# Patient Record
Sex: MALE | Race: WHITE | Employment: OTHER | URBAN - METROPOLITAN AREA
[De-identification: names, ages, dates, MRNs, and addresses within clinical notes are randomized per-mention and may not be internally consistent; named-entity substitution may affect disease eponyms.]

---

## 2017-01-05 ENCOUNTER — TELEPHONE (OUTPATIENT)
Dept: CARDIOLOGY CLINIC | Age: 77
End: 2017-01-05

## 2017-01-05 NOTE — TELEPHONE ENCOUNTER
----- Message from Aaron Penn MD sent at 12/22/2016  8:47 AM EST -----  Regarding: Carotid duplex   Please let the patient know that he did not have any high-grade stenosis. His carotid disease were unchanged compared with previous study  ----- Message -----     From: Tito Park MD     Sent: 12/8/2016   4:41 PM       To:  Aaron Penn MD

## 2017-01-05 NOTE — TELEPHONE ENCOUNTER
Patient was informed that he did not have any high grade stenosis and carotid disease were unchanged compared with prior study. Patient verbalized understanding.

## 2017-01-13 DIAGNOSIS — I25.119 ATHEROSCLEROSIS OF NATIVE CORONARY ARTERY WITH ANGINA PECTORIS, UNSPECIFIED WHETHER NATIVE OR TRANSPLANTED HEART (HCC): ICD-10-CM

## 2017-01-13 RX ORDER — NITROGLYCERIN 0.4 MG/1
0.4 TABLET SUBLINGUAL
Qty: 25 TAB | Refills: 3 | Status: SHIPPED | OUTPATIENT
Start: 2017-01-13 | End: 2018-10-23

## 2017-01-25 ENCOUNTER — HOSPITAL ENCOUNTER (OUTPATIENT)
Dept: LAB | Age: 77
Discharge: HOME | End: 2017-01-25
Payer: MEDICARE

## 2017-01-25 DIAGNOSIS — E78.5 HYPERLIPIDEMIA, UNSPECIFIED HYPERLIPIDEMIA TYPE: ICD-10-CM

## 2017-01-25 DIAGNOSIS — I10 ESSENTIAL HYPERTENSION: ICD-10-CM

## 2017-01-25 DIAGNOSIS — R73.01 ELEVATED FASTING GLUCOSE: ICD-10-CM

## 2017-01-25 LAB
ALBUMIN SERPL BCP-MCNC: 4.2 G/DL (ref 3.4–5)
ALBUMIN/GLOB SERPL: 1.6 {RATIO} (ref 0.8–1.7)
ALP SERPL-CCNC: 99 U/L (ref 45–117)
ALT SERPL-CCNC: 53 U/L (ref 16–61)
ANION GAP BLD CALC-SCNC: 12 MMOL/L (ref 3–18)
AST SERPL W P-5'-P-CCNC: 40 U/L (ref 15–37)
BILIRUB SERPL-MCNC: 1.7 MG/DL (ref 0.2–1)
BUN SERPL-MCNC: 12 MG/DL (ref 7–18)
BUN/CREAT SERPL: 12 (ref 12–20)
CALCIUM SERPL-MCNC: 9.5 MG/DL (ref 8.5–10.1)
CHLORIDE SERPL-SCNC: 109 MMOL/L (ref 100–108)
CHOLEST SERPL-MCNC: 118 MG/DL
CO2 SERPL-SCNC: 25 MMOL/L (ref 21–32)
CREAT SERPL-MCNC: 1 MG/DL (ref 0.6–1.3)
GLOBULIN SER CALC-MCNC: 2.7 G/DL (ref 2–4)
GLUCOSE SERPL-MCNC: 111 MG/DL (ref 74–99)
HDLC SERPL-MCNC: 46 MG/DL (ref 40–60)
HDLC SERPL: 2.6 {RATIO} (ref 0–5)
LDLC SERPL CALC-MCNC: 44.2 MG/DL (ref 0–100)
LIPID PROFILE,FLP: NORMAL
POTASSIUM SERPL-SCNC: 4.2 MMOL/L (ref 3.5–5.5)
PROT SERPL-MCNC: 6.9 G/DL (ref 6.4–8.2)
SODIUM SERPL-SCNC: 146 MMOL/L (ref 136–145)
TRIGL SERPL-MCNC: 139 MG/DL (ref ?–150)
VLDLC SERPL CALC-MCNC: 27.8 MG/DL

## 2017-01-25 PROCEDURE — 80061 LIPID PANEL: CPT | Performed by: FAMILY MEDICINE

## 2017-01-25 PROCEDURE — 36415 COLL VENOUS BLD VENIPUNCTURE: CPT | Performed by: FAMILY MEDICINE

## 2017-01-25 PROCEDURE — 80053 COMPREHEN METABOLIC PANEL: CPT | Performed by: FAMILY MEDICINE

## 2017-02-01 ENCOUNTER — OFFICE VISIT (OUTPATIENT)
Dept: FAMILY MEDICINE CLINIC | Age: 77
End: 2017-02-01

## 2017-02-01 VITALS
SYSTOLIC BLOOD PRESSURE: 144 MMHG | RESPIRATION RATE: 18 BRPM | BODY MASS INDEX: 27.77 KG/M2 | HEIGHT: 72 IN | WEIGHT: 205 LBS | HEART RATE: 63 BPM | OXYGEN SATURATION: 98 % | TEMPERATURE: 96.9 F | DIASTOLIC BLOOD PRESSURE: 80 MMHG

## 2017-02-01 DIAGNOSIS — M54.16 RADICULOPATHY, LUMBAR REGION: ICD-10-CM

## 2017-02-01 DIAGNOSIS — I10 ESSENTIAL HYPERTENSION: Primary | ICD-10-CM

## 2017-02-01 DIAGNOSIS — E78.5 HYPERLIPIDEMIA, UNSPECIFIED HYPERLIPIDEMIA TYPE: ICD-10-CM

## 2017-02-01 NOTE — PROGRESS NOTES
HISTORY OF PRESENT ILLNESS  Sherita  is a 77 y.o. male. Chief Complaint   Patient presents with    Follow-up    Hypertension    Cholesterol Problem    Labs     completed on 1-25-17    Stress       HPI  Pt is here for follow up of Hypertension, and Cholesterol. Pt had labs on 1-25-17. Labs reviewed in detail with pt. Pt feels that his diet has not been great recently as they have needed to eat less healthy foods for the sake of convenience. Pt does not need medication refills today. New concerns today: Pt states that he has been stressed lately due to family members' health issues and a death in the family. He will travel to [place] this spring and may be there for a few months as he and his wife help to settle the estate of his late mother-in-law. ROS  Review of Systems   Constitutional: Negative. HENT: Negative. Respiratory: Negative. Cardiovascular: Negative. All other systems reviewed and are negative. Physical Exam  Nursing note and vitals reviewed. Constitutional: He is oriented to person, place, and time. He appears well-developed and well-nourished. HENT:   Head: Normocephalic and atraumatic. Right Ear: External ear normal.   Left Ear: External ear normal.   Nose: Nose normal.   Eyes: Conjunctivae and EOM are normal.   Neck: Normal range of motion. Neck supple. No JVD present. Carotid bruit is not present. No thyromegaly present. Cardiovascular: Normal rate, regular rhythm, normal heart sounds and intact distal pulses. Exam reveals no gallop and no friction rub. No murmur heard. Pulmonary/Chest: Effort normal and breath sounds normal. He has no wheezes. He has no rhonchi. He has no rales. Abdominal: Soft. Musculoskeletal: Normal range of motion. Neurological: He is alert and oriented to person, place, and time. Coordination normal.   Skin: Skin is warm and dry. Psychiatric: He has a normal mood and affect.  His behavior is normal. Judgment and thought content normal.       ASSESSMENT and PLAN  Philly Ross was seen today for follow-up, hypertension, cholesterol problem, labs and stress. Diagnoses and all orders for this visit:    Essential hypertension  Slightly elevated today. Recheck at f/u prior to travel. Hyperlipidemia, unspecified hyperlipidemia type  Stable, cont pres tx plan. Radiculopathy, lumbar region  Stable, cont pres tx plan.        Follow-up Disposition:  6 wk for f/u htn

## 2017-02-01 NOTE — PROGRESS NOTES
Willian Gonzales 77 y.o. male   Chief Complaint   Patient presents with    Follow-up    Hypertension    Cholesterol Problem    Labs     completed on 1-25-17    Stress         1. Have you been to the ER, urgent care clinic since your last visit? Hospitalized since your last visit? No    2. Have you seen or consulted any other health care providers outside of the [facility] since your last visit? Include any pap smears or colon screening.  No

## 2017-02-01 NOTE — MR AVS SNAPSHOT
Visit Information Date & Time Provider Department Dept. Phone Encounter #  
 2/1/2017  9:00 Candice Zaragoza MD [address] [id number] Your Appointments 3/15/2017  9:30 AM  
Follow Up with Mahi Wiggins MD  
[address] (--) Appt Note: 6 week follow up [address] [place] [address] [phone]  
  
   
 [address] [place] [phone]  
  
    
 8/7/2017  8:40 AM  
Follow Up with Ana Maria Le MD  
[facility] ([address]) Appt Note: Follow up  In 6 mo with Dr. Perdue [place] [id number] [address] [address] [phone] [place] [phone]  
  
    
 8/8/2017  8:00 AM  
PROCEDURE with [facility] IMAGING 1  
[facility] ([facility] SCHEDULING) Appt Note: aaa 2 yr [place] [address] [address]  
[phone] [address] [phone]  
  
    
 8/8/2017  9:00 AM  
PROCEDURE with [facility] IMAGING 1  
[facility] ([facility] SCHEDULING) Appt Note: cv 2 yr [place] [address] [address]  
[phone] 8/17/2017  9:15 AM  
Follow Up with RAHAT Ha  
[facility] ([facility] SCHEDULING) [address] [address]  
[phone]  
  
   
 [address] [address] Upcoming Health Maintenance Date Due  
 GLAUCOMA SCREENING Q2Y 11/17/2016 MEDICARE YEARLY EXAM 11/2/2017 COLONOSCOPY 9/28/2020 DTaP/Tdap/Td series (2 - Td) 11/1/2026 Allergies as of 2/1/2017  Review Complete On: 2/1/2017 By: Mahi Wiggins MD  
  
 Severity Noted Reaction Type Reactions Pollen Extracts  02/05/2015    Sneezing Itchy eyes, runny nose Current Immunizations  Reviewed on 11/1/2016 Name Date Influenza High Dose Vaccine PF 11/1/2016 Influenza Vaccine 9/17/2015, 12/1/2014, 9/16/2013 Influenza Vaccine Whole 10/18/2012 Pneumococcal Polysaccharide (PPSV-23) 11/1/2016 Pneumococcal Vaccine (Unspecified Type) 8/21/2006 TD Vaccine 8/21/2006 Zoster 9/18/2012 Not reviewed this visit You Were Diagnosed With   
  
 Codes Comments Essential hypertension    -  Primary ICD-10-CM: I10 
ICD-9-CM: 401.9 Hyperlipidemia, unspecified hyperlipidemia type     ICD-10-CM: E78.5 ICD-9-CM: 272.4 Radiculopathy, lumbar region     ICD-10-CM: M54.16 
ICD-9-CM: 724.4 Vitals BP Pulse Temp Resp Height(growth percentile) Weight(growth percentile) 144/80 (BP 1 Location: Left arm, BP Patient Position: Sitting) 63 96.9 °F (36.1 °C) (Oral) 18 6' (1.829 m) 205 lb (93 kg) SpO2 BMI Smoking Status 98% 27.8 kg/m2 Former Smoker BMI and BSA Data Body Mass Index Body Surface Area  
 27.8 kg/m 2 2.17 m 2 Preferred Pharmacy Pharmacy Name Phone [address] [facility] [phone] Your Updated Medication List  
  
   
This list is accurate as of: 2/1/17  2:48 PM.  Always use your most recent med list. amLODIPine 5 mg tablet Commonly known as:  Arlyss [place] TAKE 1 TABLET BY MOUTH DAILY  
  
 aspirin delayed-release 81 mg tablet Take  by mouth daily. CALTRATE PLUS PO Take 1 Tab by mouth daily. COQ10  100-100 mg-unit Cap Generic drug:  coenzyme q10-vitamin e Take  by mouth daily. ezetimibe 10 mg tablet Commonly known as:  Donice Ka Take 1 Tab by mouth daily. gabapentin 300 mg capsule Commonly known as:  NEURONTIN  
300 mg two (2) times a day. naproxen 500 mg tablet Commonly known as:  NAPROSYN Take 1 Tab by mouth two (2) times daily (with meals). niacin 1,000 mg Tb24 tab Commonly known as:  Niaspan Take 1 Tab by mouth daily. nitroglycerin 0.4 mg SL tablet Commonly known as:  NITROSTAT  
1 Tab by SubLINGual route every five (5) minutes as needed. omega 3-dha-epa-fish oil 100-160-1,000 mg Cap Take 1,000 mg by mouth daily. rosuvastatin 20 mg tablet Commonly known as:  CRESTOR Take 1 Tab by mouth nightly. sildenafil citrate 100 mg tablet Commonly known as:  VIAGRA Take 1 Tab by mouth as needed. VITAMIN D3 2,000 unit Tab Generic drug:  cholecalciferol (vitamin D3) Take  by mouth. Patient Instructions Please contact our office if you have any questions about your visit today. Introducing \[facility]\"" & HEALTH SERVICES! Dear Demar Cain: Thank you for requesting a [employer] account. Our records indicate that you already have an active [employer] account. You can access your account anytime at [email or web address] Did you know that you can access your hospital and ER discharge instructions at any time in [employer]? You can also review all of your test results from your hospital stay or ER visit. Additional Information If you have questions, please visit the Frequently Asked Questions section of the [employer] website at [email or web address] Remember, [employer] is NOT to be used for urgent needs. For medical emergencies, dial 911. Now available from your iPhone and Android! Please provide this summary of care documentation to your next provider. Your primary care clinician is listed as Tong Baldwin. If you have any questions after today's visit, please call [phone].

## 2017-03-15 ENCOUNTER — OFFICE VISIT (OUTPATIENT)
Dept: FAMILY MEDICINE CLINIC | Age: 77
End: 2017-03-15

## 2017-03-15 VITALS
SYSTOLIC BLOOD PRESSURE: 146 MMHG | TEMPERATURE: 96.8 F | DIASTOLIC BLOOD PRESSURE: 71 MMHG | HEART RATE: 67 BPM | OXYGEN SATURATION: 97 % | RESPIRATION RATE: 18 BRPM | BODY MASS INDEX: 27.63 KG/M2 | HEIGHT: 72 IN | WEIGHT: 204 LBS

## 2017-03-15 DIAGNOSIS — I10 ESSENTIAL HYPERTENSION: ICD-10-CM

## 2017-03-15 DIAGNOSIS — E78.5 HYPERLIPIDEMIA, UNSPECIFIED HYPERLIPIDEMIA TYPE: ICD-10-CM

## 2017-03-15 RX ORDER — AMLODIPINE BESYLATE 5 MG/1
TABLET ORAL
Qty: 90 TAB | Refills: 3 | Status: SHIPPED | OUTPATIENT
Start: 2017-03-15 | End: 2017-06-15 | Stop reason: SDUPTHER

## 2017-03-15 RX ORDER — NIACIN 1000 MG/1
1000 TABLET, EXTENDED RELEASE ORAL DAILY
Qty: 90 TAB | Refills: 3 | Status: SHIPPED | OUTPATIENT
Start: 2017-03-15 | End: 2018-04-20 | Stop reason: SDUPTHER

## 2017-03-15 RX ORDER — EZETIMIBE 10 MG/1
10 TABLET ORAL DAILY
Qty: 90 TAB | Refills: 3 | Status: SHIPPED | OUTPATIENT
Start: 2017-03-15 | End: 2017-07-03 | Stop reason: SDUPTHER

## 2017-03-15 RX ORDER — METHYLPREDNISOLONE 4 MG/1
TABLET ORAL
COMMUNITY
Start: 2017-03-13 | End: 2017-08-07 | Stop reason: ALTCHOICE

## 2017-03-15 NOTE — PROGRESS NOTES
Doroteo Gonzales 77 y.o. male   Chief Complaint   Patient presents with    Follow-up     6 week f/u    Hypertension    Medication Refill    Stress     Due to wife in hospital         1. Have you been to the ER, urgent care clinic since your last visit? Hospitalized since your last visit? No    2. Have you seen or consulted any other health care providers outside of the [address] [place] [address] since your last visit? Include any pap smears or colon screening.  No

## 2017-03-15 NOTE — PROGRESS NOTES
HISTORY OF PRESENT ILLNESS  Tashi Cardona is a 77 y.o. male. Chief Complaint   Patient presents with    Follow-up     6 week f/u    Hypertension    Medication Refill    Stress     Due to wife in hospital       HPI  Pt is here for a 6 week follow up of Hypertension. Pt does need medication refills today. Pt requests printed refills. New concerns today: Pt states that he has been under a lot of stress lately due to his wife's recent illness. She has transitioned from the hospital to rehab. She should be able to return home later this month. Her illness has delayed their travels, so he won't be going oversees next month as planned. He feels that he is handling the stress well. ROS  Review of Systems   Constitutional: Negative. HENT: Negative. Respiratory: Negative. Cardiovascular: Negative. All other systems reviewed and are negative. Physical Exam  Nursing note and vitals reviewed. Constitutional: He is oriented to person, place, and time. He appears well-developed and well-nourished. HENT:   Head: Normocephalic and atraumatic. Right Ear: External ear normal.   Left Ear: External ear normal.   Nose: Nose normal.   Eyes: Conjunctivae and EOM are normal.   Neck: Normal range of motion. Neck supple. No JVD present. Carotid bruit is not present. No thyromegaly present. Cardiovascular: Normal rate, regular rhythm, normal heart sounds and intact distal pulses. Exam reveals no gallop and no friction rub. No murmur heard. Pulmonary/Chest: Effort normal and breath sounds normal. He has no wheezes. He has no rhonchi. He has no rales. Abdominal: Soft. Musculoskeletal: Normal range of motion. Neurological: He is alert and oriented to person, place, and time. Coordination normal.   Skin: Skin is warm and dry. Psychiatric: He has a normal mood and affect.  His behavior is normal. Judgment and thought content normal.       ASSESSMENT and PLAN  Wanda Raphael was seen today for follow-up, hypertension, medication refill and stress. Diagnoses and all orders for this visit:    Hyperlipidemia, unspecified hyperlipidemia type  -     ezetimibe (ZETIA) 10 mg tablet; Take 1 Tab by mouth daily. -     niacin (NIASPAN) 1,000 mg Tb24 tab; Take 1 Tab by mouth daily. Essential hypertension  -     amLODIPine (NORVASC) 5 mg tablet; TAKE 1 TABLET BY MOUTH DAILY  Slightly elevated today. Cont to monitor.   Work on getting adequate rest.      Follow-up Disposition: 3 months; sooner prn

## 2017-06-15 ENCOUNTER — OFFICE VISIT (OUTPATIENT)
Dept: FAMILY MEDICINE CLINIC | Age: 77
End: 2017-06-15

## 2017-06-15 VITALS
RESPIRATION RATE: 18 BRPM | DIASTOLIC BLOOD PRESSURE: 84 MMHG | OXYGEN SATURATION: 98 % | HEIGHT: 72 IN | WEIGHT: 201 LBS | TEMPERATURE: 97 F | SYSTOLIC BLOOD PRESSURE: 146 MMHG | HEART RATE: 65 BPM | BODY MASS INDEX: 27.22 KG/M2

## 2017-06-15 DIAGNOSIS — E78.2 MIXED HYPERLIPIDEMIA: Primary | ICD-10-CM

## 2017-06-15 DIAGNOSIS — I10 ESSENTIAL HYPERTENSION: ICD-10-CM

## 2017-06-15 RX ORDER — AMLODIPINE BESYLATE 10 MG/1
TABLET ORAL
Qty: 90 TAB | Refills: 3 | Status: SHIPPED | OUTPATIENT
Start: 2017-06-15 | End: 2017-06-19 | Stop reason: SDUPTHER

## 2017-06-15 NOTE — PROGRESS NOTES
This is a Subsequent Medicare Annual Wellness Visit providing Personalized Prevention Plan Services (PPPS) (Performed 12 months after initial AWV and PPPS )    I have reviewed the patient's medical history in detail and updated the computerized patient record. History     Past Medical History:   Diagnosis Date    AAA (abdominal aortic aneurysm) ([facility] [address] 03/2010    noted on CT (abdomen)    Abnormal LFT's 12/95, 04/16/03    Acute meniscal tear, lateral 01/2007    s/p arthorscopic surg (Dr. Mayte Grier)    Atypical chest pain 02/04/09    CAD (coronary artery disease), native coronary artery     Cardiac cath 04/13/2009    dLM 30%. mLAD 30%. oD2 30%. pCX 55% (FFR 0.95). mRCA 40%. RPDA 65% (FFR 0.86). EF 65%.  Cardiac echocardiogram 04/03/2007    EF 60%. No RWMA. Gr 1 DDfx. LAE. No significant valvular heart disease.  Cardiac nuclear imaging test, low risk 12/04/2015    Low risk. No ischemia or prior infarction. No WMA. EF 67%. Neg EKG on pharm stress test.    Cardiovascular aorto-iliac duplex 06/29/2015    AAA measures 3.2 x 3.1 cm Trv. (Prev 3.2 x 3.4 cm on 6/19/14). Aneurysm is infrarenal & fusiform w/complex intraluminal thrombus within. Mod bilateral common iliac stenosis.  Carotid duplex 03/16/2016    Mild < 50% BETHEL stenosis. Mod 63-96% LICA stenosis. Similar to study of 6/29/15.  Cholecystitis chronic 03/2008    s/p sue    Diverticulosis     Diverticulosis 9-28-15    DR. 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    Plantar fasciitis     Rhinitis     Right knee pain     Right shoulder pain     Tinea versicolor 7/23/2012    2.8 x 2.9cm infra-renal    Tubular adenoma 9-28-15    Vitamin D deficiency 4/13/2011      Past Surgical History:   Procedure Laterality Date    ENDOSCOPY, COLON, DIAGNOSTIC      normal per patient;     HX ARTHROPLASTY  04/05/10    [place]-left knee    HX CATARACT REMOVAL Bilateral     November 2014    HX CHOLECYSTECTOMY  03/2008    chronic cholecystitis    HX COLONOSCOPY  9-28-15    HX HERNIA REPAIR  1995    L inguinal    HX KNEE ARTHROSCOPY  01/24/07    left knee    HX KNEE ARTHROSCOPY Left 4/16/2014    peripatellar debridement    HX KNEE REPLACEMENT  01/29/2013    [place]    HX MOHS PROCEDURES  11/2008    HX RHINOPLASTY  1983    HX TONSILLECTOMY      HX VASECTOMY  1977    HX WISDOM TEETH EXTRACTION      x4     Current Outpatient Prescriptions   Medication Sig Dispense Refill    methylPREDNISolone (MEDROL DOSEPACK) 4 mg tablet       ezetimibe (ZETIA) 10 mg tablet Take 1 Tab by mouth daily. 90 Tab 3    amLODIPine (NORVASC) 5 mg tablet TAKE 1 TABLET BY MOUTH DAILY 90 Tab 3    niacin (NIASPAN) 1,000 mg Tb24 tab Take 1 Tab by mouth daily. 90 Tab 3    CRESTOR 20 mg tablet TAKE 1 TABLET NIGHTLY 90 Tab 3    nitroglycerin (NITROSTAT) 0.4 mg SL tablet 1 Tab by SubLINGual route every five (5) minutes as needed. 25 Tab 3    gabapentin (NEURONTIN) 300 mg capsule 300 mg two (2) times a day.  naproxen (NAPROSYN) 500 mg tablet Take 1 Tab by mouth two (2) times daily (with meals). 30 Tab 0    cholecalciferol, vitamin D3, (VITAMIN D3) 2,000 unit tab Take  by mouth.  sildenafil citrate (VIAGRA) 100 mg tablet Take 1 Tab by mouth as needed. 20 Tab 3    omega 3-dha-epa-fish oil 100-160-1,000 mg cap Take 1,000 mg by mouth daily.  (Patient taking differently: Take 2,000 mg by mouth daily.) 90 Cap 3  aspirin delayed-release 81 mg tablet Take  by mouth daily.  coenzyme q10-vitamin e (COQ10 ) 100-100 mg-unit Cap Take  by mouth daily.  CALCIUM CARB/VIT D3/MINERALS (CALTRATE PLUS PO) Take 1 Tab by mouth daily.        Allergies   Allergen Reactions    Pollen Extracts Sneezing     Itchy eyes, runny nose     Family History   Problem Relation Age of Onset    Heart Disease Mother     Stroke Father     No Known Problems Sister     No Known Problems Maternal Grandmother     No Known Problems Maternal Grandfather     Dementia Paternal Grandmother     No Known Problems Paternal Grandfather     Cancer Brother      lung CA     Social History   Substance Use Topics    Smoking status: Former Smoker     Packs/day: 0.50     Years: 20.00     Types: Cigarettes     Quit date: 12/10/1995    Smokeless tobacco: Never Used    Alcohol use 4.0 oz/week     7 Glasses of wine, 1 Shots of liquor per week      Comment: social     Patient Active Problem List   Diagnosis Code    Hyperlipidemia E78.5    Coronary atherosclerosis of native coronary artery I25.10    AAA [facility]abdominal aortic aneurysm) ([facility] I71.4    Right knee pain M25.561    Abnormal LFTs (liver function tests) R79.89    Vitamin D deficiency E55.9    CLARKE (dyspnea on exertion) R06.09    Hypertension I10    Hyperglycemia R73.9    Tinea versicolor B36.0    S/P total knee replacement Z96.659    Fatty liver K76.0    PVC's (premature ventricular contractions) I49.3    Carotid artery disease ([facility]) I77.9    Right shoulder pain M25.511    Acute back pain M54.9    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.816    Lumbar neuritis M54.16    DDD (degenerative disc disease), lumbar M51.36    Radiculopathy, lumbar region M54.16    Degeneration of intervertebral disc of lumbar region M51.36    Mixed hyperlipidemia E78.2       Depression Risk Factor Screening:     PHQ over the last two weeks 3/15/2017   PHQ Not Done - Little interest or pleasure in doing things Not at all   Feeling down, depressed or hopeless Not at all   Total Score PHQ 2 0     Alcohol Risk Factor Screening: On any occasion during the past 3 months, have you had more than 4 drinks containing alcohol? Yes    Do you average more than 14 drinks per week? No      Functional Ability and Level of Safety:     Hearing Loss   moderate    Activities of Daily Living   Self-care. Requires assistance with: no ADLs    Fall Risk     Fall Risk Assessment, last 12 mths 3/15/2017   Able to walk? Yes   Fall in past 12 months? No     Abuse Screen   Patient is not abused    Review of Systems   {ros - complete:36770}    Physical Examination     Evaluation of Cognitive Function:  Mood/affect:  {mood:73380}  Appearance: {APPEARANCE:52693::\"age appropriate\"}  Family member/caregiver input: ***    {Exam, Complete:40200}    Patient Care Team:  Jaimie Rutledge MD as PCP - General (Family Practice)  Felicia Shepard MD (Vascular Surgery)  Robinson Tang MD (Cardiology)  RAHAT Martinez (Vascular Surgery)  Shoshana Callaway MD (Orthopedic Surgery)    Advice/Referrals/Counseling   Education and counseling provided:  {Education List, choose as appropriate:44741}      Assessment/Plan   {Assessment and Plan:09436}.

## 2017-06-15 NOTE — PROGRESS NOTES
Ingrid Joymorena Gonzales 69 y.o. male   Chief Complaint   Patient presents with    Follow-up    Hypertension    Cholesterol Problem    Arm Pain     one episode of of tingling and numbness of the right arm while sleeping. 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  Annual Wellness Visit         1. Have you been to the ER, urgent care clinic since your last visit? Hospitalized since your last visit? No    2. Have you seen or consulted any other health care providers outside of the [address] [place] [address] since your last visit? Include any pap smears or colon screening.  No

## 2017-06-16 NOTE — PROGRESS NOTES
Chief Complaint   Patient presents with    Follow-up    Hypertension    Cholesterol Problem    Arm Pain     one episode of of tingling and numbness of the right arm while sleeping. 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 HISTORY OF PRESENT ILLNESS  Britton Hurtado is a 69 y.o. male. HPI  Pt is here for follow up of htn and lipids. No recent labs. New concerns today: pt reports that his wife continues to suffer from failing health. He has been her primary caregiver. She has been having multiple appts each week and will start rehab soon. Pt declines MWV today; he needs to take his wife to an appt. ROS  Review of Systems   Constitutional: Negative. HENT: Negative. Respiratory: Negative. Cardiovascular: Negative. All other systems reviewed and are negative. Physical Exam  Nursing note and vitals reviewed. Constitutional: He is oriented to person, place, and time. He appears well-developed and well-nourished. HENT:   Head: Normocephalic and atraumatic. Right Ear: External ear normal.   Left Ear: External ear normal.   Nose: Nose normal.   Eyes: Conjunctivae and EOM are normal.   Neck: Normal range of motion. Neck supple. No JVD present. Carotid bruit is not present. No thyromegaly present. Cardiovascular: Normal rate, regular rhythm, normal heart sounds and intact distal pulses. Exam reveals no gallop and no friction rub. No murmur heard. Pulmonary/Chest: Effort normal and breath sounds normal. He has no wheezes. He has no rhonchi. He has no rales. Abdominal: Soft. Musculoskeletal: Normal range of motion. Neurological: He is alert and oriented to person, place, and time. Coordination normal.   Skin: Skin is warm and dry. Psychiatric: He has a normal mood and affect.  His behavior is normal. Judgment and thought content normal.       ASSESSMENT and PLAN  Jefferson Medina was seen today for follow-up, hypertension, cholesterol problem and arm pain. Diagnoses and all orders for this visit:    Mixed hyperlipidemia  -     METABOLIC PANEL, COMPREHENSIVE; Future  -     LIPID PANEL;  Future    Essential hypertension  -     Increase amLODIPine (NORVASC) to 10 mg tablet; TAKE 1 TABLET BY MOUTH DAILY      Follow-up Disposition: 3 months; sooner prn

## 2017-06-19 DIAGNOSIS — I10 ESSENTIAL HYPERTENSION: ICD-10-CM

## 2017-06-19 RX ORDER — AMLODIPINE BESYLATE 10 MG/1
TABLET ORAL
Qty: 90 TAB | Refills: 3 | Status: SHIPPED | OUTPATIENT
Start: 2017-06-19 | End: 2018-04-20 | Stop reason: SDUPTHER

## 2017-07-03 DIAGNOSIS — E78.5 HYPERLIPIDEMIA, UNSPECIFIED HYPERLIPIDEMIA TYPE: ICD-10-CM

## 2017-07-03 NOTE — TELEPHONE ENCOUNTER
Requested Prescriptions     Pending Prescriptions Disp Refills    ezetimibe (ZETIA) 10 mg tablet 90 Tab 3     Sig: Take 1 Tab by mouth daily.

## 2017-07-05 RX ORDER — EZETIMIBE 10 MG/1
10 TABLET ORAL DAILY
Qty: 90 TAB | Refills: 3 | Status: SHIPPED | OUTPATIENT
Start: 2017-07-05 | End: 2018-07-23 | Stop reason: SDUPTHER

## 2017-08-07 ENCOUNTER — OFFICE VISIT (OUTPATIENT)
Dept: CARDIOLOGY CLINIC | Age: 77
End: 2017-08-07

## 2017-08-07 VITALS
OXYGEN SATURATION: 97 % | DIASTOLIC BLOOD PRESSURE: 66 MMHG | WEIGHT: 205 LBS | HEART RATE: 74 BPM | SYSTOLIC BLOOD PRESSURE: 126 MMHG | HEIGHT: 72 IN | BODY MASS INDEX: 27.77 KG/M2

## 2017-08-07 DIAGNOSIS — I49.3 PVC'S (PREMATURE VENTRICULAR CONTRACTIONS): ICD-10-CM

## 2017-08-07 DIAGNOSIS — I10 ESSENTIAL HYPERTENSION: ICD-10-CM

## 2017-08-07 DIAGNOSIS — I25.10 ATHEROSCLEROSIS OF NATIVE CORONARY ARTERY OF NATIVE HEART WITHOUT ANGINA PECTORIS: Primary | ICD-10-CM

## 2017-08-07 DIAGNOSIS — E78.2 MIXED HYPERLIPIDEMIA: ICD-10-CM

## 2017-08-07 NOTE — PROGRESS NOTES
HISTORY OF PRESENT ILLNESS  Jessy Goins is a 69 y.o. male. Shortness of Breath   Associated symptoms include chest pain. Pertinent negatives include no fever, no headaches, no neck pain, no cough, no wheezing, no PND, no orthopnea, no vomiting, no abdominal pain, no rash, no leg swelling and no claudication. Chest Pain (Angina)    Associated symptoms include shortness of breath. Pertinent negatives include no abdominal pain, no back pain, no claudication, no cough, no dizziness, no fever, no headaches, no nausea, no orthopnea, no palpitations, no PND and no vomiting. Patient presents for a scheduled followup visit. He has a known history of coronary artery disease, status post a non-ST elevation myocardial infarction in 2009. He underwent a cardiac catheterization at that time and was found to have moderate, but nonobstructive two-vessel coronary disease involving his left circumflex and a right-sided posterior descending artery, that were assessed by pressure wire. The patient has been maintained on medical therapy since that time. The patient last underwent a pharmacological nuclear stress test in December 2015, which was a normal study, showing no ischemia, normal left ventricle ejection fraction. EF 67%. He has a history of mild to moderate carotid artery disease, but last underwent a carotid duplex in December 2016 which only showed mild disease bilaterally. The patient was last seen in the office approximately 9 months ago, since last visit, he describes a few episodes of chest pressure which occur when he is under stress, but he has not noted these episodes with any exertional activity. No major change in his activity tolerance. He does complain of chronic exertional dyspnea which has been stable for the past few years. No leg swelling or claudication. No palpitations, dizziness or sam syncope.     Past Medical History:   Diagnosis Date    AAA (abdominal aortic aneurysm) ([facility] [address] 03/2010    noted on CT (abdomen)    Abnormal LFT's 12/95, 04/16/03    Acute meniscal tear, lateral 01/2007    s/p arthorscopic surg (Dr. Anton Burgos)    Atypical chest pain 02/04/09    CAD (coronary artery disease), native coronary artery     Cardiac cath 04/13/2009    dLM 30%. mLAD 30%. oD2 30%. pCX 55% (FFR 0.95). mRCA 40%. RPDA 65% (FFR 0.86). EF 65%.  Cardiac echocardiogram 04/03/2007    EF 60%. No RWMA. Gr 1 DDfx. LAE. No significant valvular heart disease.  Cardiac nuclear imaging test, low risk 12/04/2015    Low risk. No ischemia or prior infarction. No WMA. EF 67%. Neg EKG on pharm stress test.    Cardiovascular aorto-iliac duplex 06/29/2015    AAA measures 3.2 x 3.1 cm Trv. (Prev 3.2 x 3.4 cm on 6/19/14). Aneurysm is infrarenal & fusiform w/complex intraluminal thrombus within. Mod bilateral common iliac stenosis.  Carotid duplex 03/16/2016    Mild < 50% BETHEL stenosis. Mod 98-03% LICA stenosis. Similar to study of 6/29/15.  Cholecystitis chronic 03/2008    s/p sue    Diverticulosis     Diverticulosis 9-28-15    DR. 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 90 Tab 3    amLODIPine (NORVASC) 10 mg tablet TAKE 1 TABLET BY MOUTH DAILY 90 Tab 3    niacin (NIASPAN) 1,000 mg Tb24 tab Take 1 Tab by mouth daily. 90 Tab 3    CRESTOR 20 mg tablet TAKE 1 TABLET NIGHTLY 90 Tab 3    nitroglycerin (NITROSTAT) 0.4 mg SL tablet 1 Tab by SubLINGual route every five (5) minutes as needed. 25 Tab 3    gabapentin (NEURONTIN) 300 mg capsule 300 mg two (2) times a day.  naproxen (NAPROSYN) 500 mg tablet Take 1 Tab by mouth two (2) times daily (with meals). 30 Tab 0    cholecalciferol, vitamin D3, (VITAMIN D3) 2,000 unit tab Take  by mouth.  sildenafil citrate (VIAGRA) 100 mg tablet Take 1 Tab by mouth as needed. 20 Tab 3    omega 3-dha-epa-fish oil 100-160-1,000 mg cap Take 1,000 mg by mouth daily. (Patient taking differently: Take 2,000 mg by mouth daily.) 90 Cap 3    aspirin delayed-release 81 mg tablet Take  by mouth daily.  coenzyme q10-vitamin e (COQ10 ) 100-100 mg-unit Cap Take  by mouth daily.  CALCIUM CARB/VIT D3/MINERALS (CALTRATE PLUS PO) Take 1 Tab by mouth daily. Allergies   Allergen Reactions    Pollen Extracts Sneezing     Itchy eyes, runny nose        Social History   Substance Use Topics    Smoking status: Former Smoker     Packs/day: 0.50     Years: 20.00     Types: Cigarettes     Quit date: 12/10/1995    Smokeless tobacco: Never Used    Alcohol use 4.0 oz/week     7 Glasses of wine, 1 Shots of liquor per week      Comment: social           Review of Systems   Constitutional: Negative for chills, fever and weight loss. HENT: Negative for nosebleeds. Eyes: Negative for blurred vision and double vision. Respiratory: Positive for shortness of breath. Negative for cough and wheezing. Cardiovascular: Positive for chest pain. Negative for palpitations, orthopnea, claudication, leg swelling and PND.    Gastrointestinal: Negative for abdominal pain, heartburn, nausea and vomiting. Genitourinary: Negative for dysuria and hematuria. Musculoskeletal: Negative for back pain, falls, joint pain, myalgias and neck pain. Skin: Negative for rash. Neurological: Negative for dizziness, focal weakness and headaches. Endo/Heme/Allergies: Does not bruise/bleed easily. Psychiatric/Behavioral: Negative for substance abuse. Visit Vitals    /66    Pulse 74    Ht 6' (1.829 m)    Wt 93 kg (205 lb)    SpO2 97%    BMI 27.8 kg/m2      Physical Exam   Constitutional: He is oriented to person, place, and time. He appears well-developed and well-nourished. HENT:   Head: Normocephalic and atraumatic. Eyes: Conjunctivae are normal.   Neck: Neck supple. No JVD present. Carotid bruit is not present. Cardiovascular: Normal rate, regular rhythm, S1 normal, S2 normal and normal pulses. Exam reveals no gallop and no S3. No murmur heard. Pulmonary/Chest: Breath sounds normal. He has no wheezes. He has no rales. Abdominal: Soft. Bowel sounds are normal. There is no tenderness. Musculoskeletal: He exhibits no edema. Neurological: He is alert and oriented to person, place, and time. Skin: Skin is warm and dry. EKG: Normal sinus rhythm, low voltage in the limb leads,  normal axis, normal QTc interval, frequent atrial premature contractions, no ST or T wave abnormalities. Compared to previous EKG, atrial premature contractions are now present. ASSESSMENT and PLAN    Coronary disease. Patient has moderate 2 vessel disease last assessed by cardiac catheterization in 2009, involving nonobstructive lesions in the proximal left circumflex in the right-sided PDA. He does have a history of a non-ST elevation myocardial infarction back in 2009, for which he was briefly treated with Plavix. He is now maintained on aspirin and a statin, and he has been intolerant to beta blockers because of bradycardia.   He last underwent a negative pharmacologic nuclear stress test in December 2015. He describes a few episodes of chest pressure which were more related to stress and not exertion. If he does have more frequent episodes of this, I would recommend a repeat exercise nuclear stress test.    Hypertension. Patient blood pressure remains well controlled on his current regimen, all of which I would continue. Dyslipidemia: Patient is now on Crestor 20 mg daily, Niaspan 1000 mg daily, Fish oil, and Zetia. His goal LDL should be as close to 70 as possible and this is been closely followed by his PCP. Historically it has been well controlled on this regimen. Carotid artery disease. This was only mild on his most recent carotid scan from December 2016. This can likely be reassessed every other year.     Followup in 9 months time, sooner if needed

## 2017-08-07 NOTE — PROGRESS NOTES
1. Have you been to the ER, urgent care clinic since your last visit? Hospitalized since your last visit? No     2. Have you seen or consulted any other health care providers outside of the [address] [place] [address] since your last visit? Include any pap smears or colon screening.  No

## 2017-08-07 NOTE — MR AVS SNAPSHOT
Visit Information Date & Time Provider Department Dept. Phone Encounter #  
 8/7/2017  8:40 AM Solange England MD [facility] [id number] Your Appointments 8/8/2017  8:00 AM  
PROCEDURE with [facility] 1 [facility] ([address] [place] [address]) Appt Note: aaa 1 yr [place]; .  
 [address] [place], [place] [address] [address] [facility] [address]  
[phone] [address] [facility]  
  
    
 8/8/2017  9:00 AM  
PROCEDURE with [facility] 1 [facility] ([address] [place] [address]) Appt Note: cv 1 yr [place]; .  
 [address] [place], [place] [address] [facility] [address]  
[phone] 8/17/2017  9:15 AM  
Follow Up with RAHAT [facility] ([address] [place] [address]) Appt Note: 1 year fu after studies at [facility] on 8/8/17 with prep; rescheduled from [place] schedule [address] [facility] [address] [facility] [address]  
[phone] [address] [facility] [address] [facility] 9/18/2017  9:45 AM  
Follow Up with Rj Chandra MD  
[address] [place] [address] (--) Appt Note: Follow up [address] [address] [phone]  
  
   
 [address] [place] [id number]  
  
    
 5/7/2018  8:20 AM  
Follow Up with Solange England MD  
[facility] ([address] [place] [address]) Appt Note: 9 month follow up [facility] [address] [address] [address] [phone] [address] [place] [address] [address] Upcoming Health Maintenance Date Due INFLUENZA AGE 9 TO ADULT 8/1/2017 GLAUCOMA SCREENING Q2Y 6/30/2018* MEDICARE YEARLY EXAM 11/2/2017 COLONOSCOPY 9/28/2020 DTaP/Tdap/Td series (2 - Td) 11/1/2026 *Topic was postponed. The date shown is not the original due date. Allergies as of 8/7/2017  Review Complete On: 6/15/2017 By: Wilman Self MD  
  
 Severity Noted Reaction Type Reactions Pollen Extracts  02/05/2015    Sneezing Itchy eyes, runny nose Current Immunizations  Reviewed on 11/1/2016 Name Date Influenza High Dose Vaccine PF 11/1/2016 Influenza Vaccine 9/17/2015, 12/1/2014, 9/16/2013 Influenza Vaccine Whole 10/18/2012 Pneumococcal Polysaccharide (PPSV-23) 11/1/2016 TD Vaccine 8/21/2006 ZZZ-RETIRED (DO NOT USE) Pneumococcal Vaccine (Unspecified Type) 8/21/2006 Zoster 9/18/2012 Not reviewed this visit You Were Diagnosed With   
  
 Codes Comments PVC's (premature ventricular contractions)    -  Primary ICD-10-CM: I49.3 ICD-9-CM: 427.69 Mixed hyperlipidemia     ICD-10-CM: E78.2 ICD-9-CM: 272.2 Essential hypertension     ICD-10-CM: I10 
ICD-9-CM: 401.9 Hyperlipidemia, unspecified hyperlipidemia type     ICD-10-CM: E78.5 ICD-9-CM: 272.4 Atherosclerosis of native coronary artery of native heart without angina pectoris     ICD-10-CM: I25.10 ICD-9-CM: 414.01   
 CLARKE (dyspnea on exertion)     ICD-10-CM: R06.09 
ICD-9-CM: 786.09 Vitals BP Pulse Height(growth percentile) Weight(growth percentile) SpO2 BMI  
 126/66 74 6' (1.829 m) 205 lb (93 kg) 97% 27.8 kg/m2 Smoking Status Former Smoker Vitals History BMI and BSA Data Body Mass Index Body Surface Area  
 27.8 kg/m 2 2.17 m 2 Preferred Pharmacy Pharmacy Name Phone [address] [facility] [phone] Your Updated Medication List  
  
   
This list is accurate as of: 8/7/17  9:14 AM.  Always use your most recent med list. amLODIPine 10 mg tablet Commonly known as:  [place] TAKE 1 TABLET BY MOUTH DAILY  
  
 aspirin delayed-release 81 mg tablet Take  by mouth daily. CALTRATE PLUS PO Take 1 Tab by mouth daily. COQ10  100-100 mg-unit Cap Generic drug:  coenzyme q10-vitamin e Take  by mouth daily. CRESTOR 20 mg tablet Generic drug:  rosuvastatin TAKE 1 TABLET NIGHTLY  
  
 ezetimibe 10 mg tablet Commonly known as:  [place] More Take 1 Tab by mouth daily. gabapentin 300 mg capsule Commonly known as:  NEURONTIN  
300 mg two (2) times a day. naproxen 500 mg tablet Commonly known as:  NAPROSYN Take 1 Tab by mouth two (2) times daily (with meals). niacin 1,000 mg Tb24 tab Commonly known as:  Niaspan Take 1 Tab by mouth daily. nitroglycerin 0.4 mg SL tablet Commonly known as:  NITROSTAT  
1 Tab by SubLINGual route every five (5) minutes as needed. omega 3-dha-epa-fish oil 100-160-1,000 mg Cap Take 1,000 mg by mouth daily. sildenafil citrate 100 mg tablet Commonly known as:  VIAGRA Take 1 Tab by mouth as needed. VITAMIN D3 2,000 unit Tab Generic drug:  cholecalciferol (vitamin D3) Take  by mouth. We Performed the Following AMB POC EKG ROUTINE W/ 12 LEADS, INTER & REP [68348 CPT(R)] Introducing \[facility] [place] [facility]\"" & HEALTH SERVICES! Dear Mohini Valdez: Thank you for requesting a [employer] account. Our records indicate that you already have an active [employer] account. You can access your account anytime at [email or web address] Did you know that you can access your hospital and ER discharge instructions at any time in [employer]? You can also review all of your test results from your hospital stay or ER visit. Additional Information If you have questions, please visit the Frequently Asked Questions section of the [employer] website at [email or web address] Remember, [employer] is NOT to be used for urgent needs. For medical emergencies, dial 911. Now available from your iPhone and Android! Please provide this summary of care documentation to your next provider. Your primary care clinician is listed as Leeanna Cole. If you have any questions after today's visit, please call [phone].

## 2017-08-08 ENCOUNTER — OFFICE VISIT (OUTPATIENT)
Dept: VASCULAR SURGERY | Age: 77
End: 2017-08-08

## 2017-08-08 DIAGNOSIS — I71.40 ABDOMINAL AORTIC ANEURYSM WITHOUT RUPTURE: ICD-10-CM

## 2017-08-08 DIAGNOSIS — I77.9 BILATERAL CAROTID ARTERY DISEASE (HCC): ICD-10-CM

## 2017-08-08 NOTE — PROCEDURES
[facility]   *** FINAL REPORT ***    Name: Indu George  MRN: [id number]        Outpatient  : 26 Mar 1940  HIS Order #: [id number]  [address] [facility] Visit #: [id number]  Date: 08 Aug 2017    TYPE OF TEST: Cerebrovascular Duplex    REASON FOR TEST  Carotid disease    Right Carotid:-             Proximal               Mid                 Distal  cm/s  Systolic  Diastolic  Systolic  Diastolic  Systolic  Diastolic  CCA:     69.8      18.0                            50.0      11.0  Bulb:   118.0      29.0  ECA:    102.0      12.0  ICA:    120.0      22.0       85.0      16.0       79.0      19.0  ICA/CCA:  1.3       1.2    ICA Stenosis: <50%    Right Vertebral:-  Finding: Antegrade  Sys:       54.0  Kristina:       10.0    Right Subclavian:    Left Carotid:-            Proximal                Mid                 Distal  cm/s  Systolic  Diastolic  Systolic  Diastolic  Systolic  Diastolic  CCA:     40.5      14.0                            65.0      12.0  Bulb:   178.0      47.0  ECA:    103.0       9.0  ICA:    120.0      23.0      105.0      20.0       67.0      18.0  ICA/CCA:  1.6       1.6    ICA Stenosis: 50-69%    Left Vertebral:-  Finding: Antegrade  Sys:       54.0  Kristina:       13.0    Left Subclavian:    INTERPRETATION/FINDINGS  Duplex images were obtained using 2-D gray scale, color flow and  spectral doppler analysis. 1. Mild plaquing of the right internal carotid artery in the less than   50% range. 2. Moderate 50-69% stenosis in the left internal carotid artery. 3. No significant stenosis in the external carotid arteries  bilaterally. 4. Antegrade flow in both vertebral arteries. Plaque Morphology:  1. Heterogeneous plaque in the bulb and right ICA. 2. Heterogeneous plaque in the bulb and left ICA. No significant changes when compared to previous studies on 16  or 06/29/15. ADDITIONAL COMMENTS    I have personally reviewed the data relevant to the interpretation of  this  study.     TECHNOLOGIST: Mayank Gordon Michele Lamas RVT, BS  Signed: 08/08/2017 12:03 PM    PHYSICIAN: Samantha Rawls D.O.   Signed: 08/08/2017 02:07 PM

## 2017-08-08 NOTE — PROCEDURES
[facility]   *** FINAL REPORT ***    Name: Enoch Moore  MRN: [id number]        Outpatient  : 26 Mar 1940  HIS Order #: [id number]  [address] [facility] Visit #: [id number]  Date: 08 Aug 2017    TYPE OF TEST: Aorto-Iliac Duplex    REASON FOR TEST  AAA    B-Mode:-                 (cm)   1     2     3  Aortic diameter:         AP:     2.1   2.1   3.3                           TV:     2.1   2.1   3.2  Common iliac diameter:   Right: 1.50                           Left:  1.50    Abdominal aortic aneuysm:-  Location:                Infrarenal  Type:                    Fusiform  Distance from SMA (cm):    Duplex:-                           PSV  Stenosis                           ----- --------------------  Aorta: (1)                96.0 Normal         (2)                96.0         (3)               100.0    Right common iliac:      187.0 Normal  Right external iliac:    102.0 Normal    Left common iliac:       155.0 Normal  Left external iliac:     113.0 Normal    INTERPRETATION/FINDINGS  Duplex images were obtained using 2-D gray scale, color flow and  spectral doppler analysis. 1. There has been no significant increase in the size of the abdominal   aortic aneurysm as compared to the previous visit on 16 with a  measurement of 3.2 x 3.3 cm Trv. The aneurysm now measures 3.3 x 3.2  cm Trv.  2. The aneurysm is infrarenal and fusiform. Complex intramural  thrombus within the aneurysm and bilateral common iliac arteries. 3. Continued evdience of elevated velocities noted in bilateral common   iliac arteries. 4. Celiac, SMA and renal origins patent. ADDITIONAL COMMENTS  Celiac: 185 cm/sec   SMA: 193 cm/sec  RRA: 105 cm/sec   LRA: 89cm/sec    I have personally reviewed the data relevant to the interpretation of  this  study. TECHNOLOGIST: Yee Dumas RVT, CECILIA  Signed: 2017 12:39 PM    PHYSICIAN: Daly Price D.O.   Signed: 2017 02:07 PM

## 2017-08-17 ENCOUNTER — OFFICE VISIT (OUTPATIENT)
Dept: VASCULAR SURGERY | Age: 77
End: 2017-08-17

## 2017-08-17 VITALS
BODY MASS INDEX: 27.77 KG/M2 | HEIGHT: 72 IN | RESPIRATION RATE: 16 BRPM | HEART RATE: 68 BPM | DIASTOLIC BLOOD PRESSURE: 72 MMHG | WEIGHT: 205 LBS | SYSTOLIC BLOOD PRESSURE: 134 MMHG

## 2017-08-17 DIAGNOSIS — I77.9 BILATERAL CAROTID ARTERY DISEASE (HCC): ICD-10-CM

## 2017-08-17 DIAGNOSIS — I71.40 ABDOMINAL AORTIC ANEURYSM (AAA) WITHOUT RUPTURE: Primary | ICD-10-CM

## 2017-08-17 NOTE — PROGRESS NOTES
[address]    Chief Complaint   Patient presents with    Coronary Artery Disease       History and Physical    Mr Carolina Schwartz is here to follow up on his AAA and carotid stenosis. He had requested baseline screening a few years ago when it came up that he had relatives getting ready for elective repairs.    He is doing well without complaints   He is leaving town today to go visit his mother who just turned 8 years old! Health is stable, no changes in meds  Denies claudication symptoms    Past Medical History:   Diagnosis Date    AAA (abdominal aortic aneurysm) ([address] 03/2010    noted on CT (abdomen)    Abnormal LFT's 12/95, 04/16/03    Acute meniscal tear, lateral 01/2007    s/p arthorscopic surg (Dr. Mendoza Rodriguez)    Atypical chest pain 02/04/09    CAD (coronary artery disease), native coronary artery     Cardiac cath 04/13/2009    dLM 30%. mLAD 30%. oD2 30%. pCX 55% (FFR 0.95). mRCA 40%. RPDA 65% (FFR 0.86). EF 65%.  Cardiac echocardiogram 04/03/2007    EF 60%. No RWMA. Gr 1 DDfx. LAE. No significant valvular heart disease.  Cardiac nuclear imaging test, low risk 12/04/2015    Low risk. No ischemia or prior infarction. No WMA. EF 67%. Neg EKG on pharm stress test.    Cardiovascular aorto-iliac duplex 06/29/2015    AAA measures 3.2 x 3.1 cm Trv. (Prev 3.2 x 3.4 cm on 6/19/14). Aneurysm is infrarenal & fusiform w/complex intraluminal thrombus within. Mod bilateral common iliac stenosis.  Carotid duplex 03/16/2016    Mild < 50% BETHEL stenosis. Mod 52-79% LICA stenosis. Similar to study of 6/29/15.  Cholecystitis chronic 03/2008    s/p sue    Diverticulosis     Diverticulosis 9-28-15    DR. NAPOLEON ALBERTOD (degenerative joint disease) of lumbar spine 12/18/01    CLARKE (Dyspnea on Exertion) 03/2007    echo and stress WNL    ED (erectile dysfunction) 11/11/04    Fatty liver 4/2012    noted on CT (abdomen)    Fibrosis of knee joint March 2014    peripatellar fibrosis, left knee replacement    H/O: rotator cuff tear 09/25/08    History of colon polyps     Hypercholesterolemia     Hypertension 2009    Non-STEMI (non-ST elevated myocardial infarction) (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.816    Lumbar neuritis M54.16    DDD (degenerative disc disease), lumbar M51.36    Radiculopathy, lumbar region M54.16    Degeneration of intervertebral disc of lumbar region M51.36    Mixed hyperlipidemia E78.2     Past Surgical History:   Procedure Laterality Date    ENDOSCOPY, COLON, DIAGNOSTIC      normal per patient;     HX ARTHROPLASTY  04/05/10    [place]-left knee    HX CATARACT REMOVAL Bilateral     November 2014    HX CHOLECYSTECTOMY  03/2008    chronic cholecystitis    HX COLONOSCOPY  9-28-15    HX HERNIA REPAIR  1995    L inguinal    HX KNEE ARTHROSCOPY  01/24/07    left knee    HX KNEE ARTHROSCOPY Left 4/16/2014    peripatellar debridement    HX KNEE REPLACEMENT  01/29/2013    [facility]    HX MOHS PROCEDURES 11/2008    HX RHINOPLASTY  1983    HX TONSILLECTOMY      HX VASECTOMY  1977    HX WISDOM TEETH EXTRACTION      x4     Current Outpatient Prescriptions   Medication Sig Dispense Refill    ezetimibe (ZETIA) 10 mg tablet Take 1 Tab by mouth daily. 90 Tab 3    amLODIPine (NORVASC) 10 mg tablet TAKE 1 TABLET BY MOUTH DAILY 90 Tab 3    niacin (NIASPAN) 1,000 mg Tb24 tab Take 1 Tab by mouth daily. 90 Tab 3    CRESTOR 20 mg tablet TAKE 1 TABLET NIGHTLY 90 Tab 3    nitroglycerin (NITROSTAT) 0.4 mg SL tablet 1 Tab by SubLINGual route every five (5) minutes as needed. 25 Tab 3    gabapentin (NEURONTIN) 300 mg capsule 300 mg two (2) times a day.  naproxen (NAPROSYN) 500 mg tablet Take 1 Tab by mouth two (2) times daily (with meals). 30 Tab 0    cholecalciferol, vitamin D3, (VITAMIN D3) 2,000 unit tab Take  by mouth.  sildenafil citrate (VIAGRA) 100 mg tablet Take 1 Tab by mouth as needed. 20 Tab 3    omega 3-dha-epa-fish oil 100-160-1,000 mg cap Take 1,000 mg by mouth daily. (Patient taking differently: Take 2,000 mg by mouth daily.) 90 Cap 3    aspirin delayed-release 81 mg tablet Take  by mouth daily.  coenzyme q10-vitamin e (COQ10 ) 100-100 mg-unit Cap Take  by mouth daily.  CALCIUM CARB/VIT D3/MINERALS (CALTRATE PLUS PO) Take 1 Tab by mouth daily.        Allergies   Allergen Reactions    Pollen Extracts Sneezing     Itchy eyes, runny nose       Review of Systems    Review of Systems - History obtained from the patient  General ROS: negative  Ophthalmic ROS: negative  Psychological ROS: negative  Respiratory ROS: no cough, shortness of breath, or wheezing  Cardiovascular ROS: no chest pain or dyspnea on exertion  Gastrointestinal ROS: negative  Musculoskeletal ROS: knee pain  Neurological ROS: no TIA or stroke symptoms  Dermatological ROS: negative  Vascular ROS: no claudication    Physical   Visit Vitals    /72 (BP 1 Location: Left arm, BP Patient Position: Sitting)    Pulse 68  Resp 16    Ht 6' (1.829 m)    Wt 205 lb (93 kg)    BMI 27.8 kg/m2     General:   Alert, cooperative, no distress.     Head:   Normocephalic, without obvious abnormality, atraumatic.     Eyes:  Conjunctivae/corneas clear. Pupils equal, round, reactive to light. Extraocular movements intact.     Neck:   No bruits     Lungs:   Clear to auscultation bilaterally.     Heart:   Regular rate and rhythm, S1, S2 normal     Extremities:   Extremities normal, atraumatic, no cyanosis or edema.     Pulses:   2+ and symmetric all extremities.     Skin:   Skin color, texture, turgor normal. No rashes or lesions.           Vascular studies:  1. Mild plaquing of the right internal carotid artery in the less than   50% range. 2. Moderate 50-69% stenosis in the left internal carotid artery. 3. No significant stenosis in the external carotid arteries  bilaterally. 4. Antegrade flow in both vertebral arteries. Plaque Morphology:  1. Heterogeneous plaque in the bulb and right ICA. 2. Heterogeneous plaque in the bulb and left ICA. No significant changes when compared to previous studies on 03/16/16  or 06/29/15. 1. There has been no significant increase in the size of the abdominal   aortic aneurysm as compared to the previous visit on 07/06/16 with a  measurement of 3.2 x 3.3 cm Trv. The aneurysm now measures 3.3 x 3.2  cm Trv.  2. The aneurysm is infrarenal and fusiform. Complex intramural  thrombus within the aneurysm and bilateral common iliac arteries. 3. Continued evdience of elevated velocities noted in bilateral common   iliac arteries. 4. Celiac, SMA and renal origins patent    Impression/Plan:     ICD-10-CM ICD-9-CM    1.  Abdominal aortic aneurysm (AAA) without rupture ([facility] [address] I71.4 441.4 DUPLEX AORTA ILIAC GRAFT COMPLETE AMB   2. Bilateral carotid artery disease (HCC) I77.9 447.9 DUPLEX CAROTID BILATERAL AMB     Orders Placed This Encounter    DUPLEX CAROTID BILATERAL AMB    DUPLEX AORTA ILIAC GRAFT COMPLETE AMB     Reviewed stability of results and continued follow up can be done yearly  Continue with healthy lifestyle and risk factor control, which were reviewed    Follow-up Disposition:  Return in about 1 year (around 8/17/2018). RAHAT Zazueta    Portions of this note have been entered using voice recognition software.

## 2017-09-07 ENCOUNTER — HOSPITAL ENCOUNTER (OUTPATIENT)
Dept: LAB | Age: 77
Discharge: HOME | End: 2017-09-07
Payer: MEDICARE

## 2017-09-07 DIAGNOSIS — E78.2 MIXED HYPERLIPIDEMIA: ICD-10-CM

## 2017-09-07 LAB
ALBUMIN SERPL-MCNC: 4.2 G/DL (ref 3.4–5)
ALBUMIN/GLOB SERPL: 1.6 {RATIO} (ref 0.8–1.7)
ALP SERPL-CCNC: 105 U/L (ref 45–117)
ALT SERPL-CCNC: 39 U/L (ref 16–61)
ANION GAP SERPL CALC-SCNC: 9 MMOL/L (ref 3–18)
AST SERPL-CCNC: 29 U/L (ref 15–37)
BILIRUB SERPL-MCNC: 2.3 MG/DL (ref 0.2–1)
BUN SERPL-MCNC: 16 MG/DL (ref 7–18)
BUN/CREAT SERPL: 15 (ref 12–20)
CALCIUM SERPL-MCNC: 9.4 MG/DL (ref 8.5–10.1)
CHLORIDE SERPL-SCNC: 109 MMOL/L (ref 100–108)
CHOLEST SERPL-MCNC: 106 MG/DL
CO2 SERPL-SCNC: 28 MMOL/L (ref 21–32)
CREAT SERPL-MCNC: 1.07 MG/DL (ref 0.6–1.3)
GLOBULIN SER CALC-MCNC: 2.7 G/DL (ref 2–4)
GLUCOSE SERPL-MCNC: 134 MG/DL (ref 74–99)
HDLC SERPL-MCNC: 49 MG/DL (ref 40–60)
HDLC SERPL: 2.2 {RATIO} (ref 0–5)
LDLC SERPL CALC-MCNC: 34.6 MG/DL (ref 0–100)
LIPID PROFILE,FLP: NORMAL
POTASSIUM SERPL-SCNC: 4.3 MMOL/L (ref 3.5–5.5)
PROT SERPL-MCNC: 6.9 G/DL (ref 6.4–8.2)
SODIUM SERPL-SCNC: 146 MMOL/L (ref 136–145)
TRIGL SERPL-MCNC: 112 MG/DL (ref ?–150)
VLDLC SERPL CALC-MCNC: 22.4 MG/DL

## 2017-09-07 PROCEDURE — 80061 LIPID PANEL: CPT | Performed by: FAMILY MEDICINE

## 2017-09-07 PROCEDURE — 80053 COMPREHEN METABOLIC PANEL: CPT | Performed by: FAMILY MEDICINE

## 2017-09-07 PROCEDURE — 36415 COLL VENOUS BLD VENIPUNCTURE: CPT | Performed by: FAMILY MEDICINE

## 2017-09-18 ENCOUNTER — OFFICE VISIT (OUTPATIENT)
Dept: FAMILY MEDICINE CLINIC | Age: 77
End: 2017-09-18

## 2017-09-18 VITALS
DIASTOLIC BLOOD PRESSURE: 75 MMHG | HEART RATE: 63 BPM | BODY MASS INDEX: 27.09 KG/M2 | OXYGEN SATURATION: 96 % | SYSTOLIC BLOOD PRESSURE: 140 MMHG | RESPIRATION RATE: 20 BRPM | HEIGHT: 72 IN | WEIGHT: 200 LBS | TEMPERATURE: 97 F

## 2017-09-18 DIAGNOSIS — E78.2 MIXED HYPERLIPIDEMIA: Primary | ICD-10-CM

## 2017-09-18 DIAGNOSIS — M25.561 CHRONIC PAIN OF RIGHT KNEE: ICD-10-CM

## 2017-09-18 DIAGNOSIS — I10 ESSENTIAL HYPERTENSION: ICD-10-CM

## 2017-09-18 DIAGNOSIS — G89.29 CHRONIC PAIN OF RIGHT KNEE: ICD-10-CM

## 2017-09-18 NOTE — PROGRESS NOTES
Tremaine Gonzales 69 y.o. male   Chief Complaint   Patient presents with    Follow-up     3 month f/u    Hypertension    Cholesterol Problem    Labs     9-7-17         1. Have you been to the ER, urgent care clinic since your last visit? Hospitalized since your last visit? No    2. Have you seen or consulted any other health care providers outside of the [address] [place] [address] since your last visit? Include any pap smears or colon screening. No     There are no preventive care reminders to display for this patient.

## 2017-09-18 NOTE — PROGRESS NOTES
HISTORY OF PRESENT ILLNESS  Leodan Samuel is a 69 y.o. male. Chief Complaint   Patient presents with    Follow-up     3 month f/u    Hypertension    Cholesterol Problem    Labs     9-7-17       HPI  Pt is here for follow up of Hypertension, and Cholesterol. Pt is doing well overall. Pt had labs on 9-7-17. Labs reviewed in detail with pt. Pt does not need medication refills today. New concerns today:  Pt is having some r knee pain but has been doing more physical work due to his wife's recent illness. He has been using alleve with good relief. Pt will f/u with ortho. Review of Systems   Constitutional: Negative. HENT: Negative. Respiratory: Negative. Cardiovascular: Negative. Musculoskeletal: Positive for joint pain. All other systems reviewed and are negative. Physical Exam  Nursing note and vitals reviewed. Constitutional: He is oriented to person, place, and time. He appears well-developed and well-nourished. HENT:   Head: Normocephalic and atraumatic. Right Ear: External ear normal.   Left Ear: External ear normal.   Nose: Nose normal.   Eyes: Conjunctivae and EOM are normal.   Neck: Normal range of motion. Neck supple. No JVD present. Carotid bruit is not present. No thyromegaly present. Cardiovascular: Normal rate, regular rhythm, normal heart sounds and intact distal pulses. Exam reveals no gallop and no friction rub. No murmur heard. Pulmonary/Chest: Effort normal and breath sounds normal. He has no wheezes. He has no rhonchi. He has no rales. Abdominal: Soft. Musculoskeletal: Normal range of motion. Neurological: He is alert and oriented to person, place, and time. Coordination normal.   Skin: Skin is warm and dry. Psychiatric: He has a normal mood and affect. His behavior is normal. Judgment and thought content normal.       ASSESSMENT and PLAN  Diagnoses and all orders for this visit:    1.  Mixed hyperlipidemia  Stable, cont pres tx plan.     2. Essential hypertension  -     METABOLIC PANEL, COMPREHENSIVE; Future  Stable, cont pres tx plan. 3. Chronic pain of right knee  Care as per ortho.      Follow-up Disposition: 3 months; sooner prn

## 2017-10-19 ENCOUNTER — OFFICE VISIT (OUTPATIENT)
Dept: ORTHOPEDIC SURGERY | Age: 77
End: 2017-10-19

## 2017-10-19 VITALS
RESPIRATION RATE: 18 BRPM | HEART RATE: 67 BPM | BODY MASS INDEX: 26.55 KG/M2 | SYSTOLIC BLOOD PRESSURE: 141 MMHG | DIASTOLIC BLOOD PRESSURE: 80 MMHG | OXYGEN SATURATION: 97 % | HEIGHT: 72 IN | TEMPERATURE: 96.8 F | WEIGHT: 196 LBS

## 2017-10-19 DIAGNOSIS — M17.11 PRIMARY OSTEOARTHRITIS OF RIGHT KNEE: ICD-10-CM

## 2017-10-19 DIAGNOSIS — M25.561 RIGHT KNEE PAIN, UNSPECIFIED CHRONICITY: Primary | ICD-10-CM

## 2017-10-19 RX ORDER — TRIAMCINOLONE ACETONIDE 40 MG/ML
40 INJECTION, SUSPENSION INTRA-ARTICULAR; INTRAMUSCULAR ONCE
Qty: 1 ML | Refills: 0
Start: 2017-10-19 | End: 2017-10-19

## 2017-10-19 RX ORDER — AA/PROT/LYSINE/METHIO/VIT C/B6 50-12.5 MG
1 TABLET ORAL DAILY
COMMUNITY

## 2017-10-19 NOTE — MR AVS SNAPSHOT
Visit Information Date & Time Provider Department Dept. Phone Encounter #  
 10/19/2017  9:15 AM Nishant Leonard, [facility] [phone] Your Appointments 12/20/2017  9:00 AM  
Follow Up with Ellen Majano MD  
[facility] (--) Appt Note: [address] [place] [id number] [phone]  
  
   
 [address] [facility] [id number]  
  
    
 5/7/2018  8:20 AM  
Follow Up with Nery Spear MD  
[facility] ([address]) Appt Note: 9 month follow up [place] [id number] [phone] [place]  [id number]  
  
    
 8/21/2018  9:00 AM  
PROCEDURE with [facility] 1 [facility] ([address]) Appt Note: aaa 1 year [address], [place] [address] [facility] [address]  
[phone] [address] [facility]  
  
    
 8/21/2018 10:00 AM  
PROCEDURE with [facility] 1 [facility] ([address]) Appt Note:  [address] [place], [place] [address] [facility]  
[phone] 8/21/2018 11:30 AM  
Follow Up with RAHAT Polanco [facility] ([address]) Appt Note: 1 YEAR WITH STUDIES SAME DAY WITH PREP  
 [address] [facility]  
[phone] [address] [facility] [address] [facility] Upcoming Health Maintenance Date Due  
 GLAUCOMA SCREENING Q2Y 6/30/2018* MEDICARE YEARLY EXAM 11/2/2017 COLONOSCOPY 9/28/2020 DTaP/Tdap/Td series (2 - Td) 11/1/2026 *Topic was postponed. The date shown is not the original due date. Allergies as of 10/19/2017  Review Complete On: 10/19/2017 By: Kiley Dugan Severity Noted Reaction Type Reactions Pollen Extracts  02/05/2015    Sneezing Itchy eyes, runny nose Current Immunizations  Reviewed on 11/1/2016 Name Date Influenza High Dose Vaccine PF 11/1/2016 Influenza Vaccine 9/17/2015, 12/1/2014, 9/16/2013 Influenza Vaccine Whole 10/18/2012 Pneumococcal Polysaccharide (PPSV-23) 11/1/2016 TD Vaccine 8/21/2006 ZZZ-RETIRED (DO NOT USE) Pneumococcal Vaccine (Unspecified Type) 8/21/2006 Zoster 9/18/2012 Not reviewed this visit You Were Diagnosed With   
  
 Codes Comments Right knee pain, unspecified chronicity    -  Primary ICD-10-CM: M25.561 ICD-9-CM: 719.46 Primary osteoarthritis of right knee     ICD-10-CM: M17.11 ICD-9-CM: 715.16 Vitals BP Pulse Temp Resp Height(growth percentile) Weight(growth percentile) 141/80 67 96.8 °F (36 °C) (Oral) 18 6' (1.829 m) 196 lb (88.9 kg) SpO2 BMI Smoking Status 97% 26.58 kg/m2 Former Smoker Vitals History BMI and BSA Data Body Mass Index Body Surface Area  
 26.58 kg/m 2 2.13 m 2 Preferred Pharmacy Pharmacy Name Phone [address] [facility] [phone] Your Updated Medication List  
  
   
This list is accurate as of: 10/19/17 10:16 AM.  Always use your most recent med list. amLODIPine 10 mg tablet Commonly known as:  Laurel Croon TAKE 1 TABLET BY MOUTH DAILY  
  
 aspirin delayed-release 81 mg tablet Take  by mouth daily. CALTRATE PLUS PO Take 1 Tab by mouth daily. CO Q-10 10 mg Cap Generic drug:  coenzyme q10 Take  by mouth. COQ10  100-100 mg-unit Cap Generic drug:  coenzyme q10-vitamin e Take  by mouth daily. CRESTOR 20 mg tablet Generic drug:  rosuvastatin TAKE 1 TABLET NIGHTLY  
  
 ezetimibe 10 mg tablet Commonly known as:  Anay Zimmerman Take 1 Tab by mouth daily. gabapentin 300 mg capsule Commonly known as:  NEURONTIN  
 300 mg two (2) times a day. naproxen 500 mg tablet Commonly known as:  NAPROSYN Take 1 Tab by mouth two (2) times daily (with meals). niacin 1,000 mg Tb24 tab Commonly known as:  Niaspan Take 1 Tab by mouth daily. nitroglycerin 0.4 mg SL tablet Commonly known as:  NITROSTAT  
1 Tab by SubLINGual route every five (5) minutes as needed. omega 3-dha-epa-fish oil 100-160-1,000 mg Cap Take 1,000 mg by mouth daily. sildenafil citrate 100 mg tablet Commonly known as:  VIAGRA Take 1 Tab by mouth as needed. triamcinolone acetonide 40 mg/mL injection Commonly known as:  KENALOG  
1 mL by IntraMUSCular route once for 1 dose. VITAMIN D3 2,000 unit Tab Generic drug:  cholecalciferol (vitamin D3) Take  by mouth. We Performed the Following AMB POC X-RAY KNEE 3 VIEW [40578 CPT(R)] DRAIN/INJECT LARGE JOINT/BURSA [id number] CPT(R)] TRIAMCINOLONE ACETONIDE INJ [ [facility]] Introducing [facility]! Dear Rach Morales: Thank you for requesting a [employer] account. Our records indicate that you already have an active [employer] account. You can access your account anytime at [email or web address] [email or web address]/"[employer] Did you know that you can access your hospital and ER discharge instructions at any time in [employer]? You can also review all of your test results from your hospital stay or ER visit. Additional Information If you have questions, please visit the Frequently Asked Questions section of the [employer] website at [email or web address] [employer]/"[email or web address]/. Remember, [employer] is NOT to be used for urgent needs. For medical emergencies, dial 911. Now available from your iPhone and Android! Please provide this summary of care documentation to your next provider. Your primary care clinician is listed as Chely Garcia. If you have any questions after today's visit, please call [phone].

## 2017-10-19 NOTE — PROGRESS NOTES
HISTORY OF PRESENT ILLNESS:  Ignacio Apodaca returns following for severe right knee pain. He has a history of right knee osteoarthritis worse in the medial joint than lateral, but he also has tricompartmental findings. He received a cortisone injection under Dr. Demetrius Grace care about one year ago. He has contemplated knee surgery on the right. He is, at this time, taking care of an ill wife. His pain at rest is a 6-7/10 and with activity to include extended standing and walking and stairs, the pain increases to upwards of a 9-10/10. He denies any trauma to the knee. He is a status post total knee replacement. He has generally done well from the knee replacement. REVIEW OF SYSTEMS:  No chest pain. No shortness of breath. No fevers, chills, or night sweats. No rash and no itching. No nausea and no vomiting. He is not a diabetic. He is not allergic to Betadine. PHYSICAL EXAM:  He is a healthy-appearing, generally fit, 55-year-old,  male, atraumatic, normocephalic, alert and oriented times three, sitting on the table comfortably. The left knee bent at 90° reveals in a cranial/caudal orientation, a 20 cm well-healed surgical incision. His motion is excellent today against resistance of the left knee at 110-0°. There is no instability on varus and valgus stressing. The right knee reveals with the patients knee extended, he has varus deformity. On varus stressing of the right knee, he has worsening right medial knee discomfort. Through ranging today at 95-5° actively with crepitation throughout with the patella tracking midline. There is no calf tenderness or evidence of DVT. Distal sensation is intact fully to the right lower extremity. RADIOGRAPHS:  X-rays today show tricompartmental osteoarthritis of the right knee with pronounced medial joint space narrowing. There appears on AP a well-seated, anatomically aligned left total knee replacement.      IMPRESSION: 1. Status post left total knee replacement stable, doing well. 2. Left knee pain, resolved status post left TKA. 3. Left knee range of motion excellent. 4. Right knee pain. 5. Right knee osteoarthritis, tricompartmental in nature. 6. Decreased range of motion of the right knee. PROCEDURE:  Today, using sterile technique, after verbal and written consent obtained and appropriate time out performed, 2 cc of Kenalog at 40 mg per mL mixed with 8 mL of Marcaine 0.25% was to the right knee using the anteromedial, intra-articular approach. There were no complications. The patient tolerated the procedure well. PLAN:   I am currently recommending a low-dose cortisone injection to the right knee today. We are going to plan on seeing him back on a p.r.n. basis. With regards to his wife, he will consider again once her health circumstances improve, a total knee replacement on the right. X-rays were reviewed and provided today.

## 2017-12-15 ENCOUNTER — HOSPITAL ENCOUNTER (OUTPATIENT)
Dept: LAB | Age: 77
Discharge: HOME | End: 2017-12-15
Payer: MEDICARE

## 2017-12-15 DIAGNOSIS — I10 ESSENTIAL HYPERTENSION: ICD-10-CM

## 2017-12-15 LAB
ALBUMIN SERPL-MCNC: 4.3 G/DL (ref 3.4–5)
ALBUMIN/GLOB SERPL: 1.6 {RATIO} (ref 0.8–1.7)
ALP SERPL-CCNC: 86 U/L (ref 45–117)
ALT SERPL-CCNC: 52 U/L (ref 16–61)
ANION GAP SERPL CALC-SCNC: 7 MMOL/L (ref 3–18)
AST SERPL-CCNC: 35 U/L (ref 15–37)
BILIRUB SERPL-MCNC: 2.1 MG/DL (ref 0.2–1)
BUN SERPL-MCNC: 11 MG/DL (ref 7–18)
BUN/CREAT SERPL: 11 (ref 12–20)
CALCIUM SERPL-MCNC: 9.7 MG/DL (ref 8.5–10.1)
CHLORIDE SERPL-SCNC: 108 MMOL/L (ref 100–108)
CO2 SERPL-SCNC: 30 MMOL/L (ref 21–32)
CREAT SERPL-MCNC: 1.01 MG/DL (ref 0.6–1.3)
GLOBULIN SER CALC-MCNC: 2.7 G/DL (ref 2–4)
GLUCOSE SERPL-MCNC: 89 MG/DL (ref 74–99)
POTASSIUM SERPL-SCNC: 4.7 MMOL/L (ref 3.5–5.5)
PROT SERPL-MCNC: 7 G/DL (ref 6.4–8.2)
SODIUM SERPL-SCNC: 145 MMOL/L (ref 136–145)

## 2017-12-15 PROCEDURE — 36415 COLL VENOUS BLD VENIPUNCTURE: CPT | Performed by: FAMILY MEDICINE

## 2017-12-15 PROCEDURE — 80053 COMPREHEN METABOLIC PANEL: CPT | Performed by: FAMILY MEDICINE

## 2017-12-20 ENCOUNTER — OFFICE VISIT (OUTPATIENT)
Dept: FAMILY MEDICINE CLINIC | Age: 77
End: 2017-12-20

## 2017-12-20 VITALS
BODY MASS INDEX: 26.41 KG/M2 | HEIGHT: 72 IN | TEMPERATURE: 97 F | HEART RATE: 68 BPM | SYSTOLIC BLOOD PRESSURE: 115 MMHG | WEIGHT: 195 LBS | OXYGEN SATURATION: 97 % | DIASTOLIC BLOOD PRESSURE: 65 MMHG

## 2017-12-20 DIAGNOSIS — E78.2 MIXED HYPERLIPIDEMIA: ICD-10-CM

## 2017-12-20 DIAGNOSIS — R25.2 MUSCLE CRAMP, NOCTURNAL: ICD-10-CM

## 2017-12-20 DIAGNOSIS — I10 ESSENTIAL HYPERTENSION: Primary | ICD-10-CM

## 2017-12-20 RX ORDER — ROSUVASTATIN CALCIUM 20 MG/1
20 TABLET, COATED ORAL
COMMUNITY
End: 2018-04-20 | Stop reason: SDUPTHER

## 2017-12-20 NOTE — PROGRESS NOTES
Chief Complaint   Patient presents with    Hypertension     Patient states he has been feeling good    Spasms     foot and leg spasms at night x1 month     Labs     completed on 12/15/2017    Cholesterol Problem     Joahn Montanez is a 69 y.o. male here for Hypertension (Patient states he has been feeling good); Spasms (foot and leg spasms at night x1 month ); Labs (completed on 12/15/2017); and Cholesterol Problem  . 1. Have you been to the ER, urgent care clinic since your last visit? Hospitalized since your last visit? No    2. Have you seen or consulted any other health care providers outside of the [address] [place] [address] since your last visit? Include any pap smears or colon screening.  No

## 2017-12-20 NOTE — PROGRESS NOTES
HISTORY OF PRESENT ILLNESS  Janny Grimes is a 69 y.o. male. Chief Complaint   Patient presents with    Hypertension     Patient states he has been feeling good    Spasms     foot and leg spasms at night x1 month     Labs     completed on 12/15/2017    Cholesterol Problem       HPI  Patient is here for a 3 month follow up of Htn, lipids, and review labs. Patient had labs on 12-15-17. Labs reviewed in detail with patient     Patient does not need medication refills today. New concerns today: Patient reports having leg cramps or spasms at night in the lower extremities x 1 month. Review of Systems   Constitutional: Negative. HENT: Negative. Respiratory: Negative. Cardiovascular: Negative. Musculoskeletal: Positive for myalgias. All other systems reviewed and are negative. Physical Exam  Nursing note and vitals reviewed. Constitutional: He is oriented to person, place, and time. He appears well-developed and well-nourished. HENT:   Head: Normocephalic and atraumatic. Right Ear: External ear normal.   Left Ear: External ear normal.   Nose: Nose normal.   Eyes: Conjunctivae and EOM are normal.   Neck: Normal range of motion. Neck supple. No JVD present. Carotid bruit is not present. No thyromegaly present. Cardiovascular: Normal rate, regular rhythm, normal heart sounds and intact distal pulses. Exam reveals no gallop and no friction rub. No murmur heard. Pulmonary/Chest: Effort normal and breath sounds normal. He has no wheezes. He has no rhonchi. He has no rales. Abdominal: Soft. Musculoskeletal: Normal range of motion. Neurological: He is alert and oriented to person, place, and time. Coordination normal.   Skin: Skin is warm and dry. Psychiatric: He has a normal mood and affect. His behavior is normal. Judgment and thought content normal.       ASSESSMENT and PLAN  Diagnoses and all orders for this visit:    1.  Essential hypertension  Stable, cont pres tx plan.     2. Mixed hyperlipidemia  Stable, cont pres tx plan. 3. Muscle cramp, nocturnal  Pt to work on increasing hydration.      Follow-up Disposition: 3 months; sooner prn

## 2018-01-03 ENCOUNTER — OFFICE VISIT (OUTPATIENT)
Dept: FAMILY MEDICINE CLINIC | Age: 78
End: 2018-01-03

## 2018-01-03 VITALS
DIASTOLIC BLOOD PRESSURE: 73 MMHG | WEIGHT: 200 LBS | SYSTOLIC BLOOD PRESSURE: 130 MMHG | TEMPERATURE: 97.5 F | OXYGEN SATURATION: 97 % | HEIGHT: 72 IN | RESPIRATION RATE: 14 BRPM | BODY MASS INDEX: 27.09 KG/M2 | HEART RATE: 67 BPM

## 2018-01-03 DIAGNOSIS — Z00.00 MEDICARE ANNUAL WELLNESS VISIT, SUBSEQUENT: Primary | ICD-10-CM

## 2018-01-03 DIAGNOSIS — Z71.89 ACP (ADVANCE CARE PLANNING): ICD-10-CM

## 2018-01-03 NOTE — PATIENT INSTRUCTIONS
Please contact our office if you have any questions about your visit today. Medicare Wellness Visit, Male    The best way to live healthy is to have a healthy lifestyle by eating a well-balanced diet, exercising regularly, limiting alcohol and stopping smoking. Regular physical exams and screening tests are another way to keep healthy. Preventive exams provided by your health care provider can find health problems before they become diseases or illnesses. Preventive services including immunizations, screening tests, monitoring and exams can help you take care of your own health. All people over age 72 should have a pneumovax  and and a prevnar shot to prevent pneumonia. These are once in a lifetime unless you and your provider decide differently. All people over 65 should have a yearly flu shot and a tetanus vaccine every 10 years. Screening for diabetes mellitus with a blood sugar test should be done every year. Glaucoma is a disease of the eye due to increased ocular pressure that can lead to blindness and it should be done every year by an eye professional.    Cardiovascular screening tests that check for elevated lipids (fatty part of blood) which can lead to heart disease and strokes should be done every 5 years. Colorectal screening that evaluates for blood or polyps in your colon should be done yearly as a stool test or every five years as a flexible sigmoidoscope or every 10 years as a colonoscopy up to age 76. Men up to age 76 may need a screening blood test for prostate cancer at certain intervals, depending on their personal and family history. This decision is between the patient and his provider. If you have been a smoker or had family history of abdominal aortic aneurysms, you and your provider may decide to schedule an ultrasound test of your aorta. Hepatitis C screening is also recommended for anyone born between [birth year] through [address].     A shingles vaccine is also recommended once in a lifetime after age 61. Your Medicare Wellness Exam is recommended annually.     Here is a list of your current Health Maintenance items with a due date:  Health Maintenance Due   Topic Date Due    Annual Well Visit  11/02/2017

## 2018-01-03 NOTE — PROGRESS NOTES
Leonor Gonzales 69 y.o. male   Chief Complaint   Patient presents with   [facility] Annual Wellness Visit         1. Have you been to the ER, urgent care clinic since your last visit? Hospitalized since your last visit? No    2. Have you seen or consulted any other health care providers outside of the [address] [place] [address] since your last visit? Include any pap smears or colon screening. No       There are no preventive care reminders to display for this patient.

## 2018-01-03 NOTE — PROGRESS NOTES
This is a Subsequent Medicare Annual Wellness Exam (AWV) (Performed 12 months after IPPE or effective date of Medicare Part B enrollment)    I have reviewed the patient's medical history in detail and updated the computerized patient record. History     Past Medical History:   Diagnosis Date    AAA (abdominal aortic aneurysm) ([facility] [address] 03/2010    noted on CT (abdomen)    Abnormal LFT's 12/95, 04/16/03    Acute meniscal tear, lateral 01/2007    s/p arthorscopic surg (Dr. Terri Haines)    Atypical chest pain 02/04/09    CAD (coronary artery disease), native coronary artery     Cardiac cath 04/13/2009    dLM 30%. mLAD 30%. oD2 30%. pCX 55% (FFR 0.95). mRCA 40%. RPDA 65% (FFR 0.86). EF 65%.  Cardiac echocardiogram 04/03/2007    EF 60%. No RWMA. Gr 1 DDfx. LAE. No significant valvular heart disease.  Cardiac nuclear imaging test, low risk 12/04/2015    Low risk. No ischemia or prior infarction. No WMA. EF 67%. Neg EKG on pharm stress test.    Cardiovascular aorto-iliac duplex 06/29/2015    AAA measures 3.2 x 3.1 cm Trv. (Prev 3.2 x 3.4 cm on 6/19/14). Aneurysm is infrarenal & fusiform w/complex intraluminal thrombus within. Mod bilateral common iliac stenosis.  Carotid duplex 03/16/2016    Mild < 50% BETHEL stenosis. Mod 78-05% LICA stenosis. Similar to study of 6/29/15.  Cholecystitis chronic 03/2008    s/p sue    Diverticulosis     Diverticulosis 9-28-15    DR. 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    Plantar fasciitis     Rhinitis     Right knee pain     Right shoulder pain     Tinea versicolor 7/23/2012    2.8 x 2.9cm infra-renal    Tubular adenoma 9-28-15    Vitamin D deficiency 4/13/2011      Past Surgical History:   Procedure Laterality Date    ENDOSCOPY, COLON, DIAGNOSTIC      normal per patient;     HX ARTHROPLASTY  04/05/10    [place]-left knee    HX CATARACT REMOVAL Bilateral     November 2014    HX CHOLECYSTECTOMY  03/2008    chronic cholecystitis    HX COLONOSCOPY  9-28-15    HX HERNIA REPAIR  1995    L inguinal    HX KNEE ARTHROSCOPY  01/24/07    left knee    HX KNEE ARTHROSCOPY Left 4/16/2014    peripatellar debridement    HX KNEE REPLACEMENT  01/29/2013    [place]    HX MOHS PROCEDURES  11/2008    HX RHINOPLASTY  1983    HX TONSILLECTOMY      HX VASECTOMY  1977    HX WISDOM TEETH EXTRACTION      x4    NV ANESTH,SURGERY OF SHOULDER       Current Outpatient Prescriptions   Medication Sig Dispense Refill    rosuvastatin (CRESTOR) 20 mg tablet Take 20 mg by mouth nightly.  coenzyme q10 (CO Q-10) 10 mg cap Take  by mouth.  ezetimibe (ZETIA) 10 mg tablet Take 1 Tab by mouth daily. 90 Tab 3    amLODIPine (NORVASC) 10 mg tablet TAKE 1 TABLET BY MOUTH DAILY 90 Tab 3    niacin (NIASPAN) 1,000 mg Tb24 tab Take 1 Tab by mouth daily. 90 Tab 3    nitroglycerin (NITROSTAT) 0.4 mg SL tablet 1 Tab by SubLINGual route every five (5) minutes as needed. 25 Tab 3    gabapentin (NEURONTIN) 300 mg capsule 300 mg three (3) times daily.  naproxen (NAPROSYN) 500 mg tablet Take 1 Tab by mouth two (2) times daily (with meals). 30 Tab 0    cholecalciferol, vitamin D3, (VITAMIN D3) 2,000 unit tab Take  by mouth.  sildenafil citrate (VIAGRA) 100 mg tablet Take 1 Tab by mouth as needed. 20 Tab 3    omega 3-dha-epa-fish oil 100-160-1,000 mg cap Take 1,000 mg by mouth daily.  (Patient taking differently: Take 2,000 mg by mouth daily.) 90 Cap 3    aspirin delayed-release 81 mg tablet Take  by mouth daily.  CALCIUM CARB/VIT D3/MINERALS (CALTRATE PLUS PO) Take 1 Tab by mouth daily.        Allergies   Allergen Reactions    Pollen Extracts Sneezing     Itchy eyes, runny nose     Family History   Problem Relation Age of Onset    Heart Disease Mother     Stroke Father     No Known Problems Sister     No Known Problems Maternal Grandmother     No Known Problems Maternal Grandfather     Dementia Paternal Grandmother     No Known Problems Paternal Grandfather     Cancer Brother      lung CA     Social History   Substance Use Topics    Smoking status: Former Smoker     Packs/day: 0.50     Years: 20.00     Types: Cigarettes     Quit date: 12/10/1995    Smokeless tobacco: Never Used    Alcohol use 4.0 oz/week     7 Glasses of wine, 1 Shots of liquor per week      Comment: social     Patient Active Problem List   Diagnosis Code    Hyperlipidemia E78.5    Coronary atherosclerosis of native coronary artery I25.10    AAA [facility]abdominal aortic aneurysm) ([facility] [address] I71.4    Right knee pain M25.561    Abnormal LFTs (liver function tests) R79.89    Vitamin D deficiency E55.9    CLARKE (dyspnea on exertion) R06.09    Essential hypertension I10    Hyperglycemia R73.9    Tinea versicolor B36.0    S/P total knee replacement Z96.659    Fatty liver K76.0    PVC's (premature ventricular contractions) I49.3    Carotid artery disease (HCC) I77.9    Right shoulder pain M25.511    Acute back pain M54.9    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.816    Lumbar neuritis M54.16    DDD (degenerative disc disease), lumbar M51.36    Radiculopathy, lumbar region M54.16    Degeneration of intervertebral disc of lumbar region M51.36    Mixed hyperlipidemia E78.2    ACP (advance care planning) Z71.89       Depression Risk Factor Screening:     PHQ over the last two weeks 12/20/2017   PHQ Not Done -   Little interest or pleasure in doing things Not at all   Feeling down, depressed or hopeless Nearly every day   Total Score PHQ 2 3   Trouble falling or staying asleep, or sleeping too much Nearly every day   Feeling tired or having little energy More than half the days   Poor appetite or overeating Not at all   Feeling bad about yourself - or that you are a failure or have let yourself or your family down Not at all   Trouble concentrating on things such as school, work, reading or watching TV Not at all   Moving or speaking so slowly that other people could have noticed; or the opposite being so fidgety that others notice Not at all   Thoughts of being better off dead, or hurting yourself in some way Not at all   PHQ 9 Score 8   How difficult have these problems made it for you to do your work, take care of your home and get along with others Somewhat difficult     Alcohol Risk Factor Screening: You average more than 14 drinks a week. Functional Ability and Level of Safety:   Hearing Loss  Hearing is good. Activities of Daily Living  The home contains: no safety equipment. Patient does total self care    Fall Risk  Fall Risk Assessment, last 12 mths 12/20/2017   Able to walk? Yes   Fall in past 12 months?  No   Fall with injury? -   Number of falls in past 12 months -   Fall Risk Score -       Abuse Screen  Patient is not abused    Cognitive Screening   Evaluation of Cognitive Function:  Has your family/caregiver stated any concerns about your memory: no  Normal    Patient Care Team   Patient Care Team:  Anitha Cortés MD as PCP - General (Family Practice)  Isela Castañeda MD (Vascular Surgery)  Jenna Pfeiffer MD (Cardiology)  RAHAT Kaur (Vascular Surgery)  Mercy Pizarro MD (Orthopedic Surgery)    Assessment/Plan   Education and counseling provided:  Are appropriate based on today's review and evaluation  End-of-Life planning (with patient's consent)    Diagnoses and all orders for this visit: 1. Medicare annual wellness visit, subsequent    2. ACP (advance care planning)      There are no preventive care reminders to display for this patient.

## 2018-01-04 PROBLEM — Z71.89 ACP (ADVANCE CARE PLANNING): Status: ACTIVE | Noted: 2018-01-04

## 2018-01-15 ENCOUNTER — OFFICE VISIT (OUTPATIENT)
Dept: ORTHOPEDIC SURGERY | Facility: CLINIC | Age: 78
End: 2018-01-15

## 2018-01-15 VITALS
DIASTOLIC BLOOD PRESSURE: 75 MMHG | SYSTOLIC BLOOD PRESSURE: 151 MMHG | HEIGHT: 72 IN | WEIGHT: 201.6 LBS | OXYGEN SATURATION: 98 % | BODY MASS INDEX: 27.3 KG/M2 | RESPIRATION RATE: 18 BRPM | HEART RATE: 69 BPM | TEMPERATURE: 97.7 F

## 2018-01-15 DIAGNOSIS — M25.561 RIGHT KNEE PAIN, UNSPECIFIED CHRONICITY: Primary | ICD-10-CM

## 2018-01-15 DIAGNOSIS — M17.11 PRIMARY OSTEOARTHRITIS OF RIGHT KNEE: ICD-10-CM

## 2018-01-15 RX ORDER — TRIAMCINOLONE ACETONIDE 40 MG/ML
40 INJECTION, SUSPENSION INTRA-ARTICULAR; INTRAMUSCULAR ONCE
Qty: 1 ML | Refills: 0
Start: 2018-01-15 | End: 2018-01-15

## 2018-01-15 NOTE — PROGRESS NOTES
HISTORY:  The patient is a pleasant 69-year-old  male who presents for an acute recurrence of his chronic right knee pain. He has a history of severe endstage osteoarthritis of the right knee and has been recommended for a right total knee replacement by Dr. Anthony Chawla. He has been putting that off recently due to his wifes recent exacerbation of COPD which led to a cardiac ICU stay at [facility] for almost 10 days. Currently she is in [facility] recovering with physical and occupational therapies. He is hopeful that his wife will be home soon. Regarding his right knee, pain is present when he stands for an extended period, walks distance, climbs, and ascends stairs, and sleeps with his knee straightened out. His pain at rest is a 4 to 5 on a 10-point scale. With activity it increases to upwards of an 8 to 9 on a 10-point scale. He has received Cortisone injections in the past and requests an injection today. REVIEW OF SYSTEMS:  No chest pain. No shortness of breath. No fevers, chills, or night sweats reported. No rash, itching, cough, or headache. He is not a diabetic. He is not allergic to Betadine. EXAMINATION:  Physical examination today reveals a pleasant, healthy appearing, well-developed, well-nourished, 69-year-old,  male, atraumatic and normocephalic. He is alert and oriented x 3. He is found sitting comfortably in the examination room today on the table. Examination of the right knee reveals flexion at 90º with extension against resistance. Crepitation is noted through ranging with the patella tracking midline with the range tested at full extension and 95º of flexion. No instability on varus or valgus stressing, however varus stressing does increase medial joint line discomfort. Distal sensation intact fully in the right lower extremity. Capillary refill is brisk and less than two seconds in the right lower extremity.       PROCEDURE: Today, using sterile technique after verbal and written consent were obtained with appropriate time-out performed, 2 cc of Kenalog at 40 mg per ml mixed with 6 ml of Marcaine 0.25%  injected in the right knee using the anterolateral intraarticular approach. There were no complications. The patient tolerated the procedure well. IMPRESSION:    1. Right knee pain. 2. Right knee tricompartmental endstage osteoarthritis. 3. Decreased range of motion of the right knee, secondary to the above. PLAN:  I am currently recommending a low-dose Cortisone injection for his right knee, his acute exacerbation of chronic right knee pain secondary to his tricompartmental osteoarthritis. We are going to see him back on a prn basis. He is going to schedule a total knee replacement some time in the future, but as he indicated today wants to establish his wifes safe recoveries before endeavoring to replace his knee.

## 2018-04-12 ENCOUNTER — OFFICE VISIT (OUTPATIENT)
Dept: ORTHOPEDIC SURGERY | Age: 78
End: 2018-04-12

## 2018-04-12 VITALS
WEIGHT: 195 LBS | HEIGHT: 72 IN | HEART RATE: 69 BPM | DIASTOLIC BLOOD PRESSURE: 73 MMHG | SYSTOLIC BLOOD PRESSURE: 126 MMHG | BODY MASS INDEX: 26.41 KG/M2 | TEMPERATURE: 97.7 F | OXYGEN SATURATION: 96 %

## 2018-04-12 DIAGNOSIS — M19.012 OSTEOARTHRITIS OF GLENOHUMERAL JOINT, LEFT: ICD-10-CM

## 2018-04-12 DIAGNOSIS — M25.612 DECREASED RANGE OF MOTION OF LEFT SHOULDER: ICD-10-CM

## 2018-04-12 DIAGNOSIS — M19.019 OSTEOARTHRITIS OF ACROMIOCLAVICULAR JOINT: ICD-10-CM

## 2018-04-12 DIAGNOSIS — M25.512 ACUTE PAIN OF LEFT SHOULDER: Primary | ICD-10-CM

## 2018-04-12 DIAGNOSIS — M75.52 BURSITIS OF SHOULDER, LEFT: ICD-10-CM

## 2018-04-12 DIAGNOSIS — M75.42 IMPINGEMENT SYNDROME OF LEFT SHOULDER: ICD-10-CM

## 2018-04-12 RX ORDER — TRIAMCINOLONE ACETONIDE 40 MG/ML
40 INJECTION, SUSPENSION INTRA-ARTICULAR; INTRAMUSCULAR ONCE
Qty: 1 ML | Refills: 0
Start: 2018-04-12 | End: 2018-04-12

## 2018-04-12 RX ORDER — DEXTROMETHORPHAN HYDROBROMIDE, GUAIFENESIN 5; 100 MG/5ML; MG/5ML
650 LIQUID ORAL EVERY 8 HOURS
COMMUNITY
End: 2018-05-07

## 2018-04-12 NOTE — PROGRESS NOTES
HISTORY:  Hood Maalve returns to the office complaining of a worsening of his what has become chronic left shoulder pain. He has been plagued over the past several months with worsening range of motion of his left shoulder and within the last week since his wife has been discharged from her skilled care facility, he has been helping up from the chair and bed, using both arms far more than he has in the recent past. He denies any trauma to the left shoulder. He has difficulty with his arm moving forward and to the side. He can barely put his hand in his back pocket secondary to pain. He is using Tylenol Arthritis with minimal relief for symptom management. Regarding his left knee, which is status post replacement, he is generally doing well. He has a pain every now and then, but he is tolerating the occasional discomfort. He does have bone-on-bone on the right knee and has considered a right total knee replacement. He is not unwilling to proceed with knee surgery but feels like his shoulder may need to be addressed prior to his knee. He has received Cortisone injections to his knee, which have helped for a short period of time after each injection. He does not wish a Cortisone injection today to the right knee. He is status post right shoulder rotator cuff repair, open-type, under the care of Dr. Venu Kent. He has done well from the surgical intervention. REVIEW OF SYSTEMS: No chest pain. No shortness of breath. No fever, chills, or night sweats. No rashes or itching. No nausea or vomiting. Patient has pain of the left shoulder with decreased range of motion that is near constant. Pain at rest is 4 to 5 on a 10-point scale, dull and achy in characteristic. With activity, to include helping his wife up from seated or lying position, as well as raising his left arm forward above nipple line, his pain increased to upwards of an 8 to 9 on a 10-point scale.  He is hardly able to put his hand behind his back to reach his back pocket on the left side. PHYSICAL EXAMINATION: The patient is a healthy-appearing, well-developed, well-nourished, pleasant, 61-year-old, obese,  male, atraumatic, normocephalic, alert and oriented x 3, sitting on the table comfortably. He is gowned. Left shoulder is exposed. Skin is intact. No evidence of skin breakdown or infection. Distal AC joint tenderness is noted. Patients active forward flexion against resistance is limited to pain and guarding at only 95? Morna Rout Passively, he can be assisted to 100? with pain throughout to end point. His passive external rotation of the left shoulder is barely 20? with pain throughout. He has active internal rotation of barely the posterior aspect of the left iliac crest. There is a positive empty can sign, left upper extremity. He has modest atrophy of his left deltoid with glenohumeral abduction strength actively tested today at 3+/5. Biceps and triceps are intact. Guarded strength tested at 4+/5. RADIOGRAPHS: X-rays today of the left shoulder, three views, reveal glenohumeral osteoarthritis noted. There is also AC joint arthritis seen as well. No fractures deformities, lesions, or masses otherwise noted. IMPRESSION:   1. Left shoulder bursitis with developing impingement. 2. Left shoulder pain. 3. Left shoulder decreased range of motion. 4. Left shoulder osteoarthritis. 5. Left shoulder suspicion for internal derangement with a positive empty can sign. Patient recommended for an MRI arthrogram.     PROCEDURE: Today, using sterile technique, after verbal and written consent were obtained and appropriate timeout performed, 1 cc of Kenalog at 40mg/ml mixed with 6 ml of Marcaine 0.25% injected in the left shoulder using the posterior subacromial approach. There were no complications. Patient tolerated the procedure well. PLAN: Today we discussed the shoulder findings and correlated them with his x-rays.  All of his questions were answered to his satisfaction. Again, in light of his positive findings on empty can sign, I would like to obtain an MRI arthrogram. We will see him back following.

## 2018-04-16 DIAGNOSIS — M25.512 ACUTE PAIN OF LEFT SHOULDER: Primary | ICD-10-CM

## 2018-04-16 DIAGNOSIS — M75.52 BURSITIS OF SHOULDER, LEFT: ICD-10-CM

## 2018-04-16 DIAGNOSIS — M25.612 DECREASED RANGE OF MOTION OF SHOULDER, LEFT: ICD-10-CM

## 2018-04-20 ENCOUNTER — OFFICE VISIT (OUTPATIENT)
Dept: FAMILY MEDICINE CLINIC | Age: 78
End: 2018-04-20

## 2018-04-20 VITALS
DIASTOLIC BLOOD PRESSURE: 73 MMHG | HEIGHT: 72 IN | TEMPERATURE: 98 F | WEIGHT: 192 LBS | HEART RATE: 79 BPM | RESPIRATION RATE: 18 BRPM | OXYGEN SATURATION: 96 % | SYSTOLIC BLOOD PRESSURE: 140 MMHG | BODY MASS INDEX: 26.01 KG/M2

## 2018-04-20 DIAGNOSIS — R26.89 BALANCE PROBLEM: ICD-10-CM

## 2018-04-20 DIAGNOSIS — I10 ESSENTIAL HYPERTENSION: Primary | ICD-10-CM

## 2018-04-20 DIAGNOSIS — M54.42 ACUTE BILATERAL LOW BACK PAIN WITH LEFT-SIDED SCIATICA: ICD-10-CM

## 2018-04-20 DIAGNOSIS — E78.5 HYPERLIPIDEMIA, UNSPECIFIED HYPERLIPIDEMIA TYPE: ICD-10-CM

## 2018-04-20 DIAGNOSIS — T14.8XXA BRUISING: ICD-10-CM

## 2018-04-20 RX ORDER — NIACIN 1000 MG/1
1000 TABLET, EXTENDED RELEASE ORAL DAILY
Qty: 90 TAB | Refills: 3 | Status: SHIPPED | OUTPATIENT
Start: 2018-04-20 | End: 2019-07-01 | Stop reason: SDUPTHER

## 2018-04-20 RX ORDER — AMLODIPINE BESYLATE 10 MG/1
TABLET ORAL
Qty: 90 TAB | Refills: 3 | Status: SHIPPED | OUTPATIENT
Start: 2018-04-20 | End: 2019-06-03 | Stop reason: SDUPTHER

## 2018-04-20 RX ORDER — ROSUVASTATIN CALCIUM 20 MG/1
20 TABLET, COATED ORAL
Qty: 90 TAB | Refills: 3 | Status: SHIPPED | OUTPATIENT
Start: 2018-04-20 | End: 2019-07-01 | Stop reason: SDUPTHER

## 2018-04-20 RX ORDER — TRAMADOL HYDROCHLORIDE 50 MG/1
50 TABLET ORAL
Qty: 30 TAB | Refills: 0 | Status: SHIPPED | OUTPATIENT
Start: 2018-04-20 | End: 2018-11-01

## 2018-04-20 NOTE — PROGRESS NOTES
HISTORY OF PRESENT ILLNESS  Bridgette Lowery is a 69 y.o. male. Chief Complaint   Patient presents with    Follow-up    Cholesterol Problem    Hypertension    Medication Refill    Skin Problem     Discoloration on bilateral forearms and left leg. Patient denies any pain or itching, but states his wife has the same discolorations on her skin.  Back Pain     lower back and left upper leg x 5 days, patient denies any injury but states that the pain begain after working in the yard. HPI  Patient is here for a  follow up of Lipids, and HTN. No recent labs. Patient is not fasting today. Patient does need medication refills today. Patient requests printed refills. New concerns today: Patient reports having skin Discoloration on bilateral forearms and left leg x 2 days. Patient denies any pain or itching, but states his wife has the same discolorations on her skin. Patient reports that he is receiving injections in the left shoulder which is helping with the shoulder pain. He does recall bumping his leg recently. He did do yard work this past weekend and recalls bumping his arms a fair bit. Patient also reports having  lower back pain and left upper leg x 5 days, patient denies any injury but states that the pain begain after working in the yard. Pt c/o feeling off balance occasionally. This happened a few years ago and he was referred to PT by his ENT. He had good improvement of sx then. Review of Systems   Constitutional: Negative. HENT: Negative. Respiratory: Negative. Cardiovascular: Negative. Musculoskeletal: Positive for back pain. Skin: Positive for rash. Negative for itching. All other systems reviewed and are negative. Physical Exam  Nursing note and vitals reviewed. Constitutional: He is oriented to person, place, and time. He appears well-developed and well-nourished. HENT:   Head: Normocephalic and atraumatic.    Right Ear: External ear normal.   Left Ear: External ear normal.   Nose: Nose normal.   Eyes: Conjunctivae and EOM are normal.   Neck: Normal range of motion. Neck supple. No JVD present. Carotid bruit is not present. No thyromegaly present. Cardiovascular: Normal rate, regular rhythm, normal heart sounds and intact distal pulses. Exam reveals no gallop and no friction rub. No murmur heard. Pulmonary/Chest: Effort normal and breath sounds normal. He has no wheezes. He has no rhonchi. He has no rales. Abdominal: Soft. Musculoskeletal: Normal range of motion. no spinal ttp. Neurological: He is alert and oriented to person, place, and time. Coordination normal.   Skin: Skin is warm and dry. Small contusions B forearms and L medial lower leg adjacent to the knee  Psychiatric: He has a normal mood and affect. His behavior is normal. Judgment and thought content normal.       ASSESSMENT and PLAN  Diagnoses and all orders for this visit:    1. Essential hypertension  -     amLODIPine (NORVASC) 10 mg tablet; TAKE 1 TABLET BY MOUTH DAILY  -     CBC WITH AUTOMATED DIFF; Future  -     METABOLIC PANEL, COMPREHENSIVE; Future  -     LIPID PANEL; Future  Stable, cont pres tx plan. 2. Hyperlipidemia, unspecified hyperlipidemia type  -     niacin (NIASPAN) 1,000 mg Tb24 tab; Take 1 Tab by mouth daily. -     rosuvastatin (CRESTOR) 20 mg tablet; Take 1 Tab by mouth nightly. -     METABOLIC PANEL, COMPREHENSIVE; Future  -     LIPID PANEL; Future    3. Acute bilateral low back pain with left-sided sciatica  -     traMADol (ULTRAM) 50 mg tablet; Take 1 Tab by mouth every twelve (12) hours as needed for Pain. Max Daily Amount: 100 mg.    4. Bruising  -     CBC WITH AUTOMATED DIFF; Future  Suspect bruising from activity. Monitor. 5. Balance problem:  Pt will resume HEP from PT. He will do this daily. Call if not improving and will refer to PT.   Suspect combination of prior inner ear issue and msk problems (R knee end stage vanessadgarret L knee tka) may be contributory.         Follow-up Disposition: 3 months; sooner prn

## 2018-04-20 NOTE — PROGRESS NOTES
Moises Patel Janet 69 y.o. male   Chief Complaint   Patient presents with    Follow-up    Cholesterol Problem    Hypertension    Medication Refill    Skin Problem     Discoloration on bilateral forearms and left leg. Patient denies any pain or itching, but states his wife has the same discolorations on her skin.  Back Pain     lower back and left upper leg x 5 days, patient denies any injury but states that the pain begain after working in the yard. 1. Have you been to the ER, urgent care clinic since your last visit? Hospitalized since your last visit? No    2. Have you seen or consulted any other health care providers outside of the [address] [place] [address] since your last visit? Include any pap smears or colon screening.  No

## 2018-04-23 ENCOUNTER — HOSPITAL ENCOUNTER (OUTPATIENT)
Dept: LAB | Age: 78
Discharge: HOME | End: 2018-04-23
Payer: MEDICARE

## 2018-04-23 DIAGNOSIS — T14.8XXA BRUISING: ICD-10-CM

## 2018-04-23 DIAGNOSIS — I10 ESSENTIAL HYPERTENSION: ICD-10-CM

## 2018-04-23 DIAGNOSIS — E78.5 HYPERLIPIDEMIA, UNSPECIFIED HYPERLIPIDEMIA TYPE: ICD-10-CM

## 2018-04-23 LAB
ALBUMIN SERPL-MCNC: 4.2 G/DL (ref 3.4–5)
ALBUMIN/GLOB SERPL: 1.6 {RATIO} (ref 0.8–1.7)
ALP SERPL-CCNC: 93 U/L (ref 45–117)
ALT SERPL-CCNC: 46 U/L (ref 16–61)
ANION GAP SERPL CALC-SCNC: 8 MMOL/L (ref 3–18)
AST SERPL-CCNC: 26 U/L (ref 15–37)
BASOPHILS # BLD: 0 K/UL (ref 0–0.06)
BASOPHILS NFR BLD: 0 % (ref 0–2)
BILIRUB SERPL-MCNC: 2.4 MG/DL (ref 0.2–1)
BUN SERPL-MCNC: 16 MG/DL (ref 7–18)
BUN/CREAT SERPL: 19 (ref 12–20)
CALCIUM SERPL-MCNC: 9.2 MG/DL (ref 8.5–10.1)
CHLORIDE SERPL-SCNC: 105 MMOL/L (ref 100–108)
CHOLEST SERPL-MCNC: 152 MG/DL
CO2 SERPL-SCNC: 28 MMOL/L (ref 21–32)
CREAT SERPL-MCNC: 0.85 MG/DL (ref 0.6–1.3)
DIFFERENTIAL METHOD BLD: ABNORMAL
EOSINOPHIL # BLD: 0.1 K/UL (ref 0–0.4)
EOSINOPHIL NFR BLD: 1 % (ref 0–5)
ERYTHROCYTE [DISTWIDTH] IN BLOOD BY AUTOMATED COUNT: 13 % (ref 11.6–14.5)
GLOBULIN SER CALC-MCNC: 2.7 G/DL (ref 2–4)
GLUCOSE SERPL-MCNC: 106 MG/DL (ref 74–99)
HCT VFR BLD AUTO: 47.6 % (ref 36–48)
HDLC SERPL-MCNC: 71 MG/DL (ref 40–60)
HDLC SERPL: 2.1 {RATIO} (ref 0–5)
HGB BLD-MCNC: 17.2 G/DL (ref 13–16)
LDLC SERPL CALC-MCNC: 64.2 MG/DL (ref 0–100)
LIPID PROFILE,FLP: ABNORMAL
LYMPHOCYTES # BLD: 1.3 K/UL (ref 0.9–3.6)
LYMPHOCYTES NFR BLD: 12 % (ref 21–52)
MCH RBC QN AUTO: 34.2 PG (ref 24–34)
MCHC RBC AUTO-ENTMCNC: 36.1 G/DL (ref 31–37)
MCV RBC AUTO: 94.6 FL (ref 74–97)
MONOCYTES # BLD: 0.8 K/UL (ref 0.05–1.2)
MONOCYTES NFR BLD: 7 % (ref 3–10)
NEUTS SEG # BLD: 8.8 K/UL (ref 1.8–8)
NEUTS SEG NFR BLD: 80 % (ref 40–73)
PLATELET # BLD AUTO: 302 K/UL (ref 135–420)
PMV BLD AUTO: 9.6 FL (ref 9.2–11.8)
POTASSIUM SERPL-SCNC: 4.1 MMOL/L (ref 3.5–5.5)
PROT SERPL-MCNC: 6.9 G/DL (ref 6.4–8.2)
RBC # BLD AUTO: 5.03 M/UL (ref 4.7–5.5)
SODIUM SERPL-SCNC: 141 MMOL/L (ref 136–145)
TRIGL SERPL-MCNC: 84 MG/DL (ref ?–150)
VLDLC SERPL CALC-MCNC: 16.8 MG/DL
WBC # BLD AUTO: 11 K/UL (ref 4.6–13.2)

## 2018-04-23 PROCEDURE — 80053 COMPREHEN METABOLIC PANEL: CPT | Performed by: FAMILY MEDICINE

## 2018-04-23 PROCEDURE — 36415 COLL VENOUS BLD VENIPUNCTURE: CPT | Performed by: FAMILY MEDICINE

## 2018-04-23 PROCEDURE — 80061 LIPID PANEL: CPT | Performed by: FAMILY MEDICINE

## 2018-04-23 PROCEDURE — 85025 COMPLETE CBC W/AUTO DIFF WBC: CPT | Performed by: FAMILY MEDICINE

## 2018-04-27 ENCOUNTER — HOSPITAL ENCOUNTER (OUTPATIENT)
Dept: GENERAL RADIOLOGY | Age: 78
Discharge: HOME | End: 2018-04-27
Attending: PHYSICIAN ASSISTANT
Payer: MEDICARE

## 2018-04-27 ENCOUNTER — HOSPITAL ENCOUNTER (OUTPATIENT)
Dept: MRI IMAGING | Age: 78
Discharge: HOME | End: 2018-04-27
Attending: SPECIALIST
Payer: MEDICARE

## 2018-04-27 DIAGNOSIS — M75.52 BURSITIS OF SHOULDER, LEFT: ICD-10-CM

## 2018-04-27 DIAGNOSIS — M25.512 ACUTE PAIN OF LEFT SHOULDER: ICD-10-CM

## 2018-04-27 DIAGNOSIS — M25.612 DECREASED RANGE OF MOTION OF SHOULDER, LEFT: ICD-10-CM

## 2018-04-27 PROCEDURE — 23350 INJECTION FOR SHOULDER X-RAY: CPT

## 2018-04-27 PROCEDURE — A9579 GAD-BASE MR CONTRAST NOS,1ML: HCPCS | Performed by: PHYSICIAN ASSISTANT

## 2018-04-27 PROCEDURE — 74011000250 HC RX REV CODE- 250: Performed by: PHYSICIAN ASSISTANT

## 2018-04-27 PROCEDURE — 73222 MRI JOINT UPR EXTREM W/DYE: CPT

## 2018-04-27 PROCEDURE — 74011636320 HC RX REV CODE- 636/320: Performed by: PHYSICIAN ASSISTANT

## 2018-04-27 RX ORDER — SODIUM CHLORIDE 9 MG/ML
10 INJECTION INTRAMUSCULAR; INTRAVENOUS; SUBCUTANEOUS
Status: COMPLETED | OUTPATIENT
Start: 2018-04-27 | End: 2018-04-27

## 2018-04-27 RX ORDER — LIDOCAINE HYDROCHLORIDE 10 MG/ML
30 INJECTION, SOLUTION EPIDURAL; INFILTRATION; INTRACAUDAL; PERINEURAL ONCE
Status: COMPLETED | OUTPATIENT
Start: 2018-04-27 | End: 2018-04-27

## 2018-04-27 RX ADMIN — SODIUM CHLORIDE 10 ML: 9 INJECTION, SOLUTION INTRAMUSCULAR; INTRAVENOUS; SUBCUTANEOUS at 11:45

## 2018-04-27 RX ADMIN — LIDOCAINE HYDROCHLORIDE 10 ML: 10 INJECTION, SOLUTION EPIDURAL; INFILTRATION; INTRACAUDAL; PERINEURAL at 11:45

## 2018-04-27 RX ADMIN — GADOPENTETATE DIMEGLUMINE 0.15 ML: 469.01 INJECTION INTRAVENOUS at 11:45

## 2018-04-27 RX ADMIN — IOPAMIDOL 20 ML: 408 INJECTION, SOLUTION INTRATHECAL at 11:45

## 2018-05-07 ENCOUNTER — OFFICE VISIT (OUTPATIENT)
Dept: CARDIOLOGY CLINIC | Age: 78
End: 2018-05-07

## 2018-05-07 VITALS
HEIGHT: 72 IN | SYSTOLIC BLOOD PRESSURE: 134 MMHG | BODY MASS INDEX: 25.6 KG/M2 | HEART RATE: 64 BPM | DIASTOLIC BLOOD PRESSURE: 80 MMHG | WEIGHT: 189 LBS | OXYGEN SATURATION: 98 %

## 2018-05-07 DIAGNOSIS — I49.3 PVC'S (PREMATURE VENTRICULAR CONTRACTIONS): ICD-10-CM

## 2018-05-07 DIAGNOSIS — I77.9 BILATERAL CAROTID ARTERY DISEASE (HCC): ICD-10-CM

## 2018-05-07 DIAGNOSIS — I25.10 ATHEROSCLEROSIS OF NATIVE CORONARY ARTERY OF NATIVE HEART WITHOUT ANGINA PECTORIS: Primary | ICD-10-CM

## 2018-05-07 DIAGNOSIS — E78.2 MIXED HYPERLIPIDEMIA: ICD-10-CM

## 2018-05-07 DIAGNOSIS — I10 ESSENTIAL HYPERTENSION: ICD-10-CM

## 2018-05-07 NOTE — PROGRESS NOTES
1. Have you been to the ER, urgent care clinic since your last visit? Hospitalized since your last visit? no    2. Have you seen or consulted any other health care providers outside of the [address] [place] [address] since your last visit? Include any pap smears or colon screening.  no

## 2018-05-07 NOTE — MR AVS SNAPSHOT
[facility] [id number] [address] [id number] [phone] Patient: Sari Rivero MRN:  [id number] Visit Information Date & Time Provider Department Dept. Phone Encounter #  
 5/7/2018  8:20 AM Randee Solano MD [phone] Your Appointments 5/10/2018  8:45 AM  
DIAG TEST F/U with Ghazala Dsouza PA-C  
[facility] ([facility]) Appt Note: MRI f/u LEFT SHOULDER, req HV office [address] [place] [address] [address] [facility]  
[phone] [address] [facility], [facility] [address]  
  
    
 7/23/2018 12:45 PM  
Follow Up with Geno Rose MD  
[facility] (--) Appt Note: 3 month follow up [address] [id number] [phone]  
  
   
 [address] [place], [place] [id number]  
  
    
 8/21/2018  9:00 AM  
PROCEDURE with [facility] 1 [facility] ([facility]) Appt Note: aaa 1 year [address] [place], [place] [address] [address] [facility]  
[phone] [facility] [facility]  
  
    
 8/21/2018 10:00 AM  
PROCEDURE with [facility] IMAGING 1 [facility] ([facility]) Appt Note:  [address] [place], [place] [address] [address] [facility]  
[phone] 8/21/2018 11:30 AM  
Follow Up with RAHAT Guadalupe [facility] ([facility]) Appt Note: 1 YEAR WITH STUDIES SAME DAY WITH PREP  
 [address] [place] [address] [facility]  
[phone] [address] [facility] [address] [facility] Upcoming Health Maintenance Date Due  
 GLAUCOMA SCREENING Q2Y 6/30/2018* Influenza Age 5 to Adult 8/1/2018 MEDICARE YEARLY EXAM 1/4/2019 COLONOSCOPY 9/28/2020 DTaP/Tdap/Td series (2 - Td) 11/1/2026 *Topic was postponed. The date shown is not the original due date. Allergies as of 5/7/2018  Review Complete On: 4/27/2018 By: Corrina Issa Severity Noted Reaction Type Reactions Pollen Extracts  02/05/2015    Sneezing Itchy eyes, runny nose Current Immunizations  Reviewed on 11/1/2016 Name Date Influenza High Dose Vaccine PF 11/1/2016 Influenza Vaccine 10/11/2017, 9/17/2015, 12/1/2014, 9/16/2013 Influenza Vaccine Whole 10/18/2012 Pneumococcal Polysaccharide (PPSV-23) 11/1/2016 TD Vaccine 8/21/2006 ZZZ-RETIRED (DO NOT USE) Pneumococcal Vaccine (Unspecified Type) 8/21/2006 Zoster 9/18/2012 Not reviewed this visit You Were Diagnosed With   
  
 Codes Comments Atherosclerosis of native coronary artery of native heart without angina pectoris    -  Primary ICD-10-CM: I25.10 ICD-9-CM: 414.01 Mixed hyperlipidemia     ICD-10-CM: E78.2 ICD-9-CM: 272.2 PVC's (premature ventricular contractions)     ICD-10-CM: I49.3 ICD-9-CM: 427.69 Essential hypertension     ICD-10-CM: I10 
ICD-9-CM: 401.9 Hyperlipidemia, unspecified hyperlipidemia type     ICD-10-CM: E78.5 ICD-9-CM: 272.4 Vitals BP Pulse Height(growth percentile) Weight(growth percentile) SpO2 BMI  
 134/80 64 6' (1.829 m) 189 lb (85.7 kg) 98% 25.63 kg/m2 Smoking Status Former Smoker Vitals History BMI and BSA Data Body Mass Index Body Surface Area  
 25.63 kg/m 2 2.09 m 2 Preferred Pharmacy Pharmacy Name Phone [address] [facility] [phone] Your Updated Medication List  
  
   
This list is accurate as of 5/7/18  8:57 AM.  Always use your most recent med list. amLODIPine 10 mg tablet Commonly known as:  Alissa So TAKE 1 TABLET BY MOUTH DAILY aspirin delayed-release 81 mg tablet Take  by mouth daily. CALTRATE PLUS PO Take 1 Tab by mouth daily. CO Q-10 10 mg Cap Generic drug:  coenzyme q10 Take  by mouth.  
  
 ezetimibe 10 mg tablet Commonly known as:  Corinda [place] Take 1 Tab by mouth daily. gabapentin 300 mg capsule Commonly known as:  NEURONTIN  
300 mg three (3) times daily. niacin 1,000 mg Tb24 tab Commonly known as:  Niaspan Take 1 Tab by mouth daily. nitroglycerin 0.4 mg SL tablet Commonly known as:  NITROSTAT  
1 Tab by SubLINGual route every five (5) minutes as needed. omega 3-dha-epa-fish oil 100-160-1,000 mg Cap Take 1,000 mg by mouth daily. rosuvastatin 20 mg tablet Commonly known as:  CRESTOR Take 1 Tab by mouth nightly. sildenafil citrate 100 mg tablet Commonly known as:  VIAGRA Take 1 Tab by mouth as needed. traMADol 50 mg tablet Commonly known as:  ULTRAM  
Take 1 Tab by mouth every twelve (12) hours as needed for Pain. Max Daily Amount: 100 mg. VITAMIN D3 2,000 unit Tab Generic drug:  cholecalciferol (vitamin D3) Take  by mouth. We Performed the Following AMB POC EKG ROUTINE W/ 12 LEADS, INTER & REP [28684 CPT(R)] Introducing [facility]! Dear Dewey Mcpherson: Thank you for requesting a [employer] account. Our records indicate that you already have an active [employer] account. You can access your account anytime at [email or web address] Did you know that you can access your hospital and ER discharge instructions at any time in Mo[employer]? You can also review all of your test results from your hospital stay or ER visit. Additional Information If you have questions, please visit the Frequently Asked Questions section of the [employer] website at [email or web address] Remember, MoPix is NOT to be used for urgent needs. For medical emergencies, dial 911. Now available from your iPhone and Android! Please provide this summary of care documentation to your next provider. Your primary care clinician is listed as Demarcus Sharp. If you have any questions after today's visit, please call [phone].

## 2018-05-07 NOTE — PROGRESS NOTES
HISTORY OF PRESENT ILLNESS  Criss Pinedo is a 69 y.o. male. Hypertension   Pertinent negatives include no chest pain, no abdominal pain, no headaches and no shortness of breath. Patient presents for a followup office visit. He has a known history of coronary artery disease, status post a non-ST elevation myocardial infarction in 2009. He underwent a cardiac catheterization at that time and was found to have moderate, but nonobstructive two-vessel coronary disease involving his left circumflex and a right-sided posterior descending artery, that were assessed by pressure wire. The patient has been maintained on medical therapy since that time. The patient last underwent a pharmacological nuclear stress test in December 2015, which was a normal study, showing no ischemia, normal left ventricle ejection fraction. EF 67%. He has a history of mild to moderate carotid artery disease,  last evaluated with a carotid duplex in August 2017 which demonstrated moderate carotid disease of the left ICA and mild plaquing of the right. He also underwent an abdominal ultrasound which showed a very small infrarenal AAA. He is now being followed by vascular surgery really annually. The patient was last seen in the office approximately 9 months ago, since last visit, he reports he has been feeling well. He has lost 15 pounds in weight and is more active than he was last year. No new chest pain or shortness of breath, no leg swelling, no orthopnea, PND, claudication. Past Medical History:   Diagnosis Date    AAA (abdominal aortic aneurysm) ([address] 03/2010    noted on CT (abdomen)    Abnormal LFT's 12/95, 04/16/03    Acute meniscal tear, lateral 01/2007    s/p arthorscopic surg (Dr. Claudell Gazella)    Atypical chest pain 02/04/09    CAD (coronary artery disease), native coronary artery     Cardiac cath 04/13/2009    dLM 30%. mLAD 30%. oD2 30%. pCX 55% (FFR 0.95). mRCA 40%. RPDA 65% (FFR 0.86). EF 65%.      Cardiac echocardiogram 04/03/2007    EF 60%. No RWMA. Gr 1 DDfx. LAE. No significant valvular heart disease.  Cardiac nuclear imaging test, low risk 12/04/2015    Low risk. No ischemia or prior infarction. No WMA. EF 67%. Neg EKG on pharm stress test.    Cardiovascular aorto-iliac duplex 06/29/2015    AAA measures 3.2 x 3.1 cm Trv. (Prev 3.2 x 3.4 cm on 6/19/14). Aneurysm is infrarenal & fusiform w/complex intraluminal thrombus within. Mod bilateral common iliac stenosis.  Carotid duplex 03/16/2016    Mild < 50% BETHEL stenosis. Mod 32-14% LICA stenosis. Similar to study of 6/29/15.  Cholecystitis chronic 03/2008    s/p sue    Diverticulosis     Diverticulosis 9-28-15    DR. 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 90 Tab 3    amLODIPine (NORVASC) 10 mg tablet TAKE 1 TABLET BY MOUTH DAILY 90 Tab 3    rosuvastatin (CRESTOR) 20 mg tablet Take 1 Tab by mouth nightly. 90 Tab 3    traMADol (ULTRAM) 50 mg tablet Take 1 Tab by mouth every twelve (12) hours as needed for Pain.  Max Daily Amount: 100 mg. 30 Tab 0    coenzyme q10 (CO Q-10) 10 mg cap Take  by mouth.  ezetimibe (ZETIA) 10 mg tablet Take 1 Tab by mouth daily. 90 Tab 3    nitroglycerin (NITROSTAT) 0.4 mg SL tablet 1 Tab by SubLINGual route every five (5) minutes as needed. 25 Tab 3    gabapentin (NEURONTIN) 300 mg capsule 300 mg three (3) times daily.  cholecalciferol, vitamin D3, (VITAMIN D3) 2,000 unit tab Take  by mouth.  sildenafil citrate (VIAGRA) 100 mg tablet Take 1 Tab by mouth as needed. 20 Tab 3    omega 3-dha-epa-fish oil 100-160-1,000 mg cap Take 1,000 mg by mouth daily. (Patient taking differently: Take 2,000 mg by mouth daily.) 90 Cap 3    aspirin delayed-release 81 mg tablet Take  by mouth daily.  CALCIUM CARB/VIT D3/MINERALS (CALTRATE PLUS PO) Take 1 Tab by mouth daily. Allergies   Allergen Reactions    Pollen Extracts Sneezing     Itchy eyes, runny nose        Social History   Substance Use Topics    Smoking status: Former Smoker     Packs/day: 0.50     Years: 20.00     Types: Cigarettes     Quit date: 12/10/1995    Smokeless tobacco: Never Used    Alcohol use 4.0 oz/week     7 Glasses of wine, 1 Shots of liquor per week      Comment: social           Review of Systems   Constitutional: Negative for chills, fever and weight loss. HENT: Negative for nosebleeds. Eyes: Negative for blurred vision and double vision. Respiratory: Negative for cough, shortness of breath and wheezing. Cardiovascular: Negative for chest pain, palpitations, orthopnea, claudication, leg swelling and PND. Gastrointestinal: Negative for abdominal pain, heartburn, nausea and vomiting. Genitourinary: Negative for dysuria and hematuria. Musculoskeletal: Negative for falls, joint pain and myalgias. Skin: Negative for rash. Neurological: Negative for dizziness, focal weakness and headaches. Endo/Heme/Allergies: Does not bruise/bleed easily. Psychiatric/Behavioral: Negative for substance abuse.      Visit Vitals    /80    Pulse 64    Ht 6' (1.829 m)    Wt 85.7 kg (189 lb)    SpO2 98%    BMI 25.63 kg/m2      Physical Exam   Constitutional: He is oriented to person, place, and time. He appears well-developed and well-nourished. HENT:   Head: Normocephalic and atraumatic. Eyes: Conjunctivae are normal.   Neck: Neck supple. No JVD present. Carotid bruit is not present. Cardiovascular: Normal rate, regular rhythm, S1 normal, S2 normal and normal pulses. Exam reveals no gallop and no S3. No murmur heard. Pulmonary/Chest: Breath sounds normal. He has no wheezes. He has no rales. Abdominal: Soft. Bowel sounds are normal. There is no tenderness. Musculoskeletal: He exhibits no edema. Neurological: He is alert and oriented to person, place, and time. Skin: Skin is warm and dry. EKG: Normal sinus rhythm, low voltage in the limb leads,  normal axis, normal QTc interval, , no ST or T wave abnormalities concerning for ischemia. Compared to previous EKG, atrial premature contractions are no longer. ASSESSMENT and PLAN    Asymptomatic coronary artery disease. Patient has moderate 2 vessel disease last assessed by cardiac catheterization in 2009, involving nonobstructive lesions in the proximal left circumflex in the right-sided PDA. He does have a history of a non-ST elevation myocardial infarction back in 2009, for which he was briefly treated with Plavix. He is now maintained on aspirin and a statin, and he has been intolerant to beta blockers because of bradycardia. He last underwent a negative pharmacologic nuclear stress test in December 2015. No new symptoms concerning for angina. Patient remains on a potent statin and a daily aspirin. He did not tolerate a beta-blocker due to bradycardia. Essential hypertension. Patient blood pressure remains well controlled on his current regimen, all of which I would continue. Dyslipidemia: Patient is now on Crestor 20 mg daily, Niaspan 1000 mg daily, Fish oil, and Zetia.  His goal LDL should be as close to 70 as possible and this is been closely followed by his PCP. Historically it has been well controlled on this regimen. Carotid artery disease. This is now being followed annually by vascular surgery. His last carotid duplex scan from August 2017 showed moderate disease of the left and mild disease on the right. Infrarenal AAA. This was last evaluated by an ultrasound in August 2017. This was small in size measuring 3.2 x 3.3 cm. This will be followed by vascular surgery as well.     Followup in 9 months time, sooner if needed

## 2018-05-10 ENCOUNTER — OFFICE VISIT (OUTPATIENT)
Dept: ORTHOPEDIC SURGERY | Facility: CLINIC | Age: 78
End: 2018-05-10

## 2018-05-10 VITALS
HEIGHT: 72 IN | WEIGHT: 190.6 LBS | DIASTOLIC BLOOD PRESSURE: 69 MMHG | RESPIRATION RATE: 18 BRPM | BODY MASS INDEX: 25.81 KG/M2 | TEMPERATURE: 97.8 F | OXYGEN SATURATION: 97 % | HEART RATE: 57 BPM | SYSTOLIC BLOOD PRESSURE: 118 MMHG

## 2018-05-10 DIAGNOSIS — Z96.652 S/P TKR (TOTAL KNEE REPLACEMENT), LEFT: ICD-10-CM

## 2018-05-10 DIAGNOSIS — G89.29 CHRONIC LEFT SHOULDER PAIN: ICD-10-CM

## 2018-05-10 DIAGNOSIS — M17.11 PRIMARY OSTEOARTHRITIS OF RIGHT KNEE: Primary | ICD-10-CM

## 2018-05-10 DIAGNOSIS — S43.432A SUPERIOR GLENOID LABRUM LESION OF LEFT SHOULDER, INITIAL ENCOUNTER: ICD-10-CM

## 2018-05-10 DIAGNOSIS — M25.612 DECREASED RANGE OF MOTION OF LEFT SHOULDER: ICD-10-CM

## 2018-05-10 DIAGNOSIS — M75.122 COMPLETE TEAR OF LEFT ROTATOR CUFF: ICD-10-CM

## 2018-05-10 DIAGNOSIS — M75.52 BURSITIS OF SHOULDER, LEFT: ICD-10-CM

## 2018-05-10 DIAGNOSIS — M75.42 IMPINGEMENT SYNDROME OF LEFT SHOULDER: ICD-10-CM

## 2018-05-10 DIAGNOSIS — M25.512 CHRONIC LEFT SHOULDER PAIN: ICD-10-CM

## 2018-05-10 RX ORDER — BETAMETHASONE SODIUM PHOSPHATE AND BETAMETHASONE ACETATE 3; 3 MG/ML; MG/ML
6 INJECTION, SUSPENSION INTRA-ARTICULAR; INTRALESIONAL; INTRAMUSCULAR; SOFT TISSUE ONCE
Qty: 0.5 ML | Refills: 0
Start: 2018-05-10 | End: 2018-05-10

## 2018-05-10 RX ORDER — BUPIVACAINE HYDROCHLORIDE 2.5 MG/ML
4 INJECTION, SOLUTION EPIDURAL; INFILTRATION; INTRACAUDAL ONCE
Qty: 4 ML | Refills: 0
Start: 2018-05-10 | End: 2018-05-10

## 2018-05-10 NOTE — PROGRESS NOTES
Patient: Ranjith Doyle                MRN: [id number]       SSN: [id number]  YOB: 1940        AGE: 69 y.o. SEX: male    PCP: Dusty Kamara MD  05/10/18    Chief Complaint   Patient presents with    Shoulder Pain     Left    Knee Pain     Chidi     HISTORY:  Ranjith Doyle is a 69 y.o. male who is seen for left shoulder, bilateral knee R>L pain. He also has stiffness and pain in his left shoulder. He states he went to PT and the spine center which helped with his back and leg back pain. He was previously seen for seen for right knee pain. He notes his right knee began hurting during a previous course of PT. He went to [facility] where he received x-rays. He notes medial right knee pain. He notes benefit from previous injections. He states the Celebrex has not helped with the pain. He has right knee pain after walking or sitting for long periods of time. He responded to a Euflexxa series in February. 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 He reports increased knee pain after helping his son move recently. He is S/P left TKR 1/29/13. Occupation, etc:  Mr. Cande Kamara is retired. He was previously the  of [employer] and an avid member of the [employer]. He has no longer been able to exercise on the track. His wife's sister has peritoneal cancer. He is going back to [address] [place] [address] for three weeks on Monday. He notes his wife was recently diagnosed breast cancer. His wife has heart problems and is not very mobile. He is his wife's primary caregiver. His niece is an oncologist and did not recommend chemotherapy. His wife is now doing much better. He travelled to [place] to visit his son recently. He notes that he used to live in [facility]. He grew up in [place]. He reports that he lost about 8 pounds recently.    His current weight is 190lbs. Last 3 Recorded Weights in this Encounter    05/10/18 0906   Weight: 190 lb 9.6 oz (86.5 kg)     Body mass index is 25.85 kg/(m^2). Patient Active Problem List   Diagnosis Code    Hyperlipidemia E78.5    Coronary atherosclerosis of native coronary artery I25.10    AAA (abdominal aortic aneurysm) ([facility] [address] I71.4    Right knee pain M25.561    Abnormal LFTs (liver function tests) R94.5    Vitamin D deficiency E55.9    CLARKE (dyspnea on exertion) R06.09    Essential hypertension I10    Hyperglycemia R73.9    Tinea versicolor B36.0    S/P total knee replacement Z96.659    Fatty liver K76.0    PVC's (premature ventricular contractions) I49.3    Carotid artery disease (HCC) I77.9    Right shoulder pain M25.511    Acute back pain M54.9    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.816    Lumbar neuritis M54.16    DDD (degenerative disc disease), lumbar M51.36    Radiculopathy, lumbar region M54.16    Degeneration of intervertebral disc of lumbar region M51.36    Mixed hyperlipidemia E78.2    ACP (advance care planning) Z71.89     REVIEW OF SYSTEMS: All Below are Negative except: See HPI   Constitutional: negative for fever, chills, and weight loss. Cardiovascular: negative for chest pain, claudication, leg swelling, SOB, CLARKE   Gastrointestinal: Negative for pain, N/V/C/D, Blood in stool or urine, dysuria,  hematuria, incontinence, pelvic pain. Musculoskeletal: See HPI   Neurological: Negative for dizziness and weakness. Negative for headaches, Visual changes, confusion, seizures   Phychiatric/Behavioral: Negative for depression, memory loss, substance  abuse. Extremities: Negative for hair changes, rash, or skin lesion changes. Hematologic: Negative for bleeding problems, bruising, pallor or swollen lymph  nodes   Peripheral Vascular: No calf pain, no circulation deficits.     Social History     Social History    Marital status:      Spouse name: N/A  Number of children: N/A    Years of education: N/A     Occupational History    Not on file. Social History Main Topics    Smoking status: Former Smoker     Packs/day: 0.50     Years: 20.00     Types: Cigarettes     Quit date: 12/10/1995    Smokeless tobacco: Never Used    Alcohol use 4.0 oz/week     7 Glasses of wine, 1 Shots of liquor per week      Comment: social    Drug use: No    Sexual activity: Not on file     Other Topics Concern    Not on file     Social History Narrative     Allergies   Allergen Reactions    Pollen Extracts Sneezing     Itchy eyes, runny nose     Current Outpatient Prescriptions   Medication Sig    niacin (NIASPAN) 1,000 mg Tb24 tab Take 1 Tab by mouth daily.  amLODIPine (NORVASC) 10 mg tablet TAKE 1 TABLET BY MOUTH DAILY    rosuvastatin (CRESTOR) 20 mg tablet Take 1 Tab by mouth nightly.  traMADol (ULTRAM) 50 mg tablet Take 1 Tab by mouth every twelve (12) hours as needed for Pain. Max Daily Amount: 100 mg.  coenzyme q10 (CO Q-10) 10 mg cap Take  by mouth.  ezetimibe (ZETIA) 10 mg tablet Take 1 Tab by mouth daily.  gabapentin (NEURONTIN) 300 mg capsule 300 mg three (3) times daily.  cholecalciferol, vitamin D3, (VITAMIN D3) 2,000 unit tab Take  by mouth.  sildenafil citrate (VIAGRA) 100 mg tablet Take 1 Tab by mouth as needed.  omega 3-dha-epa-fish oil 100-160-1,000 mg cap Take 1,000 mg by mouth daily. (Patient taking differently: Take 2,000 mg by mouth daily.)    aspirin delayed-release 81 mg tablet Take  by mouth daily.  nitroglycerin (NITROSTAT) 0.4 mg SL tablet 1 Tab by SubLINGual route every five (5) minutes as needed.  CALCIUM CARB/VIT D3/MINERALS (CALTRATE PLUS PO) Take 1 Tab by mouth daily. No current facility-administered medications for this visit.       PHYSICAL EXAMINATION:  Visit Vitals    /69    Pulse (!) 57    Temp 97.8 °F (36.6 °C) (Oral)    Resp 18    Ht 6' (1.829 m)    Wt 190 lb 9.6 oz (86.5 kg)    SpO2 97%  BMI 25.85 kg/m2     ORTHO EXAMINATION:  Examination Left Hand Right Hand   Skin Intact Intact   Deformity - -   Swelling - -   Tenderness - -   Finger flexion Full Full   Finger extension Full Full   Sensation Normal Normal   Capillary refill Normal Normal   Heberden's nodes + +   Dupuytren's + +     Examination Left Wrist Right Wrist   Skin Intact Intact   Tenderness - -   Flexion 60 60   Extension 60 60   Deformity - -   Effusion - -   Tinel's sign - -   Phalen's test - -   Finklestein maneuver - -   Pain with thumb abduction - -     Examination Right shoulder Left shoulder   Skin Intact Intact   Effusion - -   Biceps deformity - -   Atrophy - -   AC joint tenderness - -   Acromial tenderness + +   Biceps tenderness - -   Forward flexion/Elevation  170   Active abduction  170   External rotation ROM 40 30   Internal rotation  100   Apprehension - -   Impingement - +   Drop Arm Test - -   Neurovascular Intact Intact       Examination Right knee Left knee   Skin Intact Well healed TKR incision    Range of motion 120-0 110-0   Effusion - -   Medial joint line tenderness + -   Lateral joint line tenderness - -   Popliteal tenderness - -   Osteophytes palpable + -   Missaels - -   Patella crepitus + +   Anterior drawer - -   Lateral laxity - -   Medial laxity - -   Varus deformity - -   Valgus deformity - -   Pretibial edema - -   Calf tenderness - -   He is wearing a Alexandr Copper knee sleeve    PROCEDURE: After discussing treatment options, patient's right knee was injected with 4 cc Marcaine and 1/2 cc Celestone.       Chart reviewed for the following:   Braden Davalos MD, have reviewed the History, Physical and updated the Allergic reactions for [address] [place] [address] Dr performed immediately prior to start of procedure:  Braden Davalos MD, have performed the following reviews on [facility] prior to the start of the procedure:            * Patient was identified by name and date of birth   * Agreement on procedure being performed was verified  * Risks and Benefits explained to the patient  * Procedure site verified and marked as necessary  * Patient was positioned for comfort  * Consent was obtained     Time: 9:42 AM     Date of procedure: 5/10/2018    Procedure performed by:  Juma Barber MD    Mr. Venu Elizondo tolerated the procedure well with no complications. MRI LEFT SHOULDER W CONT 04/27/2018  IMPRESSION:  1. Supraspinatus moderate tendinosis and small focal full-thickness tear  anterior insertion. No muscle atrophy or tendon retraction. 2. Moderate subscapularis tendinosis and partial-thickness tears. Intra-articular biceps tendinosis. 3. Superior labral tear extending posteriorly/SLAP tear type II. Posterior  inferior labral degeneration and tear. 4. Moderate acromioclavicular osteoarthrosis with inflammation. Small  subacromial spur. Subacromial subdeltoid bursitis. RADIOLOGY:  XR LEFT SHOULDER 5/5/16  IMPRESSION:  No fractures, mild acromioclavicular narrowing, no glenohumeral narrowing, no calcific densities. IMPRESSION:      ICD-10-CM ICD-9-CM    1. Primary osteoarthritis of right knee M17.11 715.16 betamethasone (CELESTONE SOLUSPAN) 6 mg/mL injection      BETAMETHASONE ACETATE & SODIUM PHOSPHATE INJECTION 3 MG EA.      DRAIN/INJECT LARGE JOINT/BURSA      bupivacaine, PF, (MARCAINE, PF,) 0.25 % (2.5 mg/mL) injection   2. Chronic left shoulder pain M25.512 719.41     G89.29 338.29    3. Decreased range of motion of left shoulder M25.612 719.51    4. Bursitis of shoulder, left M75.52 726.10    5. Impingement syndrome of left shoulder M75.42 726.2    6. Superior glenoid labrum lesion of left shoulder, initial encounter S43.432A 840.7    7. Complete tear of left rotator cuff M75.122 727.61    8.  S/P TKR (total knee replacement), left Z96.652 V43.65      PLAN:  After discussing treatment options, patient's right knee was injected with 4 cc Marcaine and 1/2 cc Celestone. I will see him back as needed. We discussed possible need for right knee arthroplasty at some time in the future. We discussed a future RCR if pain increases.     Scribed by [employer] ([address]) as dictated by Chidi Diaz MD

## 2018-05-10 NOTE — PATIENT INSTRUCTIONS
Knee Arthritis: Exercises  Your Care Instructions  Here are some examples of exercises for knee arthritis. Start each exercise slowly. Ease off the exercise if you start to have pain. Your doctor or physical therapist will tell you when you can start these exercises and which ones will work best for you. How to do the exercises  Knee flexion with heel slide    1. Lie on your back with your knees bent. 2. Slide your heel back by bending your affected knee as far as you can. Then hook your other foot around your ankle to help pull your heel even farther back. 3. Hold for about 6 seconds, then rest for up to 10 seconds. 4. Repeat 8 to 12 times. 5. Switch legs and repeat steps 1 through 4, even if only one knee is sore. Quad sets    1. Sit with your affected leg straight and supported on the floor or a firm bed. Place a small, rolled-up towel under your knee. Your other leg should be bent, with that foot flat on the floor. 2. Tighten the thigh muscles of your affected leg by pressing the back of your knee down into the towel. 3. Hold for about 6 seconds, then rest for up to 10 seconds. 4. Repeat 8 to 12 times. 5. Switch legs and repeat steps 1 through 4, even if only one knee is sore. Straight-leg raises to the front    1. Lie on your back with your good knee bent so that your foot rests flat on the floor. Your affected leg should be straight. Make sure that your low back has a normal curve. You should be able to slip your hand in between the floor and the small of your back, with your palm touching the floor and your back touching the back of your hand. 2. Tighten the thigh muscles in your affected leg by pressing the back of your knee flat down to the floor. Hold your knee straight. 3. Keeping the thigh muscles tight and your leg straight, lift your affected leg up so that your heel is about 12 inches off the floor. Hold for about 6 seconds, then lower slowly.   4. Relax for up to 10 seconds between repetitions. 5. Repeat 8 to 12 times. 6. Switch legs and repeat steps 1 through 5, even if only one knee is sore. Active knee flexion    1. Lie on your stomach with your knees straight. If your kneecap is uncomfortable, roll up a washcloth and put it under your leg just above your kneecap. 2. Lift the foot of your affected leg by bending the knee so that you bring the foot up toward your buttock. If this motion hurts, try it without bending your knee quite as far. This may help you avoid any painful motion. 3. Slowly move your leg up and down. 4. Repeat 8 to 12 times. 5. Switch legs and repeat steps 1 through 4, even if only one knee is sore. Quadriceps stretch (facedown)    1. Lie flat on your stomach, and rest your face on the floor. 2. Wrap a towel or belt strap around the lower part of your affected leg. Then use the towel or belt strap to slowly pull your heel toward your buttock until you feel a stretch. 3. Hold for about 15 to 30 seconds, then relax your leg against the towel or belt strap. 4. Repeat 2 to 4 times. 5. Switch legs and repeat steps 1 through 4, even if only one knee is sore. Stationary exercise bike    1. If you do not have a stationary exercise bike at home, you can find one to ride at your local health club or community center. 2. Adjust the height of the bike seat so that your knee is slightly bent when your leg is extended downward. If your knee hurts when the pedal reaches the top, you can raise the seat so that your knee does not bend as much. 3. Start slowly. At first, try to do 5 to 10 minutes of cycling with little to no resistance. Then increase your time and the resistance bit by bit until you can do 20 to 30 minutes without pain. 4. If you start to have pain, rest your knee until your pain gets back to the level that is normal for you. Or cycle for less time or with less effort. Follow-up care is a key part of your treatment and safety.  Be sure to make and go to all appointments, and call your doctor if you are having problems. It's also a good idea to know your test results and keep a list of the medicines you take. Where can you learn more? Go to http://chucky-avel.info/. Enter C159 in the search box to learn more about \"Knee Arthritis: Exercises. \"  Current as of: March 21, 2017  Content Version: 11.4  © 2006-2017 [employer]. Care instructions adapted under license by [employer] (which disclaims liability or warranty for this information). If you have questions about a medical condition or this instruction, always ask your healthcare professional. [place] [address] any warranty or liability for your use of this information. Shoulder Arthritis: Exercises  Your Care Instructions  Here are some examples of typical rehabilitation exercises for your condition. Start each exercise slowly. Ease off the exercise if you start to have pain. Your doctor or physical therapist will tell you when you can start these exercises and which ones will work best for you. How to do the exercises  Shoulder flexion (lying down)    To make a wand for this exercise, use a piece of PVC pipe or a broom handle with the broom removed. Make the wand about a foot wider than your shoulders. 6. Lie on your back, holding a wand with both hands. Your palms should face down as you hold the wand. 7. Keeping your elbows straight, slowly raise your arms over your head. Raise them until you feel a stretch in your shoulders, upper back, and chest.  8. Hold for 15 to 30 seconds. 9. Repeat 2 to 4 times. Shoulder rotation (lying down)    To make a wand for this exercise, use a piece of PVC pipe or a broom handle with the broom removed. Make the wand about a foot wider than your shoulders. 6. Lie on your back. Hold a wand with both hands with your elbows bent and palms up.   7. Keep your elbows close to your body, and move the wand across your body toward the sore arm. 8. Hold for 8 to 12 seconds. 9. Repeat 2 to 4 times. Shoulder internal rotation with towel    7. Hold a towel above and behind your head with the arm that is not sore. 8. With your sore arm, reach behind your back and grasp the towel. 9. With the arm above your head, pull the towel upward. Do this until you feel a stretch on the front and outside of your sore shoulder. 10. Hold 15 to 30 seconds. 11. Repeat 2 to 4 times. Shoulder blade squeeze    6. Stand with your arms at your sides, and squeeze your shoulder blades together. Do not raise your shoulders up as you squeeze. 7. Hold 6 seconds. 8. Repeat 8 to 12 times. Resisted rows    For this exercise, you will need elastic exercise material, such as surgical tubing or Thera-Band. 6. Put the band around a solid object at about waist level. (A bedpost will work well.) Each hand should hold an end of the band. 7. With your elbows at your sides and bent to 90 degrees, pull the band back. Your shoulder blades should move toward each other. Return to the starting position. 8. Repeat 8 to 12 times. External rotator strengthening exercise    5. Start by tying a piece of elastic exercise material to a doorknob. You can use surgical tubing or Thera-Band. (You may also hold one end of the band in each hand.)  6. Stand or sit with your shoulder relaxed and your elbow bent 90 degrees. Your upper arm should rest comfortably against your side. Squeeze a rolled towel between your elbow and your body for comfort. This will help keep your arm at your side. 7. Hold one end of the elastic band with the hand of the painful arm. 8. Start with your forearm across your belly. Slowly rotate the forearm out away from your body. Keep your elbow and upper arm tucked against the towel roll or the side of your body until you begin to feel tightness in your shoulder. Slowly move your arm back to where you started. 9. Repeat 8 to 12 times.   Internal rotator strengthening exercise    1. Start by tying a piece of elastic exercise material to a doorknob. You can use surgical tubing or Thera-Band. 2. Stand or sit with your shoulder relaxed and your elbow bent 90 degrees. Your upper arm should rest comfortably against your side. Squeeze a rolled towel between your elbow and your body for comfort. This will help keep your arm at your side. 3. Hold one end of the elastic band in the hand of the painful arm. 4. Slowly rotate your forearm toward your body until it touches your belly. Slowly move it back to where you started. 5. Keep your elbow and upper arm firmly tucked against the towel roll or at your side. 6. Repeat 8 to 12 times. Pendulum swing    If you have pain in your back, do not do this exercise. 1. Hold on to a table or the back of a chair with your good arm. Then bend forward a little and let your sore arm hang straight down. This exercise does not use the arm muscles. Rather, use your legs and your hips to create movement that makes your arm swing freely. 2. Use the movement from your hips and legs to guide the slightly swinging arm back and forth like a pendulum (or elephant trunk). Then guide it in circles that start small (about the size of a dinner plate). Make the circles a bit larger each day, as your pain allows. 3. Do this exercise for 5 minutes, 5 to 7 times each day. 4. As you have less pain, try bending over a little farther to do this exercise. This will increase the amount of movement at your shoulder. Follow-up care is a key part of your treatment and safety. Be sure to make and go to all appointments, and call your doctor if you are having problems. It's also a good idea to know your test results and keep a list of the medicines you take. Where can you learn more? Go to http://chucky-avel.info/. Enter H562 in the search box to learn more about \"Shoulder Arthritis: Exercises. \"  Current as of: March 21, 2017  Content Version: 11.4  © 2814-5729 [employer]. Care instructions adapted under license by [employer] (which disclaims liability or warranty for this information). If you have questions about a medical condition or this instruction, always ask your healthcare professional. [address] any warranty or liability for your use of this information.

## 2018-07-12 ENCOUNTER — OFFICE VISIT (OUTPATIENT)
Dept: ORTHOPEDIC SURGERY | Facility: CLINIC | Age: 78
End: 2018-07-12

## 2018-07-12 VITALS
WEIGHT: 188 LBS | BODY MASS INDEX: 25.47 KG/M2 | SYSTOLIC BLOOD PRESSURE: 120 MMHG | HEIGHT: 72 IN | TEMPERATURE: 97.3 F | HEART RATE: 67 BPM | DIASTOLIC BLOOD PRESSURE: 69 MMHG | OXYGEN SATURATION: 96 %

## 2018-07-12 DIAGNOSIS — M11.20 CHONDROCALCINOSIS: ICD-10-CM

## 2018-07-12 DIAGNOSIS — M25.461 KNEE EFFUSION, RIGHT: ICD-10-CM

## 2018-07-12 DIAGNOSIS — M11.20 PSEUDOGOUT: ICD-10-CM

## 2018-07-12 DIAGNOSIS — M17.11 PRIMARY OSTEOARTHRITIS OF RIGHT KNEE: ICD-10-CM

## 2018-07-12 DIAGNOSIS — G89.29 CHRONIC PAIN OF RIGHT KNEE: Primary | ICD-10-CM

## 2018-07-12 DIAGNOSIS — M25.561 CHRONIC PAIN OF RIGHT KNEE: Primary | ICD-10-CM

## 2018-07-12 RX ORDER — BUPIVACAINE HYDROCHLORIDE 2.5 MG/ML
4 INJECTION, SOLUTION EPIDURAL; INFILTRATION; INTRACAUDAL ONCE
Qty: 4 ML | Refills: 0
Start: 2018-07-12 | End: 2018-07-12

## 2018-07-12 RX ORDER — BRINZOLAMIDE/BRIMONIDINE TARTRATE 10; 2 MG/ML; MG/ML
1 SUSPENSION/ DROPS OPHTHALMIC 2 TIMES DAILY
COMMUNITY

## 2018-07-12 RX ORDER — BUPIVACAINE HYDROCHLORIDE 2.5 MG/ML
10 INJECTION, SOLUTION EPIDURAL; INFILTRATION; INTRACAUDAL ONCE
Qty: 10 ML | Refills: 0
Start: 2018-07-12 | End: 2018-07-12

## 2018-07-12 RX ORDER — BETAMETHASONE SODIUM PHOSPHATE AND BETAMETHASONE ACETATE 3; 3 MG/ML; MG/ML
6 INJECTION, SUSPENSION INTRA-ARTICULAR; INTRALESIONAL; INTRAMUSCULAR; SOFT TISSUE ONCE
Qty: 0.5 ML | Refills: 0
Start: 2018-07-12 | End: 2018-07-12

## 2018-07-12 NOTE — PROGRESS NOTES
Patient: Ceferino Reddy                MRN: [id number]       SSN: [id number]  YOB: 1940        AGE: 69 y.o. SEX: male    PCP: Cory Shelton MD  07/12/18    Chief Complaint   Patient presents with    Knee Pain     RIGHT KNEE PAIN     HISTORY:  Ceferino Reddy is a 69 y.o. male who is seen for increased bilateral knee R>L pain and swelling. He states he went to PT and the spine center which helped with his back and leg back pain. He was previously seen for seen for right knee pain. He notes his right knee began hurting during a previous course of PT. He went to [facility] where he received x-rays. He notes medial right knee pain. He notes benefit from previous injections. He states the Celebrex has not helped with the pain. He has right knee pain after walking or sitting for long periods of time. He responded to a Euflexxa series in February. 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 He reports increased knee pain after helping his son move recently. He is S/P left TKR 1/29/13. He was previously seen for  left shoulder pain. He also has stiffness and pain in his left shoulder. Occupation, etc:  Mr. Ezequiel Riddle is retired. He was previously the  of [employer] and an avid member of the [employer]. He lost his mother, mother-in-law, sister-in-law, and brother all this year. He has no longer been able to exercise on the track. He is going back to [address] [place] [address] for three weeks on Monday. He notes his wife was recently been cleared of breast cancer. His wife has heart problems and is not very mobile. He is his wife's primary caregiver. He travelled to [place] to visit his son recently. He notes that he used to live in [facility] when he was in [facility] previously. He grew up in [place]. He reports that he lost about 8 pounds recently. His current weight is 188lbs.      Last 3 Recorded Weights in this Encounter    07/12/18 1129   Weight: 188 lb (85.3 kg)     Body mass index is 25.5 kg/(m^2). Patient Active Problem List   Diagnosis Code    Hyperlipidemia E78.5    Coronary atherosclerosis of native coronary artery I25.10    AAA (abdominal aortic aneurysm) ([facility] [address] I71.4    Right knee pain M25.561    Abnormal LFTs (liver function tests) R94.5    Vitamin D deficiency E55.9    CLARKE (dyspnea on exertion) R06.09    Essential hypertension I10    Hyperglycemia R73.9    Tinea versicolor B36.0    S/P total knee replacement Z96.659    Fatty liver K76.0    PVC's (premature ventricular contractions) I49.3    Carotid artery disease (HCC) I77.9    Right shoulder pain M25.511    Acute back pain M54.9    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.816    Lumbar neuritis M54.16    DDD (degenerative disc disease), lumbar M51.36    Radiculopathy, lumbar region M54.16    Degeneration of intervertebral disc of lumbar region M51.36    Mixed hyperlipidemia E78.2    ACP (advance care planning) Z71.89     REVIEW OF SYSTEMS: All Below are Negative except: See HPI   Constitutional: negative for fever, chills, and weight loss. Cardiovascular: negative for chest pain, claudication, leg swelling, SOB, CLARKE   Gastrointestinal: Negative for pain, N/V/C/D, Blood in stool or urine, dysuria,  hematuria, incontinence, pelvic pain. Musculoskeletal: See HPI   Neurological: Negative for dizziness and weakness. Negative for headaches, Visual changes, confusion, seizures   Phychiatric/Behavioral: Negative for depression, memory loss, substance  abuse. Extremities: Negative for hair changes, rash, or skin lesion changes. Hematologic: Negative for bleeding problems, bruising, pallor or swollen lymph  nodes   Peripheral Vascular: No calf pain, no circulation deficits.     Social History     Social History    Marital status:      Spouse name: N/A    Number of children: N/A  Years of education: N/A     Occupational History    Not on file. Social History Main Topics    Smoking status: Former Smoker     Packs/day: 0.50     Years: 20.00     Types: Cigarettes     Quit date: 12/10/1995    Smokeless tobacco: Never Used    Alcohol use 4.0 oz/week     7 Glasses of wine, 1 Shots of liquor per week      Comment: social    Drug use: No    Sexual activity: Not on file     Other Topics Concern    Not on file     Social History Narrative     Allergies   Allergen Reactions    Pollen Extracts Sneezing     Itchy eyes, runny nose     Current Outpatient Prescriptions   Medication Sig    brinzolamide-brimonidine (SIMBRINZA) 1-0.2 % drps Apply  to eye.  niacin (NIASPAN) 1,000 mg Tb24 tab Take 1 Tab by mouth daily.  amLODIPine (NORVASC) 10 mg tablet TAKE 1 TABLET BY MOUTH DAILY    rosuvastatin (CRESTOR) 20 mg tablet Take 1 Tab by mouth nightly.  traMADol (ULTRAM) 50 mg tablet Take 1 Tab by mouth every twelve (12) hours as needed for Pain. Max Daily Amount: 100 mg.  coenzyme q10 (CO Q-10) 10 mg cap Take  by mouth.  ezetimibe (ZETIA) 10 mg tablet Take 1 Tab by mouth daily.  nitroglycerin (NITROSTAT) 0.4 mg SL tablet 1 Tab by SubLINGual route every five (5) minutes as needed.  gabapentin (NEURONTIN) 300 mg capsule 300 mg three (3) times daily.  cholecalciferol, vitamin D3, (VITAMIN D3) 2,000 unit tab Take  by mouth.  sildenafil citrate (VIAGRA) 100 mg tablet Take 1 Tab by mouth as needed.  omega 3-dha-epa-fish oil 100-160-1,000 mg cap Take 1,000 mg by mouth daily. (Patient taking differently: Take 2,000 mg by mouth daily.)    aspirin delayed-release 81 mg tablet Take  by mouth daily.  CALCIUM CARB/VIT D3/MINERALS (CALTRATE PLUS PO) Take 1 Tab by mouth daily. No current facility-administered medications for this visit.       PHYSICAL EXAMINATION:  Visit Vitals    /69 (BP 1 Location: Left arm, BP Patient Position: Sitting)    Pulse 67    Temp 97.3 °F (36.3 °C)    Ht 6' (1.829 m)    Wt 188 lb (85.3 kg)    SpO2 96%    BMI 25.5 kg/m2     ORTHO EXAMINATION:  Examination Left Hand Right Hand   Skin Intact Intact   Deformity - -   Swelling - -   Tenderness - -   Finger flexion Full Full   Finger extension Full Full   Sensation Normal Normal   Capillary refill Normal Normal   Heberden's nodes + +   Dupuytren's + +     Examination Left Wrist Right Wrist   Skin Intact Intact   Tenderness - -   Flexion 60 60   Extension 60 60   Deformity - -   Effusion - -   Tinel's sign - -   Phalen's test - -   Finklestein maneuver - -   Pain with thumb abduction - -     Examination Right shoulder Left shoulder   Skin Intact Intact   Effusion - -   Biceps deformity - -   Atrophy - -   AC joint tenderness - -   Acromial tenderness + +   Biceps tenderness - -   Forward flexion/Elevation  170   Active abduction  170   External rotation ROM 40 30   Internal rotation  100   Apprehension - -   Impingement - +   Drop Arm Test - -   Neurovascular Intact Intact     Examination Right knee Left knee   Skin Intact Well healed TKR incision    Range of motion 120-0 110-0   Effusion +3 -   Medial joint line tenderness +++ -   Lateral joint line tenderness - -   Popliteal tenderness - -   Osteophytes palpable + -   Missaels - -   Patella crepitus + +   Anterior drawer - -   Lateral laxity - -   Medial laxity - -   Varus deformity - -   Valgus deformity - -   Pretibial edema - -   Calf tenderness - -   He is wearing a Alexandr Copper knee sleeve    PROCEDURE: After timeout and under sterile conditions, right knee aspirated 30 cc of light yellow, slightly blood tinged fluid. The fluid was discarded. After discussing treatment options, patient's right knee was injected with 4 cc Marcaine and 1/2 cc Celestone.       Chart reviewed for the following:   Josephine Andersen MD, have reviewed the History, Physical and updated the Allergic reactions for Rockisaiah Yenifer Gonzales     TIME OUT performed immediately prior to start of procedure:  Siomara Sykes MD, have performed the following reviews on Satya Greenberg prior to the start of the procedure:            * Patient was identified by name and date of birth   * Agreement on procedure being performed was verified  * Risks and Benefits explained to the patient  * Procedure site verified and marked as necessary  * Patient was positioned for comfort  * Consent was obtained     Time: 12:02 PM     Date of procedure: 7/12/2018    Procedure performed by:  Marie Padgett MD    Mr. Mady Banks tolerated the procedure well with no complications. MRI LEFT SHOULDER W CONT 04/27/2018  IMPRESSION:  1. Supraspinatus moderate tendinosis and small focal full-thickness tear  anterior insertion. No muscle atrophy or tendon retraction. 2. Moderate subscapularis tendinosis and partial-thickness tears. Intra-articular biceps tendinosis. 3. Superior labral tear extending posteriorly/SLAP tear type II. Posterior  inferior labral degeneration and tear. 4. Moderate acromioclavicular osteoarthrosis with inflammation. Small  subacromial spur. Subacromial subdeltoid bursitis. RADIOLOGY:  XR LEFT SHOULDER 5/5/16  IMPRESSION:  No fractures, mild acromioclavicular narrowing, no glenohumeral narrowing, no calcific densities. IMPRESSION:      ICD-10-CM ICD-9-CM    1. Chronic pain of right knee M25.561 719.46 betamethasone (CELESTONE SOLUSPAN) 6 mg/mL injection    G89.29 338.29 BETAMETHASONE ACETATE & SODIUM PHOSPHATE INJECTION 3 MG EA.      DRAIN/INJECT LARGE JOINT/BURSA      bupivacaine, PF, (MARCAINE, PF,) 0.25 % (2.5 mg/mL) injection      bupivacaine, PF, (MARCAINE, PF,) 0.25 % (2.5 mg/mL) injection      DRAIN/INJECT LARGE JOINT/BURSA   2. Primary osteoarthritis of right knee M17.11 715.16    3.  Knee effusion, right M25.461 719.06 betamethasone (CELESTONE SOLUSPAN) 6 mg/mL injection      BETAMETHASONE ACETATE & SODIUM PHOSPHATE INJECTION 3 MG EA. DRAIN/INJECT LARGE JOINT/BURSA      bupivacaine, PF, (MARCAINE, PF,) 0.25 % (2.5 mg/mL) injection      bupivacaine, PF, (MARCAINE, PF,) 0.25 % (2.5 mg/mL) injection      DRAIN/INJECT LARGE JOINT/BURSA   4. Chondrocalcinosis M11.20 275.49 betamethasone (CELESTONE SOLUSPAN) 6 mg/mL injection     712.30 BETAMETHASONE ACETATE & SODIUM PHOSPHATE INJECTION 3 MG EA.      DRAIN/INJECT LARGE JOINT/BURSA      bupivacaine, PF, (MARCAINE, PF,) 0.25 % (2.5 mg/mL) injection      bupivacaine, PF, (MARCAINE, PF,) 0.25 % (2.5 mg/mL) injection      DRAIN/INJECT LARGE JOINT/BURSA   5. Pseudogout M11.20 275.49 betamethasone (CELESTONE SOLUSPAN) 6 mg/mL injection     712.20 BETAMETHASONE ACETATE & SODIUM PHOSPHATE INJECTION 3 MG EA.      DRAIN/INJECT LARGE JOINT/BURSA      bupivacaine, PF, (MARCAINE, PF,) 0.25 % (2.5 mg/mL) injection      bupivacaine, PF, (MARCAINE, PF,) 0.25 % (2.5 mg/mL) injection      DRAIN/INJECT LARGE JOINT/BURSA     PLAN: After timeout and under sterile conditions, right knee aspirated 30 cc of light yellow, slightly blood tinged fluid. The fluid was discarded. After discussing treatment options, patient's right knee was injected with 4 cc Marcaine and 1/2 cc Celestone. I will see him back as needed. We discussed possible need for right knee arthroplasty at some time in the future.      Scribed by Reginald Boothe [facility]) as dictated by Stefanie Prater MD

## 2018-07-23 ENCOUNTER — OFFICE VISIT (OUTPATIENT)
Dept: FAMILY MEDICINE CLINIC | Age: 78
End: 2018-07-23

## 2018-07-23 VITALS
DIASTOLIC BLOOD PRESSURE: 72 MMHG | BODY MASS INDEX: 25.47 KG/M2 | HEIGHT: 72 IN | TEMPERATURE: 98.8 F | RESPIRATION RATE: 12 BRPM | SYSTOLIC BLOOD PRESSURE: 139 MMHG | OXYGEN SATURATION: 96 % | HEART RATE: 68 BPM | WEIGHT: 188 LBS

## 2018-07-23 DIAGNOSIS — R23.3 EASY BRUISING: ICD-10-CM

## 2018-07-23 DIAGNOSIS — I10 ESSENTIAL HYPERTENSION: Primary | ICD-10-CM

## 2018-07-23 DIAGNOSIS — F43.21 SITUATIONAL DEPRESSION: ICD-10-CM

## 2018-07-23 DIAGNOSIS — Z23 ENCOUNTER FOR IMMUNIZATION: ICD-10-CM

## 2018-07-23 DIAGNOSIS — E78.5 HYPERLIPIDEMIA, UNSPECIFIED HYPERLIPIDEMIA TYPE: ICD-10-CM

## 2018-07-23 RX ORDER — EZETIMIBE 10 MG/1
10 TABLET ORAL DAILY
Qty: 90 TAB | Refills: 3 | Status: SHIPPED | OUTPATIENT
Start: 2018-07-23 | End: 2019-08-29 | Stop reason: SDUPTHER

## 2018-07-23 NOTE — PATIENT INSTRUCTIONS
Recovering From Depression: Care Instructions  Your Care Instructions    Taking good care of yourself is important as you recover from depression. In time, your symptoms will fade as your treatment takes hold. Do not give up. Instead, focus your energy on getting better. Your mood will improve. It just takes some time. Focus on things that can help you feel better, such as being with friends and family, eating well, and getting enough rest. But take things slowly. Do not do too much too soon. You will begin to feel better gradually. Follow-up care is a key part of your treatment and safety. Be sure to make and go to all appointments, and call your doctor if you are having problems. It's also a good idea to know your test results and keep a list of the medicines you take. How can you care for yourself at home? Be realistic  · If you have a large task to do, break it up into smaller steps you can handle, and just do what you can. · You may want to put off important decisions until your depression has lifted. If you have plans that will have a major impact on your life, such as marriage, divorce, or a job change, try to wait a bit. Talk it over with friends and loved ones who can help you look at the overall picture first.  · Reaching out to people for help is important. Do not isolate yourself. Let your family and friends help you. Find someone you can trust and confide in, and talk to that person. · Be patient, and be kind to yourself. Remember that depression is not your fault and is not something you can overcome with willpower alone. Treatment is necessary for depression, just like for any other illness. Feeling better takes time, and your mood will improve little by little. Stay active  · Stay busy and get outside. Take a walk, or try some other light exercise. · Talk with your doctor about an exercise program. Exercise can help with mild depression. · Go to a movie or concert.  Take part in a Voodoo activity or other social gathering. Go to a [employer] game. · Ask a friend to have dinner with you. Take care of yourself  · Eat a balanced diet with plenty of fresh fruits and vegetables, whole grains, and lean protein. If you have lost your appetite, eat small snacks rather than large meals. · Avoid drinking alcohol or using illegal drugs. Do not take medicines that have not been prescribed for you. They may interfere with medicines you may be taking for depression, or they may make your depression worse. · Take your medicines exactly as they are prescribed. You may start to feel better within 1 to 3 weeks of taking antidepressant medicine. But it can take as many as 6 to 8 weeks to see more improvement. If you have questions or concerns about your medicines, or if you do not notice any improvement by 3 weeks, talk to your doctor. · If you have any side effects from your medicine, tell your doctor. Antidepressants can make you feel tired, dizzy, or nervous. Some people have dry mouth, constipation, headaches, sexual problems, or diarrhea. Many of these side effects are mild and will go away on their own after you have been taking the medicine for a few weeks. Some may last longer. Talk to your doctor if side effects are bothering you too much. You might be able to try a different medicine. · Get enough sleep. If you have problems sleeping:  ¨ Go to bed at the same time every night, and get up at the same time every morning. ¨ Keep your bedroom dark and quiet. ¨ Do not exercise after 5:00 p.m. ¨ Avoid drinks with caffeine after 5:00 p.m. · Avoid sleeping pills unless they are prescribed by the doctor treating your depression. Sleeping pills may make you groggy during the day, and they may interact with other medicine you are taking. · If you have any other illnesses, such as diabetes, heart disease, or high blood pressure, make sure to continue with your treatment.  Tell your doctor about all of the medicines you take, including those with or without a prescription. · Keep the numbers for these national suicide hotlines: 9-243-303-TALK (6-479.378.2930) and 5-391-CNICLFI (1-757.297.9351). If you or someone you know talks about suicide or feeling hopeless, get help right away. When should you call for help? Call 911 anytime you think you may need emergency care. For example, call if:    · You feel like hurting yourself or someone else.     · Someone you know has depression and is about to attempt or is attempting suicide.   [facility] your doctor now or seek immediate medical care if:    · You hear voices.     · Someone you know has depression and:  ¨ Starts to give away his or her possessions. ¨ Uses illegal drugs or drinks alcohol heavily. ¨ Talks or writes about death, including writing suicide notes or talking about guns, knives, or pills. ¨ Starts to spend a lot of time alone. ¨ Acts very aggressively or suddenly appears calm.    Watch closely for changes in your health, and be sure to contact your doctor if:    · You do not get better as expected. Where can you learn more? Go to http://chucky-avel.info/. Enter Q657 in the search box to learn more about \"Recovering From Depression: Care Instructions. \"  Current as of: December 7, 2017  Content Version: 11.7  © 1817-3040 [employer]. Care instructions adapted under license by [employer] (which disclaims liability or warranty for this information). If you have questions about a medical condition or this instruction, always ask your healthcare professional. [address] any warranty or liability for your use of this information.

## 2018-07-23 NOTE — PROGRESS NOTES
HISTORY OF PRESENT ILLNESS  Fannie Styles is a 69 y.o. male. Chief Complaint   Patient presents with    Follow-up    Hypertension    Cholesterol Problem    Labs     completed on 18    Depression     Patient states that he has feelings of depression, due to taking care of his ill wife. Patient denies any thoughts of harming himself. HPI  Patient is here for a  follow up of Htn, Cholesterol, and review labs. Patient had labs on 18. Labs reviewed in detail with patient     Patient does not need medication refills today. New concerns today:   Patient states that he has feelings of depression, due in part to the stress of  taking care of his ill wife. Patient denies any thoughts of harming himself. Additionally, he has lost 3 family members in the recent past.  Due to his wife's illness, they were unable to travel to the  for her last living sibling. PHQ over the last two weeks 2018   PHQ Not Done -   Little interest or pleasure in doing things More than half the days   Feeling down, depressed, irritable, or hopeless More than half the days   Total Score PHQ 2 4   Trouble falling or staying asleep, or sleeping too much Nearly every day   Feeling tired or having little energy More than half the days   Poor appetite, weight loss, or overeating Not at all   Feeling bad about yourself - or that you are a failure or have let yourself or your family down Not at all   Trouble concentrating on things such as school, work, reading, or watching TV Not at all   Moving or speaking so slowly that other people could have noticed; or the opposite being so fidgety that others notice Not at all   Thoughts of being better off dead, or hurting yourself in some way Not at all   PHQ 9 Score 9   How difficult have these problems made it for you to do your work, take care of your home and get along with others Somewhat difficult         Review of Systems   Constitutional: Negative. HENT: Negative. Respiratory: Negative. Cardiovascular: Negative. Psychiatric/Behavioral: Positive for depression. All other systems reviewed and are negative. Physical Exam  Nursing note and vitals reviewed. Constitutional: He is oriented to person, place, and time. He appears well-developed and well-nourished. HENT:   Head: Normocephalic and atraumatic. Right Ear: External ear normal.   Left Ear: External ear normal.   Nose: Nose normal.   Eyes: Conjunctivae and EOM are normal.   Neck: Normal range of motion. Neck supple. No JVD present. Carotid bruit is not present. No thyromegaly present. Cardiovascular: Normal rate, regular rhythm, normal heart sounds and intact distal pulses. Exam reveals no gallop and no friction rub. No murmur heard. Pulmonary/Chest: Effort normal and breath sounds normal. He has no wheezes. He has no rhonchi. He has no rales. Abdominal: Soft. Musculoskeletal: Normal range of motion. Neurological: He is alert and oriented to person, place, and time. Coordination normal.   Skin: Skin is warm and dry. Psychiatric: He has a normal mood and affect. His behavior is normal. Judgment and thought content normal.       ASSESSMENT and PLAN  Diagnoses and all orders for this visit:    1. Essential hypertension  -     METABOLIC PANEL, COMPREHENSIVE; Future  -     LIPID PANEL; Future  Stable, cont pres tx plan. 2. Hyperlipidemia, unspecified hyperlipidemia type  -     ezetimibe (ZETIA) 10 mg tablet; Take 1 Tab by mouth daily.  -     METABOLIC PANEL, COMPREHENSIVE; Future  -     LIPID PANEL; Future    3. Easy bruising  -     REFERRAL TO HEMATOLOGY    4. Encounter for immunization  -     Pneumococcal Conjugate vaccine - 13 valent (50 years and older)  -     Administration fee () for Medicare insured patients    5. Situational depression  Discussed option of medication vs counseling. Pt declines both for now.   Pt aware that he can return if he changes his mind or feels that his coping strategies are no longer adequate.      Follow-up Disposition: 3 months; sooner prn

## 2018-07-23 NOTE — PROGRESS NOTES
Chief Complaint   Patient presents with    Follow-up    Hypertension    Cholesterol Problem    Labs     completed on 4-23-18    Depression     Patient states that he has feelings of depression, due to taking care of his ill wife. Patient denies any thoughts of harming himself. 1. Have you been to the ER, urgent care clinic since your last visit? Hospitalized since your last visit? No    2. Have you seen or consulted any other health care providers outside of the [facility] since your last visit? Include any pap smears or colon screening. No    Provider notified of depression screening results. PHQ over the last two weeks 7/23/2018   PHQ Not Done -   Little interest or pleasure in doing things More than half the days   Feeling down, depressed, irritable, or hopeless More than half the days   Total Score PHQ 2 4   Trouble falling or staying asleep, or sleeping too much Nearly every day   Feeling tired or having little energy More than half the days   Poor appetite, weight loss, or overeating Not at all   Feeling bad about yourself - or that you are a failure or have let yourself or your family down Not at all   Trouble concentrating on things such as school, work, reading, or watching TV Not at all   Moving or speaking so slowly that other people could have noticed; or the opposite being so fidgety that others notice Not at all   Thoughts of being better off dead, or hurting yourself in some way Not at all   PHQ 9 Score 9   How difficult have these problems made it for you to do your work, take care of your home and get along with others Somewhat difficult     Wandy Schmidt 1940 male who presents for routine immunizations. Patient denies any symptoms , reactions or allergies that would exclude them from being immunized today. Risks and adverse reactions were discussed and the VIS was given to them. All questions were addressed.   Order placed for PCV 13,  per Verbal Order from  with read back. Patient was observed for 15 min post injection. There were no reactions observed.     Zoë Tsang LPN

## 2018-08-21 ENCOUNTER — OFFICE VISIT (OUTPATIENT)
Dept: VASCULAR SURGERY | Age: 78
End: 2018-08-21

## 2018-08-21 VITALS
HEIGHT: 72 IN | DIASTOLIC BLOOD PRESSURE: 82 MMHG | HEART RATE: 68 BPM | BODY MASS INDEX: 25.47 KG/M2 | RESPIRATION RATE: 17 BRPM | WEIGHT: 188 LBS | SYSTOLIC BLOOD PRESSURE: 130 MMHG

## 2018-08-21 DIAGNOSIS — I71.40 ABDOMINAL AORTIC ANEURYSM (AAA) WITHOUT RUPTURE: Primary | ICD-10-CM

## 2018-08-21 DIAGNOSIS — I65.23 CAROTID STENOSIS, ASYMPTOMATIC, BILATERAL: ICD-10-CM

## 2018-08-21 NOTE — PROGRESS NOTES
[address]    Chief Complaint   Patient presents with    Abdominal Aortic Aneurysm    Carotid Artery Stenosis       History and Physical    Mr Dav Barfield is here to follow up on his AAA and carotid stenosis. He had requested baseline screening a number of years ago when it came up that he had relatives getting ready for elective repairs.    He is doing well without complaints   Health is stable, no changes in meds  Denies claudication symptoms    Past Medical History:   Diagnosis Date    AAA (abdominal aortic aneurysm) ([address] 03/2010    noted on CT (abdomen)    Abnormal LFT's 12/95, 04/16/03    Acute meniscal tear, lateral 01/2007    s/p arthorscopic surg (Dr. Lino Armenta)    Atypical chest pain 02/04/09    CAD (coronary artery disease), native coronary artery     Cardiac cath 04/13/2009    dLM 30%. mLAD 30%. oD2 30%. pCX 55% (FFR 0.95). mRCA 40%. RPDA 65% (FFR 0.86). EF 65%.  Cardiac echocardiogram 04/03/2007    EF 60%. No RWMA. Gr 1 DDfx. LAE. No significant valvular heart disease.  Cardiac nuclear imaging test, low risk 12/04/2015    Low risk. No ischemia or prior infarction. No WMA. EF 67%. Neg EKG on pharm stress test.    Cardiovascular aorto-iliac duplex 06/29/2015    AAA measures 3.2 x 3.1 cm Trv. (Prev 3.2 x 3.4 cm on 6/19/14). Aneurysm is infrarenal & fusiform w/complex intraluminal thrombus within. Mod bilateral common iliac stenosis.  Carotid duplex 03/16/2016    Mild < 50% BETHEL stenosis. Mod 15-82% LICA stenosis. Similar to study of 6/29/15.  Cholecystitis chronic 03/2008    s/p sue    Diverticulosis     Diverticulosis 9-28-15    DR. 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    Plantar fasciitis     Rhinitis     Right knee pain     Right shoulder pain     Tinea versicolor 7/23/2012    2.8 x 2.9cm infra-renal    Tubular adenoma 9-28-15    Vitamin D deficiency 4/13/2011     Patient Active Problem List   Diagnosis Code    Hyperlipidemia E78.5    Coronary atherosclerosis of native coronary artery I25.10    AAA [facility]ab[facility]) [facility] I71.4    Right knee pain M25.561    Abnormal LFTs (liver function tests) R94.5    Vitamin D deficiency E55.9    CLARKE (dyspnea on exertion) R06.09    Essential hypertension I10    Hyperglycemia R73.9    Tinea versicolor B36.0    S/P total knee replacement Z96.659    Fatty liver K76.0    PVC's (premature ventricular contractions) I49.3    Carotid artery disease (HCC) I77.9    Right shoulder pain M25.511    Acute back pain M54.9    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.816    Lumbar neuritis M54.16    DDD (degenerative disc disease), lumbar M51.36    Radiculopathy, lumbar region M54.16    Degeneration of intervertebral disc of lumbar region M51.36    Mixed hyperlipidemia E78.2    ACP (advance care planning) Z71.89     Past Surgical History:   Procedure Laterality Date    ENDOSCOPY, COLON, DIAGNOSTIC      normal per patient;     HX ARTHROPLASTY  04/05/10    [place]-left knee    HX CATARACT REMOVAL Bilateral     November 2014    HX CHOLECYSTECTOMY  03/2008    chronic cholecystitis    HX COLONOSCOPY  9-28-15    HX HERNIA REPAIR  1995    L inguinal    HX KNEE ARTHROSCOPY  01/24/07    left knee    HX KNEE ARTHROSCOPY Left 4/16/2014    peripatellar debridement    HX KNEE REPLACEMENT  01/29/2013    [place]    HX MOHS PROCEDURES 11/2008    HX RHINOPLASTY  1983    HX TONSILLECTOMY      HX VASECTOMY  1977    HX WISDOM TEETH EXTRACTION      x4    CT ANESTH,SURGERY OF SHOULDER       Current Outpatient Prescriptions   Medication Sig Dispense Refill    ezetimibe (ZETIA) 10 mg tablet Take 1 Tab by mouth daily. 90 Tab 3    brinzolamide-brimonidine (SIMBRINZA) 1-0.2 % drps Apply  to eye.  niacin (NIASPAN) 1,000 mg Tb24 tab Take 1 Tab by mouth daily. 90 Tab 3    amLODIPine (NORVASC) 10 mg tablet TAKE 1 TABLET BY MOUTH DAILY 90 Tab 3    rosuvastatin (CRESTOR) 20 mg tablet Take 1 Tab by mouth nightly. 90 Tab 3    traMADol (ULTRAM) 50 mg tablet Take 1 Tab by mouth every twelve (12) hours as needed for Pain. Max Daily Amount: 100 mg. 30 Tab 0    coenzyme q10 (CO Q-10) 10 mg cap Take  by mouth.  nitroglycerin (NITROSTAT) 0.4 mg SL tablet 1 Tab by SubLINGual route every five (5) minutes as needed. 25 Tab 3    gabapentin (NEURONTIN) 300 mg capsule 300 mg three (3) times daily.  cholecalciferol, vitamin D3, (VITAMIN D3) 2,000 unit tab Take  by mouth.  sildenafil citrate (VIAGRA) 100 mg tablet Take 1 Tab by mouth as needed. 20 Tab 3    omega 3-dha-epa-fish oil 100-160-1,000 mg cap Take 1,000 mg by mouth daily. (Patient taking differently: Take 2,000 mg by mouth daily.) 90 Cap 3    aspirin delayed-release 81 mg tablet Take  by mouth daily.  CALCIUM CARB/VIT D3/MINERALS (CALTRATE PLUS PO) Take 1 Tab by mouth daily.        Allergies   Allergen Reactions    Pollen Extracts Sneezing     Itchy eyes, runny nose       Review of Systems    Review of Systems - History obtained from the patient  General ROS: negative  Ophthalmic ROS: negative  Psychological ROS: negative  Respiratory ROS: no cough, shortness of breath, or wheezing  Cardiovascular ROS: no chest pain or dyspnea on exertion  Gastrointestinal ROS: negative  Musculoskeletal ROS: knee pain  Neurological ROS: no TIA or stroke symptoms  Dermatological ROS: negative  Vascular ROS: no claudication    Physical   Visit Vitals    /82 (BP 1 Location: Left arm, BP Patient Position: Sitting)    Pulse 68    Resp 17    Ht 6' (1.829 m)    Wt 188 lb (85.3 kg)    BMI 25.5 kg/m2     General:   Alert, cooperative, no distress.     Head:   Normocephalic, without obvious abnormality, atraumatic.     Eyes:  Conjunctivae/corneas clear. Pupils equal, round, reactive to light. Extraocular movements intact.     Neck:   No bruits     Lungs:   Clear to auscultation bilaterally.     Heart:   Regular rate and rhythm, S1, S2 normal     Extremities:   Extremities normal, atraumatic, no cyanosis or edema.     Pulses:   2+ and symmetric all extremities.     Skin:   Skin color, texture, turgor normal. No rashes or lesions         Vascular studies:  Mild calcific plaque right internal carotid with less than 50% stenosis. Moderate heterogeneous plaque left internal carotid artery with 50-69% stenosis. Vertebrals are antegrade bilateral.  Subclavian systolic blood pressure symmetrical bilateral.        Stable infrarenal fusiform aortic aneurysm at 3.06 cm  Renal arteries patent  Celiac and SMA are patent    Impression/Plan:     ICD-10-CM ICD-9-CM    1. Abdominal aortic aneurysm (AAA) without rupture (HCC) I71.4 441.4 DUPLEX AORTA ILIAC GRAFT COMPLETE   2. Carotid stenosis, asymptomatic, bilateral I65.23 433.10 DUPLEX CAROTID BILATERAL     433.30      No orders of the defined types were placed in this encounter. I reviewed his studies all the way back to at least 4-5 years ago and he has remained stable  We discussed and agreed on transitioning to every 2 years for surveillance given stability and good risk factor control     Follow-up Disposition:  Return in about 2 years (around 8/21/2020). RAHAT Jeffers    Portions of this note have been entered using voice recognition software.

## 2018-08-21 NOTE — MR AVS SNAPSHOT
[address] 017 200 [facility] 
[phone] Patient: Lo Bains MRN: [id number] RUSSELL:[id number] Visit Information Date & Time Provider Department Dept. Phone Encounter #  
 8/21/2018 11:30 AM [facility] [phone] [id number] Follow-up Instructions Return in about 2 years (around 8/21/2020). Your Appointments 8/27/2018  3:00 PM  
New Patient with Yesenia Dickerson MD  
[facility]  Oncology [facility]) Appt Note: DR YOUNG [facility] GRAVES/EASY BRUISING/RECS IN   
 [address], [address] [place] [address]  
  
    
 10/23/2018 12:45 PM  
Follow Up with Eleanor Leavitt MD  
[facility] (--) Appt Note: follow up Pedro 57 [facility] [address]  
[phone]  
  
   
 [address] [place] [address]  
  
    
 1/14/2019  8:20 AM  
Follow Up with Olivier Jorgensen MD  
[facility] ([facility]) Appt Note: 9 mo f/u  
 [address] [place] [address] [address] [address] [address] [phone] [facility] 53 [address]  
  
    
 8/25/2020  8:00 AM  
PROCEDURE with [facility] IMAGING 1 [facility] ([facility]) Appt Note: Aorta duplex 2 years 2000 [facility],2Nd [facility], [place] 201 200 [facility]  
[phone] [address] [place] [address],Suite [facility]  
  
    
 8/25/2020  9:00 AM  
PROCEDURE with [facility] IMAGING 1 [facility] ([facility]) Appt Note: CV 2 years [address] [place] [address] 118 200 [facility]  
[phone]  8/25/2020 11:30 AM  
Follow Up with RAHAT Smith  
 [facility] ([address] [place] [facility]) Appt Note: 2 years fup with studies SAME DAY APPT  
 [address] [place] [address] 314 706 [facility]  
[phone] 2306 [facility] 706 [facility] Upcoming Health Maintenance Date Due  
 GLAUCOMA SCREENING Q2Y 11/17/2016 Influenza Age 5 to Adult 8/1/2018 MEDICARE YEARLY EXAM 1/4/2019 COLONOSCOPY 9/28/2020 DTaP/Tdap/Td series (2 - Td) 11/1/2026 Allergies as of 8/21/2018  Review Complete On: 8/21/2018 By: Colette Reynolds Severity Noted Reaction Type Reactions Pollen Extracts  02/05/2015    Sneezing Itchy eyes, runny nose Current Immunizations  Reviewed on 11/1/2016 Name Date Influenza High Dose Vaccine PF 11/1/2016 Influenza Vaccine 10/11/2017, 9/17/2015, 12/1/2014, 9/16/2013 Influenza Vaccine Whole 10/18/2012 Pneumococcal Conjugate (PCV-13) 7/23/2018 Pneumococcal Polysaccharide (PPSV-23) 11/1/2016 TD Vaccine 8/21/2006 ZZZ-RETIRED (DO NOT USE) Pneumococcal Vaccine (Unspecified Type) 8/21/2006 Zoster 9/18/2012 Not reviewed this visit You Were Diagnosed With   
  
 Codes Comments Abdominal aortic aneurysm (AAA) without rupture ([facility] [address]    -  Primary ICD-10-CM: I71.4 ICD-9-CM: 139. 4 Carotid stenosis, asymptomatic, bilateral     ICD-10-CM: I65.23 ICD-9-CM: 433.10, 433.30 Vitals BP Pulse Resp Height(growth percentile) Weight(growth percentile) BMI  
 130/82 (BP 1 Location: Left arm, BP Patient Position: Sitting) 68 17 6' (1.829 m) 188 lb (85.3 kg) 25.5 kg/m2 Smoking Status Former Smoker Vitals History BMI and BSA Data Body Mass Index Body Surface Area 25.5 kg/m 2 2.08 m 2 Preferred Pharmacy Pharmacy Name Phone 2813 [facility] [phone] Your Updated Medication List  
  
   
 This list is accurate as of 8/21/18 11:46 AM.  Always use your most recent med list. amLODIPine 10 mg tablet Commonly known as:  Love Breach TAKE 1 TABLET BY MOUTH DAILY  
  
 aspirin delayed-release 81 mg tablet Take  by mouth daily. CALTRATE PLUS PO Take 1 Tab by mouth daily. CO Q-10 10 mg Cap Generic drug:  coenzyme q10 Take  by mouth.  
  
 ezetimibe 10 mg tablet Commonly known as:  Charlann  Take 1 Tab by mouth daily. gabapentin 300 mg capsule Commonly known as:  NEURONTIN  
300 mg three (3) times daily. niacin 1,000 mg Tb24 tab Commonly known as:  Niaspan Take 1 Tab by mouth daily. nitroglycerin 0.4 mg SL tablet Commonly known as:  NITROSTAT  
1 Tab by SubLINGual route every five (5) minutes as needed. omega 3-dha-epa-fish oil 100-160-1,000 mg Cap Take 1,000 mg by mouth daily. rosuvastatin 20 mg tablet Commonly known as:  CRESTOR Take 1 Tab by mouth nightly. sildenafil citrate 100 mg tablet Commonly known as:  VIAGRA Take 1 Tab by mouth as needed. SIMBRINZA 1-0.2 % Drps Generic drug:  brinzolamide-brimonidine Apply  to eye.  
  
 traMADol 50 mg tablet Commonly known as:  ULTRAM  
Take 1 Tab by mouth every twelve (12) hours as needed for Pain. Max Daily Amount: 100 mg. VITAMIN D3 2,000 unit Tab Generic drug:  cholecalciferol (vitamin D3) Take  by mouth. Follow-up Instructions Return in about 2 years (around 8/21/2020). To-Do List   
 08/19/2020 Vascular/US:  DUPLEX AORTA ILIAC GRAFT COMPLETE   
  
 08/19/2020 Vascular/US:  DUPLEX CAROTID BILATERAL Introducing [facility] & HEALTH SERVICES! Dear Nathalie Pugh: Thank you for requesting a Company account. Our records indicate that you already have an active Company account. You can access your account anytime at [email or web address] Did you know that you can access your hospital and ER discharge instructions at any time in [employer]? You can also review all of your test results from your hospital stay or ER visit. Additional Information If you have questions, please visit the Frequently Asked Questions section of the [employer] website at [email or web address] Remember, [employer] is NOT to be used for urgent needs. For medical emergencies, dial 911. Now available from your iPhone and Android! Please provide this summary of care documentation to your next provider. Your primary care clinician is listed as Governor Roula. If you have any questions after today's visit, please call [phone].

## 2018-08-21 NOTE — PROGRESS NOTES
1. Have you been to an emergency room or urgent care clinic since your last visit? no    Hospitalized since your last visit? If yes, where, when, and reason for visit? no  2. Have you seen or consulted any other health care providers outside of the [facility] since your last visit including any procedures, health maintenance items.  If yes, where, when and reason for visit? no

## 2018-08-27 ENCOUNTER — HOSPITAL ENCOUNTER (OUTPATIENT)
Dept: ONCOLOGY | Age: 78
Discharge: HOME | End: 2018-08-27

## 2018-08-27 ENCOUNTER — OFFICE VISIT (OUTPATIENT)
Dept: ONCOLOGY | Age: 78
End: 2018-08-27

## 2018-08-27 ENCOUNTER — HOSPITAL ENCOUNTER (OUTPATIENT)
Dept: LAB | Age: 78
Discharge: HOME | End: 2018-08-27
Payer: MEDICARE

## 2018-08-27 VITALS
TEMPERATURE: 97.6 F | SYSTOLIC BLOOD PRESSURE: 126 MMHG | HEART RATE: 75 BPM | DIASTOLIC BLOOD PRESSURE: 72 MMHG | HEIGHT: 72 IN | BODY MASS INDEX: 25.33 KG/M2 | RESPIRATION RATE: 18 BRPM | WEIGHT: 187 LBS

## 2018-08-27 DIAGNOSIS — D69.2 PURPURA (HCC): ICD-10-CM

## 2018-08-27 DIAGNOSIS — T14.8XXA BRUISING: ICD-10-CM

## 2018-08-27 DIAGNOSIS — T14.8XXA BRUISING: Primary | ICD-10-CM

## 2018-08-27 LAB
BASO+EOS+MONOS # BLD AUTO: 1 K/UL (ref 0–2.3)
BASO+EOS+MONOS # BLD AUTO: 9 % (ref 0.1–17)
DIFFERENTIAL METHOD BLD: ABNORMAL
ERYTHROCYTE [DISTWIDTH] IN BLOOD BY AUTOMATED COUNT: 13.2 % (ref 11.5–14.5)
HCT VFR BLD AUTO: 46.1 % (ref 36–48)
HGB BLD-MCNC: 16.2 G/DL (ref 12–16)
LYMPHOCYTES # BLD: 1.8 K/UL (ref 1.1–5.9)
LYMPHOCYTES NFR BLD: 16 % (ref 14–44)
MCH RBC QN AUTO: 33.5 PG (ref 25–35)
MCHC RBC AUTO-ENTMCNC: 35.1 G/DL (ref 31–37)
MCV RBC AUTO: 95.2 FL (ref 78–102)
NEUTS SEG # BLD: 8.2 K/UL (ref 1.8–9.5)
NEUTS SEG NFR BLD: 75 % (ref 40–70)
PLATELET # BLD AUTO: 264 K/UL (ref 140–440)
RBC # BLD AUTO: 4.84 M/UL (ref 4.1–5.1)
WBC # BLD AUTO: 11 K/UL (ref 4.5–13)

## 2018-08-27 PROCEDURE — 36415 COLL VENOUS BLD VENIPUNCTURE: CPT | Performed by: INTERNAL MEDICINE

## 2018-08-27 PROCEDURE — 85652 RBC SED RATE AUTOMATED: CPT | Performed by: INTERNAL MEDICINE

## 2018-08-27 PROCEDURE — 80053 COMPREHEN METABOLIC PANEL: CPT | Performed by: INTERNAL MEDICINE

## 2018-08-27 RX ORDER — METHYLPREDNISOLONE 4 MG/1
TABLET ORAL
Qty: 1 DOSE PACK | Refills: 0 | Status: SHIPPED | OUTPATIENT
Start: 2018-08-27 | End: 2018-11-01

## 2018-08-27 NOTE — MR AVS SNAPSHOT
[address] 629 [address] [place] [address] 
[phone] Patient: Ángel Mason MRN: [id number] KWAKU:[id number] Visit Information Date & Time Provider Department Dept. Phone Encounter #  
 8/27/2018  3:00 PM Ventura Eagle MD [facility] [phone] [id number] Follow-up Instructions Return in about 2 months (around 10/27/2018). Your Appointments 9/10/2018  9:00 AM  
Office Visit with Ventura Eagle MD  
[facility]ia [facility] [address] Oncology [address] [place] [address]) Appt Note: 2 WK RET  
 5445 [facility] 132 [facility] [address] [place] [address], [address] [place] [address]  
  
    
 10/23/2018 12:45 PM  
Follow Up with [place] MD  
[address] [place] [address] (--) Appt Note: follow up Pedro 57 [facility] [address]  
[phone]  
  
   
 [address] [place] [phone]  
  
    
 1/14/2019  8:20 AM  
Follow Up with Navarro Mckay MD  
[facility] ([address] [place] [address]) Appt Note: 9 mo f/u  
 [address] [place] [address] [address] [address] [address] [phone] [facility] 53 [phone]  
  
    
 8/25/2020  8:00 AM  
PROCEDURE with B[facility]VS IMAGING 1 [facility] ([address] [place] [address]) Appt Note: Aorta duplex 2 years 2000 [facility], [facility] 126 706 [facility]  
[phone] [address] [place] [address],Suite [facility]  
  
    
 8/25/2020  9:00 AM  
PROCEDURE with BSVVS IMAGING 1 [facility] ([address] [place] [address]) Appt Note: CV 2 years [address] [place] [address] 653 706 [facility]  
[phone]  8/25/2020 11:30 AM  
Follow Up with RAHAT Medel  
 [facility] (5553 [facility]) Appt Note: 2 years fup with studies SAME DAY APPT  
 [place], [place] 681 200 [facility]  
[phone] [facility] 200 [facility] Upcoming Health Maintenance Date Due  
 GLAUCOMA SCREENING Q2Y 11/17/2016 Influenza Age 5 to Adult 8/1/2018 MEDICARE YEARLY EXAM 1/4/2019 COLONOSCOPY 9/28/2020 DTaP/Tdap/Td series (2 - Td) 11/1/2026 Allergies as of 8/27/2018  Review Complete On: 8/21/2018 By: Neo Hurt Severity Noted Reaction Type Reactions Pollen Extracts  02/05/2015    Sneezing Itchy eyes, runny nose Current Immunizations  Reviewed on 11/1/2016 Name Date Influenza High Dose Vaccine PF 11/1/2016 Influenza Vaccine 10/11/2017, 9/17/2015, 12/1/2014, 9/16/2013 Influenza Vaccine Whole 10/18/2012 Pneumococcal Conjugate (PCV-13) 7/23/2018 Pneumococcal Polysaccharide (PPSV-23) 11/1/2016 TD Vaccine 8/21/2006 ZZZ-RETIRED (DO NOT USE) Pneumococcal Vaccine (Unspecified Type) 8/21/2006 Zoster 9/18/2012 Not reviewed this visit You Were Diagnosed With   
  
 Codes Comments Bruising    -  Primary ICD-10-CM: T14. Shoshana Mace ICD-9-CM: 924.9 Purpura ([facility] [address]     ICD-10-CM: J01.5 ICD-9-CM: 287.2 Vitals BP Pulse Temp Resp Height(growth percentile) Weight(growth percentile) 126/72 (BP 1 Location: Right arm, BP Patient Position: Sitting) 75 97.6 °F (36.4 °C) (Oral) 18 6' (1.829 m) 187 lb (84.8 kg) BMI Smoking Status 25.36 kg/m2 Former Smoker BMI and BSA Data Body Mass Index Body Surface Area  
 25.36 kg/m 2 2.08 m 2 Preferred Pharmacy Pharmacy Name Phone 5993 [facility]2Nd [facility] [phone] Your Updated Medication List  
  
   
This list is accurate as of 8/27/18  3:46 PM.  Always use your most recent med list. amLODIPine 10 mg tablet Commonly known as:  Daisy Blade TAKE 1 TABLET BY MOUTH DAILY  
  
 aspirin delayed-release 81 mg tablet Take  by mouth daily. CALTRATE PLUS PO Take 1 Tab by mouth daily. CO Q-10 10 mg Cap Generic drug:  coenzyme q10 Take  by mouth.  
  
 ezetimibe 10 mg tablet Commonly known as:  Levell Antes Take 1 Tab by mouth daily. gabapentin 300 mg capsule Commonly known as:  NEURONTIN  
300 mg three (3) times daily. methylPREDNISolone 4 mg tablet Commonly known as:  Acquanetta Philner Dose per instructions on the back  Indications: angioedema, Idiopathic Thrombocytopenic Purpura  
  
 niacin 1,000 mg Tb24 tab Commonly known as:  Niaspan Take 1 Tab by mouth daily. nitroglycerin 0.4 mg SL tablet Commonly known as:  NITROSTAT  
1 Tab by SubLINGual route every five (5) minutes as needed. omega 3-dha-epa-fish oil 100-160-1,000 mg Cap Take 1,000 mg by mouth daily. rosuvastatin 20 mg tablet Commonly known as:  CRESTOR Take 1 Tab by mouth nightly. sildenafil citrate 100 mg tablet Commonly known as:  VIAGRA Take 1 Tab by mouth as needed. SIMBRINZA 1-0.2 % Drps Generic drug:  brinzolamide-brimonidine Apply  to eye.  
  
 traMADol 50 mg tablet Commonly known as:  ULTRAM  
Take 1 Tab by mouth every twelve (12) hours as needed for Pain. Max Daily Amount: 100 mg. VITAMIN D3 2,000 unit Tab Generic drug:  cholecalciferol (vitamin D3) Take  by mouth. Prescriptions Printed Refills  
 methylPREDNISolone (MEDROL DOSEPACK) 4 mg tablet 0 Sig: Dose per instructions on the back  Indications: angioedema, Idiopathic Thrombocytopenic Purpura Class: Print We Performed the Following COMPLETE CBC & AUTO DIFF WBC [68513 CPT(R)] Follow-up Instructions Return in about 2 months (around 10/27/2018). To-Do List   
 08/27/2018 Lab:  CBC WITH 3 PART DIFF   
  
 08/27/2018 Lab:  FACTOR V ACTIVITY   
  
 08/27/2018 Lab: METABOLIC PANEL, COMPREHENSIVE   
  
 08/27/2018 Lab:  PROTHROMBIN TIME + INR   
  
 08/27/2018 Lab:  PTT   
  
 08/27/2018 Lab:  SED RATE (ESR)   
  
 08/28/2018 Lab:  FACTOR IX ACTIVITY   
  
 08/28/2018 Lab:  FACTOR VII ACTIVITY   
  
 08/28/2018 Lab:  FACTOR VIII ACTIVITY   
  
 08/28/2018 Lab:  FACTOR X ACTIVITY   
  
 08/28/2018 Lab:  [facility] PANEL Patient Instructions Learning About Solar Purpura What is solar purpura? Solar purpura (say \"PWLA-mied-wpl\" or \"HLZB-gha-xub\") is a condition that causes one or more flat, purple bruises. It often occurs on the hands, forearms, and legs. Purpura is common in older people. It is called solar purpura because it occurs most often on areas that are exposed to the sun. The bruises bother some people because of how they look. But they aren't serious. What happens when you have solar purpura? As we age, our skin becomes less elastic and more easily damaged. Exposure to the sun also weakens the walls of blood vessels. These changes increase the chance of bruising. These bruises can occur after even minor injuries, such as lightly hitting your hand or arm against something. These bruises are more common in people who take medicines such as corticosteroids or aspirin and other blood thinners. Unlike other bruises, these bruises usually don't hurt. How can you prevent it? There is no sure way to prevent solar purpura. But to reduce the chance of getting the condition: · Always wear sunscreen on exposed skin. Make sure to use a broad-spectrum sunscreen that has a sun protection factor (SPF) of 30 or higher. Use it every day, even when it is cloudy. · Wear long-sleeve shirts and long pants. · Protect your skin from injury. Wear gloves when you garden or do other yard work. Put padding on handrails and walkers.  
· Don't take aspirin or medicines such as ibuprofen or naproxen, unless you doctor recommends it. They can raise the chance of bleeding. · Ask your doctor if any of your medicines can increase the chance of bleeding. How is it treated? No treatment is needed. The bruises fade over a few weeks. If you don't like how the bruises look, you can cover them with clothing or makeup. When should you call for help? Watch closely for changes in your health, and be sure to contact your doctor if: 
· You have more bruising than usual and on more places on your body. · You do not get better as expected. Follow-up care is a key part of your treatment and safety. Be sure to make and go to all appointments, and call your doctor if you are having problems. It's also a good idea to know your test results and keep a list of the medicines you take. Where can you learn more? Go to http://chucky-avel.info/. Enter C621 in the search box to learn more about \"Learning About Solar Purpura. \" Current as of: October 5, 2017 Content Version: 11.7 © 0679-9912 [employer]MediaNetwork. Care instructions adapted under license by [employer] (which disclaims liability or warranty for this information). If you have questions about a medical condition or this instruction, always ask your healthcare professional. [address] any warranty or liability for your use of this information. Introducing [facility] & HEALTH SERVICES! Dear Linda Mabry: Thank you for requesting a [employer] account. Our records indicate that you already have an active [employer] account. You can access your account anytime at [email or web address] Did you know that you can access your hospital and ER discharge instructions at any time in [employer]? You can also review all of your test results from your hospital stay or ER visit. Additional Information If you have questions, please visit the Frequently Asked Questions section of the [employer] website at [email or web address] Remember, [employer] is NOT to be used for urgent needs. For medical emergencies, dial 911. Now available from your iPhone and Android! Please provide this summary of care documentation to your next provider. Your primary care clinician is listed as [facility]. If you have any questions after today's visit, please call [phone].

## 2018-08-27 NOTE — PATIENT INSTRUCTIONS
Learning About Solar Purpura  What is solar purpura? Solar purpura (say \"DEHV-veza-zww\" or \"MWRC-yvl-gqg\") is a condition that causes one or more flat, purple bruises. It often occurs on the hands, forearms, and legs. Purpura is common in older people. It is called solar purpura because it occurs most often on areas that are exposed to the sun. The bruises bother some people because of how they look. But they aren't serious. What happens when you have solar purpura? As we age, our skin becomes less elastic and more easily damaged. Exposure to the sun also weakens the walls of blood vessels. These changes increase the chance of bruising. These bruises can occur after even minor injuries, such as lightly hitting your hand or arm against something. These bruises are more common in people who take medicines such as corticosteroids or aspirin and other blood thinners. Unlike other bruises, these bruises usually don't hurt. How can you prevent it? There is no sure way to prevent solar purpura. But to reduce the chance of getting the condition:  · Always wear sunscreen on exposed skin. Make sure to use a broad-spectrum sunscreen that has a sun protection factor (SPF) of 30 or higher. Use it every day, even when it is cloudy. · Wear long-sleeve shirts and long pants. · Protect your skin from injury. Wear gloves when you garden or do other yard work. Put padding on handrails and walkers. · Don't take aspirin or medicines such as ibuprofen or naproxen, unless you doctor recommends it. They can raise the chance of bleeding. · Ask your doctor if any of your medicines can increase the chance of bleeding. How is it treated? No treatment is needed. The bruises fade over a few weeks. If you don't like how the bruises look, you can cover them with clothing or makeup. When should you call for help?   Watch closely for changes in your health, and be sure to contact your doctor if:  · You have more bruising than usual and on more places on your body. · You do not get better as expected. Follow-up care is a key part of your treatment and safety. Be sure to make and go to all appointments, and call your doctor if you are having problems. It's also a good idea to know your test results and keep a list of the medicines you take. Where can you learn more? Go to http://chucky-avel.info/. Enter Q420 in the search box to learn more about \"Learning About Solar Purpura. \"  Current as of: October 5, 2017  Content Version: 11.7  © 6791-0766 [employer], [employer]. Care instructions adapted under license by [employer] (which disclaims liability or warranty for this information). If you have questions about a medical condition or this instruction, always ask your healthcare professional. [address] any warranty or liability for your use of this information.

## 2018-08-27 NOTE — PROGRESS NOTES
Hematology/Oncology Consultation Note    Name: Fannie Styles  Date: 2018  : 1940    Lisa Nieto MD       Mr. Melissa Martinez  is a 69 y.o. man who is here with complaints of unexplained bruising or bleeding. He was referred for a complete assessment. Subjective:   Chief complaint: Unexplained bruising on the forearms    History of present illness:  Mr. Melissa Martinez is a 52-year-old man who states that about 6 months ago he was doing some work in his yard and had multiple scratches on his arms from the shrubs and trees. A few months later he noticed some unexplained bruises on his forearms. There was no other indication of rash and there was no evidence of infection. Over the past several months he has noticed that the areas have slowly began healing. There was no bruising on his upper arms, neck, back, torso or legs. He decided to inform his primary care physician, of the bruising, and was promptly referred for an evaluation of unexplained bruising. Back on 2018 a CBC showed that he had a normal WBC count of 11, his hemoglobin was 17.2 g/dL with hematocrit of 47.6% and a platelet count of 340,095. He has no complaints of unexplained bleeding and has never experienced any evidence of venous thromboembolism. Past Medical History:   Diagnosis Date    AAA (abdominal aortic aneurysm) ([facility] [address] 2010    noted on CT (abdomen)    Abnormal LFT's , 03    Acute meniscal tear, lateral 2007    s/p arthorscopic surg (Dr. Deann Sinclair)    Atypical chest pain 09    CAD (coronary artery disease), native coronary artery     Cardiac cath 2009    dLM 30%. mLAD 30%. oD2 30%. pCX 55% (FFR 0.95). mRCA 40%. RPDA 65% (FFR 0.86). EF 65%.  Cardiac echocardiogram 2007    EF 60%. No RWMA. Gr 1 DDfx. LAE. No significant valvular heart disease.  Cardiac nuclear imaging test, low risk 2015    Low risk. No ischemia or prior infarction. No WMA. EF 67%.   Neg EKG on pharm stress test.    Cardiovascular aorto-iliac duplex 06/29/2015    AAA measures 3.2 x 3.1 cm Trv. (Prev 3.2 x 3.4 cm on 6/19/14). Aneurysm is infrarenal & fusiform w/complex intraluminal thrombus within. Mod bilateral common iliac stenosis.  Carotid duplex 03/16/2016    Mild < 50% BETHEL stenosis. Mod 45-19% LICA stenosis. Similar to study of 6/29/15.  Cholecystitis chronic 03/2008    s/p sue    Diverticulosis     Diverticulosis 9-28-15    DR. 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    Plantar fasciitis     Rhinitis     Right knee pain     Right shoulder pain     Tinea versicolor 7/23/2012    2.8 x 2.9cm infra-renal    Tubular adenoma 9-28-15    Vitamin D deficiency 4/13/2011       Allergies   Allergen Reactions    Pollen Extracts Sneezing     Itchy eyes, runny nose       Past Surgical History:   Procedure Laterality Date    ENDOSCOPY, COLON, DIAGNOSTIC      normal per patient; Richie Begin    HX ARTHROPLASTY  04/05/10    [place]-left knee    HX CATARACT REMOVAL Bilateral     November 2014    HX CHOLECYSTECTOMY  03/2008    chronic cholecystitis    HX COLONOSCOPY  9-28-15    HX HERNIA REPAIR  1995    L inguinal    HX KNEE ARTHROSCOPY  01/24/07    left knee    HX KNEE ARTHROSCOPY Left 4/16/2014    peripatellar debridement    HX KNEE REPLACEMENT  01/29/2013    [facility]    HX MOHS PROCEDURES  11/2008    HX RHINOPLASTY  1983    HX TONSILLECTOMY  HX VASECTOMY  1977    HX WISDOM TEETH EXTRACTION      x4    [place] ANESTH,SURGERY OF SHOULDER         Social History     Social History    Marital status:      Spouse name: N/A    Number of children: N/A    Years of education: N/A     Occupational History    Not on file. Social History Main Topics    Smoking status: Former Smoker     Packs/day: 0.50     Years: 20.00     Types: Cigarettes     Quit date: 12/10/1995    Smokeless tobacco: Never Used    Alcohol use 4.0 oz/week     7 Glasses of wine, 1 Shots of liquor per week      Comment: social    Drug use: No    Sexual activity: Not on file     Other Topics Concern    Not on file     Social History Narrative       Family History   Problem Relation Age of Onset    Heart Disease Mother     Stroke Father     No Known Problems Sister     No Known Problems Maternal Grandmother     No Known Problems Maternal Grandfather     Dementia Paternal Grandmother     No Known Problems Paternal Grandfather     Cancer Brother      lung CA       Current Outpatient Prescriptions   Medication Sig Dispense Refill    methylPREDNISolone (MEDROL DOSEPACK) 4 mg tablet Dose per instructions on the back  Indications: angioedema, Idiopathic Thrombocytopenic Purpura 1 Dose Pack 0    ezetimibe (ZETIA) 10 mg tablet Take 1 Tab by mouth daily. 90 Tab 3    brinzolamide-brimonidine (SIMBRINZA) 1-0.2 % drps Apply  to eye.  niacin (NIASPAN) 1,000 mg Tb24 tab Take 1 Tab by mouth daily. 90 Tab 3    amLODIPine (NORVASC) 10 mg tablet TAKE 1 TABLET BY MOUTH DAILY 90 Tab 3    rosuvastatin (CRESTOR) 20 mg tablet Take 1 Tab by mouth nightly. 90 Tab 3    traMADol (ULTRAM) 50 mg tablet Take 1 Tab by mouth every twelve (12) hours as needed for Pain. Max Daily Amount: 100 mg. 30 Tab 0    coenzyme q10 (CO Q-10) 10 mg cap Take  by mouth.  nitroglycerin (NITROSTAT) 0.4 mg SL tablet 1 Tab by SubLINGual route every five (5) minutes as needed.  25 Tab 3    gabapentin (NEURONTIN) 300 mg capsule 300 mg three (3) times daily.  cholecalciferol, vitamin D3, (VITAMIN D3) 2,000 unit tab Take  by mouth.  sildenafil citrate (VIAGRA) 100 mg tablet Take 1 Tab by mouth as needed. 20 Tab 3    omega 3-dha-epa-fish oil 100-160-1,000 mg cap Take 1,000 mg by mouth daily. (Patient taking differently: Take 2,000 mg by mouth daily.) 90 Cap 3    aspirin delayed-release 81 mg tablet Take  by mouth daily.  CALCIUM CARB/VIT D3/MINERALS (CALTRATE PLUS PO) Take 1 Tab by mouth daily. Review of Systems    General ROS:The patient has no complaints and there is no physical distress evident. Psychological ROS: patient denies having any psychological symptoms such as hallucinations, depression or anxiety. Ophthalmic ROS:the patient denies having any visual impairment or eye discomfort. ENT ROS: there are no abnormalities reported. Allergy and Immunology ROS:the patient denies having any seasonal allergies or allergies to medications other than those already outlined above. Hematological and Lymphatic ROS: the patient denies having any bruising, bleeding or lymphadenopathy. Endocrine ROS: the patient denies having any heat or cold intolerance. There is no history of diabetes or thyroid disorders. Breast ROS: the patient denies having any history of breast mass, nipple discharge, or lumps. Respiratory ROS:the patient denies having any cough, shortness of breath, or dyspnea on exertion. Cardiovascular ROS: there are no complaints of chest pain, palpitations, chest pounding, or dyspnea on exertion. Gastrointestinal ROS: the patient denies having nausea, emesis, diarrhea, constipation, or blood in the stool. Genito-Urinary ROS: the patient denies having urinary urgency, frequency, or dysuria. Musculoskeletal ROS: with the exception of mild arthralgias the patient has no other musculoskeletal complaints.   Neurological ROS: the patient denies having any numbness, tingling, or neurologic deficits. Dermatological ROS: The patient reports that he has some residual areas of bruising on his forearms bilaterally. Objective:     Visit Vitals    /72 (BP 1 Location: Right arm, BP Patient Position: Sitting)    Pulse 75    Temp 97.6 °F (36.4 °C) (Oral)    Resp 18    Ht 6' (1.829 m)    Wt 84.8 kg (187 lb)    BMI 25.36 kg/m2        ECOGPS=0    Physical Exam:   Gen. Appearance: the patient is in no acute distress. Skin: There is evidence of bruises on the forearms bilaterally which appear to be slowly resolving. HEENT: The head is normocephalic and atraumatic. The conjunctiva and sclera are clear. Pupils are equal, round, reactive to light, and accommodation. The extraocular movements are intact. ENT reveals no oral mucosal lesions or ulcerations. Neck: Supple without lymphadenopathy or thyromegaly. Lungs: Clear to auscultation and percussion; there are no wheezes or rhonchi. Heart: Regular rate and rhythm; there are no murmurs, gallops, or rubs. Abdomen: Bowel sounds are present and normal.  There is no guarding, tenderness, or hepatosplenomegaly. Extremities: There is no clubbing, cyanosis, or edema. Neurologic: There are no focal neurologic deficits. Lymphatics: There is no palpable peripheral lymphadenopathy. Lab data:  Lab data from 4/23/2018 shows urgency count of 11, hemoglobin 17.2 g/dL, hematocrit 47.6%, and the platelet count is 883,802. Assessment:   Purpura/bruising: Have explained to the patient that it appears that he may have been allergic to something that was in the tree branches that caused the abrasions on his arms. This does not appear to be a global coagulation disorder nonetheless we will assess for any evidence of coagulation protein abnormalities. Plan:   Bruising/purpura: At this time I will check the factor V, factor VII, factor IX, factor X, factor XI, and factor VIII activity levels.   Additionally a von Willebrand profile will panel will be requested. A repeat CBC will also be ordered and I will see him back in clinic in about 2 weeks. The patient will be prescribed a Medrol pack with instructions to take the medications as indicated on the instructions. I have also advised the patient to apply cortisone 10 to his arms bilaterally 2-3 times daily for the next 2-3 weeks. Follow-up in 2 weeks to review lab data and discuss options of management. Orders Placed This Encounter    COMPLETE CBC & AUTO DIFF WBC    InHouse CBC ([employer])     Standing Status:   Future     Number of Occurrences:   1     Standing Expiration Date:   6/6/5668    METABOLIC PANEL, COMPREHENSIVE     Standing Status:   Future     Standing Expiration Date:   8/28/2019    SED RATE (ESR)     Standing Status:   Future     Standing Expiration Date:   8/28/2019    PTT     Standing Status:   Future     Standing Expiration Date:   8/28/2019    PROTHROMBIN TIME + INR     Standing Status:   Future     Standing Expiration Date:   8/28/2019    FACTOR IX ACTIVITY     Standing Status:   Future     Standing Expiration Date:   8/28/2019    FACTOR VII ACTIVITY     Standing Status:   Future     Standing Expiration Date:   8/28/2019    FACTOR VIII ACTIVITY     Standing Status:   Future     Standing Expiration Date:   8/28/2019    FACTOR X ACTIVITY     Standing Status:   Future     Standing Expiration Date:   8/28/2019   [facility] PANEL     Standing Status:   Future     Standing Expiration Date:   8/28/2019    FACTOR V ACTIVITY     Standing Status:   Future     Standing Expiration Date:   8/28/2019    methylPREDNISolone (MEDROL DOSEPACK) 4 mg tablet     Sig: Dose per instructions on the back  Indications: angioedema, Idiopathic Thrombocytopenic Purpura     Dispense:  1 Dose Pack     Refill:  0           Johann Joseph MD  8/27/2018      Please note: This document has been produced using voice recognition software.  Unrecognized errors in transcription may be present.

## 2018-08-28 LAB
ALBUMIN SERPL-MCNC: 4.4 G/DL (ref 3.4–5)
ALBUMIN/GLOB SERPL: 1.9 {RATIO} (ref 0.8–1.7)
ALP SERPL-CCNC: 102 U/L (ref 45–117)
ALT SERPL-CCNC: 43 U/L (ref 16–61)
ANION GAP SERPL CALC-SCNC: 9 MMOL/L (ref 3–18)
AST SERPL-CCNC: 42 U/L (ref 15–37)
BILIRUB SERPL-MCNC: 2.9 MG/DL (ref 0.2–1)
BUN SERPL-MCNC: 20 MG/DL (ref 7–18)
BUN/CREAT SERPL: 21 (ref 12–20)
CALCIUM SERPL-MCNC: 9 MG/DL (ref 8.5–10.1)
CHLORIDE SERPL-SCNC: 108 MMOL/L (ref 100–108)
CO2 SERPL-SCNC: 25 MMOL/L (ref 21–32)
CREAT SERPL-MCNC: 0.96 MG/DL (ref 0.6–1.3)
ERYTHROCYTE [SEDIMENTATION RATE] IN BLOOD: 1 MM/HR (ref 0–20)
GLOBULIN SER CALC-MCNC: 2.3 G/DL (ref 2–4)
GLUCOSE SERPL-MCNC: 102 MG/DL (ref 74–99)
POTASSIUM SERPL-SCNC: 4.1 MMOL/L (ref 3.5–5.5)
PROT SERPL-MCNC: 6.7 G/DL (ref 6.4–8.2)
SODIUM SERPL-SCNC: 142 MMOL/L (ref 136–145)

## 2018-09-06 ENCOUNTER — HOSPITAL ENCOUNTER (OUTPATIENT)
Dept: LAB | Age: 78
Discharge: HOME | End: 2018-09-06
Payer: MEDICARE

## 2018-09-06 DIAGNOSIS — D69.2 PURPURA (HCC): ICD-10-CM

## 2018-09-06 DIAGNOSIS — T14.8XXA BRUISING: ICD-10-CM

## 2018-09-06 LAB
APTT PPP: 32.1 SEC (ref 23–36.4)
INR PPP: 1.1 (ref 0.8–1.2)
PROTHROMBIN TIME: 14.3 SEC (ref 11.5–15.2)

## 2018-09-06 PROCEDURE — 85230 CLOT FACTOR VII PROCONVERTIN: CPT | Performed by: INTERNAL MEDICINE

## 2018-09-06 PROCEDURE — 85260 CLOT FACTOR X STUART-POWER: CPT | Performed by: INTERNAL MEDICINE

## 2018-09-06 PROCEDURE — 85220 BLOOC CLOT FACTOR V TEST: CPT | Performed by: INTERNAL MEDICINE

## 2018-09-06 PROCEDURE — 85730 THROMBOPLASTIN TIME PARTIAL: CPT | Performed by: INTERNAL MEDICINE

## 2018-09-06 PROCEDURE — 85240 CLOT FACTOR VIII AHG 1 STAGE: CPT | Performed by: INTERNAL MEDICINE

## 2018-09-06 PROCEDURE — 85610 PROTHROMBIN TIME: CPT | Performed by: INTERNAL MEDICINE

## 2018-09-06 PROCEDURE — 36415 COLL VENOUS BLD VENIPUNCTURE: CPT | Performed by: INTERNAL MEDICINE

## 2018-09-06 PROCEDURE — 85250 CLOT FACTOR IX PTC/CHRSTMAS: CPT | Performed by: INTERNAL MEDICINE

## 2018-09-08 LAB
FACT IX ACT/NOR PPP: 136 % (ref 60–177)
FACT V ACT/NOR PPP: 60 % (ref 70–150)
FACT VII ACT/NOR PPP: 72 % (ref 51–186)
FACT VIII ACT/NOR PPP: 141 % (ref 57–163)
FACT X ACT/NOR PPP: 99 % (ref 76–183)
INTERPRETATION, 910378, CSIR1: NORMAL
VWF AG ACT/NOR PPP IA: 197 % (ref 50–200)
VWF:RCO ACT/NOR PPP PL AGG: 136 % (ref 50–200)

## 2018-09-10 ENCOUNTER — OFFICE VISIT (OUTPATIENT)
Dept: ONCOLOGY | Age: 78
End: 2018-09-10

## 2018-09-10 VITALS
BODY MASS INDEX: 25.33 KG/M2 | HEIGHT: 72 IN | HEART RATE: 69 BPM | SYSTOLIC BLOOD PRESSURE: 152 MMHG | DIASTOLIC BLOOD PRESSURE: 83 MMHG | TEMPERATURE: 98.6 F | WEIGHT: 187 LBS

## 2018-09-10 DIAGNOSIS — T14.8XXA BRUISING: ICD-10-CM

## 2018-09-10 DIAGNOSIS — D69.2 PURPURA (HCC): Primary | ICD-10-CM

## 2018-09-10 NOTE — MR AVS SNAPSHOT
[address] 116 200 [facility] 
[phone] Patient: Virginia Sosa MRN: [id number] DZM:[id number] Visit Information Date & Time Provider Department Dept. Phone Encounter #  
 9/10/2018  9:00 AM Ivná Velasco MD [facility] Oncology [phone] [id number] Your Appointments 10/1/2018  9:15 AM  
Office Visit with Iván Velasco MD  
[address] [place] [address] Oncology [address] [place] [address]) Appt Note: 2 WK RET  
 [address] 3200 [facility], [address] [place] [address]  
  
    
 10/23/2018 12:45 PM  
Follow Up with Heidi Pan MD  
[address] [place] [address] (--) Appt Note: follow up Pedro [address] [place] [id number] [phone]  
  
   
 [address] [place] [id number]  
  
    
 1/14/2019  8:20 AM  
Follow Up with Jem Trimble MD  
[facility] ([address] [place] [address]) Appt Note: 9 mo f/u  
 [address] [id number] [address] [address] [phone] [facility] 53 [phone]  
  
    
 8/25/2020  8:00 AM  
PROCEDURE with [facility] 1 [facility] ([facility]) Appt Note: Aorta duplex 2 years 2000 [facility],[address] [place] [address] 669 200 [facility]  
[phone] [address] [place] [address],RUST 1M07  
  
    
 8/25/2020  9:00 AM  
PROCEDURE with [facility] 1 [facility] ([facility] [address]) Appt Note: CV 2 years [address] [place] [address] 226 200 [facility]  
[phone] 8/25/2020 11:30 AM  
Follow Up with RAHAT Bonner [facility] ([address] [place] [address]) Appt Note: 2 years fup with studies SAME DAY APPT 1212 [facility] [address] 200 [facility]  
[phone] 1212 [facility] Tristian Colon 200 [facility] Upcoming Health Maintenance Date Due  
 GLAUCOMA SCREENING Q2Y 11/17/2016 Influenza Age 5 to Adult 8/1/2018 MEDICARE YEARLY EXAM 1/4/2019 COLONOSCOPY 9/28/2020 DTaP/Tdap/Td series (2 - Td) 11/1/2026 Allergies as of 9/10/2018  Review Complete On: 8/30/2018 By: Nitish Sheth MD  
  
 Severity Noted Reaction Type Reactions Pollen Extracts  02/05/2015    Sneezing Itchy eyes, runny nose Current Immunizations  Reviewed on 11/1/2016 Name Date Influenza High Dose Vaccine PF 11/1/2016 Influenza Vaccine 10/11/2017, 9/17/2015, 12/1/2014, 9/16/2013 Influenza Vaccine Whole 10/18/2012 Pneumococcal Conjugate (PCV-13) 7/23/2018 Pneumococcal Polysaccharide (PPSV-23) 11/1/2016 TD Vaccine 8/21/2006 ZZZ-RETIRED (DO NOT USE) Pneumococcal Vaccine (Unspecified Type) 8/21/2006 Zoster 9/18/2012 Not reviewed this visit Vitals BP Pulse Temp Height(growth percentile) Weight(growth percentile) BMI  
 152/83 69 98.6 °F (37 °C) (Oral) 6' (1.829 m) 187 lb (84.8 kg) 25.36 kg/m2 Smoking Status Former Smoker BMI and BSA Data Body Mass Index Body Surface Area  
 25.36 kg/m 2 2.08 m 2 Preferred Pharmacy Pharmacy Name Phone [address] [facility] [phone] Your Updated Medication List  
  
   
This list is accurate as of 9/10/18  9:53 AM.  Always use your most recent med list. amLODIPine 10 mg tablet Commonly known as:  Harish Pedro TAKE 1 TABLET BY MOUTH DAILY  
  
 aspirin delayed-release 81 mg tablet Take  by mouth daily. CALTRATE PLUS PO Take 1 Tab by mouth daily. CO Q-10 10 mg Cap Generic drug:  coenzyme q10 Take  by mouth.  
  
 ezetimibe 10 mg tablet Commonly known as:  Kendrick Duane Take 1 Tab by mouth daily. gabapentin 300 mg capsule Commonly known as:  NEURONTIN  
300 mg three (3) times daily. methylPREDNISolone 4 mg tablet Commonly known as:  Elverna [place] Dose per instructions on the back  Indications: angioedema, Idiopathic Thrombocytopenic Purpura  
  
 niacin 1,000 mg Tb24 tab Commonly known as:  Niaspan Take 1 Tab by mouth daily. nitroglycerin 0.4 mg SL tablet Commonly known as:  NITROSTAT  
1 Tab by SubLINGual route every five (5) minutes as needed. omega 3-dha-epa-fish oil 100-160-1,000 mg Cap Take 1,000 mg by mouth daily. rosuvastatin 20 mg tablet Commonly known as:  CRESTOR Take 1 Tab by mouth nightly. sildenafil citrate 100 mg tablet Commonly known as:  VIAGRA Take 1 Tab by mouth as needed. SIMBRINZA 1-0.2 % Drps Generic drug:  brinzolamide-brimonidine Apply  to eye.  
  
 traMADol 50 mg tablet Commonly known as:  ULTRAM  
Take 1 Tab by mouth every twelve (12) hours as needed for Pain. Max Daily Amount: 100 mg. VITAMIN D3 2,000 unit Tab Generic drug:  cholecalciferol (vitamin D3) Take  by mouth. Introducing [facility]! Dear [place]: Thank you for requesting a [employer] account. Our records indicate that you already have an active [employer] account. You can access your account anytime at [email or web address] Did you know that you can access your hospital and ER discharge instructions at any time in [employer]? You can also review all of your test results from your hospital stay or ER visit. Additional Information If you have questions, please visit the Frequently Asked Questions section of the [employer] website at [email or web address] Remember, [employer] is NOT to be used for urgent needs. For medical emergencies, dial 911. Now available from your iPhone and Android! Please provide this summary of care documentation to your next provider. Your primary care clinician is listed as Palmira Meza. If you have any questions after today's visit, please call [phone].

## 2018-09-11 ENCOUNTER — HOSPITAL ENCOUNTER (OUTPATIENT)
Dept: LAB | Age: 78
Discharge: HOME | End: 2018-09-11
Payer: MEDICARE

## 2018-09-11 PROCEDURE — 36415 COLL VENOUS BLD VENIPUNCTURE: CPT | Performed by: INTERNAL MEDICINE

## 2018-09-12 NOTE — PROGRESS NOTES
Hematology/medical oncology progress note 9/10/2018 Apple Scott YOB: 1940 PCP: Dr. Beth Boyer Diagnosis: Bruising Mr. Dav Barfield is a 52-year-old man who was referred for evaluation of unexplained bruising and purpura. I have explained to the patient that the coagulation profile revealed that he had a normal factor VIII level of 141%, von Willebrand factor assay showed normal values. Factor IX activity was 99%, factor VII activity was normal at 72%, factor IX activity was 136%, a pro time was 14.3 with an INR of 1.1, and the PTT was normal at 32.1 seconds. However the factor V activity was only 60% with a reference range of %. The coagulation lab have recommended molecular testing for the presence of a factor V Leiden to assess for thrombolic risk. I have explained to the patient that factor V Leiden abnormalities are not associated primarily with bruising and bleeding but are more associated with unexplained thromboembolic phenomena. We will obtain a blood specimen for factor V Leiden. The patient had his questions answered to his satisfaction. I will see him back in clinic in 2 weeks to review the results of these additional tests. Total time 25 minutes, greater than 50% of the time was in counseling and coordination of care. Wilder Bae MD, [address]

## 2018-09-14 LAB
F2 GENE MUT ANL BLD/T: NORMAL
F5 GENE MUT ANL BLD/T: NORMAL

## 2018-10-01 ENCOUNTER — OFFICE VISIT (OUTPATIENT)
Dept: ONCOLOGY | Age: 78
End: 2018-10-01

## 2018-10-01 ENCOUNTER — HOSPITAL ENCOUNTER (OUTPATIENT)
Dept: ONCOLOGY | Age: 78
Discharge: HOME | End: 2018-10-01

## 2018-10-01 VITALS
SYSTOLIC BLOOD PRESSURE: 136 MMHG | BODY MASS INDEX: 25.19 KG/M2 | RESPIRATION RATE: 16 BRPM | HEART RATE: 70 BPM | HEIGHT: 72 IN | WEIGHT: 186 LBS | DIASTOLIC BLOOD PRESSURE: 76 MMHG | OXYGEN SATURATION: 100 % | TEMPERATURE: 97.1 F

## 2018-10-01 DIAGNOSIS — D69.2 PURPURA (HCC): Primary | ICD-10-CM

## 2018-10-01 DIAGNOSIS — D69.2 PURPURA (HCC): ICD-10-CM

## 2018-10-01 DIAGNOSIS — T14.8XXA BRUISING: ICD-10-CM

## 2018-10-01 NOTE — PATIENT INSTRUCTIONS
Learning About Solar Purpura  What is solar purpura? Solar purpura (say \"JKMW-bqmt-nfo\" or \"EXYN-mgj-hep\") is a condition that causes one or more flat, purple bruises. It often occurs on the hands, forearms, and legs. Purpura is common in older people. It is called solar purpura because it occurs most often on areas that are exposed to the sun. The bruises bother some people because of how they look. But they aren't serious. What happens when you have solar purpura? As we age, our skin becomes less elastic and more easily damaged. Exposure to the sun also weakens the walls of blood vessels. These changes increase the chance of bruising. These bruises can occur after even minor injuries, such as lightly hitting your hand or arm against something. These bruises are more common in people who take medicines such as corticosteroids or aspirin and other blood thinners. Unlike other bruises, these bruises usually don't hurt. How can you prevent it? There is no sure way to prevent solar purpura. But to reduce the chance of getting the condition:  · Always wear sunscreen on exposed skin. Make sure to use a broad-spectrum sunscreen that has a sun protection factor (SPF) of 30 or higher. Use it every day, even when it is cloudy. · Wear long-sleeve shirts and long pants. · Protect your skin from injury. Wear gloves when you garden or do other yard work. Put padding on handrails and walkers. · Don't take aspirin or medicines such as ibuprofen or naproxen, unless you doctor recommends it. They can raise the chance of bleeding. · Ask your doctor if any of your medicines can increase the chance of bleeding. How is it treated? No treatment is needed. The bruises fade over a few weeks. If you don't like how the bruises look, you can cover them with clothing or makeup. When should you call for help?   Watch closely for changes in your health, and be sure to contact your doctor if:  · You have more bruising than usual and on more places on your body. · You do not get better as expected. Follow-up care is a key part of your treatment and safety. Be sure to make and go to all appointments, and call your doctor if you are having problems. It's also a good idea to know your test results and keep a list of the medicines you take. Where can you learn more? Go to http://chucky-avel.info/. Enter D502 in the search box to learn more about \"Learning About Solar Purpura. \"  Current as of: October 5, 2017  Content Version: 11.7  © 0164-1690 [employer]. Care instructions adapted under license by [employer] (which disclaims liability or warranty for this information). If you have questions about a medical condition or this instruction, always ask your healthcare professional. [address] any warranty or liability for your use of this information.

## 2018-10-01 NOTE — PROGRESS NOTES
Hematology/medical oncology progress note    10/1/2018  Loraine Medina  YOB: 1940    PCP: Dr. Lockwood Precise    Diagnosis: Henrine Cones Mr. Claudette Cooks is a 81-year-old man who was referred for evaluation of unexplained bruising and purpura. I have explained to the patient that the coagulation profile revealed that he had a normal factor VIII level of 141%, von Willebrand factor assay showed normal values. Factor IX activity was 99%, factor VII activity was normal at 72%, factor IX activity was 136%, a pro time was 14.3 with an INR of 1.1, and the PTT was normal at 32.1 seconds. However the factor V activity was only 60% with a reference range of %. The coagulation lab have recommended molecular testing for the presence of a factor V Leiden to assess for thrombolic risk. I have explained to the patient that factor V Leiden abnormalities are not associated primarily with bruising and bleeding but are more associated with unexplained thromboembolic phenomena. The patient was referred to the reference lab and the assessment shows that he is analysis was negative for the factor V Leiden. Therefore no additional tests or recommendations are warranted. The patient was advised that he can take the aspirin 81 mg daily. An additional factor II DNA analysis also revealed no point mutation. I will see him back in the clinic in 2 months. The patient had his questions answered to his satisfaction. Total time 25 minutes, greater than 50% of the time was in counseling and coordination of care. Wilder Escobar MD, [address]

## 2018-10-01 NOTE — MR AVS SNAPSHOT
Kayla [address] [facility] [address] 
[phone] Patient: Ari Lundberg MRN: [id number] VNR:[id number] Visit Information Date & Time Provider Department Dept. Phone Encounter #  
 10/1/2018  9:15 AM Sherron Ortega MD [facility] [phone] [id number] Follow-up Instructions Return in about 2 months (around 12/1/2018). Your Appointments 10/23/2018 12:45 PM  
Follow Up with Arianna Teran MD  
[facility] (--) Appt Note: follow up Pedro [address] [place] [id number] [phone]  
  
   
 [address] [place] [id number]  
  
    
 12/3/2018  9:45 AM  
Office Visit with Sherron Ortega MD  
[place] [place] [address] [facility] Appt Note: 2 MO RET  
 [address] [place] [address], [address] [place] [address]  
  
    
 1/14/2019  8:20 AM  
Follow Up with Jasmin Azul MD  
[facility] ([facility]) Appt Note: 9 mo f/u  
 [address] [place] [address] [facility] [address]  
[phone] [place] [phone]  
  
    
 8/25/2020  8:00 AM  
PROCEDURE with [facility] IMAGING 1 [facility] ([facility]) Appt Note: Aorta duplex 2 years [address] [facility] [address]  
[phone] [address] [place] [address],[place] [address]  
  
    
 8/25/2020  9:00 AM  
PROCEDURE with [facility] IMAGING 1 [facility] ([facility]) Appt Note: CV 2 years [id number] [facility]  
 [address] [place], [place] [address] [address] [facility] [address]  
[phone]  8/25/2020 11:30 AM  
Follow Up with Lovey Klinefelter, PA  
 [facility] ([facility]) Appt Note: 2 years fup with studies SAME DAY APPT  
 [place], [place] [address] [address] [facility]  
[phone] [address] [facility] [place] 200 [facility] Se Upcoming Health Maintenance Date Due Shingrix Vaccine Age 50> (1 of 2) 3/26/1991 GLAUCOMA SCREENING Q2Y 11/17/2016 Influenza Age 5 to Adult 8/1/2018 MEDICARE YEARLY EXAM 1/4/2019 COLONOSCOPY 9/28/2020 DTaP/Tdap/Td series (2 - Td) 11/1/2026 Allergies as of 10/1/2018  Review Complete On: 10/1/2018 By: Teresa Marie MD  
  
 Severity Noted Reaction Type Reactions Pollen Extracts  02/05/2015    Sneezing Itchy eyes, runny nose Current Immunizations  Reviewed on 11/1/2016 Name Date Influenza High Dose Vaccine PF 11/1/2016 Influenza Vaccine 10/11/2017, 9/17/2015, 12/1/2014, 9/16/2013 Influenza Vaccine Whole 10/18/2012 Pneumococcal Conjugate (PCV-13) 7/23/2018 Pneumococcal Polysaccharide (PPSV-23) 11/1/2016 TD Vaccine 8/21/2006 ZZZ-RETIRED (DO NOT USE) Pneumococcal Vaccine (Unspecified Type) 8/21/2006 Zoster 9/18/2012 Not reviewed this visit You Were Diagnosed With   
  
 Codes Comments Purpura ([facility]    -  Primary ICD-10-CM: Y06.3 ICD-9-CM: 287.2 Bruising     ICD-10-CM: T14. Cally Easley ICD-9-CM: 924.9 Vitals Smoking Status Former Smoker Preferred Pharmacy Pharmacy Name Phone [address] [facility] [phone] Your Updated Medication List  
  
   
This list is accurate as of 10/1/18  9:38 AM.  Always use your most recent med list. amLODIPine 10 mg tablet Commonly known as:  Darwin Schwalbe TAKE 1 TABLET BY MOUTH DAILY  
  
 aspirin delayed-release 81 mg tablet Take  by mouth daily. CALTRATE PLUS PO Take 1 Tab by mouth daily. CO Q-10 10 mg Cap Generic drug:  coenzyme q10 Take  by mouth. ezetimibe 10 mg tablet Commonly known as:  Saundra Genin Take 1 Tab by mouth daily. gabapentin 300 mg capsule Commonly known as:  NEURONTIN  
300 mg three (3) times daily. methylPREDNISolone 4 mg tablet Commonly known as:  Garlan Tayla Dose per instructions on the back  Indications: angioedema, Idiopathic Thrombocytopenic Purpura  
  
 niacin 1,000 mg Tb24 tab Commonly known as:  Niaspan Take 1 Tab by mouth daily. nitroglycerin 0.4 mg SL tablet Commonly known as:  NITROSTAT  
1 Tab by SubLINGual route every five (5) minutes as needed. omega 3-dha-epa-fish oil 100-160-1,000 mg Cap Take 1,000 mg by mouth daily. rosuvastatin 20 mg tablet Commonly known as:  CRESTOR Take 1 Tab by mouth nightly. sildenafil citrate 100 mg tablet Commonly known as:  VIAGRA Take 1 Tab by mouth as needed. SIMBRINZA 1-0.2 % Drps Generic drug:  brinzolamide-brimonidine Apply  to eye.  
  
 traMADol 50 mg tablet Commonly known as:  ULTRAM  
Take 1 Tab by mouth every twelve (12) hours as needed for Pain. Max Daily Amount: 100 mg. VITAMIN D3 2,000 unit Tab Generic drug:  cholecalciferol (vitamin D3) Take  by mouth. We Performed the Following COMPLETE CBC & AUTO DIFF WBC [80921 CPT(R)] Follow-up Instructions Return in about 2 months (around 12/1/2018). To-Do List   
 10/01/2018 Lab:  CBC WITH 3 PART DIFF Patient Instructions Learning About Solar Purpura What is solar purpura? Solar purpura (say \"WCAG-zgfp-roi\" or \"XEFU-ooi-cpb\") is a condition that causes one or more flat, purple bruises. It often occurs on the hands, forearms, and legs. Purpura is common in older people. It is called solar purpura because it occurs most often on areas that are exposed to the sun. The bruises bother some people because of how they look. But they aren't serious. What happens when you have solar purpura? As we age, our skin becomes less elastic and more easily damaged. Exposure to the sun also weakens the walls of blood vessels. These changes increase the chance of bruising. These bruises can occur after even minor injuries, such as lightly hitting your hand or arm against something. These bruises are more common in people who take medicines such as corticosteroids or aspirin and other blood thinners. Unlike other bruises, these bruises usually don't hurt. How can you prevent it? There is no sure way to prevent solar purpura. But to reduce the chance of getting the condition: · Always wear sunscreen on exposed skin. Make sure to use a broad-spectrum sunscreen that has a sun protection factor (SPF) of 30 or higher. Use it every day, even when it is cloudy. · Wear long-sleeve shirts and long pants. · Protect your skin from injury. Wear gloves when you garden or do other yard work. Put padding on handrails and walkers. · Don't take aspirin or medicines such as ibuprofen or naproxen, unless you doctor recommends it. They can raise the chance of bleeding. · Ask your doctor if any of your medicines can increase the chance of bleeding. How is it treated? No treatment is needed. The bruises fade over a few weeks. If you don't like how the bruises look, you can cover them with clothing or makeup. When should you call for help? Watch closely for changes in your health, and be sure to contact your doctor if: 
· You have more bruising than usual and on more places on your body. · You do not get better as expected. Follow-up care is a key part of your treatment and safety. Be sure to make and go to all appointments, and call your doctor if you are having problems. It's also a good idea to know your test results and keep a list of the medicines you take. Where can you learn more? Go to http://chucky-avel.info/.  
Enter P182 in the search box to learn more about \"Learning About Solar Saw. \" Current as of: October 5, 2017 Content Version: 11.7 © 9397-9747 [employer]. Care instructions adapted under license by [employer] (which disclaims liability or warranty for this information). If you have questions about a medical condition or this instruction, always ask your healthcare professional. [address] any warranty or liability for your use of this information. Introducing [facility] & HEALTH SERVICES! Dear Linda Goldberg: Thank you for requesting a [employer] account. Our records indicate that you already have an active [employer]rest account. You can access your account anytime at [email or web address]://[employer]. [employer] Did you know that you can access your hospital and ER discharge instructions at any time in [employer]rest? You can also review all of your test results from your hospital stay or ER visit. Additional Information If you have questions, please visit the Frequently Asked Questions section of the [employer] website at [email or web address]/. Remember, [employer]rest is NOT to be used for urgent needs. For medical emergencies, dial 911. Now available from your iPhone and Android! Please provide this summary of care documentation to your next provider. Your primary care clinician is listed as Clive Matthews. If you have any questions after today's visit, please call [phone].

## 2018-10-11 ENCOUNTER — OFFICE VISIT (OUTPATIENT)
Dept: ORTHOPEDIC SURGERY | Facility: CLINIC | Age: 78
End: 2018-10-11

## 2018-10-11 VITALS
HEART RATE: 67 BPM | RESPIRATION RATE: 16 BRPM | DIASTOLIC BLOOD PRESSURE: 63 MMHG | HEIGHT: 72 IN | TEMPERATURE: 96.9 F | OXYGEN SATURATION: 96 % | WEIGHT: 189 LBS | BODY MASS INDEX: 25.6 KG/M2 | SYSTOLIC BLOOD PRESSURE: 131 MMHG

## 2018-10-11 DIAGNOSIS — M25.561 CHRONIC PAIN OF RIGHT KNEE: ICD-10-CM

## 2018-10-11 DIAGNOSIS — G89.29 CHRONIC LEFT SHOULDER PAIN: ICD-10-CM

## 2018-10-11 DIAGNOSIS — M75.52 BURSITIS OF SHOULDER, LEFT: ICD-10-CM

## 2018-10-11 DIAGNOSIS — M17.11 PRIMARY OSTEOARTHRITIS OF RIGHT KNEE: Primary | ICD-10-CM

## 2018-10-11 DIAGNOSIS — M25.512 CHRONIC LEFT SHOULDER PAIN: ICD-10-CM

## 2018-10-11 DIAGNOSIS — G89.29 CHRONIC PAIN OF RIGHT KNEE: ICD-10-CM

## 2018-10-11 RX ORDER — BETAMETHASONE SODIUM PHOSPHATE AND BETAMETHASONE ACETATE 3; 3 MG/ML; MG/ML
6 INJECTION, SUSPENSION INTRA-ARTICULAR; INTRALESIONAL; INTRAMUSCULAR; SOFT TISSUE ONCE
Qty: 0.5 ML | Refills: 0
Start: 2018-10-11 | End: 2018-10-11

## 2018-10-11 RX ORDER — BUPIVACAINE HYDROCHLORIDE 2.5 MG/ML
4 INJECTION, SOLUTION EPIDURAL; INFILTRATION; INTRACAUDAL ONCE
Qty: 4 ML | Refills: 0
Start: 2018-10-11 | End: 2018-10-11

## 2018-10-11 NOTE — PROGRESS NOTES
Patient: Facundo Perez                MRN: [id number]       SSN: [id number]  YOB: 1940        AGE: 69 y.o. SEX: male    PCP: Parish Kelley MD  10/11/18    Chief Complaint   Patient presents with    Knee Pain     Right knee pain    Shoulder Pain     Left shoulder pain     HISTORY:  Facunod Perez is a 69 y.o. male who is seen for increased bilateral knee R>>L pain and swelling. He cannot take long walks. He has to use an electric cart at shopping centers. He states he went to PT and the spine center which helped with his back and leg back pain. He was previously seen for seen for right knee pain. He notes his right knee began hurting during a previous course of PT. He went to [facility] where he received x-rays. He notes medial right knee pain. He notes benefit from previous injections. He states the Celebrex has not helped with the pain. He has right knee pain after walking or sitting for long periods of time. He responded to a Euflexxa series in February. 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 He reports increased knee pain after helping his son move recently. He is S/P [employer] revision to left TKR 1/29/13--doing well . He is also seen for  left shoulder pain. He also has stiffness and pain in his left shoulder. He reports significant relief from previous left shoulder injections. Occupation, etc:  Mr. Emmett Gama is retired. He was previously the  of [employer] and an avid member of the [employer]. 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 He lost his mother, mother-in-law, sister-in-law, and brother all this year. He has no longer been able to exercise on the track. He is going back to [address] [place] [address] for three weeks on Monday. He notes his wife was recently been cleared of breast cancer. His wife has heart problems and is not very mobile.  He is his wife's primary caregiver. He travelled to [place] to visit his son recently. He notes that he used to live in [facility] when he was in [facility] previously. He grew up in [place]. He reports that he lost about 8 pounds recently. His current weight is 188lbs. Last 3 Recorded Weights in this Encounter    10/11/18 1440   Weight: 189 lb (85.7 kg)     Body mass index is 25.63 kg/(m^2). Patient Active Problem List   Diagnosis Code    Hyperlipidemia E78.5    Coronary atherosclerosis of native coronary artery I25.10    AAA (abdominal aortic aneurysm) ([facility] I71.4    Right knee pain M25.561    Abnormal LFTs (liver function tests) R94.5    Vitamin D deficiency E55.9    CLARKE (dyspnea on exertion) R06.09    Essential hypertension I10    Hyperglycemia R73.9    Tinea versicolor B36.0    S/P total knee replacement Z96.659    Fatty liver K76.0    PVC's (premature ventricular contractions) I49.3    Carotid artery disease (HCC) I77.9    Right shoulder pain M25.511    Acute back pain M54.9    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.816    Lumbar neuritis M54.16    DDD (degenerative disc disease), lumbar M51.36    Radiculopathy, lumbar region M54.16    Degeneration of intervertebral disc of lumbar region M51.36    Mixed hyperlipidemia E78.2    ACP (advance care planning) Z71.89    Purpura ([facility] D69.2    Bruising T14. 8XXA     REVIEW OF SYSTEMS: All Below are Negative except: See HPI   Constitutional: negative for fever, chills, and weight loss. Cardiovascular: negative for chest pain, claudication, leg swelling, SOB, CLARKE   Gastrointestinal: Negative for pain, N/V/C/D, Blood in stool or urine, dysuria,  hematuria, incontinence, pelvic pain. Musculoskeletal: See HPI   Neurological: Negative for dizziness and weakness. Negative for headaches, Visual changes, confusion, seizures   Phychiatric/Behavioral: Negative for depression, memory loss, substance  abuse.     Extremities: Negative for hair changes, rash, or skin lesion changes. Hematologic: Negative for bleeding problems, bruising, pallor or swollen lymph  nodes   Peripheral Vascular: No calf pain, no circulation deficits. Social History     Social History    Marital status:      Spouse name: N/A    Number of children: N/A    Years of education: N/A     Occupational History    Not on file. Social History Main Topics    Smoking status: Former Smoker     Packs/day: 0.50     Years: 20.00     Types: Cigarettes     Quit date: 12/10/1995    Smokeless tobacco: Never Used    Alcohol use 4.0 oz/week     7 Glasses of wine, 1 Shots of liquor per week      Comment: social    Drug use: No    Sexual activity: Not on file     Other Topics Concern    Not on file     Social History Narrative     Allergies   Allergen Reactions    Pollen Extracts Sneezing     Itchy eyes, runny nose     Current Outpatient Prescriptions   Medication Sig    FLUZONE HIGH-DOSE 2018-19, PF, syrg injection ADM 0.5ML IM UTD    betamethasone (CELESTONE SOLUSPAN) 6 mg/mL injection 1 mL by Intra artICUlar route once for 1 dose.  bupivacaine, PF, (MARCAINE, PF,) 0.25 % (2.5 mg/mL) injection 4 mL by Intra artICUlar route once for 1 dose.  betamethasone (CELESTONE SOLUSPAN) 6 mg/mL injection 1 mL by Intra artICUlar route once for 1 dose.  bupivacaine, PF, (MARCAINE, PF,) 0.25 % (2.5 mg/mL) injection 4 mL by Intra artICUlar route once for 1 dose.  ezetimibe (ZETIA) 10 mg tablet Take 1 Tab by mouth daily.  brinzolamide-brimonidine (SIMBRINZA) 1-0.2 % drps Apply  to eye.  niacin (NIASPAN) 1,000 mg Tb24 tab Take 1 Tab by mouth daily.  amLODIPine (NORVASC) 10 mg tablet TAKE 1 TABLET BY MOUTH DAILY    rosuvastatin (CRESTOR) 20 mg tablet Take 1 Tab by mouth nightly.  traMADol (ULTRAM) 50 mg tablet Take 1 Tab by mouth every twelve (12) hours as needed for Pain. Max Daily Amount: 100 mg.     coenzyme q10 (CO Q-10) 10 mg cap Take  by mouth.    nitroglycerin (NITROSTAT) 0.4 mg SL tablet 1 Tab by SubLINGual route every five (5) minutes as needed.  gabapentin (NEURONTIN) 300 mg capsule 300 mg three (3) times daily.  cholecalciferol, vitamin D3, (VITAMIN D3) 2,000 unit tab Take  by mouth.  sildenafil citrate (VIAGRA) 100 mg tablet Take 1 Tab by mouth as needed.  omega 3-dha-epa-fish oil 100-160-1,000 mg cap Take 1,000 mg by mouth daily. (Patient taking differently: Take 2,000 mg by mouth daily.)    aspirin delayed-release 81 mg tablet Take  by mouth daily.  CALCIUM CARB/VIT D3/MINERALS (CALTRATE PLUS PO) Take 1 Tab by mouth daily.  methylPREDNISolone (MEDROL DOSEPACK) 4 mg tablet Dose per instructions on the back  Indications: angioedema, Idiopathic Thrombocytopenic Purpura     No current facility-administered medications for this visit. PHYSICAL EXAMINATION:  Visit Vitals    /63 (BP 1 Location: Left arm, BP Patient Position: Sitting)    Pulse 67    Temp 96.9 °F (36.1 °C) (Oral)    Resp 16    Ht 6' (1.829 m)    Wt 189 lb (85.7 kg)    SpO2 96%    BMI 25.63 kg/m2     PHYSICAL EXAM:  Visit Vitals    /63 (BP 1 Location: Left arm, BP Patient Position: Sitting)    Pulse 67    Temp 96.9 °F (36.1 °C) (Oral)    Resp 16    Ht 6' (1.829 m)    Wt 189 lb (85.7 kg)    SpO2 96%    BMI 25.63 kg/m2       Appearance: Alert, well appearing and pleasant patient who is in no distress, oriented to person, place/time, and who follows commands. HEENT: Kathryn Hansen hears well, does not require hearing aids. His sclera of the eyes are non-icteric. He is breathing normally and no respiratory accessory muscle use is noted. No JVD present and Neck ROM within normal limits. Psychiatric: Affect and mood are appropriate. Oriented x3  Heart[de-identified]  S1, S2 without murmer, regular rhythm  Lungs:  Breath sounds clear to auscultation  Cardiovascular/Peripheral Vascular: Normal pulses to each foot.   Integumentary: No rashes or abrasions. Warm and normal color. No drainage. Gait: antlgic on right  Sensory Exam: Intact/Normal Sensation    Lymphatic: No evidence of Lymphedema  Vascular:       Pulses: palpable  Varicosities none  Wounds/Abrasion: None Present  Neuro: Negative, no tremors  ORTHO EXAMINATION:  Examination Left Hand Right Hand   Skin Intact Intact   Deformity - -   Swelling - -   Tenderness - -   Finger flexion Full Full   Finger extension Full Full   Sensation Normal Normal   Capillary refill Normal Normal   Heberden's nodes + +   Dupuytren's + +     Examination Left Wrist Right Wrist   Skin Intact Intact   Tenderness - -   Flexion 60 60   Extension 60 60   Deformity - -   Effusion - -   Tinel's sign - -   Phalen's test - -   Finklestein maneuver - -   Pain with thumb abduction - -     Examination Right shoulder Left shoulder   Skin Intact Intact   Effusion - -   Biceps deformity - -   Atrophy - -   AC joint tenderness - -   Acromial tenderness - +   Biceps tenderness - -   Forward flexion/Elevation  170   Active abduction  170   External rotation ROM 40 30   Internal rotation  100   Apprehension - -   Impingement - +   Drop Arm Test - -   Neurovascular Intact Intact   Difficulty removing left arm from shirt    Examination Right knee Left knee   Skin Intact Well healed TKR incision    Range of motion 120-0 110-0   Effusion +3 -   Medial joint line tenderness +++ -   Lateral joint line tenderness - -   Popliteal tenderness - -   Osteophytes palpable + -   Missaels - -   Patella crepitus + +   Anterior drawer - -   Lateral laxity - -   Medial laxity - -   Varus deformity - -   Valgus deformity - -   Pretibial edema - -   Calf tenderness - -   He is wearing a Alexandr Copper knee sleeve    PROCEDURE:. After discussing treatment options, patient's right knee and left shoulder were injected with 4 cc Marcaine and 1/2 cc Celestone.     Chart reviewed for the following:   Cindy Ferro MD, have reviewed the History, Physical and updated the Allergic reactions for Yodit Gonzales     TIME OUT performed immediately prior to start of procedure:  Radha Soria MD, have performed the following reviews on Norva Ely prior to the start of the procedure:            * Patient was identified by name and date of birth   * Agreement on procedure being performed was verified  * Risks and Benefits explained to the patient  * Procedure site verified and marked as necessary  * Patient was positioned for comfort  * Consent was obtained     Time: 12:02 PM     Date of procedure: 10/11/2018    Procedure performed by:  Chelsi Ariza MD    Mr. Smitty Epley tolerated the procedure well with no complications. XR KNEES LIDIA 10/19/17  IMPRESSION:  Moderately severe medial joint space narrowing right knee, well fixed L TKR components    MRI LEFT SHOULDER W CONT 04/27/2018  IMPRESSION:  1. Supraspinatus moderate tendinosis and small focal full-thickness tear  anterior insertion. No muscle atrophy or tendon retraction. 2. Moderate subscapularis tendinosis and partial-thickness tears. Intra-articular biceps tendinosis. 3. Superior labral tear extending posteriorly/SLAP tear type II. Posterior  inferior labral degeneration and tear. 4. Moderate acromioclavicular osteoarthrosis with inflammation. Small  subacromial spur. Subacromial subdeltoid bursitis. RADIOLOGY:  XR LEFT SHOULDER 5/5/16  IMPRESSION:  No fractures, mild acromioclavicular narrowing, no glenohumeral narrowing, no calcific densities. IMPRESSION:      ICD-10-CM ICD-9-CM    1. Primary osteoarthritis of right knee M17.11 715.16 betamethasone (CELESTONE SOLUSPAN) 6 mg/mL injection      BETAMETHASONE ACETATE & SODIUM PHOSPHATE INJECTION 3 MG EA.      DRAIN/INJECT LARGE JOINT/BURSA      bupivacaine, PF, (MARCAINE, PF,) 0.25 % (2.5 mg/mL) injection      CANCELED: AMB POC XRAY, KNEE; COMPLETE, 4+ VIEW   2.  Chronic pain of right knee M25.561 719.46 betamethasone (CELESTONE SOLUSPAN) 6 mg/mL injection    G89.29 338.29 BETAMETHASONE ACETATE & SODIUM PHOSPHATE INJECTION 3 MG EA.      DRAIN/INJECT LARGE JOINT/BURSA      bupivacaine, PF, (MARCAINE, PF,) 0.25 % (2.5 mg/mL) injection      CANCELED: AMB POC XRAY, KNEE; COMPLETE, 4+ VIEW   3. Chronic left shoulder pain M25.512 719.41 betamethasone (CELESTONE SOLUSPAN) 6 mg/mL injection    G89.29 338.29 BETAMETHASONE ACETATE & SODIUM PHOSPHATE INJECTION 3 MG EA.      DRAIN/INJECT LARGE JOINT/BURSA      bupivacaine, PF, (MARCAINE, PF,) 0.25 % (2.5 mg/mL) injection   4. Bursitis of shoulder, left M75.52 726.10 betamethasone (CELESTONE SOLUSPAN) 6 mg/mL injection      BETAMETHASONE ACETATE & SODIUM PHOSPHATE INJECTION 3 MG EA.      DRAIN/INJECT LARGE JOINT/BURSA      bupivacaine, PF, (MARCAINE, PF,) 0.25 % (2.5 mg/mL) injection     PLAN: He will be scheduled for a right TKR. Risks of surgery outlined and informed consent obtained. After discussing treatment options, patient's right knee and left shoulder were injected with 4 cc Marcaine and 1/2 cc Celestone. He will follow up for H&P and long film xrays.      Scribed by Andrea Duran 77Rocky CRMUP [facility] [address]) as dictated by Tianna Pham MD

## 2018-10-11 NOTE — PATIENT INSTRUCTIONS
Knee Arthritis: Exercises  Your Care Instructions  Here are some examples of exercises for knee arthritis. Start each exercise slowly. Ease off the exercise if you start to have pain. Your doctor or physical therapist will tell you when you can start these exercises and which ones will work best for you. How to do the exercises  Knee flexion with heel slide    1. Lie on your back with your knees bent. 2. Slide your heel back by bending your affected knee as far as you can. Then hook your other foot around your ankle to help pull your heel even farther back. 3. Hold for about 6 seconds, then rest for up to 10 seconds. 4. Repeat 8 to 12 times. 5. Switch legs and repeat steps 1 through 4, even if only one knee is sore. Quad sets    1. Sit with your affected leg straight and supported on the floor or a firm bed. Place a small, rolled-up towel under your knee. Your other leg should be bent, with that foot flat on the floor. 2. Tighten the thigh muscles of your affected leg by pressing the back of your knee down into the towel. 3. Hold for about 6 seconds, then rest for up to 10 seconds. 4. Repeat 8 to 12 times. 5. Switch legs and repeat steps 1 through 4, even if only one knee is sore. Straight-leg raises to the front    1. Lie on your back with your good knee bent so that your foot rests flat on the floor. Your affected leg should be straight. Make sure that your low back has a normal curve. You should be able to slip your hand in between the floor and the small of your back, with your palm touching the floor and your back touching the back of your hand. 2. Tighten the thigh muscles in your affected leg by pressing the back of your knee flat down to the floor. Hold your knee straight. 3. Keeping the thigh muscles tight and your leg straight, lift your affected leg up so that your heel is about 12 inches off the floor. Hold for about 6 seconds, then lower slowly.   4. Relax for up to 10 seconds between repetitions. 5. Repeat 8 to 12 times. 6. Switch legs and repeat steps 1 through 5, even if only one knee is sore. Active knee flexion    1. Lie on your stomach with your knees straight. If your kneecap is uncomfortable, roll up a washcloth and put it under your leg just above your kneecap. 2. Lift the foot of your affected leg by bending the knee so that you bring the foot up toward your buttock. If this motion hurts, try it without bending your knee quite as far. This may help you avoid any painful motion. 3. Slowly move your leg up and down. 4. Repeat 8 to 12 times. 5. Switch legs and repeat steps 1 through 4, even if only one knee is sore. Quadriceps stretch (facedown)    1. Lie flat on your stomach, and rest your face on the floor. 2. Wrap a towel or belt strap around the lower part of your affected leg. Then use the towel or belt strap to slowly pull your heel toward your buttock until you feel a stretch. 3. Hold for about 15 to 30 seconds, then relax your leg against the towel or belt strap. 4. Repeat 2 to 4 times. 5. Switch legs and repeat steps 1 through 4, even if only one knee is sore. Stationary exercise bike    1. If you do not have a stationary exercise bike at home, you can find one to ride at your local health club or community center. 2. Adjust the height of the bike seat so that your knee is slightly bent when your leg is extended downward. If your knee hurts when the pedal reaches the top, you can raise the seat so that your knee does not bend as much. 3. Start slowly. At first, try to do 5 to 10 minutes of cycling with little to no resistance. Then increase your time and the resistance bit by bit until you can do 20 to 30 minutes without pain. 4. If you start to have pain, rest your knee until your pain gets back to the level that is normal for you. Or cycle for less time or with less effort. Follow-up care is a key part of your treatment and safety.  Be sure to make and go to all appointments, and call your doctor if you are having problems. It's also a good idea to know your test results and keep a list of the medicines you take. Where can you learn more? Go to http://chucky-avel.info/. Enter C159 in the search box to learn more about \"Knee Arthritis: Exercises. \"  Current as of: November 29, 2017  Content Version: 11.8  © 2006-2018 [employer]. Care instructions adapted under license by [employer] (which disclaims liability or warranty for this information). If you have questions about a medical condition or this instruction, always ask your healthcare professional. [place] [address] any warranty or liability for your use of this information. Knee Arthritis: Care Instructions  Your Care Instructions    Knee arthritis is a breakdown of the cartilage that cushions your knee joint. When the cartilage wears down, your bones rub against each other. This causes pain and stiffness. Knee arthritis tends to get worse with time. Treatment for knee arthritis involves reducing pain, making the leg muscles stronger, and staying at a healthy body weight. The treatment usually does not improve the health of the cartilage, but it can reduce pain and improve how well your knee works. You can take simple measures to protect your knee joints, ease your pain, and help you stay active. Follow-up care is a key part of your treatment and safety. Be sure to make and go to all appointments, and call your doctor if you are having problems. It's also a good idea to know your test results and keep a list of the medicines you take. How can you care for yourself at home? · Know that knee arthritis will cause more pain on some days than on others. · Stay at a healthy weight. Lose weight if you are overweight. When you stand up, the pressure on your knees from every pound of body weight is multiplied four times.  So if you lose 10 pounds, you will reduce the pressure on your knees by 40 pounds. · Talk to your doctor or physical therapist about exercises that will help ease joint pain. ¨ Stretch to help prevent stiffness and to prevent injury before you exercise. You may enjoy gentle forms of yoga to help keep your knee joints and muscles flexible. ¨ Walk instead of jog. ¨ Ride a bike. This makes your thigh muscles stronger and takes pressure off your knee. ¨ Wear well-fitting and comfortable shoes. ¨ Exercise in chest-deep water. This can help you exercise longer with less pain. ¨ Avoid exercises that include squatting or kneeling. They can put a lot of strain on your knees. ¨ Talk to your doctor to make sure that the exercise you do is not making the arthritis worse. · Do not sit for long periods of time. Try to walk once in a while to keep your knee from getting stiff. · Ask your doctor or physical therapist whether shoe inserts may reduce your arthritis pain. · If you can afford it, get new athletic shoes at least every year. This can help reduce the strain on your knees. · Use a device to help you do everyday activities. ¨ A cane or walking stick can help you keep your balance when you walk. Hold the cane or walking stick in the hand opposite the painful knee. ¨ If you feel like you may fall when you walk, try using crutches or a front-wheeled walker. These can prevent falls that could cause more damage to your knee. ¨ A knee brace may help keep your knee stable and prevent pain. ¨ You also can use other things to make life easier, such as a higher toilet seat and handrails in the bathtub or shower. · Take pain medicines exactly as directed. ¨ Do not wait until you are in severe pain. You will get better results if you take it sooner. ¨ If you are not taking a prescription pain medicine, take an over-the-counter medicine such as acetaminophen (Tylenol), ibuprofen (Advil, Motrin), or naproxen (Aleve).  Read and follow all instructions on the label. ¨ Do not take two or more pain medicines at the same time unless the doctor told you to. Many pain medicines have acetaminophen, which is Tylenol. Too much acetaminophen (Tylenol) can be harmful. ¨ Tell your doctor if you take a blood thinner, have diabetes, or have allergies to shellfish. · Ask your doctor if you might benefit from a shot of steroid medicine into your knee. This may provide pain relief for several months. · Many people take the supplements glucosamine and chondroitin for osteoarthritis. Some people feel they help, but the medical research does not show that they work. Talk to your doctor before you take these supplements. When should you call for help? Call your doctor now or seek immediate medical care if:    · You have sudden swelling, warmth, or pain in your knee.     · You have knee pain and a fever or rash.     · You have such bad pain that you cannot use your knee.    Watch closely for changes in your health, and be sure to contact your doctor if you have any problems. Where can you learn more? Go to http://chucky-avel.info/. Enter M715 in the search box to learn more about \"Knee Arthritis: Care Instructions. \"  Current as of: June 11, 2018  Content Version: 11.8  © 5102-8789 [employer]. Care instructions adapted under license by [employer] (which disclaims liability or warranty for this information). If you have questions about a medical condition or this instruction, always ask your healthcare professional. [place] [address] any warranty or liability for your use of this information.

## 2018-10-22 ENCOUNTER — OFFICE VISIT (OUTPATIENT)
Dept: ORTHOPEDIC SURGERY | Facility: CLINIC | Age: 78
End: 2018-10-22

## 2018-10-22 DIAGNOSIS — G89.29 CHRONIC PAIN OF RIGHT KNEE: ICD-10-CM

## 2018-10-22 DIAGNOSIS — M11.20 CHONDROCALCINOSIS: ICD-10-CM

## 2018-10-22 DIAGNOSIS — M25.561 CHRONIC PAIN OF RIGHT KNEE: ICD-10-CM

## 2018-10-22 DIAGNOSIS — M17.11 PRIMARY OSTEOARTHRITIS OF RIGHT KNEE: Primary | ICD-10-CM

## 2018-10-22 NOTE — LETTER
[address] [facility] CATHLEEN Ervin [phone] To Dr Morgan Keane                 Fax [phone] Sophienadya Irma 1940 is being referred by Dr. Diana Stuart  for Surgery Clearance prior to their surgery. Clearance Appt. Date & Time November 14, 2018 at 8:30 a.m. Surgical Procedure: Right Total Knee Arthroplasty Surgery Date:  11/20/2018 DX:  Primary Osteoarthritis/Chronic Knee Pain, Right Knee DX Code:  M17.11/M25.561/G89.29 The patient has [facility]: 
 
        CBC w/diff             Basic Panel                    Type & Screen PT/INR                  EKG(Dr. Kelsey England to do)          Urinalysis PTT                       Chest X-ray                     UA with Culture and Sensitivity Hemoglobin A1C Please advise if patient __IS __IS NOT MEDICALLY CLEARED FOR SURGERY. Additional comments: ______________________________________________________ __________________________________________________________________________ __________________________________________________________________________ 
__________________________________________________________________________ 
__________________________________________________________________________ Please include office note on which this clearance is based FAX to [phone]. For questions/concerns please call Leelee Aparicio at [phone]. Patient Medical Clearance/History & Physical 
 
Name: Paola Webb  Age:  69 y.o. 
 
SSN:  [id number] Patient scheduled for Right Total Knee Arthroplasty on November 20, 2018 by Dr. Tanya Crooks at [facility]. Medical History: 
 
Surgical History: 
 
Food/Drug Allergies: 
 
Current Medications: PHYSICAL EXAMINATION: 
 
BP ____/____  Pulse ______  Ht ______  Wt _____ Temp _____  Respiration ______ GENERAL APPEARANCE HEENT: 
 
Heart/Lungs: Abdomen: 
 
Pelvic Examination: 
 
Rectal Examination: 
 
Extremities: 
 
Neurological Examination: 
 
Other Pertinent Findings: 
 
 
RECOMMENDATIONS 
 
____________________  _______________________  ___________ [address] Name - Print   Date of Exam 
 
     MEDICALLY CLEARED FOR SURGERY 
     _____Approved       _____NOT APPROVED

## 2018-10-22 NOTE — PATIENT INSTRUCTIONS
Knee Arthritis: Care Instructions  Your Care Instructions    Knee arthritis is a breakdown of the cartilage that cushions your knee joint. When the cartilage wears down, your bones rub against each other. This causes pain and stiffness. Knee arthritis tends to get worse with time. Treatment for knee arthritis involves reducing pain, making the leg muscles stronger, and staying at a healthy body weight. The treatment usually does not improve the health of the cartilage, but it can reduce pain and improve how well your knee works. You can take simple measures to protect your knee joints, ease your pain, and help you stay active. Follow-up care is a key part of your treatment and safety. Be sure to make and go to all appointments, and call your doctor if you are having problems. It's also a good idea to know your test results and keep a list of the medicines you take. How can you care for yourself at home? · Know that knee arthritis will cause more pain on some days than on others. · Stay at a healthy weight. Lose weight if you are overweight. When you stand up, the pressure on your knees from every pound of body weight is multiplied four times. So if you lose 10 pounds, you will reduce the pressure on your knees by 40 pounds. · Talk to your doctor or physical therapist about exercises that will help ease joint pain. ? Stretch to help prevent stiffness and to prevent injury before you exercise. You may enjoy gentle forms of yoga to help keep your knee joints and muscles flexible. ? Walk instead of jog.  ? Ride a bike. This makes your thigh muscles stronger and takes pressure off your knee. ? Wear well-fitting and comfortable shoes. ? Exercise in chest-deep water. This can help you exercise longer with less pain. ? Avoid exercises that include squatting or kneeling. They can put a lot of strain on your knees.   ? Talk to your doctor to make sure that the exercise you do is not making the arthritis worse.  · Do not sit for long periods of time. Try to walk once in a while to keep your knee from getting stiff. · Ask your doctor or physical therapist whether shoe inserts may reduce your arthritis pain. · If you can afford it, get new athletic shoes at least every year. This can help reduce the strain on your knees. · Use a device to help you do everyday activities. ? A cane or walking stick can help you keep your balance when you walk. Hold the cane or walking stick in the hand opposite the painful knee. ? If you feel like you may fall when you walk, try using crutches or a front-wheeled walker. These can prevent falls that could cause more damage to your knee. ? A knee brace may help keep your knee stable and prevent pain. ? You also can use other things to make life easier, such as a higher toilet seat and handrails in the bathtub or shower. · Take pain medicines exactly as directed. ? Do not wait until you are in severe pain. You will get better results if you take it sooner. ? If you are not taking a prescription pain medicine, take an over-the-counter medicine such as acetaminophen (Tylenol), ibuprofen (Advil, Motrin), or naproxen (Aleve). Read and follow all instructions on the label. ? Do not take two or more pain medicines at the same time unless the doctor told you to. Many pain medicines have acetaminophen, which is Tylenol. Too much acetaminophen (Tylenol) can be harmful. ? Tell your doctor if you take a blood thinner, have diabetes, or have allergies to shellfish. · Ask your doctor if you might benefit from a shot of steroid medicine into your knee. This may provide pain relief for several months. · Many people take the supplements glucosamine and chondroitin for osteoarthritis. Some people feel they help, but the medical research does not show that they work. Talk to your doctor before you take these supplements. When should you call for help?   Call your doctor now or seek immediate medical care if:    · You have sudden swelling, warmth, or pain in your knee.     · You have knee pain and a fever or rash.     · You have such bad pain that you cannot use your knee.    Watch closely for changes in your health, and be sure to contact your doctor if you have any problems. Where can you learn more? Go to http://chucky-avel.info/. Enter L119 in the search box to learn more about \"Knee Arthritis: Care Instructions. \"  Current as of: June 11, 2018  Content Version: 11.8  © 4569-6513 [employer]. Care instructions adapted under license by [employer] (which disclaims liability or warranty for this information). If you have questions about a medical condition or this instruction, always ask your healthcare professional. [address] any warranty or liability for your use of this information. Knee Arthritis: Exercises  Your Care Instructions  Here are some examples of exercises for knee arthritis. Start each exercise slowly. Ease off the exercise if you start to have pain. Your doctor or physical therapist will tell you when you can start these exercises and which ones will work best for you. How to do the exercises  Knee flexion with heel slide    1. Lie on your back with your knees bent. 2. Slide your heel back by bending your affected knee as far as you can. Then hook your other foot around your ankle to help pull your heel even farther back. 3. Hold for about 6 seconds, then rest for up to 10 seconds. 4. Repeat 8 to 12 times. 5. Switch legs and repeat steps 1 through 4, even if only one knee is sore. Quad sets    1. Sit with your affected leg straight and supported on the floor or a firm bed. Place a small, rolled-up towel under your knee. Your other leg should be bent, with that foot flat on the floor. 2. Tighten the thigh muscles of your affected leg by pressing the back of your knee down into the towel.   3. Hold for about 6 seconds, then rest for up to 10 seconds. 4. Repeat 8 to 12 times. 5. Switch legs and repeat steps 1 through 4, even if only one knee is sore. Straight-leg raises to the front    1. Lie on your back with your good knee bent so that your foot rests flat on the floor. Your affected leg should be straight. Make sure that your low back has a normal curve. You should be able to slip your hand in between the floor and the small of your back, with your palm touching the floor and your back touching the back of your hand. 2. Tighten the thigh muscles in your affected leg by pressing the back of your knee flat down to the floor. Hold your knee straight. 3. Keeping the thigh muscles tight and your leg straight, lift your affected leg up so that your heel is about 12 inches off the floor. Hold for about 6 seconds, then lower slowly. 4. Relax for up to 10 seconds between repetitions. 5. Repeat 8 to 12 times. 6. Switch legs and repeat steps 1 through 5, even if only one knee is sore. Active knee flexion    1. Lie on your stomach with your knees straight. If your kneecap is uncomfortable, roll up a washcloth and put it under your leg just above your kneecap. 2. Lift the foot of your affected leg by bending the knee so that you bring the foot up toward your buttock. If this motion hurts, try it without bending your knee quite as far. This may help you avoid any painful motion. 3. Slowly move your leg up and down. 4. Repeat 8 to 12 times. 5. Switch legs and repeat steps 1 through 4, even if only one knee is sore. Quadriceps stretch (facedown)    1. Lie flat on your stomach, and rest your face on the floor. 2. Wrap a towel or belt strap around the lower part of your affected leg. Then use the towel or belt strap to slowly pull your heel toward your buttock until you feel a stretch. 3. Hold for about 15 to 30 seconds, then relax your leg against the towel or belt strap.   4. Repeat 2 to 4 times.  5. Switch legs and repeat steps 1 through 4, even if only one knee is sore. Stationary exercise bike    1. If you do not have a stationary exercise bike at home, you can find one to ride at your local health club or community center. 2. Adjust the height of the bike seat so that your knee is slightly bent when your leg is extended downward. If your knee hurts when the pedal reaches the top, you can raise the seat so that your knee does not bend as much. 3. Start slowly. At first, try to do 5 to 10 minutes of cycling with little to no resistance. Then increase your time and the resistance bit by bit until you can do 20 to 30 minutes without pain. 4. If you start to have pain, rest your knee until your pain gets back to the level that is normal for you. Or cycle for less time or with less effort. Follow-up care is a key part of your treatment and safety. Be sure to make and go to all appointments, and call your doctor if you are having problems. It's also a good idea to know your test results and keep a list of the medicines you take. Where can you learn more? Go to http://chucky-avel.info/. Enter C159 in the search box to learn more about \"Knee Arthritis: Exercises. \"  Current as of: November 29, 2017  Content Version: 11.8  © 8749-2700 [employer]. Care instructions adapted under license by [employer] (which disclaims liability or warranty for this information). If you have questions about a medical condition or this instruction, always ask your healthcare professional. [place] [address] any warranty or liability for your use of this information. Total Knee Replacement: Before Your Surgery  What is a total knee replacement? A total knee replacement replaces the worn ends of the bones where they meet at the knee. Those bones are the thighbone (femur) and the lower leg bone (tibia). Your doctor will remove the damaged bone. Then he or she will replace it with plastic and metal parts. These new parts may be attached to your bones with cement. Your doctor will make a cut down the center of your knee. This cut is called an incision. It will be about 8 to 10 inches long. Sometimes the surgery can be done with a smaller incision that is 4 to 6 inches. Both kinds of incisions leave scars that usually fade with time. Your doctor will let you know if you will stay in the hospital or if you can go home the day of surgery. If you have both knees done at the same time, you may need to be in the hospital for a few days. If there is no one to help you at home, you may go to a rehab center. Most people go back to normal activities or work in 4 to 16 weeks. This depends on your health. It also depends on how well your knee does in your rehab program. This may take longer if you have both knees done at the same time. Follow-up care is a key part of your treatment and safety. Be sure to make and go to all appointments, and call your doctor if you are having problems. It's also a good idea to know your test results and keep a list of the medicines you take. What happens before surgery?   Surgery can be stressful. This information will help you understand what you can expect. And it will help you safely prepare for surgery.   Preparing for surgery    · Understand exactly what surgery is planned, along with the risks, benefits, and other options. · Tell your doctors ALL the medicines, vitamins, supplements, and herbal remedies you take. Some of these can increase the risk of bleeding or interact with anesthesia.     · If you take blood thinners, such as warfarin (Coumadin), clopidogrel (Plavix), or aspirin, be sure to talk to your doctor. He or she will tell you if you should stop taking these medicines before your surgery.  Make sure that you understand exactly what your doctor wants you to do.     · Your doctor will tell you which medicines to take or stop before your surgery. You may need to stop taking certain medicines a week or more before surgery. So talk to your doctor as soon as you can.     · If you have an advance directive, let your doctor know. It may include a living will and a durable power of  for health care. Bring a copy to the hospital. If you don't have one, you may want to prepare one. It lets your doctor and loved ones know your health care wishes. Doctors advise that everyone prepare these papers before any type of surgery or procedure.     · You may need to shower or bathe with a special soap the night before and the morning of your surgery. The soap contains chlorhexidine. It reduces the amount of bacteria on your skin that could cause an infection after surgery. What happens on the day of surgery? · Follow the instructions exactly about when to stop eating and drinking. If you don't, your surgery may be canceled. If your doctor told you to take your medicines on the day of surgery, take them with only a sip of water.     · Take a bath or shower before you come in for your surgery. Do not apply lotions, perfumes, deodorants, or nail polish.     · Do not shave the surgical site yourself.     · Take off all jewelry and piercings. And take out contact lenses, if you wear them.    At the hospital or surgery center   · Bring a picture ID.     · The area for surgery is often marked to make sure there are no errors.     · You will be kept comfortable and safe by your anesthesia provider. The anesthesia may make you sleep. Or it may just numb the area being worked on.     · You also will get antibiotics through the IV tube before surgery. This lowers the risk of an infection of the incision.     · The surgery will take about 2 to 3 hours. Going home   · Be sure you have someone to drive you home.  Anesthesia and pain medicine make it unsafe for you to drive.     · You will be given more specific instructions about recovering from your surgery. They will cover things like diet, wound care, follow-up care, driving, and getting back to your normal routine. When should you call your doctor? · You have questions or concerns.     · You don't understand how to prepare for your surgery.     · You become ill before the surgery (such as fever, flu, or a cold).     · You need to reschedule or have changed your mind about having the surgery. Where can you learn more? Go to http://chucky-avel.info/. Enter J072 in the search box to learn more about \"Total Knee Replacement: Before Your Surgery. \"  Current as of: November 29, 2017  Content Version: 11.8  © 4123-6080 [employer]wise, [employer]. Care instructions adapted under license by [employer] (which disclaims liability or warranty for this information). If you have questions about a medical condition or this instruction, always ask your healthcare professional. [address] any warranty or liability for your use of this information.

## 2018-10-22 NOTE — LETTER
Raúl Nair M.D. PATIENT SURGERY INSTRUCTIONS Patient: Klever Ford    :  1940 Surgery Date: 2018  Time: 10:30 a.m. Report [facility]st [facility]  at: 8:30 a.m. PROCEDURE: Right Total Knee Arthroplasty INSTRUCTIONS:  \"Do not eat, chew gum or drink anything after midnight prior                         to the date of surgery. \" If you are taking blood thinners STOP 10 days before your surgery. If you are taking Plavix or Aggrenox, STOP 10 days before surgery and call to inform prescribing physician. Do not take Aspirin or other anti-inflammatory medications for 10 days prior to surgery. Take no Nicotine or Alcohol products 10 days before surgery. Date to Stop: 11/10/2018 (Everlean Button particularly off list) Date to Stop Aspirin per Dr. Reagan Square:  ________________ Report to [facility] Patient Registration Office  with your enclosed order Form on 
date and time scheduled by the Pre-Admitting Nurse. (No more than 14 days 
before surgery date) at 7:00 AM or no later than 5:00 p.m. ([phone]) (Fasting not 
required). Take insurance cards and medication list.   
 
**Please expect a call from a PAT Nurse to schedule a time for a face to face Health Assessment to be performed and possible sleep evaluation. If deemed 
 necessary for anesthesia purposes this may or may not be declined. Please 
 allow 1-2 hours for this appointment. Please expect a call from [employer] [email or web address] ([phone]) to order your Durable Medical Equipment (DME) need for after surgery. Please do not delay their shipment for delivery. Need to receive prior to surgery day. Your Primary Care Physician Clearance Appointment has been scheduled with: 
Helen (Dr. Arianne Dennis) Phone [phone] on 2018 at 8:30 a.m.  
 
Your Cardiology Clearance Appointment has been scheduled with: 
 Dr. Melody Romero Phone [phone] on November 1, 2018 at 9:40 a.m. Your Pre-Surgical Appointment (History & Physical) is scheduled with Slick Huff/Dr. Harmeet Moore  on November 13, 2018 @ 9:00 a.m. at the [facility]. **All of these Appointments must be kept--If any are missed your surgery will be cancelled and rescheduled. Also, lack of communication with surgical coordinator can result in cancellation. ** 
 
Preauthorization for your admission/procedure will be obtained if deemed necessary. Return for your Postoperative Appointment (After surgery) is scheduled with Slick Huff/Dr. Harmeet Moore on November 30, 2018 @ 9:45 a.m.  at the  [facility]. Yanet Neff, Surgery Scheduler  [phone].

## 2018-10-22 NOTE — PROGRESS NOTES
Patient: Adonay Vogel                MRN: [id number]       SSN: [id number]  YOB: 1940        AGE: 69 y.o. SEX: male     PCP: Alpesh Lopez MD  10/22/18    Chief Complaint   Patient presents with    Knee Pain     Right Knee Pain     HISTORY:  Adonay Vogel is a 69 y.o. male who is seen for increased bilateral knee R>>L pain and swelling. He cannot take long walks. He has to use an electric cart at shopping centers. He states he went to PT and the spine center which helped with his back and leg back pain. He was previously seen for seen for right knee pain. He notes his right knee began hurting during a previous course of PT. He went to [facility] where he received x-rays. He notes medial right knee pain. He notes benefit from previous injections. He states the Celebrex has not helped with the pain. He has right knee pain after walking or sitting for long periods of time. He responded to a Euflexxa series in February. 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 He reports increased knee pain after helping his son move recently. He is S/P [place] [facility] revision to left TKR 1/29/13--doing well . He is also seen for  left shoulder pain. He also has stiffness and pain in his left shoulder. He reports significant relief from previous left shoulder injections. Occupation, etc:  Mr. Rod Tsai is retired. He was previously the  of [employer] and an avid member of the [employer]. 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 He lost his mother, mother-in-law, sister-in-law, and brother all this year. He has no longer been able to exercise on the track. He is going back to [address] [place] [address] for three weeks on Monday. He notes his wife was recently been cleared of breast cancer. His wife has heart problems and is not very mobile. He is his wife's primary caregiver.  He travelled to [place] to visit his son recently. He notes that he used to live in [facility] when he was in [facility] previously. He grew up in [facility] [place] [facility]. He reports that he lost about 8 pounds recently. His current weight is 188lbs. There were no vitals filed for this visit. There is no height or weight on file to calculate BMI. Patient Active Problem List   Diagnosis Code    Hyperlipidemia E78.5    Coronary atherosclerosis of native coronary artery I25.10    AAA (abdominal aortic aneurysm) ([facility] I71.4    Right knee pain M25.561    Abnormal LFTs (liver function tests) R94.5    Vitamin D deficiency E55.9    CLARKE (dyspnea on exertion) R06.09    Essential hypertension I10    Hyperglycemia R73.9    Tinea versicolor B36.0    S/P total knee replacement Z96.659    Fatty liver K76.0    PVC's (premature ventricular contractions) I49.3    Carotid artery disease (HCC) I77.9    Right shoulder pain M25.511    Acute back pain M54.9    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.816    Lumbar neuritis M54.16    DDD (degenerative disc disease), lumbar M51.36    Radiculopathy, lumbar region M54.16    Degeneration of intervertebral disc of lumbar region M51.36    Mixed hyperlipidemia E78.2    ACP (advance care planning) Z71.89    Purpura ([facility] D69.2    Bruising T14. 8XXA     REVIEW OF SYSTEMS: All Below are Negative except: See HPI   Constitutional: negative for fever, chills, and weight loss. Cardiovascular: negative for chest pain, claudication, leg swelling, SOB, CLARKE   Gastrointestinal: Negative for pain, N/V/C/D, Blood in stool or urine, dysuria,  hematuria, incontinence, pelvic pain. Musculoskeletal: See HPI   Neurological: Negative for dizziness and weakness. Negative for headaches, Visual changes, confusion, seizures   Phychiatric/Behavioral: Negative for depression, memory loss, substance  abuse. Extremities: Negative for hair changes, rash, or skin lesion changes.    Hematologic: Negative for bleeding problems, bruising, pallor or swollen lymph  nodes   Peripheral Vascular: No calf pain, no circulation deficits. Social History     Socioeconomic History    Marital status:      Spouse name: Not on file    Number of children: Not on file    Years of education: Not on file    Highest education level: Not on file   Social Needs    Financial resource strain: Not on file    Food insecurity - worry: Not on file    Food insecurity - inability: Not on file    Transportation needs - medical: Not on file   [employer] needs - non-medical: Not on file   Occupational History    Not on file   Tobacco Use    Smoking status: Former Smoker     Packs/day: 0.50     Years: 20.00     Pack years: 10.00     Types: Cigarettes     Last attempt to quit: 12/10/1995     Years since quittin.8    Smokeless tobacco: Never Used   Substance and Sexual Activity    Alcohol use: Yes     Alcohol/week: 4.0 oz     Types: 7 Glasses of wine, 1 Shots of liquor per week     Comment: social    Drug use: No    Sexual activity: Not on file   Other Topics Concern    Not on file   Social History Narrative    Not on file     Allergies   Allergen Reactions    Pollen Extracts Sneezing     Itchy eyes, runny nose     Current Outpatient Medications   Medication Sig    FLUZONE HIGH-DOSE , PF, syrg injection ADM 0.5ML IM UTD    methylPREDNISolone (MEDROL DOSEPACK) 4 mg tablet Dose per instructions on the back  Indications: angioedema, Idiopathic Thrombocytopenic Purpura    ezetimibe (ZETIA) 10 mg tablet Take 1 Tab by mouth daily.  brinzolamide-brimonidine (SIMBRINZA) 1-0.2 % drps Apply  to eye.  niacin (NIASPAN) 1,000 mg Tb24 tab Take 1 Tab by mouth daily.  amLODIPine (NORVASC) 10 mg tablet TAKE 1 TABLET BY MOUTH DAILY    rosuvastatin (CRESTOR) 20 mg tablet Take 1 Tab by mouth nightly.  traMADol (ULTRAM) 50 mg tablet Take 1 Tab by mouth every twelve (12) hours as needed for Pain.  Max Daily Amount: 100 mg.  coenzyme q10 (CO Q-10) 10 mg cap Take  by mouth.  nitroglycerin (NITROSTAT) 0.4 mg SL tablet 1 Tab by SubLINGual route every five (5) minutes as needed.  gabapentin (NEURONTIN) 300 mg capsule 300 mg three (3) times daily.  cholecalciferol, vitamin D3, (VITAMIN D3) 2,000 unit tab Take  by mouth.  sildenafil citrate (VIAGRA) 100 mg tablet Take 1 Tab by mouth as needed.  omega 3-dha-epa-fish oil 100-160-1,000 mg cap Take 1,000 mg by mouth daily. (Patient taking differently: Take 2,000 mg by mouth daily.)    aspirin delayed-release 81 mg tablet Take  by mouth daily.  CALCIUM CARB/VIT D3/MINERALS (CALTRATE PLUS PO) Take 1 Tab by mouth daily. No current facility-administered medications for this visit.       ORTHO EXAMINATION:  Examination Left Hand Right Hand   Skin Intact Intact   Deformity - -   Swelling - -   Tenderness - -   Finger flexion Full Full   Finger extension Full Full   Sensation Normal Normal   Capillary refill Normal Normal   Heberden's nodes + +   Dupuytren's + +     Examination Left Wrist Right Wrist   Skin Intact Intact   Tenderness - -   Flexion 60 60   Extension 60 60   Deformity - -   Effusion - -   Tinel's sign - -   Phalen's test - -   Finklestein maneuver - -   Pain with thumb abduction - -     Examination Right shoulder Left shoulder   Skin Intact Intact   Effusion - -   Biceps deformity - -   Atrophy - -   AC joint tenderness - -   Acromial tenderness - +   Biceps tenderness - -   Forward flexion/Elevation  170   Active abduction  170   External rotation ROM 40 30   Internal rotation  100   Apprehension - -   Impingement - +   Drop Arm Test - -   Neurovascular Intact Intact   Difficulty removing left arm from shirt    Examination Right knee Left knee   Skin Intact Well healed TKR incision    Range of motion 120-0 110-0   Effusion +3 -   Medial joint line tenderness +++ -   Lateral joint line tenderness - -   Popliteal tenderness - -   Osteophytes palpable + -   Missaels - -   Patella crepitus + +   Anterior drawer - -   Lateral laxity - -   Medial laxity - -   Varus deformity - -   Valgus deformity - -   Pretibial edema - -   Calf tenderness - -   He is wearing a Alexandr Copper knee sleeve    XR KNEES LIDIA 10/19/17  IMPRESSION:  Moderately severe medial joint space narrowing right knee, well fixed L TKR components    MRI LEFT SHOULDER W CONT 04/27/2018  IMPRESSION:  1. Supraspinatus moderate tendinosis and small focal full-thickness tear  anterior insertion. No muscle atrophy or tendon retraction. 2. Moderate subscapularis tendinosis and partial-thickness tears. Intra-articular biceps tendinosis. 3. Superior labral tear extending posteriorly/SLAP tear type II. Posterior  inferior labral degeneration and tear. 4. Moderate acromioclavicular osteoarthrosis with inflammation. Small  subacromial spur. Subacromial subdeltoid bursitis. RADIOLOGY:  XR RIGHT KNEE PREOP 10/22/18 LIDIA  IMPRESSION:  Three views - No fractures, no effusion, severe/complete medial joint space narrowing, + osteophytes present. IKDC Grade D. Femoral valgus angle 6.1 degrees. Chondrocalcinosis of lateral mensicus. Well fixed left knee implants    XR LEFT SHOULDER 5/5/16  IMPRESSION:  No fractures, mild acromioclavicular narrowing, no glenohumeral narrowing, no calcific densities. IMPRESSION:      ICD-10-CM ICD-9-CM    1. Primary osteoarthritis of right knee M17.11 715.16    2. Chronic pain of right knee M25.561 719.46 AMB POC XRAY, KNEE; COMPLETE, 4+ VIEW    G89.29 338.29    3. Chondrocalcinosis M11.20 275.49      712.30      PLAN: He will be scheduled for a right TKR--supported by chondrocalcinosis on the lateral side. X rays reviewed in detail. Risks of surgery outlined and informed consent obtained. He will follow up for H&P.      Scribed by Therese Goldberg [address]) as dictated by Raysa Copeland MD

## 2018-10-23 ENCOUNTER — OFFICE VISIT (OUTPATIENT)
Dept: FAMILY MEDICINE CLINIC | Age: 78
End: 2018-10-23

## 2018-10-23 VITALS
HEIGHT: 72 IN | TEMPERATURE: 97.6 F | SYSTOLIC BLOOD PRESSURE: 156 MMHG | RESPIRATION RATE: 18 BRPM | DIASTOLIC BLOOD PRESSURE: 81 MMHG | WEIGHT: 188 LBS | HEART RATE: 92 BPM | BODY MASS INDEX: 25.47 KG/M2

## 2018-10-23 DIAGNOSIS — E78.5 HYPERLIPIDEMIA, UNSPECIFIED HYPERLIPIDEMIA TYPE: Chronic | ICD-10-CM

## 2018-10-23 DIAGNOSIS — G89.29 CHRONIC PAIN OF RIGHT KNEE: ICD-10-CM

## 2018-10-23 DIAGNOSIS — I10 ESSENTIAL HYPERTENSION: Primary | Chronic | ICD-10-CM

## 2018-10-23 DIAGNOSIS — Z01.812 BLOOD TESTS PRIOR TO TREATMENT OR PROCEDURE: ICD-10-CM

## 2018-10-23 DIAGNOSIS — M17.11 PRIMARY OSTEOARTHRITIS OF RIGHT KNEE: Primary | ICD-10-CM

## 2018-10-23 DIAGNOSIS — I25.119 ATHEROSCLEROSIS OF NATIVE CORONARY ARTERY WITH ANGINA PECTORIS, UNSPECIFIED WHETHER NATIVE OR TRANSPLANTED HEART (HCC): ICD-10-CM

## 2018-10-23 DIAGNOSIS — Z01.811 PRE-OPERATIVE RESPIRATORY EXAMINATION: ICD-10-CM

## 2018-10-23 DIAGNOSIS — M25.561 CHRONIC PAIN OF RIGHT KNEE: ICD-10-CM

## 2018-10-23 RX ORDER — NITROGLYCERIN 0.4 MG/1
0.4 TABLET SUBLINGUAL
Qty: 25 TAB | Refills: 3 | Status: SHIPPED | OUTPATIENT
Start: 2018-10-23 | End: 2019-06-12 | Stop reason: SDUPTHER

## 2018-10-23 NOTE — PATIENT INSTRUCTIONS
High Blood Pressure: Care Instructions  Your Care Instructions    If your blood pressure is usually above 130/80, you have high blood pressure, or hypertension. That means the top number is 130 or higher or the bottom number is 80 or higher, or both. Despite what a lot of people think, high blood pressure usually doesn't cause headaches or make you feel dizzy or lightheaded. It usually has no symptoms. But it does increase your risk for heart attack, stroke, and kidney or eye damage. The higher your blood pressure, the more your risk increases. Your doctor will give you a goal for your blood pressure. Your goal will be based on your health and your age. Lifestyle changes, such as eating healthy and being active, are always important to help lower blood pressure. You might also take medicine to reach your blood pressure goal.  Follow-up care is a key part of your treatment and safety. Be sure to make and go to all appointments, and call your doctor if you are having problems. It's also a good idea to know your test results and keep a list of the medicines you take. How can you care for yourself at home? Medical treatment  · If you stop taking your medicine, your blood pressure will go back up. You may take one or more types of medicine to lower your blood pressure. Be safe with medicines. Take your medicine exactly as prescribed. Call your doctor if you think you are having a problem with your medicine. · Talk to your doctor before you start taking aspirin every day. Aspirin can help certain people lower their risk of a heart attack or stroke. But taking aspirin isn't right for everyone, because it can cause serious bleeding. · See your doctor regularly. You may need to see the doctor more often at first or until your blood pressure comes down. · If you are taking blood pressure medicine, talk to your doctor before you take decongestants or anti-inflammatory medicine, such as ibuprofen.  Some of these medicines can raise blood pressure. · Learn how to check your blood pressure at home. Lifestyle changes  · Stay at a healthy weight. This is especially important if you put on weight around the waist. Losing even 10 pounds can help you lower your blood pressure. · If your doctor recommends it, get more exercise. Walking is a good choice. Bit by bit, increase the amount you walk every day. Try for at least 30 minutes on most days of the week. You also may want to swim, bike, or do other activities. · Avoid or limit alcohol. Talk to your doctor about whether you can drink any alcohol. · Try to limit how much sodium you eat to less than 2,300 milligrams (mg) a day. Your doctor may ask you to try to eat less than 1,500 mg a day. · Eat plenty of fruits (such as bananas and oranges), vegetables, legumes, whole grains, and low-fat dairy products. · Lower the amount of saturated fat in your diet. Saturated fat is found in animal products such as milk, cheese, and meat. Limiting these foods may help you lose weight and also lower your risk for heart disease. · Do not smoke. Smoking increases your risk for heart attack and stroke. If you need help quitting, talk to your doctor about stop-smoking programs and medicines. These can increase your chances of quitting for good. When should you call for help? Call 911 anytime you think you may need emergency care. This may mean having symptoms that suggest that your blood pressure is causing a serious heart or blood vessel problem. Your blood pressure may be over 180/120.   For example, call 911 if:    · You have symptoms of a heart attack. These may include:  ? Chest pain or pressure, or a strange feeling in the chest.  ? Sweating. ? Shortness of breath. ? Nausea or vomiting. ? Pain, pressure, or a strange feeling in the back, neck, jaw, or upper belly or in one or both shoulders or arms. ? Lightheadedness or sudden weakness.   ? A fast or irregular heartbeat.     · You have symptoms of a stroke. These may include:  ? Sudden numbness, tingling, weakness, or loss of movement in your face, arm, or leg, especially on only one side of your body. ? Sudden vision changes. ? Sudden trouble speaking. ? Sudden confusion or trouble understanding simple statements. ? Sudden problems with walking or balance. ? A sudden, severe headache that is different from past headaches.     · You have severe back or belly pain.    Do not wait until your blood pressure comes down on its own. Get help right away.   Call your doctor now or seek immediate care if:    · Your blood pressure is much higher than normal (such as 180/120 or higher), but you don't have symptoms.     · You think high blood pressure is causing symptoms, such as:  ? Severe headache.  ? Blurry vision.    Watch closely for changes in your health, and be sure to contact your doctor if:    · Your blood pressure measures higher than your doctor recommends at least 2 times. That means the top number is higher or the bottom number is higher, or both.     · You think you may be having side effects from your blood pressure medicine. Where can you learn more? Go to http://chucky-avel.info/. Enter M713 in the search box to learn more about \"High Blood Pressure: Care Instructions. \"  Current as of: December 6, 2017  Content Version: 11.8  © 7279-0224 [employer]. Care instructions adapted under license by [employer] (which disclaims liability or warranty for this information). If you have questions about a medical condition or this instruction, always ask your healthcare professional. [place] [address] any warranty or liability for your use of this information.

## 2018-10-23 NOTE — PROGRESS NOTES
Chief Complaint   Patient presents with    Hypertension     follow up    Cholesterol Problem    Labs     completed 8/27/18     HISTORY OF PRESENT ILLNESS  Rosebud Dancer is a 69 y.o. male. HPI  Patient is here for a 3 mo follow up of htn and lipids. Patient has been doing well overall    Patient had labs on 8/27/18. Labs reviewed in detail with patient     Patient does need medication refills today. New concerns today: None    ROS  Review of Systems   Constitutional: Negative. HENT: Negative. Respiratory: Negative. Cardiovascular: Negative. All other systems reviewed and are negative. Physical Exam  Nursing note and vitals reviewed. Constitutional: He is oriented to person, place, and time. He appears well-developed and well-nourished. HENT:   Head: Normocephalic and atraumatic. Right Ear: External ear normal.   Left Ear: External ear normal.   Nose: Nose normal.   Eyes: Conjunctivae and EOM are normal.   Neck: Normal range of motion. Neck supple. No JVD present. Carotid bruit is not present. No thyromegaly present. Cardiovascular: Normal rate, regular rhythm, normal heart sounds and intact distal pulses. Exam reveals no gallop and no friction rub. No murmur heard. Pulmonary/Chest: Effort normal and breath sounds normal. He has no wheezes. He has no rhonchi. He has no rales. Abdominal: Soft. Musculoskeletal: Normal range of motion. Neurological: He is alert and oriented to person, place, and time. Coordination normal.   Skin: Skin is warm and dry. Psychiatric: He has a normal mood and affect. His behavior is normal. Judgment and thought content normal.       ASSESSMENT and PLAN  Diagnoses and all orders for this visit:    1. Essential hypertension  Slightly elevated today. Continue to monitor closely. Labs at follow-up visit    2.  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, unspecified whether native or transplanted heart ([facility] [address]  - nitroglycerin (NITROSTAT) 0.4 mg SL tablet; 1 Tab by SubLINGual route every five (5) minutes as needed. 3. Hyperlipidemia, unspecified hyperlipidemia type  Check cholesterol at follow-up visit    Follow-up Disposition:  Return in about 3 months (around 1/23/2019) for high blood pressure, high cholesterol.

## 2018-10-23 NOTE — PROGRESS NOTES
Leon Mondragon presents today for   Chief Complaint   Patient presents with    Hypertension     follow up    Cholesterol Problem    Labs     completed 8/27/18       Is someone accompanying this pt? no    Is the patient using any DME equipment during OV? no    Depression Screening:  PHQ over the last two weeks 10/11/2018   PHQ Not Done -   Little interest or pleasure in doing things Not at all   Feeling down, depressed, irritable, or hopeless Not at all   Total Score PHQ 2 0   Trouble falling or staying asleep, or sleeping too much -   Feeling tired or having little energy -   Poor appetite, weight loss, or overeating -   Feeling bad about yourself - or that you are a failure or have let yourself or your family down -   Trouble concentrating on things such as school, work, reading, or watching TV -   Moving or speaking so slowly that other people could have noticed; or the opposite being so fidgety that others notice -   Thoughts of being better off dead, or hurting yourself in some way -   PHQ 9 Score -   How difficult have these problems made it for you to do your work, take care of your home and get along with others -       Learning Assessment:  Learning Assessment 8/21/2018   PRIMARY LEARNER Patient   HIGHEST LEVEL OF EDUCATION - PRIMARY LEARNER  -   BARRIERS PRIMARY LEARNER -   CO-LEARNER CAREGIVER -   PRIMARY LANGUAGE ENGLISH   LEARNER PREFERENCE PRIMARY LISTENING   ANSWERED BY patient   RELATIONSHIP SELF       Abuse Screening:  Abuse Screening Questionnaire 9/10/2018   Do you ever feel afraid of your partner? N   Are you in a relationship with someone who physically or mentally threatens you? N   Is it safe for you to go home? Y           Health Maintenance Due   Topic Date Due    Shingrix Vaccine Age 49> (1 of 2) 03/26/1991    GLAUCOMA SCREENING Q2Y  11/17/2016    Influenza Age 9 to Adult  08/01/2018   . Health Maintenance reviewed and discussed and ordered per Provider.     Leon Mondragon is updated on all       Coordination of Care:  1. Have you been to the ER, urgent care clinic since your last visit? Hospitalized since your last visit? no    2. Have you seen or consulted any other health care providers outside of the [address] [place] [address] since your last visit? Include any pap smears or colon screening. no          Advance Directive:  1. Do you have an advance directive in place? Patient Reply:no    2. If not, would you like material regarding how to put one in place?  Patient Reply: no

## 2018-11-01 ENCOUNTER — OFFICE VISIT (OUTPATIENT)
Dept: CARDIOLOGY CLINIC | Age: 78
End: 2018-11-01

## 2018-11-01 VITALS — HEART RATE: 67 BPM | OXYGEN SATURATION: 97 % | WEIGHT: 189 LBS | HEIGHT: 72 IN | BODY MASS INDEX: 25.6 KG/M2

## 2018-11-01 DIAGNOSIS — I49.3 PVC'S (PREMATURE VENTRICULAR CONTRACTIONS): ICD-10-CM

## 2018-11-01 DIAGNOSIS — I25.10 ATHEROSCLEROSIS OF NATIVE CORONARY ARTERY OF NATIVE HEART WITHOUT ANGINA PECTORIS: Primary | ICD-10-CM

## 2018-11-01 DIAGNOSIS — E78.2 MIXED HYPERLIPIDEMIA: ICD-10-CM

## 2018-11-01 DIAGNOSIS — I77.9 BILATERAL CAROTID ARTERY DISEASE, UNSPECIFIED TYPE (HCC): ICD-10-CM

## 2018-11-01 DIAGNOSIS — I10 ESSENTIAL HYPERTENSION: Chronic | ICD-10-CM

## 2018-11-01 NOTE — PROGRESS NOTES
HISTORY OF PRESENT ILLNESS  Ari Lundberg is a 69 y.o. male. Hypertension   Pertinent negatives include no chest pain, no abdominal pain, no headaches and no shortness of breath. Patient presents for a followup office visit. He has a known history of coronary artery disease, status post a non-ST elevation myocardial infarction in 2009. He underwent a cardiac catheterization at that time and was found to have moderate, but nonobstructive two-vessel coronary disease involving his left circumflex and a right-sided posterior descending artery, that were assessed by pressure wire. The patient has been maintained on medical therapy since that time. The patient last underwent a pharmacological nuclear stress test in December 2015, which was a normal study, showing no ischemia, normal left ventricle ejection fraction. EF 67%. He has a history of mild to moderate carotid artery disease,  last evaluated with a carotid duplex in August 2018 which demonstrated moderate carotid disease of the left ICA and mild plaquing of the right. He also underwent an abdominal ultrasound which showed a very small infrarenal AAA. Both of these findings are unchanged compared to studies from a year ago. The patient was last seen in the office 6 months ago, since last visit, he reports he has been feeling well. He returns today for preoperative cardiac evaluation prior to undergoing a right total knee replacement which is scheduled for later this month. He denies any major change in his activity level. If anything he feels he is doing more today than he was a year ago. He denies any exertional dyspnea or chest pain. No leg swelling or claudication. No orthopnea, PND, palpitations, dizziness, nor syncope.     Past Medical History:   Diagnosis Date    AAA (abdominal aortic aneurysm) ([facility] [address] 03/2010    noted on CT (abdomen)    Abnormal LFT's 12/95, 04/16/03    Acute meniscal tear, lateral 01/2007    s/p arthorscopic surg (Dr. Steve Mabry)    Atypical chest pain 02/04/09    CAD (coronary artery disease), native coronary artery     Cardiac cath 04/13/2009    dLM 30%. mLAD 30%. oD2 30%. pCX 55% (FFR 0.95). mRCA 40%. RPDA 65% (FFR 0.86). EF 65%.  Cardiac echocardiogram 04/03/2007    EF 60%. No RWMA. Gr 1 DDfx. LAE. No significant valvular heart disease.  Cardiac nuclear imaging test, low risk 12/04/2015    Low risk. No ischemia or prior infarction. No WMA. EF 67%. Neg EKG on pharm stress test.    Cardiovascular aorto-iliac duplex 06/29/2015    AAA measures 3.2 x 3.1 cm Trv. (Prev 3.2 x 3.4 cm on 6/19/14). Aneurysm is infrarenal & fusiform w/complex intraluminal thrombus within. Mod bilateral common iliac stenosis.  Carotid duplex 03/16/2016    Mild < 50% BETHEL stenosis. Mod 72-75% LICA stenosis. Similar to study of 6/29/15.  Cholecystitis chronic 03/2008    s/p sue    Diverticulosis     Diverticulosis 9-28-15    DR. 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  [facility] HIGH-DOSE 2018-, PF, syrg injection ADM 0.5ML IM UTD  0    ezetimibe (ZETIA) 10 mg tablet Take 1 Tab by mouth daily. 90 Tab 3    brinzolamide-brimonidine (SIMBRINZA) 1-0.2 % drps Apply  to eye.  niacin (NIASPAN) 1,000 mg Tb24 tab Take 1 Tab by mouth daily. 90 Tab 3    amLODIPine (NORVASC) 10 mg tablet TAKE 1 TABLET BY MOUTH DAILY 90 Tab 3    rosuvastatin (CRESTOR) 20 mg tablet Take 1 Tab by mouth nightly. 90 Tab 3    coenzyme q10 (CO Q-10) 10 mg cap Take  by mouth.  gabapentin (NEURONTIN) 300 mg capsule 300 mg three (3) times daily.  cholecalciferol, vitamin D3, (VITAMIN D3) 2,000 unit tab Take  by mouth.  sildenafil citrate (VIAGRA) 100 mg tablet Take 1 Tab by mouth as needed. 20 Tab 3    omega 3-dha-epa-fish oil 100-160-1,000 mg cap Take 1,000 mg by mouth daily. (Patient taking differently: Take 2,000 mg by mouth daily.) 90 Cap 3    aspirin delayed-release 81 mg tablet Take  by mouth daily.  CALCIUM CARB/VIT D3/MINERALS (CALTRATE PLUS PO) Take 1 Tab by mouth daily. Allergies   Allergen Reactions    Pollen Extracts Sneezing     Itchy eyes, runny nose        Social History     Tobacco Use    Smoking status: Former Smoker     Packs/day: 0.50     Years: 20.00     Pack years: 10.00     Types: Cigarettes     Last attempt to quit: 12/10/1995     Years since quittin.9    Smokeless tobacco: Never Used   Substance Use Topics    Alcohol use: Yes     Alcohol/week: 4.0 oz     Types: 7 Glasses of wine, 1 Shots of liquor per week     Comment: social    Drug use: No           Review of Systems   Constitutional: Negative for chills, fever and weight loss. HENT: Negative for nosebleeds. Eyes: Negative for blurred vision and double vision. Respiratory: Negative for cough, shortness of breath and wheezing. Cardiovascular: Negative for chest pain, palpitations, orthopnea, claudication, leg swelling and PND.    Gastrointestinal: Negative for abdominal pain, heartburn, nausea and vomiting. Genitourinary: Negative for dysuria and hematuria. Musculoskeletal: Negative for falls, joint pain and myalgias. Skin: Negative for rash. Neurological: Negative for dizziness, focal weakness and headaches. Endo/Heme/Allergies: Does not bruise/bleed easily. Psychiatric/Behavioral: Negative for substance abuse. Visit Vitals  Pulse 67   Ht 6' (1.829 m)   Wt 85.7 kg (189 lb)   SpO2 97%   BMI 25.63 kg/m²      Physical Exam   Constitutional: He is oriented to person, place, and time. He appears well-developed and well-nourished. HENT:   Head: Normocephalic and atraumatic. Eyes: Conjunctivae are normal.   Neck: Neck supple. No JVD present. Carotid bruit is not present. Cardiovascular: Normal rate, regular rhythm, S1 normal, S2 normal and normal pulses. Exam reveals no gallop and no S3. No murmur heard. Pulmonary/Chest: Breath sounds normal. He has no wheezes. He has no rales. Abdominal: Soft. Bowel sounds are normal. There is no tenderness. Musculoskeletal: He exhibits no edema. Neurological: He is alert and oriented to person, place, and time. Skin: Skin is warm and dry. EKG: Normal sinus rhythm, low voltage in the limb leads,  normal axis, normal QTc interval,  no ST or T wave abnormalities concerning for ischemia. Compared to previous EKG, no significant interval change. ASSESSMENT and PLAN    Low risk from a cardiac standpoint to proceed with his total knee replacement when scheduled later this month. He can stop his aspirin for 7 days prior to the procedure. I will continue his statin and blood pressure medications perioperatively. Asymptomatic coronary artery disease. Patient has moderate 2 vessel disease last assessed by cardiac catheterization in 2009, involving nonobstructive lesions in the proximal left circumflex in the right-sided PDA.   He does have a history of a non-ST elevation myocardial infarction back in 2009, for which he was briefly treated with Plavix. He is now maintained on aspirin and a statin, and he has been intolerant to beta blockers because of bradycardia. He last underwent a negative pharmacologic nuclear stress test in December 2015. Patient remains on a potent statin and a daily aspirin. He did not tolerate a beta-blocker due to bradycardia. He denies any symptoms concerning for angina. Essential hypertension. Patient blood pressure remains well controlled on his current regimen, all of which I would continue. Dyslipidemia: Patient is now on Crestor 20 mg daily, Niaspan 1000 mg daily, Fish oil, and Zetia. His goal LDL should be as close to 70 as possible and this is been closely followed by his PCP. Historically it has been well controlled on this regimen. Carotid artery disease. This is now being followed annually by vascular surgery. His last carotid duplex scan from August 2018 showed moderate disease of the left and mild disease on the right. Infrarenal AAA. This was last evaluated by an ultrasound in August 2018. This remains small in size and did not significantly grow compared to the year prior.     Followup in 6 months time, sooner if needed

## 2018-11-12 ENCOUNTER — HOSPITAL ENCOUNTER (OUTPATIENT)
Dept: GENERAL RADIOLOGY | Age: 78
Discharge: HOME | End: 2018-11-12
Payer: MEDICARE

## 2018-11-12 ENCOUNTER — HOSPITAL ENCOUNTER (OUTPATIENT)
Dept: PREADMISSION TESTING | Age: 78
Discharge: HOME | End: 2018-11-12
Payer: MEDICARE

## 2018-11-12 VITALS — BODY MASS INDEX: 25.6 KG/M2 | HEIGHT: 72 IN | WEIGHT: 189 LBS

## 2018-11-12 DIAGNOSIS — G89.29 CHRONIC PAIN OF RIGHT KNEE: ICD-10-CM

## 2018-11-12 DIAGNOSIS — M17.11 PRIMARY OSTEOARTHRITIS OF RIGHT KNEE: ICD-10-CM

## 2018-11-12 DIAGNOSIS — Z01.812 BLOOD TESTS PRIOR TO TREATMENT OR PROCEDURE: ICD-10-CM

## 2018-11-12 DIAGNOSIS — Z01.811 PRE-OPERATIVE RESPIRATORY EXAMINATION: ICD-10-CM

## 2018-11-12 DIAGNOSIS — M25.561 CHRONIC PAIN OF RIGHT KNEE: ICD-10-CM

## 2018-11-12 LAB
ABO + RH BLD: NORMAL
ANION GAP SERPL CALC-SCNC: 7 MMOL/L (ref 3–18)
APPEARANCE UR: CLEAR
APTT PPP: 34.3 SEC (ref 23–36.4)
BASOPHILS # BLD: 0.1 K/UL (ref 0–0.06)
BASOPHILS NFR BLD: 2 % (ref 0–3)
BILIRUB UR QL: NEGATIVE
BLOOD GROUP ANTIBODIES SERPL: NORMAL
BUN SERPL-MCNC: 11 MG/DL (ref 7–18)
BUN/CREAT SERPL: 12 (ref 12–20)
CALCIUM SERPL-MCNC: 9.3 MG/DL (ref 8.5–10.1)
CHLORIDE SERPL-SCNC: 111 MMOL/L (ref 100–108)
CO2 SERPL-SCNC: 28 MMOL/L (ref 21–32)
COLOR UR: YELLOW
CREAT SERPL-MCNC: 0.93 MG/DL (ref 0.6–1.3)
DIFFERENTIAL METHOD BLD: ABNORMAL
EOSINOPHIL # BLD: 0.3 K/UL (ref 0–0.4)
EOSINOPHIL NFR BLD: 4 % (ref 0–5)
ERYTHROCYTE [DISTWIDTH] IN BLOOD BY AUTOMATED COUNT: 13.3 % (ref 11.6–14.5)
GLUCOSE SERPL-MCNC: 113 MG/DL (ref 74–99)
GLUCOSE UR STRIP.AUTO-MCNC: NEGATIVE MG/DL
HBA1C MFR BLD: 5.2 % (ref 4.2–5.6)
HCT VFR BLD AUTO: 47.8 % (ref 36–48)
HGB BLD-MCNC: 16.7 G/DL (ref 13–16)
HGB UR QL STRIP: NEGATIVE
INR PPP: 1 (ref 0.8–1.2)
KETONES UR QL STRIP.AUTO: NEGATIVE MG/DL
LEUKOCYTE ESTERASE UR QL STRIP.AUTO: NEGATIVE
LYMPHOCYTES # BLD: 1.4 K/UL (ref 0.8–3.5)
LYMPHOCYTES NFR BLD: 21 % (ref 20–51)
MCH RBC QN AUTO: 33.7 PG (ref 24–34)
MCHC RBC AUTO-ENTMCNC: 34.9 G/DL (ref 31–37)
MCV RBC AUTO: 96.4 FL (ref 74–97)
MONOCYTES # BLD: 0.7 K/UL (ref 0–1)
MONOCYTES NFR BLD: 11 % (ref 2–9)
NEUTS BAND NFR BLD MANUAL: 1 % (ref 0–5)
NEUTS SEG # BLD: 4.1 K/UL (ref 1.8–8)
NEUTS SEG NFR BLD: 61 % (ref 42–75)
NITRITE UR QL STRIP.AUTO: NEGATIVE
PH UR STRIP: 6 [PH] (ref 5–8)
PLATELET # BLD AUTO: 247 K/UL (ref 135–420)
PLATELET COMMENTS,PCOM: ABNORMAL
PMV BLD AUTO: 9.6 FL (ref 9.2–11.8)
POTASSIUM SERPL-SCNC: 4 MMOL/L (ref 3.5–5.5)
PROT UR STRIP-MCNC: NEGATIVE MG/DL
PROTHROMBIN TIME: 13.3 SEC (ref 11.5–15.2)
RBC # BLD AUTO: 4.96 M/UL (ref 4.7–5.5)
RBC MORPH BLD: ABNORMAL
SODIUM SERPL-SCNC: 146 MMOL/L (ref 136–145)
SP GR UR REFRACTOMETRY: 1.02 (ref 1–1.03)
SPECIMEN EXP DATE BLD: NORMAL
UROBILINOGEN UR QL STRIP.AUTO: 2 EU/DL (ref 0.2–1)
WBC # BLD AUTO: 6.6 K/UL (ref 4.6–13.2)

## 2018-11-12 PROCEDURE — 86901 BLOOD TYPING SEROLOGIC RH(D): CPT

## 2018-11-12 PROCEDURE — 71046 X-RAY EXAM CHEST 2 VIEWS: CPT

## 2018-11-12 PROCEDURE — 83036 HEMOGLOBIN GLYCOSYLATED A1C: CPT

## 2018-11-12 PROCEDURE — 85025 COMPLETE CBC W/AUTO DIFF WBC: CPT

## 2018-11-12 PROCEDURE — 36415 COLL VENOUS BLD VENIPUNCTURE: CPT

## 2018-11-12 PROCEDURE — 85610 PROTHROMBIN TIME: CPT

## 2018-11-12 PROCEDURE — 87086 URINE CULTURE/COLONY COUNT: CPT

## 2018-11-12 PROCEDURE — 80048 BASIC METABOLIC PNL TOTAL CA: CPT

## 2018-11-12 PROCEDURE — 81003 URINALYSIS AUTO W/O SCOPE: CPT

## 2018-11-12 PROCEDURE — 85730 THROMBOPLASTIN TIME PARTIAL: CPT

## 2018-11-12 NOTE — PROGRESS NOTES
Mr. Claudean Risser and Friend attended the Joint Replacement Pre-Operative class on  11/12/2018. Topics discussed included surgery preparation, what to expect the day of surgery, medications, physical and occupational therapy, and discharge planning. It was discussed that this is considered an elective surgery and that prior to the surgery he needs to make decisions such as arranging for help at home once he is discharged. Patient educated to get their DME (front wheeled walker/bedside commode/shower chair) before surgery and to bring the walker to hospital day of surgery. Patient agreed to get home ready for surgery and to have a ride arranged to go home. Mr. Claudean Risser was given a total knee patient education notebook to take home. Opportunity was given to ask questions and phone number of the Orthopaedic   was given for any questions or concerns that may arise later. He identified Sherlyn Lugo, daughter and wife as  through surgical/recovery process.

## 2018-11-12 NOTE — PERIOP NOTES
Mr. Claudette Cooks was here today for his PAT appointment. Health assessment was completed and instructions given regarding NPO status, medications, Hibiclens washes, and removal of all jewelry and/or body piercing. Instructed patient not to remove the red Blood Bank armband that was placed on their arm when the Type and Screen was drawn. Instructed to bring walker to the hospital on the day of surgery so it can be properly adjusted by the physical therapist.  Will Alu was given to ask questions and all questions were answered. Understanding of instructions was verbalized.

## 2018-11-13 ENCOUNTER — OFFICE VISIT (OUTPATIENT)
Dept: ORTHOPEDIC SURGERY | Facility: CLINIC | Age: 78
End: 2018-11-13

## 2018-11-13 VITALS
OXYGEN SATURATION: 95 % | WEIGHT: 192 LBS | BODY MASS INDEX: 26.01 KG/M2 | HEIGHT: 72 IN | DIASTOLIC BLOOD PRESSURE: 68 MMHG | TEMPERATURE: 96.4 F | HEART RATE: 63 BPM | SYSTOLIC BLOOD PRESSURE: 113 MMHG

## 2018-11-13 DIAGNOSIS — Z01.818 PREOP GENERAL PHYSICAL EXAM: Primary | ICD-10-CM

## 2018-11-13 LAB
BACTERIA SPEC CULT: NORMAL
SERVICE CMNT-IMP: NORMAL

## 2018-11-13 RX ORDER — ACETAMINOPHEN 325 MG/1
1000 TABLET ORAL
Status: CANCELLED | OUTPATIENT
Start: 2018-11-20 | End: 2018-11-21

## 2018-11-13 NOTE — H&P (VIEW-ONLY)
History and Physical 
 
69 year CC male seen in preparation for right total knee arthroplasty Review of Systems Constitutional: Positive for activity change (decreased use right knee). Negative for chills and fatigue. HENT: Negative for ear pain. Eyes: Negative for pain. Respiratory: Negative for shortness of breath. Cardiovascular: Negative. Gastrointestinal: Negative for nausea and vomiting. Endocrine: Negative. Genitourinary: Negative for flank pain. Musculoskeletal: Positive for arthralgias, gait problem, joint swelling and myalgias. Right knee Skin: Negative. Allergic/Immunologic: Negative. Hematological: Negative. Psychiatric/Behavioral: Negative. Physical Exam  
Nursing note and vitals reviewed. Constitutional: He is oriented to person, place, and time. He appears well-developed and well-nourished. HENT:  
Head: Normocephalic and atraumatic. Eyes: Conjunctivae and EOM are normal. Pupils are equal, round, and reactive to light. Neck: Normal range of motion. Cardiovascular: Normal rate, regular rhythm, normal heart sounds and intact distal pulses. Pulmonary/Chest: Effort normal and breath sounds normal.  
Musculoskeletal:  
     Right knee: He exhibits decreased range of motion, swelling, effusion and bony tenderness. Tenderness found. Medial joint line and lateral joint line tenderness noted. Legs: 
Neurological: He is alert and oriented to person, place, and time. Skin: Skin is warm and dry. Psychiatric: He has a normal mood and affect. His behavior is normal. Judgment and thought content normal.  
 
Right knee end stage osteo arthritis Right total knee arthroplasty Risk / Benefit: Surgeon will discuss in \"face to face\" encounter on day of surgery. Family History and Surgical History reviewed, discussed in detail, and deemed Non-Contributory in preparation for this surgical encounter. Orthopaedically, this patient requires admission as predetermined by the attending physicians documented severity of the admitting diagnosis,  and expected need for post surgical and co morbity health management determines length of projected stay.

## 2018-11-13 NOTE — PROGRESS NOTES
Aubrey Dawkins returns for History and Physical in preparation of upcoming right total knee replacement. Please see the attached forms in Epic for History, Physical, and Review of systems.

## 2018-11-14 ENCOUNTER — OFFICE VISIT (OUTPATIENT)
Dept: FAMILY MEDICINE CLINIC | Age: 78
End: 2018-11-14

## 2018-11-14 VITALS
DIASTOLIC BLOOD PRESSURE: 70 MMHG | WEIGHT: 190.2 LBS | RESPIRATION RATE: 20 BRPM | BODY MASS INDEX: 25.76 KG/M2 | TEMPERATURE: 98 F | HEIGHT: 72 IN | SYSTOLIC BLOOD PRESSURE: 114 MMHG | HEART RATE: 75 BPM

## 2018-11-14 DIAGNOSIS — Z01.818 PRE-OP EXAM: Primary | ICD-10-CM

## 2018-11-14 DIAGNOSIS — M25.561 CHRONIC PAIN OF RIGHT KNEE: ICD-10-CM

## 2018-11-14 DIAGNOSIS — G89.29 CHRONIC PAIN OF RIGHT KNEE: ICD-10-CM

## 2018-11-14 DIAGNOSIS — M17.0 PRIMARY OSTEOARTHRITIS OF BOTH KNEES: ICD-10-CM

## 2018-11-14 NOTE — PROGRESS NOTES
Chief Complaint   Patient presents with    Pre-op Exam     right TKR, 11/20/18, DARINEL, Dr Ny Garcia Maintenance Due   Topic Date Due    Shingrix Vaccine Age 50> (1 of 2) 03/26/1991    GLAUCOMA SCREENING Q2Y  11/17/2016       Health Maintenance reviewed      1. Have you been to the ER, urgent care clinic since your last visit? Hospitalized since your last visit? No    2. Have you seen or consulted any other health care providers outside of the [address] [place] [address] since your last visit? Include any pap smears or colon screening.  No

## 2018-11-14 NOTE — PROGRESS NOTES
Preoperative Evaluation    Date of Exam: 2018    Facundo Perez is a 69 y.o. male (:1940) who presents for preoperative evaluation. Procedure/Surgery:Right total knee arthroplasty  Date of Procedure/Surgery: 18  Surgeon: Dr. Solitario Garcia  Primary Physician: Vivi Gonzalez MD  Latex Allergy: no  History of smoking with cessation for over 25 years. Denies substance use or abuse. Left knee pain when walking 6-7/10. Denies knee pain when sitting as standing aggravates the pain. Reports neuropathy in which he takes gabapentin for.      Problem List:     Patient Active Problem List    Diagnosis Date Noted    Solar purpura ([address]     Purpura ([address] 2018    Bruising 2018    ACP (advance care planning) 2018    Mixed hyperlipidemia 03/15/2016    Radiculopathy, lumbar region 2016    Degeneration of intervertebral disc of lumbar region 2016    Acute back pain 2015    Spondylosis of lumbar region without myelopathy or radiculopathy 2015    Lumbar neuritis 2015    DDD (degenerative disc disease), lumbar 2015    Right shoulder pain     Carotid artery disease ([address] 11/10/2014    PVC's (premature ventricular contractions) 2014    S/P total knee replacement 2013    Tinea versicolor 2012    Fatty liver 2012    Hyperglycemia 2011    CLARKE (dyspnea on exertion)     Essential hypertension     Vitamin D deficiency 2011    Abnormal LFTs (liver function tests) 12/10/2010    Right knee pain     Hyperlipidemia 2010    Coronary atherosclerosis of native coronary artery 2010    AAA (abdominal aortic aneurysm) ([address] 2010     Medical History:     Past Medical History:   Diagnosis Date    AAA (abdominal aortic aneurysm) ([address] 2010    noted on CT (abdomen)    Abnormal LFT's , 03    Acute meniscal tear, lateral 2007    s/p arthorscopic surg (Dr. Elis Oliveros)    Atypical chest pain 02/04/09    CAD (coronary artery disease), native coronary artery     Cardiac cath 04/13/2009    dLM 30%. mLAD 30%. oD2 30%. pCX 55% (FFR 0.95). mRCA 40%. RPDA 65% (FFR 0.86). EF 65%.  Cardiac echocardiogram 04/03/2007    EF 60%. No RWMA. Gr 1 DDfx. LAE. No significant valvular heart disease.  Cardiac nuclear imaging test, low risk 12/04/2015    Low risk. No ischemia or prior infarction. No WMA. EF 67%. Neg EKG on pharm stress test.    Cardiovascular aorto-iliac duplex 06/29/2015    AAA measures 3.2 x 3.1 cm Trv. (Prev 3.2 x 3.4 cm on 6/19/14). Aneurysm is infrarenal & fusiform w/complex intraluminal thrombus within. Mod bilateral common iliac stenosis.  Carotid duplex 03/16/2016    Mild < 50% BETHEL stenosis. Mod 48-71% LICA stenosis. Similar to study of 6/29/15.  Cholecystitis chronic 03/2008    s/p sue    Diverticulosis     Diverticulosis 9-28-15    DR. 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  ezetimibe (ZETIA) 10 mg tablet Take 1 Tab by mouth daily.  brinzolamide-brimonidine (SIMBRINZA) 1-0.2 % drps Apply 1 Drop to eye two (2) times a day.  niacin (NIASPAN) 1,000 mg Tb24 tab Take 1 Tab by mouth daily.  amLODIPine (NORVASC) 10 mg tablet TAKE 1 TABLET BY MOUTH DAILY    rosuvastatin (CRESTOR) 20 mg tablet Take 1 Tab by mouth nightly.  coenzyme q10 (CO Q-10) 10 mg cap Take  by mouth.  gabapentin (NEURONTIN) 300 mg capsule 300 mg three (3) times daily.  cholecalciferol, vitamin D3, (VITAMIN D3) 2,000 unit tab Take  by mouth.  sildenafil citrate (VIAGRA) 100 mg tablet Take 1 Tab by mouth as needed.  omega 3-dha-epa-fish oil 100-160-1,000 mg cap Take 1,000 mg by mouth daily. (Patient taking differently: Take 2,000 mg by mouth daily.)    aspirin delayed-release 81 mg tablet Take  by mouth daily.  CALCIUM CARB/VIT D3/MINERALS (CALTRATE PLUS PO) Take 1 Tab by mouth daily.  FLUZONE HIGH-DOSE 2018-19, PF, syrg injection ADM 0.5ML IM UTD     No current facility-administered medications for this visit.       Surgical History:     Past Surgical History:   Procedure Laterality Date    ENDOSCOPY, COLON, DIAGNOSTIC      normal per patient;     HX ARTHROPLASTY  04/05/10    [place]-left knee    HX CATARACT REMOVAL Bilateral     November 2014    HX CHOLECYSTECTOMY  03/2008    chronic cholecystitis    HX COLONOSCOPY  9-28-15    HX HERNIA REPAIR  1995    L inguinal    HX KNEE ARTHROSCOPY  01/24/07    left knee    HX KNEE ARTHROSCOPY Left 4/16/2014    peripatellar debridement    HX KNEE REPLACEMENT Left 01/29/2013    [facility]    HX MOHS PROCEDURES  11/2008    HX RHINOPLASTY  1983    HX TONSILLECTOMY      HX VASECTOMY  1977    HX WISDOM TEETH EXTRACTION      x4    [place] ANESTH,SURGERY OF SHOULDER       Social History:     Social History     Socioeconomic History    Marital status:      Spouse name: Not on file    Number of children: Not on file    Years of education: Not on file    Highest education level: Not on file   Social Needs    Financial resource strain: Not on file    Food insecurity - worry: Not on file    Food insecurity - inability: Not on file    Transportation needs - medical: Not on file   [employer] needs - non-medical: Not on file   Occupational History    Not on file   Tobacco Use    Smoking status: Former Smoker     Packs/day: 0.50     Years: 20.00     Pack years: 10.00     Types: Cigarettes     Last attempt to quit: 12/10/1995     Years since quittin.9    Smokeless tobacco: Never Used   Substance and Sexual Activity    Alcohol use: Yes     Alcohol/week: 4.0 oz     Types: 7 Glasses of wine, 1 Shots of liquor per week     Comment: social    Drug use: No    Sexual activity: Not on file   Other Topics Concern    Not on file   Social History Narrative    Not on file       Recent use of: ASA and fish oil but he states he has stopped both four days ago in anticipation for surgery in 10 days    Tetanus is not up to date with last documentation on file for 2006. Patient is aware that he is past due. History of Anesthesia Complications: None  History of abnormal bleeding : None  History of Blood Transfusions: no  Health Care Directive or Living Will: no    REVIEW OF SYSTEMS:  A comprehensive review of systems was negative except for that written in the HPI.     EXAM:   Visit Vitals  /70 (BP 1 Location: Right arm, BP Patient Position: Sitting)   Pulse 75   Temp 98 °F (36.7 °C) (Oral)   Resp 20   Ht 6' (1.829 m)   Wt 190 lb 3.2 oz (86.3 kg)   BMI 25.80 kg/m²     General appearance - alert, well appearing, and in no distress  Mental status - alert, oriented to person, place, and time  Eyes - pupils equal and reactive, extraocular eye movements intact  Ears - bilateral TM's and external ear canals normal  Nose - normal and patent, no erythema, discharge or polyps  Mouth - mucous membranes moist, pharynx normal without lesions  Neck - supple, no significant adenopathy  Lymphatics - no palpable lymphadenopathy, no hepatosplenomegaly  Chest - clear to auscultation, no wheezes, rales or rhonchi, symmetric air entry  Heart - normal rate, regular rhythm, normal S1, S2, no murmurs, rubs, clicks or gallops  Abdomen - soft, nontender, nondistended, no masses or organomegaly  Back exam - full range of motion, no tenderness, palpable spasm or pain on motion  Neurological - alert, oriented, normal speech, no focal findings or movement disorder noted  Musculoskeletal - no joint tenderness, deformity or swelling      DIAGNOSTICS:   1. EKG: EKG FINDINGS - normal EKG, normal sinus rhythm, unchanged from previous tracings - Cardiac clearance received by Melody Romero on 11/1/18 with indications for low risk. 2. CXR: was negative for infiltrate, effusion, pneumothorax, or wide mediastinum  3. Labs:   Lab Results   Component Value Date/Time    WBC 6.6 11/12/2018 07:44 AM    HGB 16.7 (H) 11/12/2018 07:44 AM    HCT 47.8 11/12/2018 07:44 AM    PLATELET 564 69/07/0783 07:44 AM    MCV 96.4 11/12/2018 07:44 AM     Lab Results   Component Value Date/Time    Hemoglobin A1c 5.2 11/12/2018 07:44 AM    Hemoglobin A1c 5.5 06/11/2015 10:10 AM    Hemoglobin A1c 5.4 01/29/2015 08:05 AM    Glucose 113 (H) 11/12/2018 07:44 AM    LDL, calculated 64.2 04/23/2018 08:09 AM    Creatinine, POC 0.9 04/08/2011 06:52 AM    Creatinine 0.93 11/12/2018 07:44 AM      Lab Results   Component Value Date/Time    Cholesterol, total 152 04/23/2018 08:09 AM    HDL Cholesterol 71 (H) 04/23/2018 08:09 AM    LDL, calculated 64.2 04/23/2018 08:09 AM    Triglyceride 84 04/23/2018 08:09 AM    CHOL/HDL Ratio 2.1 04/23/2018 08:09 AM     Lab Results   Component Value Date/Time    ALT (SGPT) 43 08/27/2018 03:44 PM    AST (SGOT) 42 (H) 08/27/2018 03:44 PM    Alk.  phosphatase 102 08/27/2018 03:44 PM Bilirubin, total 2.9 (H) 08/27/2018 03:44 PM    Albumin 4.4 08/27/2018 03:44 PM    Protein, total 6.7 08/27/2018 03:44 PM    INR 1.0 11/12/2018 07:44 AM    Prothrombin time 13.3 11/12/2018 07:44 AM    PLATELET 067 41/32/3732 07:44 AM     Lab Results   Component Value Date/Time    INR 1.0 11/12/2018 07:44 AM    INR 1.1 09/06/2018 01:42 PM    INR 1.1 01/15/2013 06:57 AM    Prothrombin time 13.3 11/12/2018 07:44 AM    Prothrombin time 14.3 09/06/2018 01:42 PM    Prothrombin time 13.8 01/15/2013 06:57 AM      Lab Results   Component Value Date/Time    GFR est non-AA >60 11/12/2018 07:44 AM    GFRNA, POC >60 04/08/2011 06:52 AM    GFR est AA >60 11/12/2018 07:44 AM    GFRAA, POC >60 04/08/2011 06:52 AM    Creatinine 0.93 11/12/2018 07:44 AM    Creatinine, POC 0.9 04/08/2011 06:52 AM    BUN 11 11/12/2018 07:44 AM    Sodium 146 (H) 11/12/2018 07:44 AM    Potassium 4.0 11/12/2018 07:44 AM    Chloride 111 (H) 11/12/2018 07:44 AM    CO2 28 11/12/2018 07:44 AM    Magnesium 1.9 01/29/2015 08:05 AM     Lab Results   Component Value Date/Time    TSH 1.780 04/02/2014 08:15 AM    T4, Free 1.06 04/02/2014 08:15 AM      Lab Results   Component Value Date/Time    Glucose 113 (H) 11/12/2018 07:44 AM        IMPRESSION:   Coronary disease, Hypertension, AAA, hyperlipidemia,   Patient reports the surgery and the risk of surgery have been explained to him. He denies questions and concerns and admits to seeing a video and attending a class regarding his procedure. Patient advised in advanced care planning including a living will. No contraindications to planned surgery at this time. Patient to notify surgeon of any changes in his condition and this office prior to surgery if they occur.      Jackson Rose NP   11/14/2018

## 2018-11-19 ENCOUNTER — ANESTHESIA EVENT (OUTPATIENT)
Dept: SURGERY | Age: 78
DRG: 470 | End: 2018-11-19
Payer: MEDICARE

## 2018-11-20 ENCOUNTER — HOME HEALTH ADMISSION (OUTPATIENT)
Dept: HOME HEALTH SERVICES | Facility: HOME HEALTH | Age: 78
End: 2018-11-20
Payer: MEDICARE

## 2018-11-20 ENCOUNTER — ANESTHESIA (OUTPATIENT)
Dept: SURGERY | Age: 78
DRG: 470 | End: 2018-11-20
Payer: MEDICARE

## 2018-11-20 ENCOUNTER — HOSPITAL ENCOUNTER (INPATIENT)
Age: 78
LOS: 1 days | Discharge: HOME HEALTH | DRG: 470 | End: 2018-11-21
Attending: SPECIALIST | Admitting: SPECIALIST
Payer: MEDICARE

## 2018-11-20 DIAGNOSIS — G89.18 ACUTE POST-OPERATIVE PAIN: Primary | ICD-10-CM

## 2018-11-20 PROBLEM — M17.11 PRIMARY OSTEOARTHRITIS OF RIGHT KNEE: Status: ACTIVE | Noted: 2018-11-20

## 2018-11-20 LAB
HCT VFR BLD AUTO: 45.1 % (ref 36–48)
HGB BLD-MCNC: 15.5 G/DL (ref 13–16)

## 2018-11-20 PROCEDURE — 74011250636 HC RX REV CODE- 250/636: Performed by: PHYSICIAN ASSISTANT

## 2018-11-20 PROCEDURE — 85018 HEMOGLOBIN: CPT

## 2018-11-20 PROCEDURE — 76060000036 HC ANESTHESIA 2.5 TO 3 HR: Performed by: SPECIALIST

## 2018-11-20 PROCEDURE — 77030013708 HC HNDPC SUC IRR PULS STRY –B: Performed by: SPECIALIST

## 2018-11-20 PROCEDURE — 77030008683 HC TU ET CUF COVD -A: Performed by: NURSE ANESTHETIST, CERTIFIED REGISTERED

## 2018-11-20 PROCEDURE — 64450 NJX AA&/STRD OTHER PN/BRANCH: CPT | Performed by: ANESTHESIOLOGY

## 2018-11-20 PROCEDURE — 77030031139 HC SUT VCRL2 J&J -A: Performed by: SPECIALIST

## 2018-11-20 PROCEDURE — 74011250637 HC RX REV CODE- 250/637: Performed by: EMERGENCY MEDICINE

## 2018-11-20 PROCEDURE — 77030019605: Performed by: SPECIALIST

## 2018-11-20 PROCEDURE — 76210000016 HC OR PH I REC 1 TO 1.5 HR: Performed by: SPECIALIST

## 2018-11-20 PROCEDURE — 97161 PT EVAL LOW COMPLEX 20 MIN: CPT

## 2018-11-20 PROCEDURE — 77030006822 HC BLD SAW SAG BRSM -B: Performed by: SPECIALIST

## 2018-11-20 PROCEDURE — 74011000258 HC RX REV CODE- 258

## 2018-11-20 PROCEDURE — 74011000250 HC RX REV CODE- 250: Performed by: SPECIALIST

## 2018-11-20 PROCEDURE — 77030018836 HC SOL IRR NACL ICUM -A: Performed by: SPECIALIST

## 2018-11-20 PROCEDURE — 51798 US URINE CAPACITY MEASURE: CPT

## 2018-11-20 PROCEDURE — 65270000029 HC RM PRIVATE

## 2018-11-20 PROCEDURE — 74011250637 HC RX REV CODE- 250/637: Performed by: PHYSICIAN ASSISTANT

## 2018-11-20 PROCEDURE — 77030008467 HC STPLR SKN COVD -B: Performed by: SPECIALIST

## 2018-11-20 PROCEDURE — 74011000250 HC RX REV CODE- 250

## 2018-11-20 PROCEDURE — 74011250636 HC RX REV CODE- 250/636

## 2018-11-20 PROCEDURE — 77030011943

## 2018-11-20 PROCEDURE — 77030000032 HC CUF TRNQT ZIMM -B: Performed by: SPECIALIST

## 2018-11-20 PROCEDURE — 74011000258 HC RX REV CODE- 258: Performed by: PHYSICIAN ASSISTANT

## 2018-11-20 PROCEDURE — 74011250637 HC RX REV CODE- 250/637: Performed by: NURSE ANESTHETIST, CERTIFIED REGISTERED

## 2018-11-20 PROCEDURE — 77030020782 HC GWN BAIR PAWS FLX 3M -B: Performed by: SPECIALIST

## 2018-11-20 PROCEDURE — 97116 GAIT TRAINING THERAPY: CPT

## 2018-11-20 PROCEDURE — 74011250636 HC RX REV CODE- 250/636: Performed by: SPECIALIST

## 2018-11-20 PROCEDURE — 76010000172 HC OR TIME 2.5 TO 3 HR INTENSV-TIER 1: Performed by: SPECIALIST

## 2018-11-20 PROCEDURE — 36415 COLL VENOUS BLD VENIPUNCTURE: CPT

## 2018-11-20 PROCEDURE — 77030014368: Performed by: SPECIALIST

## 2018-11-20 PROCEDURE — 77030018719 HC DRSG PTCH ANTIMIC J&J -A: Performed by: SPECIALIST

## 2018-11-20 PROCEDURE — C1776 JOINT DEVICE (IMPLANTABLE): HCPCS | Performed by: SPECIALIST

## 2018-11-20 PROCEDURE — C1713 ANCHOR/SCREW BN/BN,TIS/BN: HCPCS | Performed by: SPECIALIST

## 2018-11-20 PROCEDURE — 74011250637 HC RX REV CODE- 250/637: Performed by: SPECIALIST

## 2018-11-20 PROCEDURE — 74011000272 HC RX REV CODE- 272: Performed by: SPECIALIST

## 2018-11-20 PROCEDURE — 74011250636 HC RX REV CODE- 250/636: Performed by: NURSE ANESTHETIST, CERTIFIED REGISTERED

## 2018-11-20 PROCEDURE — 74011000250 HC RX REV CODE- 250: Performed by: PHYSICIAN ASSISTANT

## 2018-11-20 PROCEDURE — 77030016507 HC BIT DRL3 ZIMM -B: Performed by: SPECIALIST

## 2018-11-20 PROCEDURE — 77030032490 HC SLV COMPR SCD KNE COVD -B: Performed by: SPECIALIST

## 2018-11-20 PROCEDURE — 0SRC0J9 REPLACEMENT OF RIGHT KNEE JOINT WITH SYNTHETIC SUBSTITUTE, CEMENTED, OPEN APPROACH: ICD-10-PCS | Performed by: SPECIALIST

## 2018-11-20 PROCEDURE — C9290 INJ, BUPIVACAINE LIPOSOME: HCPCS | Performed by: PHYSICIAN ASSISTANT

## 2018-11-20 PROCEDURE — 77030013079 HC BLNKT BAIR HGGR 3M -A: Performed by: NURSE ANESTHETIST, CERTIFIED REGISTERED

## 2018-11-20 PROCEDURE — 76942 ECHO GUIDE FOR BIOPSY: CPT | Performed by: ANESTHESIOLOGY

## 2018-11-20 PROCEDURE — 77030003666 HC NDL SPINAL BD -A: Performed by: SPECIALIST

## 2018-11-20 PROCEDURE — 77030003029 HC SUT VCRL J&J -B: Performed by: SPECIALIST

## 2018-11-20 DEVICE — CEMENT BNE GENTAMC HV R+G 40GM -- PALACOS R+G 5036964: Type: IMPLANTABLE DEVICE | Site: KNEE | Status: FUNCTIONAL

## 2018-11-20 DEVICE — IMPLANTABLE DEVICE: Type: IMPLANTABLE DEVICE | Site: KNEE | Status: FUNCTIONAL

## 2018-11-20 DEVICE — STEM TIB L80MM DIA12.5MM KNEE PRI FIN VANGUARD COMPLT SYS: Type: IMPLANTABLE DEVICE | Site: KNEE | Status: FUNCTIONAL

## 2018-11-20 DEVICE — TRAY TIB L79MM KNEE COPOLYESTER SIL INTLOK PRI CEM VANGUARD: Type: IMPLANTABLE DEVICE | Site: KNEE | Status: FUNCTIONAL

## 2018-11-20 DEVICE — COMPONENT PAT DIA40MM THK10MM STD KNEE TI ALLY S STL UHMWPE: Type: IMPLANTABLE DEVICE | Site: KNEE | Status: FUNCTIONAL

## 2018-11-20 DEVICE — KNEE CEM FEM/TIB E1 PAT -- VANGUARD K6: Type: IMPLANTABLE DEVICE | Site: KNEE | Status: FUNCTIONAL

## 2018-11-20 RX ORDER — FENTANYL CITRATE 50 UG/ML
INJECTION, SOLUTION INTRAMUSCULAR; INTRAVENOUS AS NEEDED
Status: DISCONTINUED | OUTPATIENT
Start: 2018-11-20 | End: 2018-11-20 | Stop reason: HOSPADM

## 2018-11-20 RX ORDER — ACETAMINOPHEN 500 MG
1000 TABLET ORAL
Status: COMPLETED | OUTPATIENT
Start: 2018-11-20 | End: 2018-11-20

## 2018-11-20 RX ORDER — VANCOMYCIN/0.9 % SOD CHLORIDE 1 G/100 ML
1000 PLASTIC BAG, INJECTION (ML) INTRAVENOUS ONCE
Status: COMPLETED | OUTPATIENT
Start: 2018-11-20 | End: 2018-11-20

## 2018-11-20 RX ORDER — FENTANYL CITRATE 50 UG/ML
25 INJECTION, SOLUTION INTRAMUSCULAR; INTRAVENOUS AS NEEDED
Status: DISCONTINUED | OUTPATIENT
Start: 2018-11-20 | End: 2018-11-20 | Stop reason: HOSPADM

## 2018-11-20 RX ORDER — FENTANYL CITRATE 50 UG/ML
INJECTION, SOLUTION INTRAMUSCULAR; INTRAVENOUS
Status: DISPENSED
Start: 2018-11-20 | End: 2018-11-20

## 2018-11-20 RX ORDER — HYDROMORPHONE HYDROCHLORIDE 2 MG/ML
0.5 INJECTION, SOLUTION INTRAMUSCULAR; INTRAVENOUS; SUBCUTANEOUS
Status: DISCONTINUED | OUTPATIENT
Start: 2018-11-20 | End: 2018-11-20 | Stop reason: HOSPADM

## 2018-11-20 RX ORDER — PROPOFOL 10 MG/ML
INJECTION, EMULSION INTRAVENOUS AS NEEDED
Status: DISCONTINUED | OUTPATIENT
Start: 2018-11-20 | End: 2018-11-20 | Stop reason: HOSPADM

## 2018-11-20 RX ORDER — HYDROMORPHONE HYDROCHLORIDE 2 MG/ML
INJECTION, SOLUTION INTRAMUSCULAR; INTRAVENOUS; SUBCUTANEOUS
Status: DISPENSED
Start: 2018-11-20 | End: 2018-11-20

## 2018-11-20 RX ORDER — SODIUM CHLORIDE 0.9 % (FLUSH) 0.9 %
5-10 SYRINGE (ML) INJECTION EVERY 8 HOURS
Status: DISCONTINUED | OUTPATIENT
Start: 2018-11-20 | End: 2018-11-21 | Stop reason: HOSPADM

## 2018-11-20 RX ORDER — CEFAZOLIN SODIUM 2 G/50ML
2 SOLUTION INTRAVENOUS
Status: COMPLETED | OUTPATIENT
Start: 2018-11-20 | End: 2018-11-20

## 2018-11-20 RX ORDER — DORZOLAMIDE HCL 20 MG/ML
1 SOLUTION/ DROPS OPHTHALMIC 2 TIMES DAILY
Status: DISCONTINUED | OUTPATIENT
Start: 2018-11-20 | End: 2018-11-21 | Stop reason: HOSPADM

## 2018-11-20 RX ORDER — ONDANSETRON 2 MG/ML
INJECTION INTRAMUSCULAR; INTRAVENOUS AS NEEDED
Status: DISCONTINUED | OUTPATIENT
Start: 2018-11-20 | End: 2018-11-20 | Stop reason: HOSPADM

## 2018-11-20 RX ORDER — NEOSTIGMINE METHYLSULFATE 5 MG/5 ML
SYRINGE (ML) INTRAVENOUS AS NEEDED
Status: DISCONTINUED | OUTPATIENT
Start: 2018-11-20 | End: 2018-11-20 | Stop reason: HOSPADM

## 2018-11-20 RX ORDER — DEXTROSE, SODIUM CHLORIDE, SODIUM LACTATE, POTASSIUM CHLORIDE, AND CALCIUM CHLORIDE 5; .6; .31; .03; .02 G/100ML; G/100ML; G/100ML; G/100ML; G/100ML
75 INJECTION, SOLUTION INTRAVENOUS CONTINUOUS
Status: DISPENSED | OUTPATIENT
Start: 2018-11-20 | End: 2018-11-21

## 2018-11-20 RX ORDER — SODIUM CHLORIDE 0.9 % (FLUSH) 0.9 %
5-10 SYRINGE (ML) INJECTION AS NEEDED
Status: DISCONTINUED | OUTPATIENT
Start: 2018-11-20 | End: 2018-11-20 | Stop reason: HOSPADM

## 2018-11-20 RX ORDER — ENOXAPARIN SODIUM 100 MG/ML
30 INJECTION SUBCUTANEOUS EVERY 24 HOURS
Status: DISCONTINUED | OUTPATIENT
Start: 2018-11-21 | End: 2018-11-21 | Stop reason: HOSPADM

## 2018-11-20 RX ORDER — ROPIVACAINE HYDROCHLORIDE 2 MG/ML
INJECTION, SOLUTION EPIDURAL; INFILTRATION; PERINEURAL
Status: COMPLETED | OUTPATIENT
Start: 2018-11-20 | End: 2018-11-20

## 2018-11-20 RX ORDER — OXYCODONE HYDROCHLORIDE 5 MG/1
5-15 TABLET ORAL
Status: DISCONTINUED | OUTPATIENT
Start: 2018-11-20 | End: 2018-11-21 | Stop reason: HOSPADM

## 2018-11-20 RX ORDER — MAGNESIUM SULFATE 100 %
4 CRYSTALS MISCELLANEOUS AS NEEDED
Status: DISCONTINUED | OUTPATIENT
Start: 2018-11-20 | End: 2018-11-20 | Stop reason: HOSPADM

## 2018-11-20 RX ORDER — LIDOCAINE HYDROCHLORIDE 20 MG/ML
INJECTION, SOLUTION EPIDURAL; INFILTRATION; INTRACAUDAL; PERINEURAL AS NEEDED
Status: DISCONTINUED | OUTPATIENT
Start: 2018-11-20 | End: 2018-11-20 | Stop reason: HOSPADM

## 2018-11-20 RX ORDER — GLYCOPYRROLATE 0.2 MG/ML
INJECTION INTRAMUSCULAR; INTRAVENOUS AS NEEDED
Status: DISCONTINUED | OUTPATIENT
Start: 2018-11-20 | End: 2018-11-20 | Stop reason: HOSPADM

## 2018-11-20 RX ORDER — SODIUM CHLORIDE, SODIUM LACTATE, POTASSIUM CHLORIDE, CALCIUM CHLORIDE 600; 310; 30; 20 MG/100ML; MG/100ML; MG/100ML; MG/100ML
75 INJECTION, SOLUTION INTRAVENOUS CONTINUOUS
Status: DISCONTINUED | OUTPATIENT
Start: 2018-11-20 | End: 2018-11-20 | Stop reason: HOSPADM

## 2018-11-20 RX ORDER — CALCIUM CARBONATE/VITAMIN D3 250-3.125
1 TABLET ORAL DAILY
Status: DISCONTINUED | OUTPATIENT
Start: 2018-11-21 | End: 2018-11-21 | Stop reason: HOSPADM

## 2018-11-20 RX ORDER — ACETAMINOPHEN 325 MG/1
650 TABLET ORAL EVERY 6 HOURS
Status: DISCONTINUED | OUTPATIENT
Start: 2018-11-20 | End: 2018-11-21 | Stop reason: HOSPADM

## 2018-11-20 RX ORDER — ROSUVASTATIN CALCIUM 20 MG/1
20 TABLET, COATED ORAL
Status: DISCONTINUED | OUTPATIENT
Start: 2018-11-20 | End: 2018-11-21 | Stop reason: HOSPADM

## 2018-11-20 RX ORDER — DOCUSATE SODIUM 100 MG/1
100 CAPSULE, LIQUID FILLED ORAL 2 TIMES DAILY
Status: DISCONTINUED | OUTPATIENT
Start: 2018-11-20 | End: 2018-11-21 | Stop reason: HOSPADM

## 2018-11-20 RX ORDER — FAMOTIDINE 20 MG/1
20 TABLET, FILM COATED ORAL ONCE
Status: COMPLETED | OUTPATIENT
Start: 2018-11-20 | End: 2018-11-20

## 2018-11-20 RX ORDER — SUCCINYLCHOLINE CHLORIDE 20 MG/ML
INJECTION INTRAMUSCULAR; INTRAVENOUS AS NEEDED
Status: DISCONTINUED | OUTPATIENT
Start: 2018-11-20 | End: 2018-11-20 | Stop reason: HOSPADM

## 2018-11-20 RX ORDER — ROPIVACAINE HYDROCHLORIDE 2 MG/ML
30 INJECTION, SOLUTION EPIDURAL; INFILTRATION; PERINEURAL
Status: COMPLETED | OUTPATIENT
Start: 2018-11-20 | End: 2018-11-20

## 2018-11-20 RX ORDER — TAMSULOSIN HYDROCHLORIDE 0.4 MG/1
0.4 CAPSULE ORAL DAILY
Status: DISCONTINUED | OUTPATIENT
Start: 2018-11-20 | End: 2018-11-21 | Stop reason: HOSPADM

## 2018-11-20 RX ORDER — BRIMONIDINE TARTRATE 2 MG/ML
1 SOLUTION/ DROPS OPHTHALMIC 2 TIMES DAILY
Status: DISCONTINUED | OUTPATIENT
Start: 2018-11-20 | End: 2018-11-21 | Stop reason: HOSPADM

## 2018-11-20 RX ORDER — EZETIMIBE 10 MG/1
10 TABLET ORAL DAILY
Status: DISCONTINUED | OUTPATIENT
Start: 2018-11-21 | End: 2018-11-21 | Stop reason: HOSPADM

## 2018-11-20 RX ORDER — NALOXONE HYDROCHLORIDE 0.4 MG/ML
0.04 INJECTION, SOLUTION INTRAMUSCULAR; INTRAVENOUS; SUBCUTANEOUS
Status: DISCONTINUED | OUTPATIENT
Start: 2018-11-20 | End: 2018-11-20 | Stop reason: HOSPADM

## 2018-11-20 RX ORDER — ROCURONIUM BROMIDE 10 MG/ML
INJECTION, SOLUTION INTRAVENOUS AS NEEDED
Status: DISCONTINUED | OUTPATIENT
Start: 2018-11-20 | End: 2018-11-20 | Stop reason: HOSPADM

## 2018-11-20 RX ORDER — GABAPENTIN 300 MG/1
300 CAPSULE ORAL 3 TIMES DAILY
Status: DISCONTINUED | OUTPATIENT
Start: 2018-11-20 | End: 2018-11-21 | Stop reason: HOSPADM

## 2018-11-20 RX ORDER — CEFAZOLIN SODIUM 2 G/50ML
2 SOLUTION INTRAVENOUS EVERY 8 HOURS
Status: COMPLETED | OUTPATIENT
Start: 2018-11-20 | End: 2018-11-20

## 2018-11-20 RX ORDER — SODIUM CHLORIDE 0.9 % (FLUSH) 0.9 %
5-10 SYRINGE (ML) INJECTION AS NEEDED
Status: DISCONTINUED | OUTPATIENT
Start: 2018-11-20 | End: 2018-11-21 | Stop reason: HOSPADM

## 2018-11-20 RX ORDER — SODIUM CHLORIDE 0.9 % (FLUSH) 0.9 %
5-10 SYRINGE (ML) INJECTION EVERY 8 HOURS
Status: DISCONTINUED | OUTPATIENT
Start: 2018-11-20 | End: 2018-11-20 | Stop reason: HOSPADM

## 2018-11-20 RX ORDER — FENTANYL CITRATE 50 UG/ML
100 INJECTION, SOLUTION INTRAMUSCULAR; INTRAVENOUS ONCE
Status: COMPLETED | OUTPATIENT
Start: 2018-11-20 | End: 2018-11-20

## 2018-11-20 RX ORDER — AMLODIPINE BESYLATE 10 MG/1
10 TABLET ORAL DAILY
Status: DISCONTINUED | OUTPATIENT
Start: 2018-11-21 | End: 2018-11-21 | Stop reason: HOSPADM

## 2018-11-20 RX ORDER — DEXTROSE 50 % IN WATER (D50W) INTRAVENOUS SYRINGE
25-50 AS NEEDED
Status: DISCONTINUED | OUTPATIENT
Start: 2018-11-20 | End: 2018-11-20 | Stop reason: HOSPADM

## 2018-11-20 RX ORDER — GLUCOSAM/CHONDRO/HERB 149/HYAL 750-100 MG
1 TABLET ORAL DAILY
Status: DISCONTINUED | OUTPATIENT
Start: 2018-11-21 | End: 2018-11-21 | Stop reason: HOSPADM

## 2018-11-20 RX ORDER — LIDOCAINE HYDROCHLORIDE 10 MG/ML
3 INJECTION, SOLUTION EPIDURAL; INFILTRATION; INTRACAUDAL; PERINEURAL ONCE
Status: COMPLETED | OUTPATIENT
Start: 2018-11-20 | End: 2018-11-20

## 2018-11-20 RX ORDER — MIDAZOLAM HYDROCHLORIDE 1 MG/ML
2 INJECTION, SOLUTION INTRAMUSCULAR; INTRAVENOUS ONCE
Status: COMPLETED | OUTPATIENT
Start: 2018-11-20 | End: 2018-11-20

## 2018-11-20 RX ORDER — LANOLIN ALCOHOL/MO/W.PET/CERES
500 CREAM (GRAM) TOPICAL
Status: DISCONTINUED | OUTPATIENT
Start: 2018-11-20 | End: 2018-11-21 | Stop reason: HOSPADM

## 2018-11-20 RX ADMIN — FENTANYL CITRATE 50 MCG: 50 INJECTION, SOLUTION INTRAMUSCULAR; INTRAVENOUS at 08:19

## 2018-11-20 RX ADMIN — BRIMONIDINE TARTRATE 1 DROP: 2 SOLUTION/ DROPS OPHTHALMIC at 20:01

## 2018-11-20 RX ADMIN — ACETAMINOPHEN 650 MG: 325 TABLET ORAL at 19:59

## 2018-11-20 RX ADMIN — FENTANYL CITRATE 50 MCG: 50 INJECTION, SOLUTION INTRAMUSCULAR; INTRAVENOUS at 08:22

## 2018-11-20 RX ADMIN — FAMOTIDINE 20 MG: 20 TABLET ORAL at 06:29

## 2018-11-20 RX ADMIN — OXYCODONE HYDROCHLORIDE 15 MG: 5 TABLET ORAL at 12:32

## 2018-11-20 RX ADMIN — OXYCODONE HYDROCHLORIDE 15 MG: 5 TABLET ORAL at 17:26

## 2018-11-20 RX ADMIN — FENTANYL CITRATE 25 MCG: 50 INJECTION, SOLUTION INTRAMUSCULAR; INTRAVENOUS at 10:23

## 2018-11-20 RX ADMIN — DOCUSATE SODIUM 100 MG: 100 CAPSULE, LIQUID FILLED ORAL at 19:59

## 2018-11-20 RX ADMIN — ROCURONIUM BROMIDE 10 MG: 10 INJECTION, SOLUTION INTRAVENOUS at 09:08

## 2018-11-20 RX ADMIN — FENTANYL CITRATE 25 MCG: 50 INJECTION, SOLUTION INTRAMUSCULAR; INTRAVENOUS at 10:38

## 2018-11-20 RX ADMIN — ACETAMINOPHEN 1000 MG: 500 TABLET ORAL at 06:29

## 2018-11-20 RX ADMIN — FENTANYL CITRATE 100 MCG: 50 INJECTION, SOLUTION INTRAMUSCULAR; INTRAVENOUS at 07:57

## 2018-11-20 RX ADMIN — HYDROMORPHONE HYDROCHLORIDE 0.5 MG: 2 INJECTION, SOLUTION INTRAMUSCULAR; INTRAVENOUS; SUBCUTANEOUS at 10:45

## 2018-11-20 RX ADMIN — Medication 500 MG: at 21:55

## 2018-11-20 RX ADMIN — GABAPENTIN 300 MG: 300 CAPSULE ORAL at 22:00

## 2018-11-20 RX ADMIN — FENTANYL CITRATE 50 MCG: 50 INJECTION, SOLUTION INTRAMUSCULAR; INTRAVENOUS at 10:08

## 2018-11-20 RX ADMIN — ONDANSETRON 4 MG: 2 INJECTION INTRAMUSCULAR; INTRAVENOUS at 09:26

## 2018-11-20 RX ADMIN — LIDOCAINE HYDROCHLORIDE 2 ML: 10 INJECTION, SOLUTION EPIDURAL; INFILTRATION; INTRACAUDAL; PERINEURAL at 07:12

## 2018-11-20 RX ADMIN — SODIUM CHLORIDE, SODIUM LACTATE, POTASSIUM CHLORIDE, CALCIUM CHLORIDE, AND DEXTROSE MONOHYDRATE 75 ML/HR: 600; 310; 30; 20; 5 INJECTION, SOLUTION INTRAVENOUS at 14:39

## 2018-11-20 RX ADMIN — DOCUSATE SODIUM 100 MG: 100 CAPSULE, LIQUID FILLED ORAL at 13:26

## 2018-11-20 RX ADMIN — ROPIVACAINE HYDROCHLORIDE 25 MG: 2 INJECTION, SOLUTION EPIDURAL; INFILTRATION; PERINEURAL at 07:15

## 2018-11-20 RX ADMIN — PROPOFOL 200 MG: 10 INJECTION, EMULSION INTRAVENOUS at 07:30

## 2018-11-20 RX ADMIN — ROSUVASTATIN CALCIUM 20 MG: 20 TABLET, FILM COATED ORAL at 21:54

## 2018-11-20 RX ADMIN — HYDROMORPHONE HYDROCHLORIDE 0.5 MG: 2 INJECTION, SOLUTION INTRAMUSCULAR; INTRAVENOUS; SUBCUTANEOUS at 11:00

## 2018-11-20 RX ADMIN — CEFAZOLIN SODIUM 2 G: 2 SOLUTION INTRAVENOUS at 15:10

## 2018-11-20 RX ADMIN — ROPIVACAINE HYDROCHLORIDE 60 MG: 2 INJECTION, SOLUTION EPIDURAL; INFILTRATION at 07:13

## 2018-11-20 RX ADMIN — TRANEXAMIC ACID 1 G: 100 INJECTION, SOLUTION INTRAVENOUS at 07:42

## 2018-11-20 RX ADMIN — FENTANYL CITRATE 50 MCG: 50 INJECTION, SOLUTION INTRAMUSCULAR; INTRAVENOUS at 09:32

## 2018-11-20 RX ADMIN — TAMSULOSIN HYDROCHLORIDE 0.4 MG: 0.4 CAPSULE ORAL at 17:12

## 2018-11-20 RX ADMIN — ACETAMINOPHEN 650 MG: 325 TABLET ORAL at 13:27

## 2018-11-20 RX ADMIN — VANCOMYCIN HYDROCHLORIDE 1000 MG: 10 INJECTION, POWDER, LYOPHILIZED, FOR SOLUTION INTRAVENOUS at 06:34

## 2018-11-20 RX ADMIN — FENTANYL CITRATE 50 MCG: 50 INJECTION, SOLUTION INTRAMUSCULAR; INTRAVENOUS at 09:46

## 2018-11-20 RX ADMIN — LIDOCAINE HYDROCHLORIDE 100 MG: 20 INJECTION, SOLUTION EPIDURAL; INFILTRATION; INTRACAUDAL; PERINEURAL at 07:30

## 2018-11-20 RX ADMIN — HYDROMORPHONE HYDROCHLORIDE 0.5 MG: 2 INJECTION, SOLUTION INTRAMUSCULAR; INTRAVENOUS; SUBCUTANEOUS at 11:28

## 2018-11-20 RX ADMIN — ROCURONIUM BROMIDE 40 MG: 10 INJECTION, SOLUTION INTRAVENOUS at 07:58

## 2018-11-20 RX ADMIN — SODIUM CHLORIDE, SODIUM LACTATE, POTASSIUM CHLORIDE, AND CALCIUM CHLORIDE 75 ML/HR: 600; 310; 30; 20 INJECTION, SOLUTION INTRAVENOUS at 06:17

## 2018-11-20 RX ADMIN — DORZOLAMIDE HYDROCHLORIDE 1 DROP: 20 SOLUTION OPHTHALMIC at 20:00

## 2018-11-20 RX ADMIN — HYDROMORPHONE HYDROCHLORIDE 0.5 MG: 2 INJECTION, SOLUTION INTRAMUSCULAR; INTRAVENOUS; SUBCUTANEOUS at 11:15

## 2018-11-20 RX ADMIN — OXYCODONE HYDROCHLORIDE 10 MG: 5 TABLET ORAL at 21:55

## 2018-11-20 RX ADMIN — FENTANYL CITRATE 25 MCG: 50 INJECTION, SOLUTION INTRAMUSCULAR; INTRAVENOUS at 10:33

## 2018-11-20 RX ADMIN — CEFAZOLIN SODIUM 2 G: 2 SOLUTION INTRAVENOUS at 07:24

## 2018-11-20 RX ADMIN — FENTANYL CITRATE 50 MCG: 50 INJECTION, SOLUTION INTRAMUSCULAR; INTRAVENOUS at 09:54

## 2018-11-20 RX ADMIN — GABAPENTIN 300 MG: 300 CAPSULE ORAL at 17:11

## 2018-11-20 RX ADMIN — TRANEXAMIC ACID 1 G: 100 INJECTION, SOLUTION INTRAVENOUS at 09:50

## 2018-11-20 RX ADMIN — VANCOMYCIN HYDROCHLORIDE 1 G: 10 INJECTION, POWDER, LYOPHILIZED, FOR SOLUTION INTRAVENOUS at 07:41

## 2018-11-20 RX ADMIN — FENTANYL CITRATE 100 MCG: 50 INJECTION INTRAMUSCULAR; INTRAVENOUS at 07:11

## 2018-11-20 RX ADMIN — MIDAZOLAM HYDROCHLORIDE 2 MG: 2 INJECTION, SOLUTION INTRAMUSCULAR; INTRAVENOUS at 07:11

## 2018-11-20 RX ADMIN — FENTANYL CITRATE 25 MCG: 50 INJECTION, SOLUTION INTRAMUSCULAR; INTRAVENOUS at 10:28

## 2018-11-20 RX ADMIN — SODIUM CHLORIDE, SODIUM LACTATE, POTASSIUM CHLORIDE, CALCIUM CHLORIDE, AND DEXTROSE MONOHYDRATE 75 ML/HR: 600; 310; 30; 20; 5 INJECTION, SOLUTION INTRAVENOUS at 23:18

## 2018-11-20 RX ADMIN — GLYCOPYRROLATE 0.6 MG: 0.2 INJECTION INTRAMUSCULAR; INTRAVENOUS at 09:37

## 2018-11-20 RX ADMIN — Medication 4 MG: at 09:37

## 2018-11-20 RX ADMIN — CEFAZOLIN SODIUM 2 G: 2 SOLUTION INTRAVENOUS at 23:16

## 2018-11-20 RX ADMIN — SUCCINYLCHOLINE CHLORIDE 100 MG: 20 INJECTION INTRAMUSCULAR; INTRAVENOUS at 07:30

## 2018-11-20 NOTE — ANESTHESIA PROCEDURE NOTES
Peripheral Block Start time: 11/20/2018 7:11 AM 
End time: 11/20/2018 7:15 AM 
Performed by: Ramiro Ramsey MD 
Authorized by: Ramiro Ramsey MD  
 
 
Pre-procedure: Indications: at surgeon's request, post-op pain management and procedure for pain Preanesthetic Checklist: patient identified, risks and benefits discussed, site marked, timeout performed, anesthesia consent given and patient being monitored Timeout Time: 07:11 Block Type:  
Block Type: Adductor canal 
Laterality:  Right Monitoring:  Standard ASA monitoring, continuous pulse ox, frequent vital sign checks, oxygen, heart rate and responsive to questions Injection Technique:  Single shot Procedures: ultrasound guided and nerve stimulator Patient Position: supine Prep: chlorhexidine Location:  Mid thigh Needle Type:  Stimuplex Needle Gauge:  21 G Needle Localization:  Ultrasound guidance and nerve stimulator Assessment: 
Number of attempts:  1 Injection Assessment:  No paresthesia, incremental injection every 5 mL, ultrasound image on chart, no intravascular symptoms, negative aspiration for blood and local visualized surrounding nerve on ultrasound Patient tolerance:  Patient tolerated the procedure well with no immediate complications Location:  PREOP HOLDING Patient given 2 mg IV Versed and 100 mcg IV Fentanyl for sedation.  
 
11/20/2018     7:18 AM     Wilfrido Kruger MD

## 2018-11-20 NOTE — PERIOP NOTES
TRANSFER - OUT REPORT: 
 
Verbal report given to [facility] on [address]  being transferred to  for routine post - op Report consisted of patients Situation, Background, Assessment and  
Recommendations(SBAR). Information from the following report(s) SBAR and MAR was reviewed with the receiving nurse. Lines:  
Peripheral IV 11/20/18 Left Hand (Active) Site Assessment Clean, dry, & intact 11/20/2018 10:15 AM  
Phlebitis Assessment 0 11/20/2018 10:15 AM  
Infiltration Assessment 0 11/20/2018 10:15 AM  
Dressing Status Clean, dry, & intact 11/20/2018 10:15 AM  
Dressing Type Tape;Transparent 11/20/2018 10:15 AM  
Hub Color/Line Status Pink; Infusing 11/20/2018 10:15 AM  
  
 
Opportunity for questions and clarification was provided. Patient transported with: 
 O2 @ 2 liters

## 2018-11-20 NOTE — ANESTHESIA PREPROCEDURE EVALUATION
Anesthetic History No history of anesthetic complications Review of Systems / Medical History Patient summary reviewed and pertinent labs reviewed Pulmonary Within defined limits Neuro/Psych Within defined limits Cardiovascular Hypertension: well controlled Past MI (NonSTEMI-2009) and CAD Exercise tolerance: >4 METS Comments: AAA ~ 3 cms. Being observed GI/Hepatic/Renal 
  
 
 
 
Liver disease (Fatty Liver) Endo/Other Arthritis Other Findings Physical Exam 
 
Airway Mallampati: II 
TM Distance: 4 - 6 cm Neck ROM: normal range of motion Mouth opening: Normal 
 
 Cardiovascular Regular rate and rhythm,  S1 and S2 normal,  no murmur, click, rub, or gallop Dental 
No notable dental hx Pulmonary Breath sounds clear to auscultation Abdominal 
GI exam deferred Other Findings Anesthetic Plan ASA: 2 Anesthesia type: general and regional - femoral single shot Induction: Intravenous Anesthetic plan and risks discussed with: Patient

## 2018-11-20 NOTE — PROGRESS NOTES
Problem: Mobility Impaired (Adult and Pediatric) Goal: *Acute Goals and Plan of Care (Insert Text) Physical Therapy Goals Initiated 11/20/2018 and to be accomplished within 7 day(s) 1. Patient will move from supine to sit and sit to supine, scoot up and down, and roll side to side in bed with supervision/set-up. 2.  Patient will transfer from bed to chair and chair to bed with supervision/set-up using the least restrictive device. 3.  Patient will perform sit to stand with supervision/set-up. 4.  Patient will ambulate with supervision/set-up for >150 feet with the least restrictive device. 5.  Patient will ascend/descend 3 stairs with 2 handrail(s) with supervision/set-up. physical Therapy EVALUATION Patient: Daniella Ayers (30 y.o. male) Date: 11/20/2018 Primary Diagnosis: Primary osteoarthritis of right knee [M17.11] Procedure(s) (LRB): 
RIGHT TOTAL KNEE ARTHROPLASTY/BIOMET/2 SA'S/ADDUCTOR BLOCK (Right) Day of Surgery Precautions: Fall, WBAT RLE  
ASSESSMENT : 
Patient is 70 yo M admitted to hospital for TKA and presents today alert and agreeable to therapy. Patient was educated on weight bearing status, role of therapy, TE (see below), and equipment in room including role of SCDs, towel roll, and ice sleeve. Patient demonstrated impaired SLR and transferred to sitting EOB for objective assessment. Patient was given demo with instruction on sit <> stand transfer and gait training. Patient transferred to standing with Lamberto and ambulated 30ft with RW with tactile cues at R knee for TKE. At conclusion of session patient transferred to sitting in recliner and was left resting with call bell by the side, SCDs donned, and towel roll under ankle. Patient instructed to call for assistance if they needed to get up for any reason and denied need for further assistance.  Patient demonstrates decreased strength, mobility, and endurance and will benefit from skilled intervention to address the above impairments. Patients rehabilitation potential is considered to be Good Factors which may influence rehabilitation potential include:  
[]         None noted 
[]         Mental ability/status [x]         Medical condition 
[x]         Home/family situation and support systems 
[x]         Safety awareness [x]         Pain tolerance/management 
[]         Other: PLAN : 
Recommendations and Planned Interventions: 
[x]           Bed Mobility Training             [x]    Neuromuscular Re-Education 
[x]           Transfer Training                   []    Orthotic/Prosthetic Training 
[x]           Gait Training                          []    Modalities [x]           Therapeutic Exercises          []    Edema Management/Control 
[x]           Therapeutic Activities            [x]    Patient and Family Training/Education 
[]           Other (comment): Frequency/Duration: Patient will be followed by physical therapy 1-2 times per day/4-7 days per week to address goals. Discharge Recommendations: Home Health Further Equipment Recommendations for Discharge: rolling walker G-CODES Mobility [id number] Current  CJ= 20-39%   Goal  CI= 1-19%. The severity rating is based on the Level of Assistance required for Functional Mobility and ADLs. 
 
 
 
G-CODES Eval Complexity: History: MEDIUM  Complexity : 1-2 comorbidities / personal factors will impact the outcome/ POC Exam:LOW Complexity : 1-2 Standardized tests and measures addressing body structure, function, activity limitation and / or participation in recreation  Presentation: LOW Complexity : Stable, uncomplicated  Clinical Decision Making:Low Complexity   Overall Complexity:LOW SUBJECTIVE:  
Patient stated I'm shaking from the anesthesia.  OBJECTIVE DATA SUMMARY:  
 
Past Medical History:  
Diagnosis Date  AAA (abdominal aortic aneurysm) ([facility] [address] 03/2010  
 noted on CT (abdomen)  Abnormal LFT's 12/95, 04/16/03  Acute meniscal tear, lateral 01/2007  
 s/p arthorscopic surg (Dr. Rosario Gandhi)  Atypical chest pain 02/04/09  CAD (coronary artery disease), native coronary artery  Cardiac cath 04/13/2009 dLM 30%. mLAD 30%. oD2 30%. pCX 55% (FFR 0.95). mRCA 40%. RPDA 65% (FFR 0.86). EF 65%.  Cardiac echocardiogram 04/03/2007 EF 60%. No RWMA. Gr 1 DDfx. LAE. No significant valvular heart disease.  Cardiac nuclear imaging test, low risk 12/04/2015 Low risk. No ischemia or prior infarction. No WMA. EF 67%. Neg EKG on pharm stress test.  
 Cardiovascular aorto-iliac duplex 06/29/2015 AAA measures 3.2 x 3.1 cm Trv. (Prev 3.2 x 3.4 cm on 6/19/14). Aneurysm is infrarenal & fusiform w/complex intraluminal thrombus within. Mod bilateral common iliac stenosis.  Carotid duplex 03/16/2016 Mild < 50% BETHEL stenosis. Mod 00-83% LICA stenosis. Similar to study of 6/29/15.  Cholecystitis chronic 03/2008  
 s/p sue  Diverticulosis  Diverticulosis 9-28-15  
 DR. BENDER  
 DJD (degenerative joint disease) of lumbar spine 12/18/01  
 CLARKE (Dyspnea on Exertion) 03/2007  
 echo and stress WNL  ED (erectile dysfunction) 11/11/04  Fatty liver 4/2012  
 noted on CT (abdomen)  Fibrosis of knee joint March 2014  
 peripatellar fibrosis, left knee replacement  H/O: rotator cuff tear 09/25/08  History of colon polyps  Hypercholesterolemia  Hypertension 2009  Non-STEMI (non-ST elevated myocardial infarction) ([address] 04/13/2009  
 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  
 2.8 x 2.9cm infra-renal  
 Tubular adenoma 9-28-15  Vitamin D deficiency 4/13/2011 Past Surgical History:  
Procedure Laterality Date  ENDOSCOPY, COLON, DIAGNOSTIC    
 normal per patient; Gian Mchugh  HX ARTHROPLASTY  04/05/10 [place]-left knee  HX CATARACT REMOVAL Bilateral   
 November 2014  HX CHOLECYSTECTOMY  03/2008  
 chronic cholecystitis  HX COLONOSCOPY  9-28-15 [address] inguinal  
 HX KNEE ARTHROSCOPY  01/24/07  
 left knee  HX KNEE ARTHROSCOPY Left 4/16/2014  
 peripatellar debridement  HX KNEE REPLACEMENT Left 01/29/2013  
 [facility]  HX MOHS PROCEDURES  11/2008 [address]  HX TONSILLECTOMY [address]  HX WISDOM TEETH EXTRACTION    
 x4  
 [place] ANESTH,SURGERY OF SHOULDER Barriers to Learning/Limitations: None Compensate with: N/A Prior Level of Function/Home Situation: Patient lives in 2 story home with 3STE with BHR and will have assistance upon D/C. Patient has BSC, SC, RW, SPC at home and reports he was independent with mobility and I/ADL's PTA. Home Situation Home Environment: Private residence # Steps to Enter: 3 One/Two Story Residence: Two story # of Interior Steps: 15 Interior Rails: Right Lift Chair Available: Yes Living Alone: No 
Support Systems: Spouse/Significant Other/Partner, Child(lea), Friends \ neighbors Patient Expects to be Discharged to[de-identified] Private residence Current DME Used/Available at Home: Adaptive bathing aides, Cane, straightCritical Behavior: A&Ox4 Strength:   
Strength: Generally decreased, functional(R knee flex/ext 3/5, DF/PF 5/5, LLE 5/5) Tone & Sensation:  
Tone: Normal(BLE) Sensation: Intact(BLE) Range Of Motion: 
AROM: Within functional limits(BLE) Functional Mobility: 
Bed Mobility: 
 Supine to Sit: Supervision Scooting: Supervision Transfers: 
Sit to Stand: Minimum assistance Stand to Sit: Contact guard assistance Balance:  
Sitting: Intact; With support Standing: Impaired; With support Standing - Static: Fair Standing - Dynamic : FairAmbulation/Gait Training: 
Distance (ft): 30 Feet (ft) Assistive Device: Walker, rolling Ambulation - Level of Assistance: Contact guard assistance;Minimal assistance Base of Support: Narrowed Speed/Carli: Slow Interventions: Verbal cues; Visual/Demos Therapeutic Exercises:  
Instructed patient in performing heel slides and quad sets (3sec hold) x10 on the hour while awake; performed x3 for teach back this session. Pain: 
Pt reports 5/10 pain or discomfort prior to treatment.   
Pt reports 5/10 pain or discomfort post treatment. Activity Tolerance:  
Patient tolerated activity well with no dizziness, chest pain, or SOB. Please refer to the flowsheet for vital signs taken during this treatment. After treatment:  
[x]         Patient left in no apparent distress sitting up in chair 
[]         Patient left in no apparent distress in bed 
[x]         Call bell left within reach [x]         Nursing notified Hawk Marin) [x]         Caregiver present 
[]         Bed alarm activated 
[x]         SCDs in place to B LE 
  
COMMUNICATION/EDUCATION:  
[x]         Fall prevention education was provided and the patient/caregiver indicated understanding. [x]         Patient/family have participated as able in goal setting and plan of care. [x]         Patient/family agree to work toward stated goals and plan of care. []         Patient understands intent and goals of therapy, but is neutral about his/her participation. []         Patient is unable to participate in goal setting and plan of care. Thank you for this referral. 
Toni Rome, PT Time Calculation: 28 mins

## 2018-11-20 NOTE — OP NOTES
Operative Note      Patient: Rosebud Dancer     Date of Surgery: 11/20/2018         YOB: 1940      Age:  69 y.o.        LOS:  LOS: 0 days       Preoperative Diagnosis:  Primary osteoarthritis of right knee [M17.11]    Postoperative Diagnosis: Primary osteoarthritis of right knee [M17.11]      Surgeon:  Valentino Alamo, MD     Assistant:  None    Anesthesia:  general anesthesia and adductor block     Procedure:  Procedure(s):  RIGHT TOTAL KNEE ARTHROPLASTY/BIOMET/2 SA'S/ADDUCTOR BLOCK    Time out performed: YES    Estimated Blood Loss:  min           Implants:    Implant Name Type Inv.  Item Serial No.  Lot No. LRB No. Used Action   CEMENT BNE GENTAMC HV R+G 40GM -- PALACOS R+G [id number] - [id number]  CEMENT BNE GENTAMC HV R+G 40GM -- PALACOS R+G [id number]  [facility] [id number] Right 1 Implanted   STEM TIB FIN YUKO 80X12.5MM --  - [id number]  STEM TIB FIN YUKO 80X12.5MM --   JEB [employer] [id number] Right 1 Implanted   PAT SER A STD 40X10 3 PEG -- NO WIRE - [id number]  PAT SER A STD 40X10 3 PEG -- NO WIRE  JEB [employer] [id number] Right 1 Implanted   TRAY TIB YUKO INTERLOK 79MM --  - [id number]  TRAY TIB YUKO INTERLOK 79MM --   [employer]EB [employer] [id number] Right 1 Implanted   INSERT TIB BRNG PS E1 79/83X12 -- VANGUARD - [id number]  INSERT TIB BRNG PS E1 79/83X12 -- VANGUARD  [employer]EB [employer] [id number] Right 1 Implanted   STEM TIB FIN YUKO 80X12.5MM --  - [id number]  STEM TIB FIN YUKO 80X12.5MM --   [employer]EB [employer] [id number] Right 1 Implanted   COMPNT FEM PS OPN RT 70MM -- Ene Zamudio - [id number]  COMPNT FEM PS OPN RT 70MM -- Mihaela Rizo [employer] [id number] Right 1 Implanted       Specimens: * No specimens in log *            Complications:  None    DESCRIPTION OF PROCEDURE: After satisfactory general and adductor block anesthesia, in the supine position, patient's knee was prepped with Betacept and ChloroPrep solution and draped in the usual fashion for knee replacement. The tourniquet was inflated to 350 mmHg after exsanguination and elevation. A longitudinal anterior skin incision was made carried down through the subcutaneus tissue to the underlying extensor mechanism. A medial parapatellar arthrotomy was carried proximally in a subvastus approach sparing the vastus medialis obliqus insertion on the quadriceps tendon. The patella was everted and retracted laterally as the knee was flexed. An anterior joint line debridement was carried out. The proximal tibial cut was made using the extramedullary tibial cutting guide and an oscillating saw. The distal femoral cuts were made using an intramedullary femoral cutting guide and a 6 degree cutting angle based on preoperative x-ray measurements of the femur. The distal femoral resection was set at 9 mm. The notch cut was made for a 70 mm posterior stabilized femoral component using the bone conserving cutting slot. Osteophytes were removed from the joint margins including the posterior femoral condyles. The tibial stem hole and fin impressions were made for the 79 mm tibial base plate. The patella was cut to recreate the native patellar thickness of 24 mm. Three drill holes were placed in the patella for a 40 mm three pegged all polyethylene patellar component. Trial components were inserted and the knee was found to have full extension and good stability with a 12 mm tibial spacer in place. Trial components were then removed and the knee was irrigated thoroughly. The actual components were then cemented into place with gentamycin bone cement. All excess cement was carefully removed. The knee was held in extension with a clamp on the patella as the cement hardened. Once the cement fully hardened, the knee was again checked for excess cement. The knee was injected with 266 mg/20 cc Exparel mixed with 40 ml saline as the cement hardened.   The actual E poly tibial spacer was then attached to the tibial base plate with a locking bar. The knee was then reduced and was found to be stable with full knee extension, good stability, and mid line patellar tracking with no thumb pressure applied. The knee was again irrigated thoroughly, including a dilute betadine lavage. A Constavac drain was inserted. Closure was obtained using #2 Vicryl interrupted sutures for the capsular closure, 2-0 Vicryl suture for the subcutaneous tissues and staples for the skin. A sterile dressing was applied and Mr. Marcos Hopson was transported to the recovery room in good condition. Sponge and needle count was correct.

## 2018-11-20 NOTE — ADDENDUM NOTE
Addendum  created 11/20/18 1043 by Cynthia Mullins MD  
 New alternative orders accepted, Order list changed, Order sets accessed

## 2018-11-20 NOTE — CONSULTS
[address] [place] [address]  MR#: [id number]  : 1940  ACCOUNT #: [de-identified]   DATE OF SERVICE: 2018    REQUESTING PHYSICIAN:  Dr. Zamora Pari:  This is a 60-year-old male currently admitted to the orthopedic surgery service of Dr. Elis Oliveros. The patient earlier today underwent right total knee arthroplasty. When I saw the patient, the patient was in his room sitting in a chair. Patient is awake, alert and oriented x3. The patient's daughter is at bedside. Patient denies any current chest pain, shortness of breath or palpitations. No history of any fever or chills. No history of any nausea, vomiting or abdominal pain. No history of any leg swelling. No history of any rash. No history of any headaches, numbness or focal weakness. Patient does state that he is having some difficulty voiding urine. PAST MEDICAL HISTORY:  Significant for hypertension, dyslipidemia and degenerative joint disease. Patient has a history of coronary artery disease as per the records, history of diverticulosis, history of plantar fasciitis. ALLERGIES:  POLLEN EXTRACT. SOCIAL HISTORY:  Patient has remote history of tobacco use, quit in . Patient drinks alcohol socially. FAMILY HISTORY:  Reviewed and not contributory in this setting. REVIEW OF SYSTEMS:  Apart from the complaints mentioned above, a complete review of systems was done and is negative. MEDICATIONS:  Prior to admission medications include:    1. Sublingual nitroglycerin p.r.n. chest pain. 2.  Zetia 10 mg p.o. daily. 3.  Simbrinza 1 drop to both eyes twice daily. 4.  Niaspan 1000 mg p.o. daily. 5.  Amlodipine 10 mg p.o. daily. 6.  Crestor 20 mg p.o. daily. 7.  Coenzyme Q10 one tablet p.o. daily. 8.  Neurontin 300 mg p.o. 3 times daily. 9.  Vitamin D3.  10.  Viagra. 11.  Fish oil. 12.  Aspirin 81 mg p.o. daily.     PHYSICAL EXAMINATION:  VITAL SIGNS:  Today showed temperature of 98.6, pulse rate of 78, blood pressure 142/86, respiratory rate of 18, oxygen saturation of 98%. GENERAL:  This is a 71-year-old male sitting in bed in no apparent distress. HEENT:  Normocephalic, atraumatic. EYES:  Pupils are reactive to light. EAR, NOSE, THROAT:  No ear or nasal discharge is seen. Mucous membranes are moist.  NECK:  Supple, no JVD, no carotid bruit, no thyromegaly, no lymphadenopathy. LUNGS:  Clear to auscultation bilaterally. No rales, no rhonchi, no wheezing is heard. HEART:  S1, S2 were heard. Regular rate and rhythm. ABDOMEN:  Soft, bowel sounds positive, nontender, nondistended. EXTREMITIES:  No lower extremity edema is noted. Pulses are 1+. Right knee has a soft cast on it. NEUROLOGIC:  Patient is awake, alert and oriented x3. Patient follows commands and responds appropriately. No focal neurological deficit was identified. LABORATORY DATA:  Today show hemoglobin of 15.5, hematocrit 45.1. ASSESSMENT:  1.  Status post right total knee arthroplasty. 2.  Hypertension. 3.  History of coronary artery disease as per the records, the patient sees Dr. Laura Graham as an outpatient. 4.  Dyslipidemia. 5.  Difficulty voiding urine, concern for urinary retention. RECOMMENDATIONS:  I will defer management of the right knee and the postop management to the surgeon. I will defer DVT prophylaxis and pain control to the surgeon. Patient will be on a bowel regimen. Incentive spirometry will be encouraged. We will continue preadmission medications. Please resume aspirin when okay with the surgeon. Regarding the difficulty voiding, I have discussed with the nurse and we will do bladder checks 3 times daily. If there is more than 300 mL of postvoid residual, the nurse will straight cath the patient. I will also start the patient on Flomax. I have discussed this plan with the patient and daughter at bedside.     Thank you for allowing me to participate in the care of this patient.       Reyes Major, MD       VT / LN  D: 11/20/2018 16:02     T: 11/20/2018 17:30  JOB #: [id number]  CC: Molina Clements MD

## 2018-11-20 NOTE — BRIEF OP NOTE
BRIEF OPERATIVE NOTE Date of Procedure: 11/20/2018 Preoperative Diagnosis: Primary osteoarthritis of right knee [M17.11] Postoperative Diagnosis: Primary osteoarthritis of right knee [M17.11] Procedure(s): RIGHT TOTAL KNEE ARTHROPLASTY/BIOMET/2 SA'S/ADDUCTOR BLOCK Surgeon(s) and Role: Ankit Feliciano MD 
 
Surgical Staff: 
Circ-1: Natasha Key RN 
Circ-Relief: Mohit Stevenson Scrub Tech-1: Catalina Kamara Surg Asst-1: Michel Wylie Event Time In Time Out Incision Start 9489 Incision Close Anesthesia: General  
Estimated Blood Loss: min Specimens: * No specimens in log * Findings: above Complications: none Implants:  
Implant Name Type Inv. Item Serial No.  Lot No. LRB No. Used Action CEMENT BNE GENTAMC HV R+G 40GM -- PALACOS R+G [id number] - [id number]  CEMENT BNE GENTAMC HV R+G 40GM -- PALACOS R+G [id number]  [facility] [id number] Right 1 Implanted STEM TIB FIN YUKO 80X12.5MM --  - [id number]  STEM TIB FIN YUKO 80X12.5MM --   [employer]EB [employer] [id number] Right 1 Implanted PAT SER A STD 40X10 3 PEG -- NO WIRE - [id number]  PAT SER A STD 40X10 3 PEG -- NO WIRE  [employer]EB [employer] [id number] Right 1 Implanted TRAY TIB YUKO INTERLOK 79MM --  - [id number]  TRAY TIB YUKO INTERLOK 79MM --   JEB [employer] [id number] Right 1 Implanted INSERT TIB BRNG PS E1 [id number] -- VANSHERICEARD - [id number]  INSERT TIB BRNG PS E1 79/83X12 -- [place] [employer] [id number] Right 1 Implanted STEM TIB FIN YUKO 80X12.5MM --  - [id number]  STEM TIB FIN YUKO 80X12.5MM --   JEB [employer] [id number] Right 1 Implanted COMPNT FEM PS OPN RT 70MM -- Tammy Branford - [id number]  COMPNT FEM PS OPN RT 70MM -- Mavis [facility] ORTHOPEDICS [id number] Right 1 Implanted

## 2018-11-20 NOTE — INTERVAL H&P NOTE
H&P Update: 
Ramesh Helm was seen and examined. History and physical has been reviewed. The patient has been examined. There have been no significant clinical changes since the completion of the originally dated History and Physical. 
Patient identified by surgeon; surgical site was confirmed by patient and surgeon.  
 
Signed By: Jay Pratt MD   
 November 20, 2018 7:13 AM

## 2018-11-20 NOTE — ROUTINE PROCESS
1230 Patient foot warm, no numbness, cap refill <3, able to move toes and move ankle up and down. Benita Mejía RN 
 
1400 PT got patient up to chair. Benita Mejía RN. 
 
8415 Patient foot warm, no numbness, cap refill <3, able to move toes and move ankle up and down. Benita Mejía, [address] Patient up to bathroom. Benita Mejía, [address] Bladder scan shows 673 mL of urine in patients bladder. Dr Cassie Salter notified. New orders for bladder scan tid and straight cath for over 300mL of urine. Benita Mejía RN. 
 
3263 Straight cathed patient, emptied 1000mL of straw yellow urine. Eric Vickers RN 
 
1633 Patients foot is warm to touch, no numbness, able to move toes and move ankle up and down. Yesi Granger RN. 
 
2172 Discharge instructions and prescriptions discussed with patient and patients wife. Both acknowledged understanding. Discharge instructions and scripts given to patients wife. No signs of distress noted. Patient stable. Patient denies any needs. Discharged to home. Benita Mejía, [address] [facility] Report given to [facility], FREDI.  Boaz Damian

## 2018-11-20 NOTE — ADDENDUM NOTE
Addendum  created 11/20/18 1049 by Luzma Marc MD  
 Intraprocedure Event edited, Intraprocedure Staff edited, SmartForm saved

## 2018-11-20 NOTE — PROGRESS NOTES
conducted a pre-surgery visit with Hi Solis, who is a 69 y. o.,male. The  provided the following Interventions: 
Initiated a relationship of care and support. Offered prayer and assurance of continued prayers on patient's behalf. Plan: 
Chaplains will continue to follow and will provide pastoral care on an as needed/requested basis.  recommends bedside caregivers page  on duty if patient shows signs of acute spiritual or emotional distress. [address] [facility] Certified [employer] Spiritual Care  
[phone]

## 2018-11-20 NOTE — HOME CARE
Rec HC order - d/c  Plan for tomorrow - pt has all needed dme at home - no other dme needed [facility] will follow per Dr. Ramon Vega knee protocol - SN/PT - D Denisse RAI

## 2018-11-20 NOTE — ANESTHESIA POSTPROCEDURE EVALUATION
Procedure(s): RIGHT TOTAL KNEE ARTHROPLASTY/BIOMET/2 SA'S/ADDUCTOR BLOCK. Anesthesia Post Evaluation Multimodal analgesia: multimodal analgesia used between 6 hours prior to anesthesia start to PACU discharge Patient location during evaluation: bedside Patient participation: complete - patient participated Level of consciousness: awake Pain score: 1 Pain management: adequate Airway patency: patent Anesthetic complications: no 
Cardiovascular status: acceptable Respiratory status: acceptable Hydration status: acceptable Post anesthesia nausea and vomiting:  none Visit Vitals /74 Pulse 84 Temp 36.6 °C (97.8 °F) Resp 12 Ht 6' (1.829 m) Wt 85.5 kg (188 lb 8 oz) SpO2 96% BMI 25.57 kg/m²

## 2018-11-20 NOTE — PROGRESS NOTES
Reason for Admission:  Primary osteoarthritis of right knee [M17.11] RRAT Score:   17 Plan for utilizing home health:    [place] of choice signed for [facility] - INPATIENT. Kennedi Aguilar,  notified. Likelihood of Readmission:   Moderate Do you (patient/family) have any concerns for transition/discharge?  no 
 
Transition of Care Plan:    
Initial assessment completed with patient. Face sheet information confirmed:  yes. This patient lives in a single family home with his wife. Currently, his wife is in a SNF rehab as per the patient. Patient is able to navigate steps as needed. Prior to hospitalization, patient was considered to be independent : yes. Jose Morales, neighbor (at bedside) stated \"he (patient) has 7 neighbors that have agreed to help him with meals, cleaning, and whatever he needs; it will be done. \" Cognitive status of patient:   oriented to time, place, person and situation Patient has a current ACP document on file: no The patient's neighbor: Jose Morales will be available to transport patient home upon discharge. The patient states that he can obtain his medications from the pharmacy, and take her medications as directed. Patient is not currently active with home health. Patient has not ever stayed in a skilled nursing facility or rehab. List of available Home Health was reviewed with the patient prior to discharge. [place] of choice signed: yes, for [facility] - INPATIENT. Currently, the discharge plan is Home with [address] [place] [address] Adam Jones. Care Management Interventions PCP Verified by CM: Yes Last Visit to PCP: 11/14/18 Palliative Care Criteria Met (RRAT>21 & CHF Dx)?: No 
Mode of Transport at Discharge: Self Transition of Care Consult (CM Consult): Discharge Planning, Home Health [facility] - INPATIENT: Yes Physical Therapy Consult: Yes Occupational Therapy Consult:  Yes 
 Current Support Network: Lives with Spouse, Other(Wife in SNF rehab; he has 7 neighbors that have agreed to help him) Confirm Follow Up Transport: Friends(Allan, neighbor) Plan discussed with Pt/Family/Caregiver: Yes Freedom of Choice Offered: Yes [employer] Information Provided?: No(He's retired .) Discharge Location Discharge Placement: Home with home health Westley Fritz RN [facility] Care Management [phone]

## 2018-11-20 NOTE — PROGRESS NOTES
Referral sent to [facility]. CECILIA McraeN, RN Pager # [phone] Care Manager

## 2018-11-21 VITALS
HEART RATE: 64 BPM | RESPIRATION RATE: 18 BRPM | DIASTOLIC BLOOD PRESSURE: 56 MMHG | OXYGEN SATURATION: 97 % | TEMPERATURE: 97.4 F | HEIGHT: 72 IN | WEIGHT: 188.5 LBS | BODY MASS INDEX: 25.53 KG/M2 | SYSTOLIC BLOOD PRESSURE: 118 MMHG

## 2018-11-21 LAB
ANION GAP SERPL CALC-SCNC: 8 MMOL/L (ref 3–18)
BASOPHILS # BLD: 0 K/UL (ref 0–0.1)
BASOPHILS NFR BLD: 0 % (ref 0–2)
BUN SERPL-MCNC: 7 MG/DL (ref 7–18)
BUN/CREAT SERPL: 8 (ref 12–20)
CALCIUM SERPL-MCNC: 8.6 MG/DL (ref 8.5–10.1)
CHLORIDE SERPL-SCNC: 104 MMOL/L (ref 100–108)
CO2 SERPL-SCNC: 27 MMOL/L (ref 21–32)
CREAT SERPL-MCNC: 0.91 MG/DL (ref 0.6–1.3)
DIFFERENTIAL METHOD BLD: ABNORMAL
EOSINOPHIL # BLD: 0 K/UL (ref 0–0.4)
EOSINOPHIL NFR BLD: 0 % (ref 0–5)
ERYTHROCYTE [DISTWIDTH] IN BLOOD BY AUTOMATED COUNT: 12.8 % (ref 11.6–14.5)
GLUCOSE SERPL-MCNC: 160 MG/DL (ref 74–99)
HCT VFR BLD AUTO: 43.4 % (ref 36–48)
HGB BLD-MCNC: 14.9 G/DL (ref 13–16)
LYMPHOCYTES # BLD: 1.4 K/UL (ref 0.9–3.6)
LYMPHOCYTES NFR BLD: 10 % (ref 21–52)
MCH RBC QN AUTO: 32.7 PG (ref 24–34)
MCHC RBC AUTO-ENTMCNC: 34.3 G/DL (ref 31–37)
MCV RBC AUTO: 95.2 FL (ref 74–97)
MONOCYTES # BLD: 1.5 K/UL (ref 0.05–1.2)
MONOCYTES NFR BLD: 11 % (ref 3–10)
NEUTS SEG # BLD: 10.7 K/UL (ref 1.8–8)
NEUTS SEG NFR BLD: 79 % (ref 40–73)
PLATELET # BLD AUTO: 252 K/UL (ref 135–420)
PMV BLD AUTO: 9.6 FL (ref 9.2–11.8)
POTASSIUM SERPL-SCNC: 3.9 MMOL/L (ref 3.5–5.5)
RBC # BLD AUTO: 4.56 M/UL (ref 4.7–5.5)
SODIUM SERPL-SCNC: 139 MMOL/L (ref 136–145)
WBC # BLD AUTO: 13.6 K/UL (ref 4.6–13.2)

## 2018-11-21 PROCEDURE — 80048 BASIC METABOLIC PNL TOTAL CA: CPT

## 2018-11-21 PROCEDURE — 36415 COLL VENOUS BLD VENIPUNCTURE: CPT

## 2018-11-21 PROCEDURE — 97116 GAIT TRAINING THERAPY: CPT

## 2018-11-21 PROCEDURE — 51798 US URINE CAPACITY MEASURE: CPT

## 2018-11-21 PROCEDURE — 97165 OT EVAL LOW COMPLEX 30 MIN: CPT

## 2018-11-21 PROCEDURE — 97535 SELF CARE MNGMENT TRAINING: CPT

## 2018-11-21 PROCEDURE — 74011250637 HC RX REV CODE- 250/637: Performed by: SPECIALIST

## 2018-11-21 PROCEDURE — 74011250636 HC RX REV CODE- 250/636: Performed by: SPECIALIST

## 2018-11-21 PROCEDURE — 85025 COMPLETE CBC W/AUTO DIFF WBC: CPT

## 2018-11-21 PROCEDURE — 74011250637 HC RX REV CODE- 250/637: Performed by: EMERGENCY MEDICINE

## 2018-11-21 RX ORDER — ENOXAPARIN SODIUM 100 MG/ML
30 INJECTION SUBCUTANEOUS EVERY 24 HOURS
Qty: 4.2 ML | Refills: 0 | Status: SHIPPED | OUTPATIENT
Start: 2018-11-22 | End: 2018-11-21

## 2018-11-21 RX ORDER — OXYCODONE HYDROCHLORIDE 5 MG/1
5-15 TABLET ORAL
Qty: 28 TAB | Refills: 0 | Status: SHIPPED | OUTPATIENT
Start: 2018-11-21 | End: 2018-11-21

## 2018-11-21 RX ORDER — POLYETHYLENE GLYCOL 3350 17 G/17G
17 POWDER, FOR SOLUTION ORAL DAILY
Qty: 30 PACKET | Refills: 0 | Status: SHIPPED | OUTPATIENT
Start: 2018-11-21 | End: 2018-11-21 | Stop reason: SDUPTHER

## 2018-11-21 RX ORDER — POLYETHYLENE GLYCOL 3350 17 G/17G
17 POWDER, FOR SOLUTION ORAL DAILY
Qty: 30 PACKET | Refills: 0 | Status: SHIPPED | OUTPATIENT
Start: 2018-11-21 | End: 2019-06-12

## 2018-11-21 RX ORDER — DOCUSATE SODIUM 100 MG/1
100 CAPSULE, LIQUID FILLED ORAL 2 TIMES DAILY
Qty: 60 CAP | Refills: 2 | Status: SHIPPED | OUTPATIENT
Start: 2018-11-21 | End: 2019-02-19

## 2018-11-21 RX ORDER — ENOXAPARIN SODIUM 100 MG/ML
30 INJECTION SUBCUTANEOUS EVERY 24 HOURS
Qty: 4.2 ML | Refills: 0 | Status: SHIPPED | OUTPATIENT
Start: 2018-11-22 | End: 2018-12-06

## 2018-11-21 RX ORDER — OXYCODONE HYDROCHLORIDE 5 MG/1
5-15 TABLET ORAL
Qty: 28 TAB | Refills: 0 | Status: SHIPPED | OUTPATIENT
Start: 2018-11-21 | End: 2019-01-22 | Stop reason: SDUPTHER

## 2018-11-21 RX ORDER — TAMSULOSIN HYDROCHLORIDE 0.4 MG/1
0.4 CAPSULE ORAL DAILY
Qty: 30 CAP | Refills: 0 | Status: SHIPPED | OUTPATIENT
Start: 2018-11-21 | End: 2019-05-06

## 2018-11-21 RX ORDER — TAMSULOSIN HYDROCHLORIDE 0.4 MG/1
0.4 CAPSULE ORAL DAILY
Qty: 30 CAP | Refills: 0 | Status: SHIPPED | OUTPATIENT
Start: 2018-11-21 | End: 2018-11-21

## 2018-11-21 RX ADMIN — EZETIMIBE 10 MG: 10 TABLET ORAL at 08:59

## 2018-11-21 RX ADMIN — NALOXEGOL OXALATE 12.5 MG: 12.5 TABLET, FILM COATED ORAL at 06:38

## 2018-11-21 RX ADMIN — AMLODIPINE BESYLATE 10 MG: 10 TABLET ORAL at 08:59

## 2018-11-21 RX ADMIN — CALCIUM CARBONATE-CHOLECALCIFEROL TAB 250 MG-125 UNIT 1 TABLET: 250-125 TAB at 09:00

## 2018-11-21 RX ADMIN — ENOXAPARIN SODIUM 30 MG: 30 INJECTION SUBCUTANEOUS at 06:38

## 2018-11-21 RX ADMIN — GABAPENTIN 300 MG: 300 CAPSULE ORAL at 08:59

## 2018-11-21 RX ADMIN — OXYCODONE HYDROCHLORIDE 15 MG: 5 TABLET ORAL at 13:04

## 2018-11-21 RX ADMIN — OXYCODONE HYDROCHLORIDE 10 MG: 5 TABLET ORAL at 08:58

## 2018-11-21 RX ADMIN — OMEGA-3 FATTY ACIDS CAP 1000 MG 1 CAPSULE: 1000 CAP at 08:59

## 2018-11-21 RX ADMIN — ACETAMINOPHEN 650 MG: 325 TABLET ORAL at 06:38

## 2018-11-21 RX ADMIN — BRIMONIDINE TARTRATE 1 DROP: 2 SOLUTION/ DROPS OPHTHALMIC at 09:02

## 2018-11-21 RX ADMIN — OXYCODONE HYDROCHLORIDE 10 MG: 5 TABLET ORAL at 02:30

## 2018-11-21 RX ADMIN — DOCUSATE SODIUM 100 MG: 100 CAPSULE, LIQUID FILLED ORAL at 08:59

## 2018-11-21 RX ADMIN — ACETAMINOPHEN 650 MG: 325 TABLET ORAL at 00:26

## 2018-11-21 RX ADMIN — DORZOLAMIDE HYDROCHLORIDE 1 DROP: 20 SOLUTION OPHTHALMIC at 09:02

## 2018-11-21 RX ADMIN — TAMSULOSIN HYDROCHLORIDE 0.4 MG: 0.4 CAPSULE ORAL at 08:59

## 2018-11-21 NOTE — PROGRESS NOTES
Rounded on patient post total knee replacement. Activity: Reinforced importance of getting OOB for all meals, going to bathroom to help prevent blood clots. VTE prophylaxis: Instructed patient to use their SCD's when not up and walking. To use while in bed and in the chair. Educated re: ankle pumps to assist with circulation when in hospital and at home. Medications: Reviewed pain medications patient is taking and the importance of keeping pain under control to help with getting OOB and therapy. Reminded the patient to always eat a snack with their pain medication to help to prevent nausea. Encouraged patient to monitor for constipation and to take a stool softner/laxative while recovering and on pain medication. Incentive Spirometry: Reinforced use of incentive spirometer with return demonstration by patient. 2500 ml x 3 Wound Care: Dressing to surgical site dry and intact ace wrap. Patient instructed not to take dressing off at home. Patient educated to make sure to wash off daily here and at home. Patient instructed that dial soap is safe to use at home. Patient told they can shower at home if they feel safe and the dressing is intact. Also instructed not to put any cream, lotion or powder on surgical leg. Stressed importance of using a clean towel and washcloth daily. Reminded to put on clean clothes and night clothes daily. Ice Protocol: Ice gel packs in place per protocol. Patient Safety: Call light and personal belongings in reach. Patient  reminded to call for help toget OOB or when leaving bathroom for safety. Phone and other items also within reach per patient's request.    
Diet: Educated patient on the importance of eating three well balanced meals a day with protein to promote bone/muscle healing. Reminded patient to drink lots of fluids to protect kidneys from all the medications being taken currently with recovery. Patient given educational material to remind them to continue doing everything at home to prevent complications and have a successful recovery. Patient  verbalized understand of all information and education discussed. Patient  given the opportunity for asking questions.

## 2018-11-21 NOTE — PROGRESS NOTES
Discharge order noted for today. Pt has been accepted to [facility]. Met with pt and agreeable to the transition plan today. Transport has been arranged with a friend. P'ts discharge [facility] orders have been forwarded to cc/link. CECILIA HuertaN, RN Pager # [phone] Care Manager

## 2018-11-21 NOTE — PROGRESS NOTES
Problem: Mobility Impaired (Adult and Pediatric) Goal: *Acute Goals and Plan of Care (Insert Text) Physical Therapy Goals Initiated 11/20/2018 and to be accomplished within 7 day(s) 1. Patient will move from supine to sit and sit to supine, scoot up and down, and roll side to side in bed with supervision/set-up. (MET 11/21/18) 2. Patient will transfer from bed to chair and chair to bed with supervision/set-up using the least restrictive device. 3.  Patient will perform sit to stand with supervision/set-up. 4.  Patient will ambulate with supervision/set-up for >150 feet with the least restrictive device. 5.  Patient will ascend/descend 3 stairs with 2 handrail(s) with supervision/set-up. physical Therapy TREATMENT Patient: Anna Gardner (37 y.o. male) Date: 11/21/2018 Diagnosis: Primary osteoarthritis of right knee [M17.11] <principal problem not specified> Procedure(s) (LRB): 
RIGHT TOTAL KNEE ARTHROPLASTY/BIOMET/2 SA'S/ADDUCTOR BLOCK (Right) 1 Day Post-Op Precautions: Yennifer Roque RLE Chart, physical therapy assessment, plan of care and goals were reviewed. ASSESSMENT: 
Patient presents today alert and agreeable to therapy and was supine in bed upon arrival. Reviewed education on weight bearing status, role of therapy, TE (see below), and equipment in room including role of SCDs, towel roll, and ice pack. Patient continues to demonstrate impaired SLR and transferred to sitting with modified independence using LLE to assist RLE off bed. Patient was given demo with instruction on sit <> stand transfer and gait training. Patient transferred to standing with CG assist and additional time and ambulated 50ft total first trial. Patient stood in bathroom with good balance to use urinal. Patient limited from ambulation >50ft at this time due to dizziness that worsened despite standing rest break.  Patient transferred to bed and was agreeable to 2nd trial after break and improved dizziness. Patient ambulated 20ft with  follow and reported he is unable to continue 2/2 dizziness. Patient performed stand step with RW to locked recliner where he was left resting with call bell by his side. Patient instructed to call for assistance if they needed to get up for any reason and denied need for further assistance. Notified RN Dany Bhatt Progression toward goals: 
[]      Improving appropriately and progressing toward goals [x]      Improving slowly and progressing toward goals 
[]      Not making progress toward goals and plan of care will be adjusted PLAN: 
Patient continues to benefit from skilled intervention to address the above impairments. Continue treatment per established plan of care. Discharge Recommendations:  Home Health with supervision Further Equipment Recommendations for Discharge:  rolling walker G-CODES:  
 
Mobility [id number] Current  CI= 1-19%   Goal  CI= 1-19%. The severity rating is based on the Level of Assistance required for Functional Mobility and ADLs. SUBJECTIVE:  
Patient stated I don't think I can do any more.  OBJECTIVE DATA SUMMARY:  
Critical Behavior: A&Ox4 Functional Mobility Training: 
Bed Mobility: 
 Supine to Sit: Modified independent Scooting: Modified independent Transfers: 
Sit to Stand: Contact guard assistance Stand to Sit: Stand-by assistance;Contact guard assistance Balance: 
Sitting: Intact; With support Standing: Impaired; With support Standing - Static: Good Standing - Dynamic : GoodAmbulation/Gait Training: 
Distance (ft): 70 Feet (ft)(50ft, seated break 2/2 dizziness, 20ft) Assistive Device: Walker, rolling Ambulation - Level of Assistance: Stand-by assistance;Contact guard assistance Gait Abnormalities: Antalgic;Decreased step clearance Right Side Weight Bearing: As tolerated Base of Support: Shift to left Speed/Carli: Slow Step Length: Left shortened;Right shortened Interventions: Verbal cues; Visual/Demos Therapeutic Exercises:  
Instructed to continue with exercises x10 on the hour while awake EXERCISE Sets Reps Active Active Assist  
Passive Self ROM Comments Ankle Pumps 1 10  [x] [] [] [] Bilaterally Quad Sets/Glut Sets   [] [] [] [] Hamstring Sets   [] [] [] [] Short Arc Quads   [] [] [] [] Heel Slides   [] [] [] [] Straight Leg Raises   [] [] [] [] Hip Abd/Add   [] [] [] [] Long Arc Quads 1 5 [] [x] [] [] RLE Seated Marching   [] [] [] []   
Standing Marching   [] [] [] [] Seated knee flexion 1 5 [] [] [] [] RLE Pain: 
Pt reports 7/10 pain or discomfort prior to treatment.   
Pt reports 8/10 pain or discomfort post treatment. Education: 
[x]         Bed mobility [x]         Transfers 
[x]         Ambulation 
[x]         Assistive device management 
[]         Stairs [x]         Body mechanics [x]         Position change 
[x]         Activity pacing/energy conservation 
[]         Other: 
 
Activity Tolerance:  
Patient tolerated activity fair; was limited this session by dizziness per patient. Did not attempt stair training or further mobility. Please refer to the flowsheet for vital signs taken during this treatment. After treatment:  
[x] Patient left in no apparent distress sitting up in chair 
[x] Patient left in no apparent distress in bed 
[x] Call bell left within reach 
[] Nursing notified 
[] Caregiver present 
[] Bed alarm activated Amanda Hartman, PT Time Calculation: 31 mins

## 2018-11-21 NOTE — PROGRESS NOTES
Mobility Intervention:  
 
  [] Pt dangled at edge of bed 
  [] Pt assisted OOB to bedside commode 
  [] Pt assisted OOB to chair 
  [] Pt ambulated to bathroom [x] Patient was ambulated in room/hallway Assistive Device Utilized:  
  
 [x] Rolling walker 
 [] Crutches 
 [] Straight Cane 
 [] Knee immobilizer [] IV pole After Mobilization:  
 
[] Patient left in no apparent distress sitting up in chair 
[x] Patient left in no apparent distress in bed 
[x] Call bell left within reach [x] SCDs on & machine turned on 
[x] Ice applied 
[] RN notified 
[] Caregiver present 
[] Bed alarm activated Reason patient not mobilized:  
 
 [] Patient refused 
 [] Nausea/vomiting 
 [] Low blood pressure 
 [] Drowsy/lethargic Pain Rating:  
 
[] 0 [] 1 Assistive Device:       
[] 2 [] 3 [] 4 
[] 5 [x] 6 Assistive Device:       
[] 7 
[] 8 
[] 9 [] 10 Comments:

## 2018-11-21 NOTE — PROGRESS NOTES
I have reviewed discharge instructions with the patient. The patient verbalized understanding. Education and teaching of Lovenox injections done. Return demonstration done.

## 2018-11-21 NOTE — PHYSICIAN ADVISORY
Letter of admission status determination Demi Frye Age: 69 y.o. MRN: [id number] [facility]:  [id number] Date of admission:  11/20/2018 I have reviewed this case as it involves a Medicare patient admitted as inpatient that has not been hospitalized for at least two midnights and has a discharge order placed. In view of patient's high operative risk related to multiple comorbidities (including CAD), I agree with the attending physician's decision to admit Demi Frye as Inpatient. Benedicto Marie MD, MYESHA, FACP, [facility] Physician Advisor [address] [place] [address],[facility] Floor [phone]

## 2018-11-21 NOTE — PROGRESS NOTES
2010-Pt stood to void 25 ml. Bladder-scan 475ml. Pt straight-cathed for 450 ml. 
 
2315-Pt voided 50 ml twice for total 100ml. Bladder-scan for 484ml. Pt requesting more time  To attempt to void before next straight-cath. Pt bladder is non-tender not distended. Will attempt to stand and void at midnight. 0015- Pt voided x2 for total of 200ml 0430-Pt stood to void,100ml. Bladder-scan post-void 107ml

## 2018-11-21 NOTE — ROUTINE PROCESS
Mobility Intervention:  
 
  [] Pt dangled at edge of bed 
  [] Pt assisted OOB to bedside commode 
  [] Pt assisted OOB to chair 
  [x] Pt ambulated to bathroom 
  [] Patient was ambulated in room/hallway Assistive Device Utilized:  
  
 [x] Rolling walker 
 [] Crutches 
 [] Straight Cane 
 [] Knee immobilizer [] IV pole After Mobilization:  
 
[] Patient left in no apparent distress sitting up in chair 
[x] Patient left in no apparent distress in bed 
[x] Call bell left within reach [x] SCDs on & machine turned on 
[x] Ice applied 
[] RN notified 
[] Caregiver present 
[] Bed alarm activated Reason patient not mobilized:  
 
 [] Patient refused 
 [] Nausea/vomiting 
 [] Low blood pressure 
 [] Drowsy/lethargic Pain Rating:  
 
[] 0 [] 1 Assistive Device:       
[] 2 [] 3 [x] 4 [] 5 
[] 6 Assistive Device:       
[] 7 
[] 8 
[] 9 [] 10 Comments:

## 2018-11-21 NOTE — DISCHARGE INSTRUCTIONS
Discharge Instructions for Total Knee Replacement Patients    · The dressing on your knee will be changed by the Home Health professional at the appropriate time. Keep your incision clean and dry. Do not apply any ointments to the incision. · You may shower as long as you keep you incision dry. When showering, leave your dressing on. The dressing is waterproof as long as the edges are sealed. · Notify your surgeon if:  · Your temperature is greater than 100.5  · You have pain not controlled by your pain medication  · You have increased drainage from your incision  · You have increased redness or swelling in your leg  · You have chest pain, shortness of breath, or any other problems    · Do your exercises as instructed by the home physical therapist.    · Bend and straighten your operative leg every hour. Walk once an hour during normal walking hours. · During periods of inactivity or rest, your leg should be elevated with a rolled towel or folded pillow under the heel to keep the knee straight. · Do not place anything under the knee. · Do not sit in a recliner chair with the footrest elevated. · You may use ice to your knee for 20-30 minutes after exercise and as needed. Do not apply the ice pack directly to your skin. Use a barrier such as your pant leg or a thin towel. · If you have BERNARDINO hose (the white support stockings), remove them at bedtime and re-apply the hose in the morning for the next 2 weeks. Take your blood thinner medication everyday at the same time. Do not skip a dose. Medication Name: Lovenox    Next dose due at: 8:00a.m    Your pain medication can be taken next:    Medication Name:Roxicodone    Next dose can be taken at: 5:00p.m    You can take Tylenol for Pain. Do not take more than 4000 mg in 24 hours. Tylenol 1000 mg    Next dose can be taken at 7:00p.m      Best of luck with your new knee and Linda Meyer for choosing the [address]!

## 2018-11-21 NOTE — DISCHARGE SUMMARY
Subjective: 69 y.o., male, 1 Day Post-Op, Procedure(s):  RIGHT TOTAL KNEE ARTHROPLASTY/BIOMET/2 SA'S/ADDUCTOR BLOCK     Admitting date:20 Nov 18    Date of Discharge/Transfer: 21 Nov 18    Physical Exam (day of transfer / discharge):21 Nov 18            History:  Past Medical History:   Diagnosis Date    AAA (abdominal aortic aneurysm) ([facility] [address] 03/2010    noted on CT (abdomen)    Abnormal LFT's 12/95, 04/16/03    Acute meniscal tear, lateral 01/2007    s/p arthorscopic surg (Dr. Artie Cintron)    Atypical chest pain 02/04/09    CAD (coronary artery disease), native coronary artery     Cardiac cath 04/13/2009    dLM 30%. mLAD 30%. oD2 30%. pCX 55% (FFR 0.95). mRCA 40%. RPDA 65% (FFR 0.86). EF 65%.  Cardiac echocardiogram 04/03/2007    EF 60%. No RWMA. Gr 1 DDfx. LAE. No significant valvular heart disease.  Cardiac nuclear imaging test, low risk 12/04/2015    Low risk. No ischemia or prior infarction. No WMA. EF 67%. Neg EKG on pharm stress test.    Cardiovascular aorto-iliac duplex 06/29/2015    AAA measures 3.2 x 3.1 cm Trv. (Prev 3.2 x 3.4 cm on 6/19/14). Aneurysm is infrarenal & fusiform w/complex intraluminal thrombus within. Mod bilateral common iliac stenosis.  Carotid duplex 03/16/2016    Mild < 50% BETHEL stenosis. Mod 32-62% LICA stenosis. Similar to study of 6/29/15.  Cholecystitis chronic 03/2008    s/p sue    Diverticulosis     Diverticulosis 9-28-15    DR. 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, left knee replacement    H/O: rotator cuff tear 09/25/08    History of colon polyps     Hypercholesterolemia     Hypertension 2009    Non-STEMI (non-ST elevated myocardial infarction) ([facility] [address] 04/13/2009    peripatellar fibrosis, left knee replacement    Patellar clunk syndrome 2014    Plantar fasciitis     Rhinitis     Right knee pain     Right shoulder pain     Solar purpura (HCC)     Tinea versicolor 2012    2.8 x 2.9cm infra-renal    Tubular adenoma 9-28-15    Vitamin D deficiency 2011       Allergies: Allergies   Allergen Reactions    Pollen Extracts Sneezing     Itchy eyes, runny nose         History of Present Illness:     Mr. Matty Rodriguez is a pleasant male who suffered a well documented radiographic history of end stage osteo arthritis right knee. Matty Rodriguez had failed all conservative measures as directed by Dr. Ida Dodge, and in light of Klaudia Gonzales progressive pain and difficultly performing his  necessary Activities of Daily Living, Dr. Justin Chen recommended a total right knee joint replacement. Social History     Occupational History    Not on file   Tobacco Use    Smoking status: Former Smoker     Packs/day: 0.50     Years: 20.00     Pack years: 10.00     Types: Cigarettes     Last attempt to quit: 12/10/1995     Years since quittin.9    Smokeless tobacco: Never Used   Substance and Sexual Activity    Alcohol use: Yes     Alcohol/week: 4.0 oz     Types: 7 Glasses of wine, 1 Shots of liquor per week     Comment: social    Drug use: No    Sexual activity: Not on file       Hospital Course:     Mr. Matty Rodriguez was admitted to [facility] on the morning of  under the care of Dr. Ida Dodge. he was taken to the operating theater under Dr. Kush Bearden direction, first assisted by trained surgical assistant's where he underwent a right knee total joint replacement. he tolerated the procedure well, and there were no intra operative complications. Post operatively he was transferred medically stable to post op holding.  After all safety criteria had been met during his immediate post op recovery phase he was transferred medical stable to [address] [place] to begin comprehensive physical and occupational therapies, restorative nursing and thrombo embolism (DVT/PE) prophylaxis. Mr. Jase Williamson post operative course progressed slow but directed. The focus throughout the post op period focused on range of motion and pain control. An acute post operative blood loss anemia was noted post operatively and there wasno need to transfuse packed red blood cells. Medically he remained stable through the remainder of the hospital stay. In light of the slow gains attained by Mr. Jase Williamson post operatively he is being recommended for a directed course of comprehensive PT / OT at a home. DISCHARGE PLAN:      The patient will be discharged to home    DIET:  Low Calorie High Protein Diet    NUTRITION: OTC Nutritional supplements, multivitamins      ACTIVITY: No lifting, Driving, or Strenuous exercise and No heavy lifting, pushing, pulling. Weight Bearing/Range of Motion Restrictions: Per Protocol    WOUND / INCISION CARE: Keep wound clean and dry, Reinforce dressing PRN and Ice to area for comfort Keep the current dressings on and in place. There is no need to change these current dressings. Dressings to please be changed by home nurse or nursing home staff each day. Keep all pets away from any wound present in order to prevent infection. PAIN CONTROL: Prescriptions written: On chart    PRECAUTIONS: Weight Bearing/Range of Motion per Restrictions:     VTE PROPHYLAXIS: with Lovenox 30mg SQ x 14 days. Brad Hose Stockings :Continue use at home. ANTIBIOTICS: None at this time. Physical and Occupational therapies:    will continue with attention to standard postop precautions / restrictions and below:    [ ] RUE [XX] RLE  [ ] LUE  [ ] LLE    [XX] Full WBAT   [ ] Partial WBAT   [ ] Toetouch WB   [ ] Non Weight Bearing:     Follow up:    [ ] 1 week  [XX] 10 days  [ ] Specific Date:       Per [facility] Protocol this  case was discussed with attending surgeon and reflects their orders as confirmed by their co signature.      Very Respectfully,        Amie Billings, APA, APC, MPAS  Surigical Physician Assistant-C  Department of [facility]   Contact Cell [phone]

## 2018-11-21 NOTE — HOME CARE
Discharge orders noted for today, [facility] will follow for SN,PT,Darcy knee protocol . EDDA VITALE.

## 2018-11-21 NOTE — PROGRESS NOTES
Problem: Self Care Deficits Care Plan (Adult) Goal: *Acute Goals and Plan of Care (Insert Text) Outcome: Resolved/Met Date Met: 11/21/18 Occupational Therapy EVALUATION/discharge Patient: Adonay Vogel (21 y.o. male) Date: 11/21/2018 Primary Diagnosis: Primary osteoarthritis of right knee [M17.11] Procedure(s) (LRB): 
RIGHT TOTAL KNEE ARTHROPLASTY/BIOMET/2 SA'S/ADDUCTOR BLOCK (Right) 1 Day Post-Op Precautions:   Fall, Aspiration ASSESSMENT AND RECOMMENDATIONS: 
Based on the objective data described below, the patient presents with pain in R knee and decreased safety and independence in ambulation with RW. Pt states he is in pain in R knee of 7/10 with pain medication administered one hour prior. Pt agreed to OT session. Pt son present who will be able to stay with dad when dc. Pt performed doffing and donning socks with MI and extra time. Pt then performed donning pants with ed on donning R LE first. Pt able to perform sit>stand with CGA and required CGA to uriel pants over hips due to decreased balance. Pt ambulated to commode with CGA and RW and performed toilet transfer with SBA and ed on hand placement on GB. When standing from commode pt stated he felt dizzy and educated on statically standing facing forward with eyes on one spot for dizziness to subside. Pt ambulated to arm chair with CGAa nd educated on slowly lowering self to chair. Pt educated on supervision from son for shower transfers and donning pants if feeling dizzy. Pt stated understanding. Pt provided with ice on R knee and set up with all needs in place. Skilled occupational therapy is not indicated at this time. Discharge Recommendations: None Further Equipment Recommendations for Discharge: N/A Barriers to Learning/Limitations: None Compensate with: visual, verbal, tactile, kinesthetic cues/model COMPLEXITY Eval Complexity: History: LOW Complexity : Brief history review ; Examination: LOW Complexity : 1-3 performance deficits relating to physical, cognitive , or psychosocial skils that result in activity limitations and / or participation restrictions ; Decision Making:MEDIUM Complexity : Patient may present with comorbidities that affect occupational performnce. Miniml to moderate modification of tasks or assistance (eg, physical or verbal ) with assesment(s) is necessary to enable patient to complete evaluation  Assessment: low Complexity G-CODES:  
 
Self Care  Current  CI= 1-19%  Goal  CI= 1-19%  D/C  CI= 1-19%. The severity rating is based on the Level of Assistance required for Functional Mobility and ADLs. SUBJECTIVE:  
Patient stated I have a chair lift for the stairs.  OBJECTIVE DATA SUMMARY:  
 
Past Medical History:  
Diagnosis Date  AAA (abdominal aortic aneurysm) ([facility] [address] 03/2010  
 noted on CT (abdomen)  Abnormal LFT's 12/95, 04/16/03  Acute meniscal tear, lateral 01/2007  
 s/p arthorscopic surg (Dr. Oh Pringle)  Atypical chest pain 02/04/09  CAD (coronary artery disease), native coronary artery  Cardiac cath 04/13/2009 dLM 30%. mLAD 30%. oD2 30%. pCX 55% (FFR 0.95). mRCA 40%. RPDA 65% (FFR 0.86). EF 65%.  Cardiac echocardiogram 04/03/2007 EF 60%. No RWMA. Gr 1 DDfx. LAE. No significant valvular heart disease.  Cardiac nuclear imaging test, low risk 12/04/2015 Low risk. No ischemia or prior infarction. No WMA. EF 67%. Neg EKG on pharm stress test.  
 Cardiovascular aorto-iliac duplex 06/29/2015 AAA measures 3.2 x 3.1 cm Trv. (Prev 3.2 x 3.4 cm on 6/19/14). Aneurysm is infrarenal & fusiform w/complex intraluminal thrombus within. Mod bilateral common iliac stenosis.  Carotid duplex 03/16/2016 Mild < 50% BETHEL stenosis. Mod 23-42% LICA stenosis. Similar to study of 6/29/15.  Cholecystitis chronic 03/2008  
 s/p sue  Diverticulosis  Diverticulosis 9-28-15   
 DJD (degenerative joint disease) of lumbar spine 12/18/01  
 CLARKE (Dyspnea on Exertion) 03/2007  
 echo and stress WNL  ED (erectile dysfunction) 11/11/04  Fatty liver 4/2012  
 noted on CT (abdomen)  Fibrosis of knee joint March 2014  
 peripatellar fibrosis, left knee replacement  H/O: rotator cuff tear 09/25/08  History of colon polyps  Hypercholesterolemia  Hypertension 2009  Non-STEMI (non-ST elevated myocardial infarction) ([address] 04/13/2009  
 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  
 2.8 x 2.9cm infra-renal  
 Tubular adenoma 9-28-15  Vitamin D deficiency 4/13/2011 Past Surgical History:  
Procedure Laterality Date  ENDOSCOPY, COLON, DIAGNOSTIC    
 normal per patient; Evaristo Castellanosls  HX ARTHROPLASTY  04/05/10 [place]-left knee  HX CATARACT REMOVAL Bilateral   
 November 2014  HX CHOLECYSTECTOMY  03/2008  
 chronic cholecystitis  HX COLONOSCOPY  9-28-15 [id number] \[facility]\"" L inguinal  
 HX KNEE ARTHROSCOPY  01/24/07  
 left knee  HX KNEE ARTHROSCOPY Left 4/16/2014  
 peripatellar debridement  HX KNEE REPLACEMENT Left 01/29/2013  
 [facility]  HX MOHS PROCEDURES  11/2008 [facility]  HX TONSILLECTOMY [address]  HX WISDOM TEETH EXTRACTION    
 x4  
 ID ANESTH,SURGERY OF SHOULDER Prior Level of Function/Home Situation: PTA pt Mi to I in self care and mobility Home Situation Home Environment: Private residence # Steps to Enter: 3 One/Two Story Residence: Two story # of Interior Steps: 15 Interior Rails: Right Lift Chair Available: Yes Living Alone: No 
Support Systems: Spouse/Significant Other/Partner, Child(lea), Friends \ neighbors Patient Expects to be Discharged to[de-identified] Private residence Current DME Used/Available at Home: Adaptive bathing aides, Cane, straight Tub or Shower Type: Shower [x]     Right hand dominant   []     Left hand dominantCognitive/Behavioral Status: 
Neurologic State: Alert Orientation Level: Oriented X4 Cognition: Appropriate decision making Safety/Judgement: Fall prevention;Home safety Skin: intact BUEs Edema: none noted BUEs Coordination: 
Coordination: Within functional limits(BUEs) Fine Motor Skills-Upper: Left Intact; Right Intact Gross Motor Skills-Upper: Left Intact; Right Intact Balance: 
Sitting: Intact; With support Standing: Impaired; With support Standing - Static: Good Standing - Dynamic : Good Strength: 
Strength: Within functional limits(BUEs) Tone & Sensation: 
Tone: Normal(BUEs) Sensation: Intact(BUEs) Range of Motion: 
AROM: Within functional limits(BUEs) Functional Mobility and Transfers for ADLs: 
Bed Mobility: 
Supine to Sit: Modified independent Scooting: Modified independent Transfers: 
Sit to Stand: Contact guard assistance Toilet Transfer : Stand-by assistance ADL Assessment: 
Feeding: Independent Oral Facial Hygiene/Grooming: Independent Bathing: Stand-by assistance Upper Body Dressing: Independent Lower Body Dressing: Contact guard assistance Toileting: Stand by assistance ADL Intervention: 
  Cognitive Retraining Safety/Judgement: Fall prevention;Home safety Pain: 
Pt reports 7/10 pain or discomfort prior to treatment.   
Pt reports 7/10 pain or discomfort post treatment. Activity Tolerance:  
Fair Please refer to the flowsheet for vital signs taken during this treatment. After treatment:  
[x]  Patient left in no apparent distress sitting up in chair 
[]  Patient left in no apparent distress in bed 
[x]  Call bell left within reach 
[]  Nursing notified 
[]  Caregiver present 
[]  Bed alarm activated COMMUNICATION/EDUCATION:  
Communication/Collaboration: [x]      Home safety education was provided and the patient/caregiver indicated understanding. [x]      Patient/family have participated as able and agree with findings and recommendations. []      Patient is unable to participate in plan of care at this time. Chiqui Avila OT Time Calculation: 25 mins

## 2018-11-22 ENCOUNTER — HOME CARE VISIT (OUTPATIENT)
Dept: SCHEDULING | Facility: HOME HEALTH | Age: 78
End: 2018-11-22
Payer: MEDICARE

## 2018-11-22 VITALS
SYSTOLIC BLOOD PRESSURE: 100 MMHG | TEMPERATURE: 99.8 F | DIASTOLIC BLOOD PRESSURE: 60 MMHG | HEART RATE: 107 BPM | OXYGEN SATURATION: 93 %

## 2018-11-22 VITALS
TEMPERATURE: 98.8 F | OXYGEN SATURATION: 93 % | SYSTOLIC BLOOD PRESSURE: 129 MMHG | DIASTOLIC BLOOD PRESSURE: 59 MMHG | RESPIRATION RATE: 18 BRPM | HEART RATE: 93 BPM

## 2018-11-22 PROCEDURE — G0151 HHCP-SERV OF PT,EA 15 MIN: HCPCS

## 2018-11-22 PROCEDURE — 3331090002 HH PPS REVENUE DEBIT

## 2018-11-22 PROCEDURE — G0299 HHS/HOSPICE OF RN EA 15 MIN: HCPCS

## 2018-11-22 PROCEDURE — 3331090001 HH PPS REVENUE CREDIT

## 2018-11-22 PROCEDURE — 400013 HH SOC

## 2018-11-23 ENCOUNTER — HOME CARE VISIT (OUTPATIENT)
Dept: HOME HEALTH SERVICES | Facility: HOME HEALTH | Age: 78
End: 2018-11-23
Payer: MEDICARE

## 2018-11-23 ENCOUNTER — PATIENT OUTREACH (OUTPATIENT)
Dept: FAMILY MEDICINE CLINIC | Age: 78
End: 2018-11-23

## 2018-11-23 ENCOUNTER — HOME CARE VISIT (OUTPATIENT)
Dept: SCHEDULING | Facility: HOME HEALTH | Age: 78
End: 2018-11-23
Payer: MEDICARE

## 2018-11-23 PROCEDURE — A6255 ABSORPT DRG >16<=48 IN W/BDR: HCPCS

## 2018-11-23 PROCEDURE — 3331090001 HH PPS REVENUE CREDIT

## 2018-11-23 PROCEDURE — G0157 HHC PT ASSISTANT EA 15: HCPCS

## 2018-11-23 PROCEDURE — 3331090002 HH PPS REVENUE DEBIT

## 2018-11-23 NOTE — PROGRESS NOTES
Hospital Discharge Follow-Up      Date/Time:  2018 2:14 PM    Patient was admitted to [facility] on 18 and discharged on 18 for right total knee replacement. The physician discharge summary was available at the time of outreach. Patient was contacted within 1 business days of discharge. Top Challenges reviewed with the provider   Pain - rates pain 8/10 right now just finished working with [facility] PT patient taking 5 mg oxycodone at a time can take as much as 15 mg at a time. NN recommended that he try 2 tabs or 10 mg next scheduled PT session  Mobility - working with PT and OT at home to restore mobility  Incision - clean dry and intact with Opsite post op dressing        Method of communication with provider :chart routing    Inpatient RRAT score: Medium Risk            15       Total Score        3 Has Seen PCP in Last 6 Months (Yes=3, No=0)    2 . Living with Significant Other. Assisted Living. LTAC. SNF. or   Rehab    10 Charlson Comorbidity Score (Age + Comorbid Conditions)        Criteria that do not apply:    Patient Length of Stay (>5 days = 3)    IP Visits Last 12 Months (1-3=4, 4=9, >4=11)    Pt. Coverage (Medicare=5 , Medicaid, or Self-Pay=4)      Was this a readmission? no   Patient stated reason for the readmission: n/a    Nurse Navigator (NN) contacted the patient by telephone to perform post hospital discharge assessment. Verified name and  with patient as identifiers. Provided introduction to self, and explanation of the Nurse Navigator role. Reviewed discharge instructions and red flags with patient who verbalized understanding. Patient given an opportunity to ask questions and does not have any further questions or concerns at this time. The patient agrees to contact the PCP office for questions related to their healthcare. NN provided contact information for future reference. Disease Specific:   N/A    Summary of patient's top problems:  1.  Pain - rates pain 8/10 right now just finished working with [place] PT patient taking 5 mg oxycodone at a time can take as much as 15 mg at a time. NN recommended that he try 2 tabs or 10 mg next scheduled PT session  2. Mobility - working with PT and OT at home to restore mobility  3. Incision - clean dry and intact with Opsite post op dressing     Home Health orders at discharge: PT, OT, [address]: BS Isiah Guillen  Date of initial visit: 11/23/2018    Barriers to care? no barriers to care noted    Advance Care Planning:   Does patient have an Advance Directive:  not on file     Medication(s):   New Medications at Discharge:   oxycodone HCl 5-15 mg Oral EVERY 4 HOURS AS NEEDED  docusate sodium 100 mg Oral 2 TIMES DAILY  enoxaparin sodium 30 mg SubCUTAneous EVERY 24 HOURS   Changed Medications at Discharge: none  Discontinued Medications at Discharge: none    Medication reconciliation was performed with patient, who verbalizes understanding of administration of home medications. There were no barriers to obtaining medications identified at this time. Referral to Pharm D needed: no     Current Outpatient Medications   Medication Sig    docusate sodium (COLACE) 100 mg capsule Take 1 Cap by mouth two (2) times a day for 90 days.  oxyCODONE IR (ROXICODONE) 5 mg immediate release tablet Take 1-3 Tabs by mouth every four (4) hours as needed. Max Daily Amount: 90 mg.    enoxaparin (LOVENOX) 30 mg/0.3 mL injection 0.3 mL by SubCUTAneous route every twenty-four (24) hours for 14 doses.  polyethylene glycol (MIRALAX) 17 gram packet Take 1 Packet by mouth daily.  tamsulosin (FLOMAX) 0.4 mg capsule Take 1 Cap by mouth daily.  nitroglycerin (NITROSTAT) 0.4 mg SL tablet 1 Tab by SubLINGual route every five (5) minutes as needed.  ezetimibe (ZETIA) 10 mg tablet Take 1 Tab by mouth daily.  brinzolamide-brimonidine (SIMBRINZA) 1-0.2 % drps Apply 1 Drop to eye two (2) times a day.     niacin (NIASPAN) 1,000 mg Tb24 tab Take 1 Tab by mouth daily.  amLODIPine (NORVASC) 10 mg tablet TAKE 1 TABLET BY MOUTH DAILY    rosuvastatin (CRESTOR) 20 mg tablet Take 1 Tab by mouth nightly.  coenzyme q10 (CO Q-10) 10 mg cap Take  by mouth.  gabapentin (NEURONTIN) 300 mg capsule 300 mg three (3) times daily.  cholecalciferol, vitamin D3, (VITAMIN D3) 2,000 unit tab Take  by mouth.  sildenafil citrate (VIAGRA) 100 mg tablet Take 1 Tab by mouth as needed.  omega 3-dha-epa-fish oil 100-160-1,000 mg cap Take 1,000 mg by mouth daily. (Patient taking differently: Take 2,000 mg by mouth daily.)    CALCIUM CARB/VIT D3/MINERALS (CALTRATE PLUS PO) Take 1 Tab by mouth daily.  FLUZONE HIGH-DOSE 2018-19, PF, syrg injection ADM 0.5ML IM UTD     No current facility-administered medications for this visit. There are no discontinued medications.     [facility] follow up appointment(s):   Future Appointments   Date Time Provider Reza Bose   11/24/2018 To Be Determined [facility]    11/25/2018 To Be Determined [facility]   11/26/2018 To Be Determined [facility]   11/26/2018 To Be Determined FREDI Call   11/27/2018 [facility]   11/28/2018 To Be Determined Ambrocio Salas PT [facility]   11/28/2018 To Be Determined FREDI Call   11/29/2018 [facility]   11/30/2018 To Be Determined Naomi [facility]   11/30/2018  9:45 AM Brillhart, Artemus Kawasaki, PA-C [facility] EVELYN [facility]   12/1/2018 To Be Determined Naomi  Yan Dudley   12/1/2018 To Be Determined FREDI Call   12/3/2018 To Be Determined Maura Dudley   12/3/2018  9:45 AM Palmira Harkins MD [facility] [address]   12/5/2018 To Be Determined Nico Albright Beverly Anthony [facility] HR [facility]   12/7/2018 To Be Determined Antonio Delgado PT [facility] HR [facility]   12/26/2018  9:30 AM Otto Vazquez MD [facility] None   1/22/2019  9:15 AM Otto Vazquez MD [facility] None   5/6/2019 10:00 AM Alpa Hopson MD [address] [place] [address]   8/25/2020  8:00 AM [facility] 1 2VV EVELYN SCHED   8/25/2020  9:00 AM [facility] IMAGING 1 2VV EVELYN SCHED   8/25/2020 11:30 AM RAHAT Domingo 2VV EVELYN SCHED      Non-[facility] follow up appointment(s): none  [facility]:  out of service area       Goals      Attends follow-up appointments as directed.  Prevent complications post hospitalization. Patient will attend all doctors appointments as scheduled  Nurse Navigator will contact patient weekly for at least 4 weeks after discharge         Returns to baseline activity level.       Patient will work with home health PT to regain mobility and strength in effected leg

## 2018-11-24 ENCOUNTER — HOME CARE VISIT (OUTPATIENT)
Dept: SCHEDULING | Facility: HOME HEALTH | Age: 78
End: 2018-11-24
Payer: MEDICARE

## 2018-11-24 VITALS
SYSTOLIC BLOOD PRESSURE: 128 MMHG | TEMPERATURE: 99 F | OXYGEN SATURATION: 94 % | HEART RATE: 94 BPM | DIASTOLIC BLOOD PRESSURE: 68 MMHG

## 2018-11-24 PROCEDURE — 3331090001 HH PPS REVENUE CREDIT

## 2018-11-24 PROCEDURE — 3331090002 HH PPS REVENUE DEBIT

## 2018-11-24 PROCEDURE — G0157 HHC PT ASSISTANT EA 15: HCPCS

## 2018-11-25 ENCOUNTER — HOME CARE VISIT (OUTPATIENT)
Dept: SCHEDULING | Facility: HOME HEALTH | Age: 78
End: 2018-11-25
Payer: MEDICARE

## 2018-11-25 VITALS
SYSTOLIC BLOOD PRESSURE: 128 MMHG | HEART RATE: 97 BPM | DIASTOLIC BLOOD PRESSURE: 74 MMHG | OXYGEN SATURATION: 93 % | TEMPERATURE: 97.8 F

## 2018-11-25 PROCEDURE — G0157 HHC PT ASSISTANT EA 15: HCPCS

## 2018-11-25 PROCEDURE — 3331090002 HH PPS REVENUE DEBIT

## 2018-11-25 PROCEDURE — 3331090001 HH PPS REVENUE CREDIT

## 2018-11-26 ENCOUNTER — HOME CARE VISIT (OUTPATIENT)
Dept: SCHEDULING | Facility: HOME HEALTH | Age: 78
End: 2018-11-26
Payer: MEDICARE

## 2018-11-26 ENCOUNTER — HOME CARE VISIT (OUTPATIENT)
Dept: HOME HEALTH SERVICES | Facility: HOME HEALTH | Age: 78
End: 2018-11-26
Payer: MEDICARE

## 2018-11-26 VITALS
HEART RATE: 76 BPM | TEMPERATURE: 98.4 F | RESPIRATION RATE: 18 BRPM | DIASTOLIC BLOOD PRESSURE: 58 MMHG | OXYGEN SATURATION: 99 % | SYSTOLIC BLOOD PRESSURE: 106 MMHG

## 2018-11-26 VITALS
TEMPERATURE: 99.3 F | DIASTOLIC BLOOD PRESSURE: 60 MMHG | SYSTOLIC BLOOD PRESSURE: 110 MMHG | OXYGEN SATURATION: 93 % | HEART RATE: 106 BPM

## 2018-11-26 VITALS
HEART RATE: 84 BPM | SYSTOLIC BLOOD PRESSURE: 120 MMHG | TEMPERATURE: 98.7 F | OXYGEN SATURATION: 98 % | DIASTOLIC BLOOD PRESSURE: 60 MMHG

## 2018-11-26 DIAGNOSIS — G89.18 ACUTE POST-OPERATIVE PAIN: ICD-10-CM

## 2018-11-26 DIAGNOSIS — G89.18 ACUTE POST-OPERATIVE PAIN: Primary | ICD-10-CM

## 2018-11-26 PROCEDURE — G0157 HHC PT ASSISTANT EA 15: HCPCS

## 2018-11-26 PROCEDURE — G0299 HHS/HOSPICE OF RN EA 15 MIN: HCPCS

## 2018-11-26 PROCEDURE — 3331090001 HH PPS REVENUE CREDIT

## 2018-11-26 PROCEDURE — 3331090002 HH PPS REVENUE DEBIT

## 2018-11-26 RX ORDER — OXYCODONE HYDROCHLORIDE 5 MG/1
5-15 TABLET ORAL
Qty: 28 TAB | Refills: 0 | Status: SHIPPED | OUTPATIENT
Start: 2018-11-26 | End: 2019-06-12 | Stop reason: ALTCHOICE

## 2018-11-26 RX ORDER — OXYCODONE HYDROCHLORIDE 5 MG/1
5-15 TABLET ORAL
Qty: 28 TAB | Refills: 0 | Status: SHIPPED | OUTPATIENT
Start: 2018-11-26 | End: 2018-11-26 | Stop reason: SDUPTHER

## 2018-11-26 NOTE — TELEPHONE ENCOUNTER
Date of Surgery: 11/20/2018 Right TKA   Last Visit: 11/13/2018 with RAHAT Huff  Next Appointment: 11/30/2018 with RAHAT Huff  Previous Refill Encounter(s): 11/21/2018 per RAHAT Huff #[id number]    Requested Prescriptions     Pending Prescriptions Disp Refills    oxyCODONE IR (ROXICODONE) 5 mg immediate release tablet 28 Tab 0     Sig: Take 1-3 Tabs by mouth every four (4) hours as needed for Pain. Max Daily Amount: 90 mg.

## 2018-11-26 NOTE — TELEPHONE ENCOUNTER
Contacted pt at his home number. Pt informed that prescription was available to be picked up at the [facility] office. Pt thanked writer for call and information.

## 2018-11-27 ENCOUNTER — HOME CARE VISIT (OUTPATIENT)
Dept: SCHEDULING | Facility: HOME HEALTH | Age: 78
End: 2018-11-27
Payer: MEDICARE

## 2018-11-27 VITALS
DIASTOLIC BLOOD PRESSURE: 78 MMHG | TEMPERATURE: 98.3 F | SYSTOLIC BLOOD PRESSURE: 123 MMHG | HEART RATE: 84 BPM | RESPIRATION RATE: 17 BRPM | OXYGEN SATURATION: 98 %

## 2018-11-27 PROCEDURE — G0157 HHC PT ASSISTANT EA 15: HCPCS

## 2018-11-27 PROCEDURE — 3331090001 HH PPS REVENUE CREDIT

## 2018-11-27 PROCEDURE — 3331090002 HH PPS REVENUE DEBIT

## 2018-11-28 ENCOUNTER — HOME CARE VISIT (OUTPATIENT)
Dept: SCHEDULING | Facility: HOME HEALTH | Age: 78
End: 2018-11-28
Payer: MEDICARE

## 2018-11-28 VITALS
SYSTOLIC BLOOD PRESSURE: 117 MMHG | TEMPERATURE: 97.5 F | DIASTOLIC BLOOD PRESSURE: 80 MMHG | RESPIRATION RATE: 18 BRPM | HEART RATE: 70 BPM | OXYGEN SATURATION: 98 %

## 2018-11-28 PROCEDURE — G0157 HHC PT ASSISTANT EA 15: HCPCS

## 2018-11-28 PROCEDURE — 3331090002 HH PPS REVENUE DEBIT

## 2018-11-28 PROCEDURE — G0299 HHS/HOSPICE OF RN EA 15 MIN: HCPCS

## 2018-11-28 PROCEDURE — 3331090001 HH PPS REVENUE CREDIT

## 2018-11-29 ENCOUNTER — HOME CARE VISIT (OUTPATIENT)
Dept: SCHEDULING | Facility: HOME HEALTH | Age: 78
End: 2018-11-29
Payer: MEDICARE

## 2018-11-29 PROCEDURE — G0157 HHC PT ASSISTANT EA 15: HCPCS

## 2018-11-29 PROCEDURE — 3331090002 HH PPS REVENUE DEBIT

## 2018-11-29 PROCEDURE — 3331090001 HH PPS REVENUE CREDIT

## 2018-11-30 ENCOUNTER — OFFICE VISIT (OUTPATIENT)
Dept: ORTHOPEDIC SURGERY | Facility: CLINIC | Age: 78
End: 2018-11-30

## 2018-11-30 ENCOUNTER — HOME CARE VISIT (OUTPATIENT)
Dept: SCHEDULING | Facility: HOME HEALTH | Age: 78
End: 2018-11-30
Payer: MEDICARE

## 2018-11-30 VITALS
RESPIRATION RATE: 18 BRPM | HEART RATE: 76 BPM | HEIGHT: 72 IN | DIASTOLIC BLOOD PRESSURE: 57 MMHG | SYSTOLIC BLOOD PRESSURE: 113 MMHG | OXYGEN SATURATION: 99 % | BODY MASS INDEX: 25.57 KG/M2 | TEMPERATURE: 96.8 F

## 2018-11-30 DIAGNOSIS — Z47.1 AFTERCARE FOLLOWING KNEE JOINT REPLACEMENT SURGERY, UNSPECIFIED LATERALITY: Primary | ICD-10-CM

## 2018-11-30 DIAGNOSIS — Z96.659 AFTERCARE FOLLOWING KNEE JOINT REPLACEMENT SURGERY, UNSPECIFIED LATERALITY: Primary | ICD-10-CM

## 2018-11-30 DIAGNOSIS — M25.561 ACUTE PAIN OF RIGHT KNEE: ICD-10-CM

## 2018-11-30 DIAGNOSIS — Z48.02: ICD-10-CM

## 2018-11-30 PROCEDURE — G0157 HHC PT ASSISTANT EA 15: HCPCS

## 2018-11-30 PROCEDURE — 3331090001 HH PPS REVENUE CREDIT

## 2018-11-30 PROCEDURE — 3331090002 HH PPS REVENUE DEBIT

## 2018-11-30 RX ORDER — TRAMADOL HYDROCHLORIDE 50 MG/1
50 TABLET ORAL
Qty: 50 TAB | Refills: 0 | Status: SHIPPED | OUTPATIENT
Start: 2018-11-30 | End: 2019-06-12 | Stop reason: ALTCHOICE

## 2018-11-30 NOTE — PROGRESS NOTES
HISTORY OF PRESENT ILLNESS:  Shannon Bolanos returns. He is now 10 days status post his primary right total knee replacement under the care of Dr. Fabio Saravia. He is doing well today. He is at home. He is continuing physical therapies. His pain is well managed. He is alternating between a wheelchair and a walker, as well as a cane around the house for ambulation assistance. The patient has continued his Lovenox for thromboembolism prophylaxis. PHYSICAL EXAM:  On exam today, the patients anterior right knee over the midline in a cranial/caudal plane reveals an intact, 23-centimeter surgical incision healing nicely. Skin staples are noted. The wound borders are well approximated. There is no erythema, no warmth, and no ecchymosis. 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 This is generally unchanged from just following surgery. The calf is nontender today. Distal sensation is intact fully to the right lower extremity. PROCEDURE:  Using clean technique, all staples were removed today with no complications. Sterile strips were placed and a sterile top cover. PLAN:   The patient may continue physical therapy as previously ordered. He is to advance away from utilization of the wheelchair and primarily focus on the four-post rolling walker and/or cane. We are going to see him back in about two weeks. X-ray at next office visit. He is to continue his Lovenox through Tuesday, December 4, 2018, and on December 5, 2018, begin 81 mg of Aspirin orally. Today, all his questions were answered to his satisfaction.

## 2018-12-01 ENCOUNTER — HOME CARE VISIT (OUTPATIENT)
Dept: SCHEDULING | Facility: HOME HEALTH | Age: 78
End: 2018-12-01
Payer: MEDICARE

## 2018-12-01 VITALS — OXYGEN SATURATION: 95 % | DIASTOLIC BLOOD PRESSURE: 60 MMHG | SYSTOLIC BLOOD PRESSURE: 118 MMHG | HEART RATE: 89 BPM

## 2018-12-01 PROCEDURE — 3331090001 HH PPS REVENUE CREDIT

## 2018-12-01 PROCEDURE — 3331090002 HH PPS REVENUE DEBIT

## 2018-12-01 PROCEDURE — G0157 HHC PT ASSISTANT EA 15: HCPCS

## 2018-12-02 VITALS
DIASTOLIC BLOOD PRESSURE: 64 MMHG | HEART RATE: 65 BPM | SYSTOLIC BLOOD PRESSURE: 121 MMHG | HEART RATE: 62 BPM | OXYGEN SATURATION: 97 % | SYSTOLIC BLOOD PRESSURE: 124 MMHG | DIASTOLIC BLOOD PRESSURE: 62 MMHG | OXYGEN SATURATION: 98 % | TEMPERATURE: 98 F | RESPIRATION RATE: 17 BRPM | TEMPERATURE: 98.2 F | RESPIRATION RATE: 18 BRPM

## 2018-12-02 PROCEDURE — 3331090002 HH PPS REVENUE DEBIT

## 2018-12-02 PROCEDURE — 3331090001 HH PPS REVENUE CREDIT

## 2018-12-03 ENCOUNTER — HOME CARE VISIT (OUTPATIENT)
Dept: SCHEDULING | Facility: HOME HEALTH | Age: 78
End: 2018-12-03
Payer: MEDICARE

## 2018-12-03 PROCEDURE — G0157 HHC PT ASSISTANT EA 15: HCPCS

## 2018-12-03 PROCEDURE — 3331090002 HH PPS REVENUE DEBIT

## 2018-12-03 PROCEDURE — 3331090001 HH PPS REVENUE CREDIT

## 2018-12-04 ENCOUNTER — HOME CARE VISIT (OUTPATIENT)
Dept: SCHEDULING | Facility: HOME HEALTH | Age: 78
End: 2018-12-04
Payer: MEDICARE

## 2018-12-04 ENCOUNTER — TELEPHONE (OUTPATIENT)
Dept: ORTHOPEDIC SURGERY | Facility: CLINIC | Age: 78
End: 2018-12-04

## 2018-12-04 VITALS
OXYGEN SATURATION: 98 % | RESPIRATION RATE: 18 BRPM | HEART RATE: 67 BPM | OXYGEN SATURATION: 97 % | SYSTOLIC BLOOD PRESSURE: 125 MMHG | HEART RATE: 73 BPM | DIASTOLIC BLOOD PRESSURE: 70 MMHG | TEMPERATURE: 98.2 F | DIASTOLIC BLOOD PRESSURE: 77 MMHG | SYSTOLIC BLOOD PRESSURE: 122 MMHG | TEMPERATURE: 98.1 F | RESPIRATION RATE: 17 BRPM

## 2018-12-04 DIAGNOSIS — Z47.1 AFTERCARE FOLLOWING KNEE JOINT REPLACEMENT SURGERY, UNSPECIFIED LATERALITY: Primary | ICD-10-CM

## 2018-12-04 DIAGNOSIS — Z96.651 S/P TOTAL KNEE REPLACEMENT, RIGHT: Primary | ICD-10-CM

## 2018-12-04 DIAGNOSIS — Z96.659 AFTERCARE FOLLOWING KNEE JOINT REPLACEMENT SURGERY, UNSPECIFIED LATERALITY: Primary | ICD-10-CM

## 2018-12-04 PROCEDURE — G0157 HHC PT ASSISTANT EA 15: HCPCS

## 2018-12-04 PROCEDURE — 3331090001 HH PPS REVENUE CREDIT

## 2018-12-04 PROCEDURE — 3331090002 HH PPS REVENUE DEBIT

## 2018-12-04 NOTE — TELEPHONE ENCOUNTER
Order placed for OP PT per Evert Quesada PA-C and faxed to [facility].

## 2018-12-05 PROCEDURE — 3331090001 HH PPS REVENUE CREDIT

## 2018-12-05 PROCEDURE — 3331090002 HH PPS REVENUE DEBIT

## 2018-12-06 ENCOUNTER — HOME CARE VISIT (OUTPATIENT)
Dept: SCHEDULING | Facility: HOME HEALTH | Age: 78
End: 2018-12-06
Payer: MEDICARE

## 2018-12-06 VITALS
DIASTOLIC BLOOD PRESSURE: 72 MMHG | TEMPERATURE: 97.8 F | OXYGEN SATURATION: 97 % | HEART RATE: 71 BPM | SYSTOLIC BLOOD PRESSURE: 122 MMHG

## 2018-12-06 PROCEDURE — 3331090001 HH PPS REVENUE CREDIT

## 2018-12-06 PROCEDURE — 3331090002 HH PPS REVENUE DEBIT

## 2018-12-06 PROCEDURE — G0151 HHCP-SERV OF PT,EA 15 MIN: HCPCS

## 2018-12-07 PROCEDURE — 3331090001 HH PPS REVENUE CREDIT

## 2018-12-07 PROCEDURE — 3331090002 HH PPS REVENUE DEBIT

## 2018-12-08 PROCEDURE — 3331090002 HH PPS REVENUE DEBIT

## 2018-12-08 PROCEDURE — 3331090001 HH PPS REVENUE CREDIT

## 2018-12-09 PROCEDURE — 3331090002 HH PPS REVENUE DEBIT

## 2018-12-09 PROCEDURE — 3331090001 HH PPS REVENUE CREDIT

## 2018-12-10 ENCOUNTER — HOSPITAL ENCOUNTER (OUTPATIENT)
Dept: PHYSICAL THERAPY | Age: 78
Discharge: HOME | End: 2018-12-10
Payer: MEDICARE

## 2018-12-10 PROCEDURE — G8978 MOBILITY CURRENT STATUS: HCPCS

## 2018-12-10 PROCEDURE — G8979 MOBILITY GOAL STATUS: HCPCS

## 2018-12-10 PROCEDURE — 97161 PT EVAL LOW COMPLEX 20 MIN: CPT

## 2018-12-10 PROCEDURE — 97110 THERAPEUTIC EXERCISES: CPT

## 2018-12-10 PROCEDURE — 97016 VASOPNEUMATIC DEVICE THERAPY: CPT

## 2018-12-10 NOTE — PROGRESS NOTES
PT DAILY TREATMENT NOTE - [facility]  Patient Name: Po Medina Date:12/10/2018 : 1940 [x]  Patient  Verified Payor: [place] MEDICARE / Plan: Chata Bains [facility] / Product Type: Medicare / In time:1:15  Out time:1:50 Total Treatment Time (min): 35 Total Timed Codes (min): 8 
1:1 Treatment Time ( only): 35 Visit #: 1 of 12 Treatment Area: Right knee pain [M25.561] SUBJECTIVE Pain Level (0-10 scale): 3 Any medication changes, allergies to medications, adverse drug reactions, diagnosis change, or new procedure performed?: [x] No    [] Yes (see summary sheet for update) Subjective functional status/changes:   [] No changes reported OBJECTIVE Modality rationale: decrease edema, decrease inflammation and decrease pain to improve the patients ability to improve functional mobility Min Type Additional Details  
 [] Estim:  []Unatt       []IFC  []Premod []Other:  []w/ice   []w/heat Position: Location:  
 [] Estim: []Att    []TENS instruct  []NMES []Other:  []w/US   []w/ice   []w/heat Position: Location:  
 []  Traction: [] Cervical       []Lumbar 
                     [] Prone          []Supine []Intermittent   []Continuous Lbs: 
[] before manual 
[] after manual  
 []  Ultrasound: []Continuous   [] Pulsed []1MHz   []3MHz W/cm2: 
Location:  
 []  Iontophoresis with dexamethasone Location: [] Take home patch  
[] In clinic  
 []  Ice     []  heat 
[]  Ice massage 
[]  Laser  
[]  Anodyne Position: Location:  
 []  Laser with stim 
[]  Other:  Position: Location:  
10 [x]  Vasopneumatic Device Pressure:       [x] lo [] med [] hi  
Temperature: [x] lo [] med [] hi  
[] Skin assessment post-treatment:  []intact []redness- no adverse reaction 
  []redness  adverse reaction:  
 
17 min [x]Eval                  []Re-Eval  
 
 
 8 min Therapeutic Exercise:  [] See flow sheet : HEP Rationale: increase ROM and increase strength to improve the patients ability to perform functional tasks With 
 [] TE 
 [] TA 
 [] neuro 
 [] other: Patient Education: [x] Review HEP [] Progressed/Changed HEP based on:  
[] positioning   [] body mechanics   [] transfers   [] heat/ice application   
[] other:   
 
Other Objective/Functional Measures:    
 
Pain Level (0-10 scale) post treatment: 2-3 
 
ASSESSMENT/Changes in Function:  Per POC Patient will continue to benefit from skilled PT services to modify and progress therapeutic interventions, address functional mobility deficits, address ROM deficits, address strength deficits, analyze and address soft tissue restrictions, analyze and cue movement patterns and analyze and modify body mechanics/ergonomics to attain remaining goals. [x]  See Plan of Care 
[]  See progress note/recertification 
[]  See Discharge Summary Progress towards goals / Updated goals: 
Per POC PLAN 
[]  Upgrade activities as tolerated     []  Continue plan of care 
[]  Update interventions per flow sheet      
[]  Discharge due to:_ 
[]  Other:_ Brina Hebert, PT 12/10/2018  1:49 PM 
 
Future Appointments Date Time Provider Reza Bose 12/12/2018 11:30 AM Oc Colby [facility]  
12/14/2018 10:00 AM Kathy Huff PA-C [facility] EVELYN SCHED  
12/14/2018 12:30 PM Aleyda Gonsalez, PT [facility]  
12/17/2018 10:30 AM Osmany Vaughan, PT [facility]  
12/19/2018 10:30 AM Osmany Vaughan, PT [facility]  
12/21/2018 10:00 AM Mildred Craig, PT [facility]  
12/26/2018  9:30 AM Andra Kearns MD [facility] None 12/27/2018 11:30 AM Mildred Craig, PT [facility]  
12/31/2018 11:30 AM Aleyda Gonsalez, PT [facility]  
1/2/2019 10:30 AM Osmany Vaughan, PT [facility]  
1/4/2019 10:30 AM Kati Christina, PT [facility]  
 1/7/2019 10:30 AM Soledad Christina, PT [facility]  
1/9/2019 11:00 AM Paul Phipps, PT [facility]  
1/22/2019  9:15 AM Sherry Pickett MD [facility] None 5/6/2019 10:00 AM Jessica Torres MD [address] [place] [address]  
8/25/2020  8:00 AM [facility] 1 2VV EVELYN SCHED  
8/25/2020  9:00 AM [facility] IM[facility]ING 1 2VV EVELYN SCHED  
8/25/2020 11:30 AM RAHAT Perez [address] [address]

## 2018-12-10 NOTE — PROGRESS NOTES
In [address]  [address] [place], [address] 
[phone] fax Plan of Care/ Statement of Necessity for Physical Therapy Services Patient name: Ari Lundberg Start of Care: 12/10/2018 Referral source: Benjamín Esquivel MD : 1940 Medical Diagnosis: Right knee pain [M25.561] Payor: Cara Bosworth / Plan: [place] MEDICARE PART A & B / Product Type: Medicare /  Onset Date:18 Treatment Diagnosis: s/p right TKA Prior Hospitalization: see medical history Provider#: [id number] Medications: Verified on Patient summary List  
 Comorbidities: OA, heart disease, HTN, left TKA Prior Level of Function: functionally I with activities The Plan of Care and following information is based on the information from the initial evaluation. Assessment/ key information: 70 y/o male presents s/p right TKA as noted above. He reports having 2 weeks of HHPT and is now presenting to outpatient PT. Pt is ambulating with use of cane with limited right heel strike and toe off and right knee flexed throughout the stance phase. Mild right knee edema noted upon observation. Right knee AROM lacks 12 degrees of extension to 102 degrees of flexion. MMT reveals weakness right quads, hamstrings and hip. Pt admits to sleeping with pillow under the knee at night for comfort and was educated to remove to help improve extension. He was given and instructed in HEP and educated to focus on extension. Pt will benefit from PT to address the aforementioned impairments.   
 
Evaluation Complexity History MEDIUM  Complexity : 1-2 comorbidities / personal factors will impact the outcome/ POC ; Examination MEDIUM Complexity : 3 Standardized tests and measures addressing body structure, function, activity limitation and / or participation in recreation  ;Presentation LOW Complexity : Stable, uncomplicated  ;Clinical Decision Making MEDIUM Complexity : FOTO score of 26-74 Overall Complexity Rating: LOW Problem List: pain affecting function, decrease ROM, decrease strength, edema affecting function, impaired gait/ balance, decrease ADL/ functional abilitiies, decrease activity tolerance and decrease flexibility/ joint mobility Treatment Plan may include any combination of the following: Therapeutic exercise, Therapeutic activities, Neuromuscular re-education, Physical agent/modality, Gait/balance training, Manual therapy, Patient education and Self Care training Patient / Family readiness to learn indicated by: asking questions, trying to perform skills and interest 
Persons(s) to be included in education: patient (P) Barriers to Learning/Limitations: None Patient Goal (s): To walk as normal as possible  Patient Self Reported Health Status: good Rehabilitation Potential: good Short Term Goals: To be accomplished in 1 weeks: 1. Pt will be I and compliant with HEP Long Term Goals: To be accomplished in 4-12 weeks: 1. Improve FOTO score to 56 to improve ability for functional tasks 2. Improve right knee extension to under 5 degrees for improved ability for daily activities. 3. Improve right knee flexion to 115 degrees to improve ability for daily tasks 4. Improve right quad strength to at least 4+/5 to improve ability for gait. 5. Pt will report no difficulty with walking 2 blocks. Frequency / Duration: Patient to be seen 2-3 times per week for 4-12 weeks. Patient/ Caregiver education and instruction: Diagnosis, prognosis, self care, activity modification and exercises 
 [x]  Plan of care has been reviewed with PTA 
 
G-Codes (GP) Mobility  Current  CL= 60-79%   Goal  CK= 40-59% The severity rating is based on clinical judgment and the FOTO score. Certification Period: 12-10-18 to 03-08-18 Rosa M Reno PT 12/10/2018 1:52 PM 
 
 ________________________________________________________________________ I certify that the above Therapy Services are being furnished while the patient is under my care. I agree with the treatment plan and certify that this therapy is necessary. [de-identified] Signature:____________________  Date:____________Time: _________ Please sign and return to In [address] [place], [address] 
[phone] fax

## 2018-12-12 ENCOUNTER — HOSPITAL ENCOUNTER (OUTPATIENT)
Dept: PHYSICAL THERAPY | Age: 78
Discharge: HOME | End: 2018-12-12
Payer: MEDICARE

## 2018-12-12 PROCEDURE — 97110 THERAPEUTIC EXERCISES: CPT

## 2018-12-12 PROCEDURE — 97016 VASOPNEUMATIC DEVICE THERAPY: CPT

## 2018-12-12 PROCEDURE — 97140 MANUAL THERAPY 1/> REGIONS: CPT

## 2018-12-14 ENCOUNTER — HOSPITAL ENCOUNTER (OUTPATIENT)
Dept: PHYSICAL THERAPY | Age: 78
Discharge: HOME | End: 2018-12-14
Payer: MEDICARE

## 2018-12-14 ENCOUNTER — OFFICE VISIT (OUTPATIENT)
Dept: ORTHOPEDIC SURGERY | Facility: CLINIC | Age: 78
End: 2018-12-14

## 2018-12-14 VITALS
BODY MASS INDEX: 23.84 KG/M2 | OXYGEN SATURATION: 99 % | HEIGHT: 72 IN | TEMPERATURE: 95.8 F | WEIGHT: 176 LBS | HEART RATE: 86 BPM | RESPIRATION RATE: 16 BRPM | DIASTOLIC BLOOD PRESSURE: 72 MMHG | SYSTOLIC BLOOD PRESSURE: 112 MMHG

## 2018-12-14 DIAGNOSIS — M19.012 OSTEOARTHRITIS OF GLENOHUMERAL JOINT, LEFT: ICD-10-CM

## 2018-12-14 DIAGNOSIS — M25.512 CHRONIC LEFT SHOULDER PAIN: ICD-10-CM

## 2018-12-14 DIAGNOSIS — Z47.1 AFTERCARE FOLLOWING KNEE JOINT REPLACEMENT SURGERY, UNSPECIFIED LATERALITY: ICD-10-CM

## 2018-12-14 DIAGNOSIS — G89.29 CHRONIC LEFT SHOULDER PAIN: ICD-10-CM

## 2018-12-14 DIAGNOSIS — Z96.651 S/P TOTAL KNEE REPLACEMENT, RIGHT: ICD-10-CM

## 2018-12-14 DIAGNOSIS — Z96.651 STATUS POST RIGHT KNEE REPLACEMENT: Primary | ICD-10-CM

## 2018-12-14 DIAGNOSIS — Z96.659 AFTERCARE FOLLOWING KNEE JOINT REPLACEMENT SURGERY, UNSPECIFIED LATERALITY: ICD-10-CM

## 2018-12-14 PROCEDURE — 97016 VASOPNEUMATIC DEVICE THERAPY: CPT

## 2018-12-14 PROCEDURE — 97140 MANUAL THERAPY 1/> REGIONS: CPT

## 2018-12-14 PROCEDURE — 97110 THERAPEUTIC EXERCISES: CPT

## 2018-12-14 RX ORDER — TRIAMCINOLONE ACETONIDE 40 MG/ML
40 INJECTION, SUSPENSION INTRA-ARTICULAR; INTRAMUSCULAR ONCE
Qty: 1 ML | Refills: 0
Start: 2018-12-14 | End: 2018-12-14

## 2018-12-14 NOTE — PROGRESS NOTES
PT DAILY TREATMENT NOTE 10-18    Patient Name: Kisha Ballesteros  Date:2018  : 1940  [x]  Patient  Verified   Payor: [place] MEDICARE / Plan: VA MEDICARE PART A & B / Product Type: Medicare /    In time:12:30  Out time:1:19  Total Treatment Time (min): 52  Visit #: 3 of 8    Medicare/BCBS Only   Total Timed Codes (min):  39 1:1 Treatment Time:  29       Treatment Area: Right knee pain [M25.561]    SUBJECTIVE  Pain Level (0-10 scale): 5-6  Any medication changes, allergies to medications, adverse drug reactions, diagnosis change, or new procedure performed?: [x] No    [] Yes (see summary sheet for update)  Subjective functional status/changes:   [] No changes reported  No new complaints reported today. OBJECTIVE    Modality rationale: decrease edema, decrease inflammation and decrease pain to improve the patients ability to perform ADLs.    Type Additional Details   [] Estim:  []Unatt       []IFC  []Premod                        []Other:  []w/ice   []w/heat  Position:  Location:   [] Estim: []Att    []TENS instruct  []NMES                    []Other:  []w/US   []w/ice   []w/heat  Position:  Location:   []  Traction: [] Cervical       []Lumbar                       [] Prone          []Supine                       []Intermittent   []Continuous Lbs:  [] before manual  [] after manual   []  Ultrasound: []Continuous   [] Pulsed                           []1MHz   []3MHz W/cm2:  Location:   []  Iontophoresis with dexamethasone         Location: [] Take home patch   [] In clinic   []  Ice     []  heat  []  Ice massage  []  Laser   []  Anodyne Position:  Location:   []  Laser with stim  []  Other:  Position:  Location:   [x]  Vasopneumatic Device Pressure:       [x] lo [] med [] hi   Temperature: [x] lo [] med [] hi   [x] Skin assessment post-treatment:  [x]intact []redness- no adverse reaction    []redness  adverse reaction:     21 min Therapeutic Exercise:  [x] See flow sheet :   Rationale: increase ROM and increase strength to improve the patients ability to return to PLOF. 8 min Manual Therapy:  Patellar mobs, PROM   Rationale: decrease pain, increase ROM and increase tissue extensibility to improve patients functional mobility. With   [x] TE   [] TA   [] neuro   [] other: Patient Education: [x] Review HEP    [] Progressed/Changed HEP based on:   [] positioning   [] body mechanics   [] transfers   [] heat/ice application    [] other:      Other Objective/Functional Measures: Right knee AROM - 125     Pain Level (0-10 scale) post treatment: 4    ASSESSMENT/Changes in Function: Patient tolerated all exercises with decrease in symptoms post session. Improvements in knee flexion AROM noted today. Mild extensor lag noted during SLR today. Patient will continue to benefit from ROM and strengthening to improve functional mobility. Patient will continue to benefit from skilled PT services to modify and progress therapeutic interventions, address functional mobility deficits, address ROM deficits, address strength deficits, analyze and address soft tissue restrictions, analyze and cue movement patterns, analyze and modify body mechanics/ergonomics and assess and modify postural abnormalities to attain remaining goals. [x]  See Plan of Care  []  See progress note/recertification  []  See Discharge Summary         Progress towards goals / Updated goals:  Short Term Goals: To be accomplished in 1 weeks:              1. Pt will be I and compliant with HEP Met 12/12/2018  Long Term Goals: To be accomplished in 4-12 weeks:              1. Improve FOTO score to 56 to improve ability for functional tasks              2. Improve right knee extension to under 5 degrees for improved ability for daily activities.                3. Improve right knee flexion to 115 degrees to improve ability for daily tasks Goal met 12/14/18 Right knee AROM - 125 degrees              4. Improve right quad strength to at least 4+/5 to improve ability for gait.             3. Pt will report no difficulty with walking 2 blocks.       PLAN  []  Upgrade activities as tolerated     [x]  Continue plan of care  []  Update interventions per flow sheet       []  Discharge due to:_  []  Other:_      Dave Galeana, PT 12/14/2018  12:41 PM    Future Appointments   Date Time Provider Reza Bose   12/17/2018 10:30 AM Amber Cruz, PT [facility]   12/19/2018 10:30 AM Sangita Christina, PT [facility]   12/21/2018 10:00 AM Milus Elkton, PT [facility]   12/26/2018  9:30 AM Rock Mariel MD [facility] None   12/27/2018 11:30 AM Milus Elkton, PT [facility]   12/31/2018 11:30 AM Jean Paul Bermudez, PT [facility]   1/2/2019 10:30 AM Amberronel Cruz, PT [facility]   1/4/2019  9:45 AM Glenda Huff PA-C [facility] EVELYN SCHED   1/4/2019 10:30 AM Amber Budmaddie, PT [facility]   1/7/2019 10:30 AM Amber Budmaddie, PT [facility]   1/9/2019 11:00 AM Milus Elkton, PT [facility]   1/22/2019  9:15 AM Rock Mariel MD [facility] None   5/6/2019 10:00 AM Ava Pacheco MD [address] [place] [address]   8/25/2020  8:00 AM [facility] 1 2VV [facility] SCHED   8/25/2020  9:00 AM [facility] IMAGING 1 2VV EVELYN SCHED   8/25/2020 11:30 AM RAHAT [id number] [address]

## 2018-12-14 NOTE — PROGRESS NOTES
Patient: Ramesh Helm                MRN: [id number]       SSN: [id number]  YOB: 1940        AGE: 69 y.o. SEX: male    PCP: Dave Kimbrough MD  12/14/18    Chief Complaint   Patient presents with    Follow-up     Rt knee follow up      HISTORY:  Ramesh Helm is a 69 y.o. male who is seen for left shoulder, and s/p right total knee arthroplasty DOS 11-20-18 . He also has stiffness and pain in his left shoulder. He states he went to PT and the spine center which helped with his back and leg back pain. He was previously seen for seen for right knee pain. He notes his right knee began hurting during a previous course of PT. He went to [facility] where he received x-rays. He notes medial right knee pain. He notes benefit from previous injections. He states the Celebrex has not helped with the pain. He has right knee pain after walking or sitting for long periods of time. He responded to a Euflexxa series in February. 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 He reports increased knee pain after helping his son move recently. He is S/P left TKR 1/29/13. Occupation, etc:  Mr. Shirley Mccurdy is retired. He was previously the  of [employer] and an avid member of the [employer]. He has no longer been able to exercise on the track. His wife's sister has peritoneal cancer. He is going back to [address] [place] [address] for three weeks on Monday. He notes his wife was recently diagnosed breast cancer. His wife has heart problems and is not very mobile. He is his wife's primary caregiver. His niece is an oncologist and did not recommend chemotherapy. His wife is now doing much better. He travelled to [place] to visit his son recently. He notes that he used to live in [facility] when he was in [facility] previously. He grew up in [place]. He reports that he lost about 8 pounds recently.    His current weight is 190lbs. Last 3 Recorded Weights in this Encounter    12/14/18 0956   Weight: 176 lb (79.8 kg)     Body mass index is 23.87 kg/m². Patient Active Problem List   Diagnosis Code    Hyperlipidemia E78.5    Coronary atherosclerosis of native coronary artery I25.10    AAA (abdominal aortic aneurysm) ([facility] I71.4    Right knee pain M25.561    Abnormal LFTs (liver function tests) R94.5    Vitamin D deficiency E55.9    CLARKE (dyspnea on exertion) R06.09    Essential hypertension I10    Hyperglycemia R73.9    Tinea versicolor B36.0    S/P total knee replacement Z96.659    Fatty liver K76.0    PVC's (premature ventricular contractions) I49.3    Carotid artery disease (HCC) I77.9    Right shoulder pain M25.511    Acute back pain M54.9    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.816    Lumbar neuritis M54.16    DDD (degenerative disc disease), lumbar M51.36    Radiculopathy, lumbar region M54.16    Degeneration of intervertebral disc of lumbar region M51.36    Mixed hyperlipidemia E78.2    ACP (advance care planning) Z71.89    Purpura ([facility] D69.2    Bruising T14. 8XXA    Solar purpura ([facility] [address] D69.2    Primary osteoarthritis of right knee M17.11     REVIEW OF SYSTEMS: All Below are Negative except: See HPI   Constitutional: negative for fever, chills, and weight loss. Cardiovascular: negative for chest pain, claudication, leg swelling, SOB, CLARKE  . Musculoskeletal: See HPI   Neurological: Negative for dizziness and weakness. Extremities: Negative for hair changes, rash, or skin lesion changes.        Social History     Socioeconomic History    Marital status:      Spouse name: Not on file    Number of children: Not on file    Years of education: Not on file    Highest education level: Not on file   Social Needs    Financial resource strain: Not on file    Food insecurity - worry: Not on file    Food insecurity - inability: Not on file  Transportation needs - medical: Not on file   [employer] needs - non-medical: Not on file   Occupational History    Not on file   Tobacco Use    Smoking status: Former Smoker     Packs/day: 0.50     Years: 20.00     Pack years: 10.00     Types: Cigarettes     Last attempt to quit: 12/10/1995     Years since quittin.0    Smokeless tobacco: Never Used   Substance and Sexual Activity    Alcohol use: Yes     Alcohol/week: 4.0 oz     Types: 7 Glasses of wine, 1 Shots of liquor per week     Comment: social    Drug use: No    Sexual activity: Not on file   Other Topics Concern    Not on file   Social History Narrative    Not on file     Allergies   Allergen Reactions    Pollen Extracts Sneezing     Itchy eyes, runny nose     Current Outpatient Medications   Medication Sig    triamcinolone acetonide (KENALOG) 40 mg/mL injection 1 mL by Intra artICUlar route once for 1 dose.  acetaminophen (TYLENOL EXTRA STRENGTH) 500 mg tablet Take 500 mg by mouth every six (6) hours as needed for Pain.  aspirin delayed-release (ADULT LOW DOSE ASPIRIN) 81 mg tablet Take 81 mg by mouth daily.  melatonin 10 mg cap Take 10 mg by mouth nightly as needed for Other (sleep).  traMADol (ULTRAM) 50 mg tablet Take 1 Tab by mouth every six (6) hours as needed for Pain. Max Daily Amount: 200 mg.    nitroglycerin (NITROSTAT) 0.4 mg SL tablet 1 Tab by SubLINGual route every five (5) minutes as needed. (Patient taking differently: 1 Tab by SubLINGual route every five (5) minutes as needed for Chest Pain. prn chest pains up to 3 doses, then call 911   )    ezetimibe (ZETIA) 10 mg tablet Take 1 Tab by mouth daily.  brinzolamide-brimonidine (SIMBRINZA) 1-0.2 % drps Apply 1 Drop to eye two (2) times a day.  niacin (NIASPAN) 1,000 mg Tb24 tab Take 1 Tab by mouth daily.  amLODIPine (NORVASC) 10 mg tablet TAKE 1 TABLET BY MOUTH DAILY    rosuvastatin (CRESTOR) 20 mg tablet Take 1 Tab by mouth nightly.     coenzyme q10 (CO Q-10) 10 mg cap Take 1 Tab by mouth daily.  gabapentin (NEURONTIN) 300 mg capsule Take 300 mg by mouth three (3) times daily.  cholecalciferol, vitamin D3, (VITAMIN D3) 2,000 unit tab Take 1 Tab by mouth daily.  sildenafil citrate (VIAGRA) 100 mg tablet Take 1 Tab by mouth as needed. (Patient taking differently: Take 1 Tab by mouth as needed (sexual disfunction). )    CALCIUM CARB/VIT D3/MINERALS (CALTRATE PLUS PO) Take 1 Tab by mouth daily.  oxyCODONE IR (ROXICODONE) 5 mg immediate release tablet Take 1-3 Tabs by mouth every four (4) hours as needed for Pain. Max Daily Amount: 90 mg.    docusate sodium (COLACE) 100 mg capsule Take 1 Cap by mouth two (2) times a day for 90 days.  oxyCODONE IR (ROXICODONE) 5 mg immediate release tablet Take 1-3 Tabs by mouth every four (4) hours as needed. Max Daily Amount: 90 mg. (Patient not taking: Reported on 12/6/2018)    polyethylene glycol (MIRALAX) 17 gram packet Take 1 Packet by mouth daily.  tamsulosin (FLOMAX) 0.4 mg capsule Take 1 Cap by mouth daily. (Patient not taking: Reported on 12/6/2018)    FLUZONE HIGH-DOSE 2018-19, PF, syrg injection ADM 0.5ML IM UTD    omega 3-dha-epa-fish oil 100-160-1,000 mg cap Take 1,000 mg by mouth daily. (Patient not taking: Reported on 12/6/2018)     No current facility-administered medications for this visit.       PHYSICAL EXAMINATION:  Visit Vitals  /72 (BP 1 Location: Left arm, BP Patient Position: Sitting)   Pulse 86   Temp 95.8 °F (35.4 °C) (Oral)   Resp 16   Ht 6' (1.829 m)   Wt 176 lb (79.8 kg)   SpO2 99%   BMI 23.87 kg/m²     ORTHO EXAMINATION:    Examination     Skin     Tenderness     Flexion     Extension     Deformity - -   Effusion - -   Tinel's sign - -   Phalen's test - -   Finklestein maneuver - -   Pain with thumb abduction - -     Examination Right shoulder Left shoulder   Skin  Intact   Effusion  -   Biceps deformity  -   Atrophy  -   AC joint tenderness  -   Acromial tenderness  + Biceps tenderness  -   Forward flexion/Elevation ROM  140   Active abduction ROM  110   External rotation ROM  30   Internal rotation ROM  100   Apprehension  -   Impingement  +   Drop Arm Test  -   Neurovascular  Intact       Examination Right knee Left knee   Skin Intact, healing surgical incision Well healed TKR incision    Range of motion 95-5 degrees 110-0   Effusion - -   Medial joint line tenderness - -   Lateral joint line tenderness - -   Popliteal tenderness - -   Osteophytes palpable - -   Missaels - -   Patella crepitus - +   Anterior drawer - -   Lateral laxity - -   Medial laxity - -   Varus deformity - -   Valgus deformity - -   Pretibial edema - -   Calf tenderness - -   PROCEDURE:  Today, using sterile technique, after verbal and written consent were obtained and appropriate time out performed, 1 cc of Kenalog at 40 mg per mL mixed with 6 mL of Sensorcaine 0.75% was injected in the left shoulder using the posterior subacromial approach. There were no complications. The patient tolerated the procedure well. Time: 9:42 AM     Date of procedure: 12/14/2018    Procedure performed by:  Saritha Hubbard PA-C    Mr. Cristóbal Zabala tolerated the procedure well with no complications. MRI LEFT SHOULDER W CONT 04/27/2018  IMPRESSION:  1. Supraspinatus moderate tendinosis and small focal full-thickness tear  anterior insertion. No muscle atrophy or tendon retraction. 2. Moderate subscapularis tendinosis and partial-thickness tears. Intra-articular biceps tendinosis. 3. Superior labral tear extending posteriorly/SLAP tear type II. Posterior  inferior labral degeneration and tear. 4. Moderate acromioclavicular osteoarthrosis with inflammation. Small  subacromial spur. Subacromial subdeltoid bursitis. RADIOLOGY:  XR LEFT SHOULDER 5/5/16  IMPRESSION:  No fractures, mild acromioclavicular narrowing, no glenohumeral narrowing, no calcific densities.     IMPRESSION:      ICD-10-CM ICD-9-CM    1. Status post right knee replacement Z96.651 V43.65 AMB POC XRAY, KNEE; 1/2 VIEWS      TRIAMCINOLONE ACETONIDE INJ      DRAIN/INJECT LARGE JOINT/BURSA   2. S/P total knee replacement, right Z96.651 V43.65    3. Aftercare following knee joint replacement surgery, unspecified laterality Z47.1 V54.81     Z96.659 V43.65    4. Chronic left shoulder pain M25.512 719.41     G89.29 338.29    5. Osteoarthritis of glenohumeral joint, left M19.012 715.91      PLAN:  Continue OP PT for right total knee replacement and recommend Left shoulder injection today.  F/U x 1 month

## 2018-12-17 ENCOUNTER — HOSPITAL ENCOUNTER (OUTPATIENT)
Dept: PHYSICAL THERAPY | Age: 78
Discharge: HOME | End: 2018-12-17
Payer: MEDICARE

## 2018-12-17 PROCEDURE — 97110 THERAPEUTIC EXERCISES: CPT

## 2018-12-17 PROCEDURE — 97140 MANUAL THERAPY 1/> REGIONS: CPT

## 2018-12-17 NOTE — PROGRESS NOTES
PT DAILY TREATMENT NOTE 10-18    Patient Name: Austin Balnco  Date:2018  : 1940  [x]  Patient  Verified  Payor: Larry Baldwin / Plan: [place] MEDICARE PART A & B / Product Type: Medicare /    In time:10:32  Out time:11:08  Total Treatment Time (min): 36  Visit #: 4 of 8    Medicare/BCBS Only   Total Timed Codes (min):  36 1:1 Treatment Time:  26       Treatment Area: Right knee pain [M25.561]    SUBJECTIVE  Pain Level (0-10 scale): 410  Any medication changes, allergies to medications, adverse drug reactions, diagnosis change, or new procedure performed?: [x] No    [] Yes (see summary sheet for update)  Subjective functional status/changes:   [] No changes reported  The patient states that he feels that he can walk better. OBJECTIVE  28 min Therapeutic Exercise:  [x] See flow sheet :   Rationale: increase ROM and increase strength to improve the patients ability to improve ADL ease. 8 min Manual Therapy:  Right knee extension mobs of tibiofemoral joint with the patient in supine. Rationale: decrease pain, increase ROM and increase tissue extensibility to improve ADL ease. With   [] TE   [] TA   [] neuro   [] other: Patient Education: [x] Review HEP    [] Progressed/Changed HEP based on:   [] positioning   [] body mechanics   [] transfers   [] heat/ice application    [] other:      Other Objective/Functional Measures:   Progressing with knee extension upon gross assessment of ambulation post manual.      Pain Level (0-10 scale) post treatment: 10    ASSESSMENT/Changes in Function: The patient continues to demonstrate mild antalgia upon gait with SPC. He will continue to benefit from skilled PT in order to progress extension mobility of his right knee as well as quad strength.      Patient will continue to benefit from skilled PT services to modify and progress therapeutic interventions, address functional mobility deficits, address ROM deficits, address strength deficits, analyze and address soft tissue restrictions, analyze and cue movement patterns, analyze and modify body mechanics/ergonomics, assess and modify postural abnormalities and instruct in home and community integration to attain remaining goals. []  See Plan of Care  []  See progress note/recertification  []  See Discharge Summary         Progress towards goals / Updated goals:  Short Term Goals: To be accomplished in 1 weeks:              1. Pt will be I and compliant with HEP Met 12/12/2018  Long Term Goals: To be accomplished in 4-12 weeks:              1. Improve FOTO score to 56 to improve ability for functional tasks              2. Improve right knee extension to under 5 degrees for improved ability for daily activities.             3. Improve right knee flexion to 115 degrees to improve ability for daily tasks Goal met 12/14/18 Right knee AROM - 125 degrees              4. Improve right quad strength to at least 4+/5 to improve ability for gait.             7.  Pt will report no difficulty with walking 2 blocks.     PLAN  []  Upgrade activities as tolerated     [x]  Continue plan of care  []  Update interventions per flow sheet       []  Discharge due to:_  []  Other:_      Elli Delong, PT 12/17/2018  10:30 AM    Future Appointments   Date Time Provider Reza Bose   12/19/2018 10:30 AM Karson Christina, PT [facility]   12/21/2018 10:00 AM Hannah Stahl, PT [facility]   12/26/2018  9:30 AM Alpesh Lopez MD [facility] None   12/27/2018 11:30 AM Hannah Stahl, PT [facility]   12/31/2018 11:30 AM El Moon, PT [facility]   1/2/2019 10:30 AM Mary Colby, PT [facility]   1/4/2019  9:45 AM Kisha Huff PA-C [facility] SCHED   1/4/2019 10:30 AM Mary Colby, PT [facility]   1/7/2019 10:30 AM Mary Colby, PT [facility]   1/9/2019 11:00 AM Hannah Stahl, PT [facility]   1/22/2019  9:15 AM Alpesh Lopez MD [facility] None   5/6/2019 10:00 AM Dewey Stanford MD JUAN [address] [place] [address]   8/25/2020  8:00 [facility]   8/25/2020  9:00 AM [facility] 1 [facility]   8/25/2020 11:30 AM RAHAT Jeronimo [id number] [facility] 30

## 2018-12-19 ENCOUNTER — HOSPITAL ENCOUNTER (OUTPATIENT)
Dept: PHYSICAL THERAPY | Age: 78
Discharge: HOME | End: 2018-12-19
Payer: MEDICARE

## 2018-12-19 PROCEDURE — 97140 MANUAL THERAPY 1/> REGIONS: CPT

## 2018-12-19 PROCEDURE — 97016 VASOPNEUMATIC DEVICE THERAPY: CPT

## 2018-12-19 PROCEDURE — 97110 THERAPEUTIC EXERCISES: CPT

## 2018-12-19 NOTE — PROGRESS NOTES
PT DAILY TREATMENT NOTE - [facility]     Patient Name: Jase Williamson  Date:2018  : 1940  [x]  Patient  Verified  Payor: Bonny Horvath / Plan: [place] MEDICARE PART A & B / Product Type: Medicare /    In time:10:30  Out time:11:17  Total Treatment Time (min): 47  Total Timed Codes (min): 37  1:1 Treatment Time ( [address] only): 27   Visit #: 5 of     Treatment Area: Right knee pain [M25.561]    SUBJECTIVE  Pain Level (0-10 scale): 3/10  Any medication changes, allergies to medications, adverse drug reactions, diagnosis change, or new procedure performed?: [x] No    [] Yes (see summary sheet for update)  Subjective functional status/changes:   [] No changes reported  The patient states that his knee is doing fairly well today. He continues to utilize [address] to ambulate. OBJECTIVE  Modality rationale: decrease edema, decrease inflammation and decrease pain to improve the patients ability to improve ADL ease. Type Additional Details   [x]  Vasopneumatic Device 10 mins Pressure:       [x] lo [] med [] hi   Temperature: [x] lo [] med [] hi   [] Skin assessment post-treatment:  []intact []redness- no adverse reaction    []redness  adverse reaction:     29 min Therapeutic Exercise:  [x] See flow sheet :   Rationale: increase ROM and increase strength to improve the patients ability to improve ADL ease. 8 min Manual Therapy:  PROM - right tibiofemoral mobs into extension. Rationale: decrease pain, increase ROM and increase tissue extensibility to improve ADL ease.         With   [] TE   [] TA   [] neuro   [] other: Patient Education: [x] Review HEP    [] Progressed/Changed HEP based on:   [] positioning   [] body mechanics   [] transfers   [] heat/ice application    [] other:      Other Objective/Functional Measures:   Knee extension ROM: lacking 5 degrees passively     Pain Level (0-10 scale) post treatment: 1/10    ASSESSMENT/Changes in Function: Progressing with knee extension and ambulation efficiency with appreciably improved utilization of extension during stance phase of gait. He continues to lack a few degrees requiring continued treatment for progression to maximize ease of gait. Patient will continue to benefit from skilled PT services to modify and progress therapeutic interventions, address functional mobility deficits, address ROM deficits, address strength deficits, analyze and address soft tissue restrictions, analyze and cue movement patterns, analyze and modify body mechanics/ergonomics, assess and modify postural abnormalities and instruct in home and community integration to attain remaining goals. []  See Plan of Care  []  See progress note/recertification  []  See Discharge Summary         Progress towards goals / Updated goals:  Short Term Goals: To be accomplished in 1 weeks:              1. Pt will be I and compliant with HEP Met 12/12/2018  Long Term Goals: To be accomplished in 4-12 weeks:              1. Improve FOTO score to 56 to improve ability for functional tasks              2. Improve right knee extension to under 5 degrees for improved ability for daily activities. Met lacking 5 degrees - 12/19/2018              3. Improve right knee flexion to 115 degrees to improve ability for daily tasks Goal met 12/14/18 Right knee AROM - 125 degrees              4. Improve right quad strength to at least 4+/5 to improve ability for gait.             8. Pt will report no difficulty with walking 2 blocks.     PLAN  []  Upgrade activities as tolerated     [x]  Continue plan of care  []  Update interventions per flow sheet       []  Discharge due to:_  []  Other:_      Tristan Ahn, PT 12/19/2018  12:58 PM    Future Appointments   Date Time Provider Reza Bose   12/21/2018 10:00 [facility]   12/26/2018  9:30 AM [facility]   12/27/2018 11:30 AM Katarzyna Salgado PT [facility]   12/31/2018 11:30 AM Nithin Ochoa PT [facility]   1/2/2019 10:30 AM Jimena Christina, PT [facility]   1/4/2019  9:45 AM Katerin Huff PA-C [facility] SCHED   1/4/2019 10:30 AM Barry Rainey, PT [facility]   1/7/2019 10:30 AM Barry Rainey, PT [facility]   1/9/2019 11:00 AM Isaac Mahajan, PT [facility]   1/22/2019  9:15 AM Lin Dill MD [facility] None   5/6/2019 10:00 AM Tia Anglin MD [address] [place] [address]   8/25/2020  8:00 AM [facility] 2[facility] SCHED   8/25/2020  9:00 AM [facility] 1 2[facility] SCHED   8/25/2020 11:30 AM RAHAT Gomez [address] [address]

## 2018-12-21 ENCOUNTER — HOSPITAL ENCOUNTER (OUTPATIENT)
Dept: PHYSICAL THERAPY | Age: 78
Discharge: HOME | End: 2018-12-21
Payer: MEDICARE

## 2018-12-21 PROCEDURE — 97110 THERAPEUTIC EXERCISES: CPT

## 2018-12-21 PROCEDURE — 97140 MANUAL THERAPY 1/> REGIONS: CPT

## 2018-12-21 PROCEDURE — 97016 VASOPNEUMATIC DEVICE THERAPY: CPT

## 2018-12-21 NOTE — PROGRESS NOTES
PT DAILY TREATMENT NOTE 10-18    Patient Name: Klever Ford  Date:2018  : 1940  [x]  Patient  Verified  Payor: [place] MEDICARE / Plan: [place] MEDICARE PART A & B / Product Type: Medicare /    In time:10:00  Out time:10:44  Total Treatment Time (min): 44  Visit #: 6 of     Medicare/BCBS Only   Total Timed Codes (min):  34 1:1 Treatment Time:  30       Treatment Area: Right knee pain [M25.561]    SUBJECTIVE  Pain Level (0-10 scale): 2-3  Any medication changes, allergies to medications, adverse drug reactions, diagnosis change, or new procedure performed?: [x] No    [] Yes (see summary sheet for update)  Subjective functional status/changes:   [] No changes reported  \"I felt a little stiff and sore this morning, but after I was walking on it a little it feels better\". OBJECTIVE    Modality rationale: decrease edema, decrease inflammation and decrease pain to improve the patients ability to perform functional mobility.     Type Additional Details   [] Estim:  []Unatt       []IFC  []Premod                        []Other:  []w/ice   []w/heat  Position:  Location:   [] Estim: []Att    []TENS instruct  []NMES                    []Other:  []w/US   []w/ice   []w/heat  Position:  Location:   []  Traction: [] Cervical       []Lumbar                       [] Prone          []Supine                       []Intermittent   []Continuous Lbs:  [] before manual  [] after manual   []  Ultrasound: []Continuous   [] Pulsed                           []1MHz   []3MHz W/cm2:  Location:   []  Iontophoresis with dexamethasone         Location: [] Take home patch   [] In clinic   []  Ice     []  heat  []  Ice massage  []  Laser   []  Anodyne Position:  Location:   []  Laser with stim  []  Other:  Position:  Location:   []  Vasopneumatic Device (10 min) Pressure:       [x] lo [] med [] hi   Temperature: [x] lo [] med [] hi   [] Skin assessment post-treatment:  []intact []redness- no adverse reaction    []redness  adverse reaction:       22 min Therapeutic Exercise:  [x] See flow sheet :   Rationale: increase ROM and increase strength to improve the patients ability to perform ADLs with improved mobility. 8 min Manual Therapy:  Right knee PROM, tibiofemoral mobs   Rationale: decrease pain, increase ROM and increase tissue extensibility to improve patients functional mobility. With   [x] TE   [] TA   [] neuro   [] other: Patient Education: [x] Review HEP    [] Progressed/Changed HEP based on:   [] positioning   [] body mechanics   [] transfers   [] heat/ice application    [] other:         Pain Level (0-10 scale) post treatment: 2    ASSESSMENT/Changes in Function: Patient continues to lack knee extension ROM, exercises performed in therapy in order to stress knee extension. Patient is progressing well with exercises and continues to demonstrate improvements in functional mobility. Patient will continue to benefit from skilled PT services to modify and progress therapeutic interventions, address functional mobility deficits, address ROM deficits, address strength deficits, analyze and address soft tissue restrictions, analyze and cue movement patterns, analyze and modify body mechanics/ergonomics and assess and modify postural abnormalities to attain remaining goals. [x]  See Plan of Care  []  See progress note/recertification  []  See Discharge Summary          Progress towards goals / Updated goals:  Short Term Goals: To be accomplished in 1 weeks:              1. Pt will be I and compliant with HEP Met 12/12/2018  Long Term Goals: To be accomplished in 4-12 weeks:              1. Improve FOTO score to 56 to improve ability for functional tasks              2. Improve right knee extension to under 5 degrees for improved ability for daily activities.  Met lacking 5 degrees - 12/19/2018              3. Improve right knee flexion to 115 degrees to improve ability for daily tasks Goal met 12/14/18 Right knee AROM - 125 degrees              4. Improve right quad strength to at least 4+/5 to improve ability for gait.             6.  Pt will report no difficulty with walking 2 blocks.       PLAN  []  Upgrade activities as tolerated     [x]  Continue plan of care  []  Update interventions per flow sheet       []  Discharge due to:_  []  Other:_      Kortney Tang, PT 12/21/2018  10:09 AM    Future Appointments   Date Time Provider Reza Lidya   12/26/2018  9:30 AM Keny Serna MD [facility] None   12/27/2018 11:30 AM Yasmani Ice, PT [facility]   12/31/2018 11:30 AM Conrado Janeing, PT [facility]   1/2/2019 10:30 AM Cherylene Nest, PT [facility]   1/4/2019  9:45 AM Gail Huff PA-C [facility] SCHED   1/4/2019 10:30 AM Cherylene Nest, PT [facility]   1/7/2019 10:30 AM Cherylene Nest, PT [facility]   1/9/2019 11:00 AM Yasmani Ice, PT [facility]   1/22/2019  9:15 AM Keny Serna MD [facility] None   5/6/2019 10:00 AM Laura Escobar MD [address] [place] [address]   8/25/2020  8:00 AM [facility] SCHED   8/25/2020  9:00 AM [facility] SCHED   8/25/2020 11:30 AM [facility] [address] [facility]

## 2018-12-26 ENCOUNTER — OFFICE VISIT (OUTPATIENT)
Dept: FAMILY MEDICINE CLINIC | Age: 78
End: 2018-12-26

## 2018-12-26 VITALS
WEIGHT: 174 LBS | DIASTOLIC BLOOD PRESSURE: 59 MMHG | SYSTOLIC BLOOD PRESSURE: 96 MMHG | TEMPERATURE: 97.5 F | HEIGHT: 72 IN | RESPIRATION RATE: 18 BRPM | HEART RATE: 71 BPM | BODY MASS INDEX: 23.57 KG/M2

## 2018-12-26 DIAGNOSIS — Z96.651 STATUS POST TOTAL RIGHT KNEE REPLACEMENT: ICD-10-CM

## 2018-12-26 DIAGNOSIS — E78.5 HYPERLIPIDEMIA, UNSPECIFIED HYPERLIPIDEMIA TYPE: Chronic | ICD-10-CM

## 2018-12-26 DIAGNOSIS — I10 ESSENTIAL HYPERTENSION: Primary | Chronic | ICD-10-CM

## 2018-12-26 NOTE — PROGRESS NOTES
Chief Complaint   Patient presents with    Surgical Follow-up     right knee surgery. HISTORY OF PRESENT ILLNESS  Po Medina is a 69 y.o. male. HPI  Pt here for f/u of R TKA. He has done well with his rehab. However, pt is very busy still with taking care of his wife. She is back in a snf for now and he is having accomodations made at home to make the house more accessible. He is working on home health for her in anticipation of a release to home in mid Jan 2019. Pt notes that he has been losing wt since his surgery. ROS  Review of Systems   Constitutional: Negative. HENT: Negative. Respiratory: Negative. Cardiovascular: Negative. All other systems reviewed and are negative. Physical Exam  Nursing note and vitals reviewed. Constitutional: He is oriented to person, place, and time. He appears well-developed and well-nourished. HENT:   Head: Normocephalic and atraumatic. Right Ear: External ear normal.   Left Ear: External ear normal.   Nose: Nose normal.   Eyes: Conjunctivae and EOM are normal.   Neck: Normal range of motion. Neck supple. No JVD present. Carotid bruit is not present. No thyromegaly present. Cardiovascular: Normal rate, regular rhythm, normal heart sounds and intact distal pulses. Exam reveals no gallop and no friction rub. No murmur heard. Pulmonary/Chest: Effort normal and breath sounds normal. He has no wheezes. He has no rhonchi. He has no rales. Abdominal: Soft. Musculoskeletal: Normal range of motion. Neurological: He is alert and oriented to person, place, and time. Coordination normal.   Skin: Skin is warm and dry. Psychiatric: He has a normal mood and affect. His behavior is normal. Judgment and thought content normal.       ASSESSMENT and PLAN  Diagnoses and all orders for this visit:    1. Essential hypertension  -     METABOLIC PANEL, COMPREHENSIVE; Future  -     LIPID PANEL; Future  Low bp today but asymptomatic.   Pt will check bp readings at home. If consistently under 913 systolic, pt to decrease amlodipine to 1/2 tab daily and notify me. Pt will cont to monitor bp to assess control on lowered dose of amlodipine. Pt has lost wt since his tka and is more active; these are likely contributing to his lower bp. 2. Hyperlipidemia, unspecified hyperlipidemia type  -     METABOLIC PANEL, COMPREHENSIVE; Future  -     LIPID PANEL; Future    3. Status post total right knee replacement  Doing well. Care as per ortho. Follow-up Disposition:  Return in about 3 months (around 3/26/2019) for high blood pressure, high cholesterol.

## 2018-12-26 NOTE — PROGRESS NOTES
Austin Blanco presents today for   Chief Complaint   Patient presents with    Surgical Follow-up     right knee surgery. Is someone accompanying this pt? no    Is the patient using any DME equipment during [address]? Yes cane    Depression Screening:  PHQ over the last two weeks 12/14/2018   PHQ Not Done -   Little interest or pleasure in doing things Not at all   Feeling down, depressed, irritable, or hopeless Not at all   Total Score PHQ 2 0   Trouble falling or staying asleep, or sleeping too much -   Feeling tired or having little energy -   Poor appetite, weight loss, or overeating -   Feeling bad about yourself - or that you are a failure or have let yourself or your family down -   Trouble concentrating on things such as school, work, reading, or watching TV -   Moving or speaking so slowly that other people could have noticed; or the opposite being so fidgety that others notice -   Thoughts of being better off dead, or hurting yourself in some way -   PHQ 9 Score -   How difficult have these problems made it for you to do your work, take care of your home and get along with others -       Learning Assessment:  Learning Assessment 8/21/2018   PRIMARY LEARNER Patient   HIGHEST LEVEL OF EDUCATION - PRIMARY LEARNER  -   BARRIERS PRIMARY LEARNER -   CO-LEARNER CAREGIVER -   PRIMARY LANGUAGE ENGLISH   LEARNER PREFERENCE PRIMARY LISTENING   ANSWERED BY patient   RELATIONSHIP SELF       Abuse Screening:  Abuse Screening Questionnaire 9/10/2018   Do you ever feel afraid of your partner? N   Are you in a relationship with someone who physically or mentally threatens you? N   Is it safe for you to go home? Y           Health Maintenance Due   Topic Date Due    Shingrix Vaccine Age 49> (1 of 2) 03/26/1991    GLAUCOMA SCREENING Q2Y  11/17/2016   . Health Maintenance reviewed and discussed and ordered per Provider. Austin Blanco is updated on all       Coordination of Care:  1.  Have you been to the ER, urgent care clinic since your last visit? Hospitalized since your last visit? no    2. Have you seen or consulted any other health care providers outside of the [address] [place] [address] since your last visit? Include any pap smears or colon screening. no    Per-Dr. Blackburn Marshall ok medication not taking to be deleted. Advance Directive:  1. Do you have an advance directive in place? Patient Reply:no    2. If not, would you like material regarding how to put one in place?  Patient Reply: no

## 2018-12-27 ENCOUNTER — APPOINTMENT (OUTPATIENT)
Dept: PHYSICAL THERAPY | Age: 78
End: 2018-12-27
Payer: MEDICARE

## 2018-12-31 ENCOUNTER — HOSPITAL ENCOUNTER (OUTPATIENT)
Dept: PHYSICAL THERAPY | Age: 78
Discharge: HOME | End: 2018-12-31
Payer: MEDICARE

## 2018-12-31 PROCEDURE — 97110 THERAPEUTIC EXERCISES: CPT

## 2018-12-31 PROCEDURE — 97140 MANUAL THERAPY 1/> REGIONS: CPT

## 2018-12-31 NOTE — PROGRESS NOTES
PT DAILY TREATMENT NOTE 10-18 Patient Name: Enoch Nieto Date:2018 : 1940 [x]  Patient  Verified Payor: [place] MEDICARE / Plan: Chata Bains y / Product Type: Medicare / In time:11:30  Out time:12:10 Total Treatment Time (min): 40 Visit #: 7 of - Medicare/BCBS Only Total Timed Codes (min):  30 1:1 Treatment Time:  21 Treatment Area: Right knee pain [M25.561] SUBJECTIVE Pain Level (0-10 scale): 3 Any medication changes, allergies to medications, adverse drug reactions, diagnosis change, or new procedure performed?: [x] No    [] Yes (see summary sheet for update) Subjective functional status/changes:   [] No changes reported No new changed reported from patient today. OBJECTIVE Modality rationale: decrease edema, decrease inflammation and decrease pain to improve the patients ability to perform ADLs with improved ease. Type Additional Details  
[] Estim:  []Unatt       []IFC  []Premod []Other:  []w/ice   []w/heat Position: Location:  
[] Estim: []Att    []TENS instruct  []NMES []Other:  []w/US   []w/ice   []w/heat Position: Location:  
[]  Traction: [] Cervical       []Lumbar 
                     [] Prone          []Supine []Intermittent   []Continuous Lbs: 
[] before manual 
[] after manual  
[]  Ultrasound: []Continuous   [] Pulsed []1MHz   []3MHz W/cm2: 
Location:  
[]  Iontophoresis with dexamethasone Location: [] Take home patch  
[] In clinic  
[]  Ice     []  heat 
[]  Ice massage 
[]  Laser  
[]  Anodyne Position: Location:  
[]  Laser with stim 
[]  Other:  Position: Location:  
[x]  Vasopneumatic Device Pressure:       [x] lo [] med [] hi  
Temperature: [x] lo [] med [] hi  
[] Skin assessment post-treatment:  []intact []redness- no adverse reaction 
  []redness  adverse reaction: 15 min Therapeutic Exercise:  [x] See flow sheet :  
Rationale: increase ROM and increase strength to improve the patients ability to perform ADLs with improved mobility 8 min Manual Therapy:  PROM (extension emphasis), patellar mobs Rationale: decrease pain, increase ROM and increase tissue extensibility to improve patients functional mobility. With 
 [x] TE 
 [] TA 
 [] neuro 
 [] other: Patient Education: [x] Review HEP [] Progressed/Changed HEP based on:  
[] positioning   [] body mechanics   [] transfers   [] heat/ice application   
[] other:   
 
Other Objective/Functional Measures: lacking 2 degrees of right knee extension. 2# added to standing hamstring curls today. Pain Level (0-10 scale) post treatment: 0 
 
ASSESSMENT/Changes in Function:  Patient with decreased symptoms post session. Improvements noted in right knee extension today. Emphasis on knee extension during PROM today. Patient is progressing well with POC and improvements in ROM and strength noted. Patient will continue to benefit from skilled PT services to modify and progress therapeutic interventions, address functional mobility deficits, address ROM deficits, address strength deficits, analyze and address soft tissue restrictions, analyze and cue movement patterns, analyze and modify body mechanics/ergonomics and assess and modify postural abnormalities to attain remaining goals. [x]  See Plan of Care 
[]  See progress note/recertification 
[]  See Discharge Summary Progress towards goals / Updated goals: 
Short Term Goals: To be accomplished in 1 weeks: 
            1. Pt will be I and compliant with HEP Met 12/12/2018 Long Term Goals: To be accomplished in 4-12 weeks: 
            8. Improve FOTO score to 56 to improve ability for functional tasks             2.  Improve right knee extension to under 5 degrees for improved ability for daily activities. Met lacking 5 degrees - 12/19/2018, lacking 2 degrees right knee extension 12/31/18 
            3. Improve right knee flexion to 115 degrees to improve ability for daily tasks Goal met 12/14/18 Right knee AROM - 125 degrees             4. Improve right quad strength to at least 4+/5 to improve ability for gait.             2. Pt will report no difficulty with walking 2 blocks. PLAN 
[]  Upgrade activities as tolerated     [x]  Continue plan of care 
[]  Update interventions per flow sheet      
[]  Discharge due to:_ 
[]  Other:_ Frida Coleman, PT 12/31/2018  11:38 AM 
 
Future Appointments Date Time Provider Reza Bose 1/2/2019 10:30 AM Daniel Christina, PT [facility]  
1/4/2019  9:45 AM Dimitris Huff PA-C [facility] SCHED  
1/4/2019 10:30 AM [facility]  
1/7/2019 10:30 AM Stefania Haney, PT [facility]  
1/9/2019 11:00 AM Dewayne Ceballos, PT [facility]  
1/14/2019  9:00 AM Stefania Haney, PT [facility]  
1/22/2019  9:15 AM Shahab Nielson MD [facility] None 3/26/2019  9:30 AM Shahab Nielson MD [facility] None 5/6/2019 10:00 AM Kevin Serrano MD [address] [place] [address]  
8/25/2020  8:00 AM [facility] SCHED  
8/25/2020  9:00 AM [facility] SCHED  
8/25/2020 11:30 AM RAHAT Quiroz [address] [facility] 30

## 2019-01-02 ENCOUNTER — HOSPITAL ENCOUNTER (OUTPATIENT)
Dept: PHYSICAL THERAPY | Age: 79
Discharge: HOME | End: 2019-01-02
Payer: MEDICARE

## 2019-01-02 PROCEDURE — 97140 MANUAL THERAPY 1/> REGIONS: CPT

## 2019-01-02 PROCEDURE — 97110 THERAPEUTIC EXERCISES: CPT

## 2019-01-02 PROCEDURE — 97016 VASOPNEUMATIC DEVICE THERAPY: CPT

## 2019-01-02 NOTE — PROGRESS NOTES
PT DAILY TREATMENT NOTE 10-18 Patient Name: Roderick Thomas Date:2019 : 1940 [x]  Patient  Verified Payor: [place] MEDICARE / Plan: Chata Bains y / Product Type: Medicare / In time:10:30  Out time:11:20 Total Treatment Time (min): 50 Visit #: 8 of -12 Medicare/BCBS Only Total Timed Codes (min):  40 1:1 Treatment Time:  31  
 
 
Treatment Area: Right knee pain [M25.561] SUBJECTIVE Pain Level (0-10 scale): 1/10 Any medication changes, allergies to medications, adverse drug reactions, diagnosis change, or new procedure performed?: [x] No    [] Yes (see summary sheet for update) Subjective functional status/changes:   [] No changes reported The patient states that his knee feels less pain today. OBJECTIVE Modality rationale: decrease edema, decrease inflammation and decrease pain to improve the patients ability to improve ADL ease. Min Type Additional Details 10 [x]  Vasopneumatic Device Pressure:       [x] lo [] med [] hi  
Temperature: [x] lo [] med [] hi  
[] Skin assessment post-treatment:  []intact []redness- no adverse reaction 
  []redness  adverse reaction:  
 
22 min Therapeutic Exercise:  [x] See flow sheet :  
Rationale: increase ROM and increase strength to improve the patients ability to improve ADL ease. 10 min Neuromuscular Re-education:  [x]  See flow sheet :  
Rationale: increase ROM and increase strength  to improve the patients ability to improve ADL ease. 8 min Manual Therapy:  Right knee extension mobs in supine, contract relax of hamstring 7\"x15\" x 4 times. Rationale: decrease pain, increase ROM and increase tissue extensibility to improve ADL ease. With 
 [] TE 
 [] TA 
 [] neuro 
 [] other: Patient Education: [x] Review HEP [] Progressed/Changed HEP based on:  
[] positioning   [] body mechanics   [] transfers   [] heat/ice application   
[] other:   
 
Other Objective/Functional Measures: Knee extension: neutral attained following mobs. Pain Level (0-10 scale) post treatment: 0/10 ASSESSMENT/Changes in Function: Added step downs from 2\" step as well as hip x 3 to maximize stability in stance. Added large sarah negotiation with slight compensations, but did require min A for balance correction at times. The patient will continue to benefit from skilled interventions to maximize ease of stair negotiation. Patient will continue to benefit from skilled PT services to modify and progress therapeutic interventions, address functional mobility deficits, address ROM deficits, address strength deficits, analyze and address soft tissue restrictions, analyze and cue movement patterns, analyze and modify body mechanics/ergonomics, assess and modify postural abnormalities and instruct in home and community integration to attain remaining goals. []  See Plan of Care 
[]  See progress note/recertification 
[]  See Discharge Summary Progress towards goals / Updated goals: 
Short Term Goals: To be accomplished in 1 weeks: 
            1. Pt will be I and compliant with HEP Met 12/12/2018 Long Term Goals: To be accomplished in 4-12 weeks: 
            4. Improve FOTO score to 56 to improve ability for functional tasks             2. Improve right knee extension to under 5 degrees for improved ability for daily activities. Met lacking 5 degrees - 12/19/2018, lacking 2 degrees right knee extension 12/31/18; Met following manual neutral 1/02/2019 
            3. Improve right knee flexion to 115 degrees to improve ability for daily tasks Goal met 12/14/18 Right knee AROM - 125 degrees             4. Improve right quad strength to at least 4+/5 to improve ability for gait.             4. Pt will report no difficulty with walking 2 blocks. PLAN 
[]  Upgrade activities as tolerated     [x]  Continue plan of care Sara Carolina PT 1/2/2019  10:38 AM 
 
Future Appointments Date Time Provider [facility] L[facility]ya 1/4/2019  9:45 AM Tran Huff PA-C [facility] SCHED  
1/4/2019 10:30 AM Mirna Christianson, PT [facility]  
1/7/2019 10:30 AM Mirna Christianson, PT [facility]  
1/9/2019 11:00 AM Santa No, PT [facility]  
1/14/2019  9:00 AM Mirna Christianson, PT [facility]  
1/22/2019  9:15 AM Nichole Groves MD [facility] None 3/26/2019  9:30 AM Nichole Groves MD [facility] None 5/6/2019 10:00 AM Arron Hudson MD [address] [place] [address]  
8/25/2020  8:00 AM [facility] SCHED  
8/25/2020  9:00 AM [facility] SCHED  
8/25/2020 11:30 AM RAHAT Alarcon [address] [address]

## 2019-01-04 ENCOUNTER — HOSPITAL ENCOUNTER (OUTPATIENT)
Dept: PHYSICAL THERAPY | Age: 79
Discharge: HOME | End: 2019-01-04
Payer: MEDICARE

## 2019-01-04 ENCOUNTER — OFFICE VISIT (OUTPATIENT)
Dept: ORTHOPEDIC SURGERY | Facility: CLINIC | Age: 79
End: 2019-01-04

## 2019-01-04 VITALS
DIASTOLIC BLOOD PRESSURE: 57 MMHG | HEIGHT: 72 IN | TEMPERATURE: 97 F | BODY MASS INDEX: 24.54 KG/M2 | SYSTOLIC BLOOD PRESSURE: 118 MMHG | OXYGEN SATURATION: 99 % | HEART RATE: 71 BPM | RESPIRATION RATE: 18 BRPM | WEIGHT: 181.2 LBS

## 2019-01-04 DIAGNOSIS — Z47.1 AFTERCARE FOLLOWING KNEE JOINT REPLACEMENT SURGERY, UNSPECIFIED LATERALITY: ICD-10-CM

## 2019-01-04 DIAGNOSIS — Z96.651 STATUS POST RIGHT KNEE REPLACEMENT: Primary | ICD-10-CM

## 2019-01-04 DIAGNOSIS — Z96.659 AFTERCARE FOLLOWING KNEE JOINT REPLACEMENT SURGERY, UNSPECIFIED LATERALITY: ICD-10-CM

## 2019-01-04 PROCEDURE — 97110 THERAPEUTIC EXERCISES: CPT

## 2019-01-04 PROCEDURE — 97140 MANUAL THERAPY 1/> REGIONS: CPT

## 2019-01-04 PROCEDURE — 97016 VASOPNEUMATIC DEVICE THERAPY: CPT

## 2019-01-04 NOTE — PROGRESS NOTES
PT DAILY TREATMENT NOTE 10-18 Patient Name: George Singh Date:2019 : 1940 [x]  Patient  Verified Payor: [place] MEDICARE / Plan: Chata Bains y / Product Type: Medicare / In time:10:30  Out time:11:17 Total Treatment Time (min): 47 Visit #: 9 of  Medicare/BCBS Only Total Timed Codes (min):  37 1:1 Treatment Time:  37 Treatment Area: Right knee pain [M25.561] SUBJECTIVE Pain Level (0-10 scale): 0/10 Any medication changes, allergies to medications, adverse drug reactions, diagnosis change, or new procedure performed?: [x] No    [] Yes (see summary sheet for update) Subjective functional status/changes:   [] No changes reported The patient reports his follow-up with the doctor went well, and they don't want to see him for another 6 months. He denies pain upon arrival and does feel his knee is improving. OBJECTIVE Modality rationale: decrease edema, decrease inflammation and decrease pain to improve the patients ability to improve ADL ease. Type Additional Details [x]  Vasopneumatic Device 10 minutes Pressure:       [x] lo [] med [] hi  
Temperature: [x] lo [] med [] hi  
[] Skin assessment post-treatment:  []intact []redness- no adverse reaction 
  []redness  adverse reaction:  
 
19 min Therapeutic Exercise:  [x] See flow sheet :  
Rationale: increase ROM and increase strength to improve the patients ability to im 
  
10 min Neuromuscular Re-education:  [x]  See flow sheet :  
Rationale: improve coordination, improve balance and increase proprioception  to improve the patients ability to improve 8 min Manual Therapy:  Right knee HS contract relax 7\"/15\" x 4. Knee extension mobs. Rationale: decrease pain, increase ROM and increase tissue extensibility to improve ADL ease. With 
 [] TE 
 [] TA 
 [] neuro 
 [] other: Patient Education: [x] Review HEP [] Progressed/Changed HEP based on: [] positioning   [] body mechanics   [] transfers   [] heat/ice application   
[] other:   
 
Other Objective/Functional Measures:  
Lacking between 1-2 degrees extension actively following manual, able to attain 0 degrees passively. Pain Level (0-10 scale) post treatment: 0/10 ASSESSMENT/Changes in Function: Excellent progress attained regarding mobility. The patient did require min A during hurdles, but improved ease of quad flexibility regarding rear leg step overs. Continue PT in order to progress complete extension in order to maximize ease of ambulation. Patient will continue to benefit from skilled PT services to modify and progress therapeutic interventions, address functional mobility deficits, address strength deficits, analyze and address soft tissue restrictions, analyze and cue movement patterns, analyze and modify body mechanics/ergonomics, assess and modify postural abnormalities and instruct in home and community integration to attain remaining goals. []  See Plan of Care 
[]  See progress note/recertification 
[]  See Discharge Summary Progress towards goals / Updated goals: 
Short Term Goals: To be accomplished in 1 weeks: 
            1. Pt will be I and compliant with HEP Met 12/12/2018 Long Term Goals: To be accomplished in 4-12 weeks: 
            6. Improve FOTO score to 56 to improve ability for functional tasks             2. Improve right knee extension to under 5 degrees for improved ability for daily activities. Met lacking 5 degrees - 12/19/2018, lacking 2 degrees right knee extension 12/31/18; Met following manual neutral 1/02/2019 
            3. Improve right knee flexion to 115 degrees to improve ability for daily tasks Goal met 12/14/18 Right knee AROM - 125 degrees             4. Improve right quad strength to at least 4+/5 to improve ability for gait.             0. Pt will report no difficulty with walking 2 blocks.    
 
PLAN 
 []  Upgrade activities as tolerated     [x]  Continue plan of care 
[]  Update interventions per flow sheet      
[]  Discharge due to:_ 
[]  Other:_   
 
Raúl Echols, PT 1/4/2019  11:11 AM 
 
Future Appointments Date Time Provider Reza Bose 1/7/2019 10:30 AM Grzegorz Christina, PT [facility]  
1/9/2019 11:00 AM Hong Palomo, PT [facility]  
1/14/2019  9:00 AM Miko Murrell, PT [facility]  
1/22/2019  9:15 AM Christen Julian MD [facility] None 3/26/2019  9:30 AM Christen Julian MD [facility] None 5/6/2019 10:00 AM Zaida Michael MD [address] [place] [address]  
8/25/2020  8:00 AM [facility] SCHED  
8/25/2020  9:00 AM [facility] SCHED  
8/25/2020 11:30 AM RAHAT Cope [id number] [address]

## 2019-01-04 NOTE — PROGRESS NOTES
HISTORY OF PRESENT ILLNESS:  Demetra Litten returns six weeks status post his primary right total knee replacement under the care of Dr. Seng Cain. He is doing very well today. He has continued physical therapy and has a session today. He is full weightbearing of his right lower extremity. PHYSICAL EXAM:  Extension is -2° to full actively, and flexion is in excess of 120°. He has no calf tenderness or evidence of DVT. Distal sensation is intact fully to the right lower extremity. 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 This is generally unchanged from previous. PLAN:   His most recent physical therapy note was reviewed, and all his questions were answered to his satisfaction today. X-rays had been obtained at a previous visit, and with him doing this well six weeks postoperatively, we will see him back in six months. Dental prophylaxis was also mentioned as necessary for any upcoming dental work for the remainder of his life. All his questions were answered to his satisfaction.

## 2019-01-07 ENCOUNTER — APPOINTMENT (OUTPATIENT)
Dept: PHYSICAL THERAPY | Age: 79
End: 2019-01-07
Payer: MEDICARE

## 2019-01-09 ENCOUNTER — HOSPITAL ENCOUNTER (OUTPATIENT)
Dept: PHYSICAL THERAPY | Age: 79
Discharge: HOME | End: 2019-01-09
Payer: MEDICARE

## 2019-01-09 PROCEDURE — 97110 THERAPEUTIC EXERCISES: CPT

## 2019-01-09 PROCEDURE — 97140 MANUAL THERAPY 1/> REGIONS: CPT

## 2019-01-09 NOTE — PROGRESS NOTES
In [address]  Luz Elena [address] [place], [address] 
[phone] fax Medicare Progress Report Patient name: Anne Beltran Start of Care: 12/10/2018 Referral source: Mike Poe MD : 1940 Medical Diagnosis: Right knee pain [M25.561] Payor: Sara Staff / Plan: [place] MEDICARE PART A & B / Product Type: Medicare /  Onset Date:18 Treatment Diagnosis: s/p right TKA Prior Hospitalization: see medical history Provider#: [id number] Medications: Verified on Patient summary List  
 Comorbidities: OA, heart disease, HTN, left TKA Prior Level of Function: functionally I with activities Visits from Start of Care: 10    Missed Visits: 0 Reporting Period: 12-10-18 to 19 Subjective Reports:  Pt reports 0/10 pain level currently.  
  
  
Patient will continue to benefit from skilled PT services to modify and progress therapeutic interventions, address functional mobility deficits, address strength deficits, analyze and address soft tissue restrictions, analyze and cue movement patterns, analyze and modify body mechanics/ergonomics, assess and modify postural abnormalities and instruct in home and community integration to attain remaining goals. Progress towards goals: 
Short Term Goals: To be accomplished in 1 weeks: 
            1. Pt will be I and compliant with HEP Met 2018 Long Term Goals: To be accomplished in 4-12 weeks: 
            7. Improve FOTO score to 56 to improve ability for functional tasks             2. Improve right knee extension to under 5 degrees for improved ability for daily activities. Met lacking 5 degrees - 2018, lacking 2 degrees right knee extension 18;  Met following manual neutral 2019 
            3. Improve right knee flexion to 115 degrees to improve ability for daily tasks Goal met 12/14/18 Right knee AROM - 125 degrees             4. Improve right quad strength to at least 4+/5 to improve ability for gait. - MET 5/5 on 1-9-19 
            5. Pt will report no difficulty with walking 2 blocks. -MEt reports no difficulty 1-9-19 Key functional changes:  Improved ability for gait, stairs and other activities per pt report / observation Problems/ barriers to goal attainment: none Assessment / Recommendations:Pt has progressed well with PT and has met most goals at this time. He has 2 remaining visits and will benefit from continuation to attempt to gain full extension and progress to updated HEP for discharge preparation. Problem List: decrease ROM, decrease strength, decrease activity tolerance and decrease flexibility/ joint mobility Treatment Plan: Therapeutic exercise, Therapeutic activities, Neuromuscular re-education, Physical agent/modality, Gait/balance training, Manual therapy and Patient education Patient Goal (s) has been updated and includes: \" To improve strength and extension\" Updated Goals to be accomplished in 1-2 weeks: 
  1. Improve FOTO score to 56 to improve ability for functional tasks 2. Pt will be issued updated HEP to continue with PT following discharge. Frequency / Duration: Patient to be seen 2 times per week for 1-2 weeks: 
 
G-Codes (GP) Mobility  Current  CL= 60-79%   Goal  CK= 40-59% The severity rating is based on clinical judgment and the FOTO score.  
 
Milind Springer, PT 1/9/2019 2:09 PM

## 2019-01-09 NOTE — PROGRESS NOTES
PT DAILY TREATMENT NOTE 10-18 Patient Name: Elsy Montesinos Date:2019 : 1940 [x]  Patient  Verified Payor: [place] MEDICARE / Plan: Chata Bains y / Product Type: Medicare / In time:11:04 Out time:11:38 Total Treatment Time (min): 34 Visit #: 10 of - Medicare/BCBS Only Total Timed Codes (min):  34 1:1 Treatment Time:  25 Treatment Area: Right knee pain [M25.561] SUBJECTIVE Pain Level (0-10 scale): 0/10 Any medication changes, allergies to medications, adverse drug reactions, diagnosis change, or new procedure performed?: [x] No    [] Yes (see summary sheet for update) Subjective functional status/changes:   [] No changes reported Pt report he is doing well overall. Reports he is able to walk distances without difficulty. OBJECTIVE 16 min Therapeutic Exercise:  [x] See flow sheet :  
Rationale: increase ROM and increase strength to improve the patients ability to im 
  
10 min Neuromuscular Re-education:  [x]  See flow sheet :  
Rationale: improve coordination, improve balance and increase proprioception  to improve the patients ability to improve 8 min Manual Therapy:  Right knee HS contract relax, STM medial HS tendons, right knee extension mobs Rationale: decrease pain, increase ROM and increase tissue extensibility to improve ADL ease. With 
 [] TE 
 [] TA 
 [] neuro 
 [] other: Patient Education: [x] Review HEP [] Progressed/Changed HEP based on:  
[] positioning   [] body mechanics   [] transfers   [] heat/ice application   
[] other:   
 
Other Objective/Functional Measures: MMT right quads 5/5, hamstrings 4+/5 Pain Level (0-10 scale) post treatment: 0/10 ASSESSMENT/Changes in Function:  Pt has progressed well with PT and has met most goals at this time. He has 2 remaining visits and will benefit from continuation to attempt to gain full extension and progress to updated HEP for discharge preparation. Patient will continue to benefit from skilled PT services to modify and progress therapeutic interventions, address functional mobility deficits, address strength deficits, analyze and address soft tissue restrictions, analyze and cue movement patterns, analyze and modify body mechanics/ergonomics, assess and modify postural abnormalities and instruct in home and community integration to attain remaining goals. []  See Plan of Care [x]  See progress note/recertification  
[]  See Discharge Summary Progress towards goals / Updated goals: 
Short Term Goals: To be accomplished in 1 weeks: 
            1. Pt will be I and compliant with HEP Met 12/12/2018 Long Term Goals: To be accomplished in 4-12 weeks: 
            5. Improve FOTO score to 56 to improve ability for functional tasks             2. Improve right knee extension to under 5 degrees for improved ability for daily activities. Met lacking 5 degrees - 12/19/2018, lacking 2 degrees right knee extension 12/31/18; Met following manual neutral 1/02/2019 
            3. Improve right knee flexion to 115 degrees to improve ability for daily tasks Goal met 12/14/18 Right knee AROM - 125 degrees             4. Improve right quad strength to at least 4+/5 to improve ability for gait. - MET 5/5 on 1-9-19 
            5. Pt will report no difficulty with walking 2 blocks. -MEt reports no difficulty 1-9-19 PLAN 
[]  Upgrade activities as tolerated     [x]  Continue plan of care 
[]  Update interventions per flow sheet      
[]  Discharge due to:_ 
[]  Other:_ Melissa Oneal, PT 1/9/2019  11:11 AM 
 
Future Appointments Date Time Provider Reza Bose 1/11/2019  2:30 PM David Olguin, PT [facility]  
1/14/2019  9:00 AM [facility]  
1/22/2019  9:15 AM Vita Lombardo MD [facility] None 3/26/2019  9:30 AM Vita Lombardo MD [facility] None 5/6/2019 10:00 AM Bianca Foster MD [address] [place] [address]  
 8/25/2020  8:00 AM [facility] 1 [facility] SCHED  
8/25/2020  9:00 AM [facility] 1 [facility] SCHED  
8/25/2020 11:30 AM RAHAT Cope [id number] [facility] 30

## 2019-01-11 ENCOUNTER — HOSPITAL ENCOUNTER (OUTPATIENT)
Dept: PHYSICAL THERAPY | Age: 79
Discharge: HOME | End: 2019-01-11
Payer: MEDICARE

## 2019-01-11 PROCEDURE — 97110 THERAPEUTIC EXERCISES: CPT

## 2019-01-11 PROCEDURE — 97140 MANUAL THERAPY 1/> REGIONS: CPT

## 2019-01-11 NOTE — PROGRESS NOTES
PT DAILY TREATMENT NOTE 10-18 Patient Name: Rayo Osullivan Date:2019 : 1940 [x]  Patient  Verified Payor: [place] MEDICARE / Plan: Chata Bains y / Product Type: Medicare / In time:2:30  Out time:3:08 Total Treatment Time (min): 38 Visit #: 1 of 2-4 Medicare/BCBS Only Total Timed Codes (min):  38 1:1 Treatment Time:  45 Treatment Area: Right knee pain [M25.561] SUBJECTIVE Pain Level (0-10 scale): 0 Any medication changes, allergies to medications, adverse drug reactions, diagnosis change, or new procedure performed?: [x] No    [] Yes (see summary sheet for update) Subjective functional status/changes:   [] No changes reported No new changes reported by patient. OBJECTIVE 30 min Therapeutic Exercise:  [x] See flow sheet :  
Rationale: increase ROM and increase strength to improve the patients ability to perofrm ADLs with improved ease. 8 min Manual Therapy:  Right patellar mobs, right knee PROM, contract relax Rationale: increase ROM and increase tissue extensibility to improve patients functional mobility. With 
 [] TE 
 [] TA 
 [] neuro 
 [] other: Patient Education: [x] Review HEP [] Progressed/Changed HEP based on:  
[] positioning   [] body mechanics   [] transfers   [] heat/ice application   
[] other:   
 
Other Objective/Functional Measures: FOTO -  51 Pain Level (0-10 scale) post treatment: 1 ASSESSMENT/Changes in Function: Patient performs all exercises with proper form and demonstrates proper squat mechanics. Patient demonstrates improvements in knee AROM observed throughout exercises today. Full knee extension achieved post manual therapy today.   
 
Patient will continue to benefit from skilled PT services to modify and progress therapeutic interventions, address functional mobility deficits, address ROM deficits, address strength deficits, analyze and address soft tissue restrictions, analyze and cue movement patterns and analyze and modify body mechanics/ergonomics to attain remaining goals. [x]  See Plan of Care 
[]  See progress note/recertification 
[]  See Discharge Summary Progress towards goals / Updated goals: 
Updated Goals to be accomplished in 1-2 weeks: 
  1. Improve FOTO score to 56 to improve ability for functional tasks Progressing 1/11/19 FOTO score - 51 2. Pt will be issued updated HEP to continue with PT following discharge. PLAN 
[]  Upgrade activities as tolerated     [x]  Continue plan of care 
[]  Update interventions per flow sheet      
[]  Discharge due to:_ 
[]  Other:_ Ramonita Raines, PT 1/11/2019  2:41 PM 
 
Future Appointments Date Time Provider Reza Bose 1/14/2019  9:00 AM Compton, Marylene Hope, PT [facility]  
1/22/2019  9:15 AM Roque Katz MD [facility] None 3/26/2019  9:30 AM Roque Katz MD [facility] None 5/6/2019 10:00 AM Natalee Faulkner MD [address] [place] [address]  
8/25/2020  8:00 AM [facility] 1 [facility] SCHED  
8/25/2020  9:00 AM [facility] 1 [facility] SCHED  
8/25/2020 11:30 AM RAHAT Castrejon [id number] [place] [id number]

## 2019-01-14 ENCOUNTER — HOSPITAL ENCOUNTER (OUTPATIENT)
Dept: PHYSICAL THERAPY | Age: 79
Discharge: HOME | End: 2019-01-14
Payer: MEDICARE

## 2019-01-14 PROCEDURE — 97110 THERAPEUTIC EXERCISES: CPT

## 2019-01-14 NOTE — PROGRESS NOTES
In [address]  Luz Elena [address] [place], [address] 
[phone] fax Physical Therapy Discharge Summary Referral Cande Diana MD : 1940              
Medical Diagnosis: Right knee pain Karla Toth Payor: Mallory Myles / Plan: [place] MEDICARE PART A & B / Product Type: Medicare /  Onset Date:18              
Treatment Diagnosis: s/p right TKA Prior Hospitalization: see medical history Provider#: [id number] Medications: Verified on Patient summary List  
 Comorbidities: OA, heart disease, HTN, left TKA 
 Prior Level of Function: functionally I with activities Visits from Start of Care: 12    Missed Visits: 0 Reporting Period : 2019 to 2019 Summary of Care: 
Updated Goals to be accomplished in 1-2 weeks: 
  1. Improve FOTO score to 56 to improve ability for functional tasks Progressing 19 FOTO score - 51 2. Pt will be issued updated HEP to continue with PT following discharge. Met - verbally reviewed prone hangs and extension ROM preservation 2019 ASSESSMENT/RECOMMENDATIONS: Verbally instructed the patient to perform extension hangs in order to reduce likelihood of losing this range, as that is the chief impairment apparent during the session which does resolve with exercise. The patient verbalized understanding and declined ice today. Pt progressed with FOTO, though not meeting his goal on last session despite impairments improving. Pt request D/C at this time due to having a lot going on at home. [x]Discontinue therapy: [x]Patient has reached or is progressing toward set goals []Patient is non-compliant or has abdicated 
    []Due to lack of appreciable progress towards set goals Charles Salas, PT 2019 9:45 AM

## 2019-01-14 NOTE — PROGRESS NOTES
PT DAILY TREATMENT NOTE 10-18 Patient Name: Issac Hills Date:2019 : 1940 [x]  Patient  Verified Payor: [place] MEDICARE / Plan: Chata Bains y / Product Type: Medicare / In time:9:06  Out time:9:37 Total Treatment Time (min): 31 Visit #: 2 of 2-4 Medicare/BCBS Only Total Timed Codes (min):  31 1:1 Treatment Time:  16 Treatment Area: Right knee pain [M25.561] SUBJECTIVE Pain Level (0-10 scale): 0/10 Any medication changes, allergies to medications, adverse drug reactions, diagnosis change, or new procedure performed?: [x] No    [] Yes (see summary sheet for update) Subjective functional status/changes:   [] No changes reported The patient reports that his knee is doing well. He indicates he has a lot going on at home caring for his wife, and does feel he is ready to be done with his PT. OBJECTIVE 21 min Therapeutic Exercise:  [x] See flow sheet :  
Rationale: increase ROM and increase strength to improve the patients ability to improve ADL ease. 10 min Neuromuscular Re-education:  [x]  See flow sheet :  
Rationale: increase ROM and increase strength  to improve the patients ability to improve ADL ease. With 
 [] TE 
 [] TA 
 [] neuro 
 [] other: Patient Education: [x] Review HEP [] Progressed/Changed HEP based on:  
[] positioning   [] body mechanics   [] transfers   [] heat/ice application   
[] other:   
 
Other Objective/Functional Measures: Actively 0 degrees extension attained during session. Pain Level (0-10 scale) post treatment: 1/10 ASSESSMENT/Changes in Function: Verbally instructed the patient to perform extension hangs in order to reduce likelihood of losing this range, as that is the chief impairment apparent during the session which does resolve with exercise. The patient verbalized understanding and declined ice today. []  See Plan of Care 
[]  See progress note/recertification 
[x]  See Discharge Summary Progress towards goals / Updated goals: 
Updated Goals to be accomplished in 1-2 weeks: 
  1. Improve FOTO score to 56 to improve ability for functional tasks Progressing 1/11/19 FOTO score - 51 2. Pt will be issued updated HEP to continue with PT following discharge. Met - verbally reviewed prone hangs and extension ROM preservation 1/14/2019 
  
PLAN 
[]  Upgrade activities as tolerated     []  Continue plan of care 
[]  Update interventions per flow sheet [x]  Discharge due to:_ 
[]  Other: pt progress and request towards goals. Kana Mcnair, PT 1/14/2019  8:44 AM 
 
Future Appointments Date Time Provider Reza Lathami 1/14/2019  9:00 AM Rimma Christina, PT [facility]  
1/22/2019  9:15 AM Diego Jones MD [facility] None 3/26/2019  9:30 AM Diego Jones MD [facility] None 5/6/2019 10:00 AM Micha Lew MD [address] [place] [address]  
8/25/2020  8:00 AM [facility] 1 [facility] SCHED  
8/25/2020  9:00 AM [facility] 1 [facility] SCHED  
8/25/2020 11:30 AM RAHAT Ascencio [address] [facility] 30

## 2019-01-22 ENCOUNTER — OFFICE VISIT (OUTPATIENT)
Dept: FAMILY MEDICINE CLINIC | Age: 79
End: 2019-01-22

## 2019-01-22 VITALS
DIASTOLIC BLOOD PRESSURE: 70 MMHG | SYSTOLIC BLOOD PRESSURE: 156 MMHG | BODY MASS INDEX: 24.46 KG/M2 | TEMPERATURE: 97.6 F | HEIGHT: 72 IN | HEART RATE: 83 BPM | RESPIRATION RATE: 18 BRPM | WEIGHT: 180.6 LBS

## 2019-01-22 DIAGNOSIS — Z53.21 PATIENT LEFT WITHOUT BEING SEEN: Primary | ICD-10-CM

## 2019-01-22 NOTE — PATIENT INSTRUCTIONS
High Blood Pressure: Care Instructions Overview It's normal for blood pressure to go up and down throughout the day. But if it stays up, you have high blood pressure. Another name for high blood pressure is hypertension. Despite what a lot of people think, high blood pressure usually doesn't cause headaches or make you feel dizzy or lightheaded. It usually has no symptoms. But it does increase your risk of stroke, heart attack, and other problems. You and your doctor will talk about your risks of these problems based on your blood pressure. Your doctor will give you a goal for your blood pressure. Your goal will be based on your health and your age. Lifestyle changes, such as eating healthy and being active, are always important to help lower blood pressure. You might also take medicine to reach your blood pressure goal. 
Follow-up care is a key part of your treatment and safety. Be sure to make and go to all appointments, and call your doctor if you are having problems. It's also a good idea to know your test results and keep a list of the medicines you take. How can you care for yourself at home? Medical treatment · If you stop taking your medicine, your blood pressure will go back up. You may take one or more types of medicine to lower your blood pressure. Be safe with medicines. Take your medicine exactly as prescribed. Call your doctor if you think you are having a problem with your medicine. · Talk to your doctor before you start taking aspirin every day. Aspirin can help certain people lower their risk of a heart attack or stroke. But taking aspirin isn't right for everyone, because it can cause serious bleeding. · See your doctor regularly. You may need to see the doctor more often at first or until your blood pressure comes down. · If you are taking blood pressure medicine, talk to your doctor before you take decongestants or anti-inflammatory medicine, such as ibuprofen. Some of these medicines can raise blood pressure. · Learn how to check your blood pressure at home. Lifestyle changes · Stay at a healthy weight. This is especially important if you put on weight around the waist. Losing even 10 pounds can help you lower your blood pressure. · If your doctor recommends it, get more exercise. Walking is a good choice. Bit by bit, increase the amount you walk every day. Try for at least 30 minutes on most days of the week. You also may want to swim, bike, or do other activities. · Avoid or limit alcohol. Talk to your doctor about whether you can drink any alcohol. · Try to limit how much sodium you eat to less than 2,300 milligrams (mg) a day. Your doctor may ask you to try to eat less than 1,500 mg a day. · Eat plenty of fruits (such as bananas and oranges), vegetables, legumes, whole grains, and low-fat dairy products. · Lower the amount of saturated fat in your diet. Saturated fat is found in animal products such as milk, cheese, and meat. Limiting these foods may help you lose weight and also lower your risk for heart disease. · Do not smoke. Smoking increases your risk for heart attack and stroke. If you need help quitting, talk to your doctor about stop-smoking programs and medicines. These can increase your chances of quitting for good. When should you call for help? Call 911 anytime you think you may need emergency care. This may mean having symptoms that suggest that your blood pressure is causing a serious heart or blood vessel problem. Your blood pressure may be over 180/120. 
 For example, call 911 if: 
  · You have symptoms of a heart attack. These may include: 
? Chest pain or pressure, or a strange feeling in the chest. 
? Sweating. ? Shortness of breath. ? Nausea or vomiting. ? Pain, pressure, or a strange feeling in the back, neck, jaw, or upper belly or in one or both shoulders or arms. ? Lightheadedness or sudden weakness. ? A fast or irregular heartbeat.  
  · You have symptoms of a stroke. These may include: 
? Sudden numbness, tingling, weakness, or loss of movement in your face, arm, or leg, especially on only one side of your body. ? Sudden vision changes. ? Sudden trouble speaking. ? Sudden confusion or trouble understanding simple statements. ? Sudden problems with walking or balance. ? A sudden, severe headache that is different from past headaches.  
  · You have severe back or belly pain.  
 Do not wait until your blood pressure comes down on its own. Get help right away. 
 Call your doctor now or seek immediate care if: 
  · Your blood pressure is much higher than normal (such as 180/120 or higher), but you don't have symptoms.  
  · You think high blood pressure is causing symptoms, such as: 
? Severe headache. 
? Blurry vision.  
 Watch closely for changes in your health, and be sure to contact your doctor if: 
  · Your blood pressure measures higher than your doctor recommends at least 2 times. That means the top number is higher or the bottom number is higher, or both.  
  · You think you may be having side effects from your blood pressure medicine. Where can you learn more? Go to http://chucky-avel.info/. Enter F941 in the search box to learn more about \"High Blood Pressure: Care Instructions. \" Current as of: July 22, 2018 Content Version: 11.9 © 8642-8807 [employer]. Care instructions adapted under license by [employer] (which disclaims liability or warranty for this information). If you have questions about a medical condition or this instruction, always ask your healthcare professional. [place] [address] any warranty or liability for your use of this information.

## 2019-01-22 NOTE — PROGRESS NOTES
Vero Hogan presents today for Chief Complaint Patient presents with  Depression Patient stated that his wife as passed away on 1/15/19 and been under a lot of pressure. Is someone accompanying this pt? no 
 
Is the patient using any DME equipment during OV? no 
 
Depression Screening: PHQ over the last two weeks 1/22/2019 PHQ Not Done - Little interest or pleasure in doing things Several days Feeling down, depressed, irritable, or hopeless Several days Total Score PHQ 2 2 Trouble falling or staying asleep, or sleeping too much Several days Feeling tired or having little energy Several days Poor appetite, weight loss, or overeating Not at all Feeling bad about yourself - or that you are a failure or have let yourself or your family down Not at all Trouble concentrating on things such as school, work, reading, or watching TV Not at all Moving or speaking so slowly that other people could have noticed; or the opposite being so fidgety that others notice Several days Thoughts of being better off dead, or hurting yourself in some way Not at all PHQ 9 Score 5 How difficult have these problems made it for you to do your work, take care of your home and get along with others Not difficult at all Learning Assessment: 
Learning Assessment 1/22/2019 PRIMARY LEARNER Patient HIGHEST LEVEL OF EDUCATION - PRIMARY LEARNER  SOME COLLEGE  
BARRIERS PRIMARY LEARNER NONE  
CO-LEARNER CAREGIVER No  
PRIMARY LANGUAGE ENGLISH  
LEARNER PREFERENCE PRIMARY LISTENING  
ANSWERED BY self RELATIONSHIP SELF Abuse Screening: 
Abuse Screening Questionnaire 1/22/2019 Do you ever feel afraid of your partner? Dell Brar Are you in a relationship with someone who physically or mentally threatens you? Dell Brar Is it safe for you to go home? Steve Layne Health Maintenance Due Topic Date Due  Shingrix Vaccine Age 50> (1 of 2) 03/26/1991  GLAUCOMA SCREENING Q2Y  11/02/2018  MEDICARE YEARLY EXAM  01/04/2019 Maxi Renee [facility] reviewed and discussed and ordered per Provider. Preston Groves is updated on all  Coordination of Care 1. Have you been to the ER, urgent care clinic since your last visit? Hospitalized since your last visit? No 
2. Have you seen or consulted any other health care providers outside of the [address] [place] [address] since your last visit? Include any pap smears or colon screening. No 
 
Per-Dr. Argentina Marie ok medication not taking to be deleted. Advance Directive: 1. Do you have an advance directive in place? Patient Reply:no 2. If not, would you like material regarding how to put one in place? Patient Reply: no 
 
Provider notified of depression screening. Patient denies of any thoughts of harming herself.

## 2019-02-21 ENCOUNTER — TELEPHONE (OUTPATIENT)
Dept: FAMILY MEDICINE CLINIC | Age: 79
End: 2019-02-21

## 2019-02-21 NOTE — TELEPHONE ENCOUNTER
Pt contacted to f/u on how he is doing since his wife's death a month ago. He reports that he does feel lonely but is doing well overall. Pt has decided to start exercising with a . Pt needs a form signed for the [facility] to be able to start his program.  Home bp readings have been good. His last reading was 130/79. Will sign the form and leave it at the  for . Pt has appt next month; he will do labs just prior to that appt.

## 2019-03-19 ENCOUNTER — HOSPITAL ENCOUNTER (OUTPATIENT)
Dept: LAB | Age: 79
Discharge: HOME | End: 2019-03-19
Payer: MEDICARE

## 2019-03-19 DIAGNOSIS — E78.5 HYPERLIPIDEMIA, UNSPECIFIED HYPERLIPIDEMIA TYPE: Chronic | ICD-10-CM

## 2019-03-19 DIAGNOSIS — I10 ESSENTIAL HYPERTENSION: Chronic | ICD-10-CM

## 2019-03-19 LAB
ALBUMIN SERPL-MCNC: 4.2 G/DL (ref 3.4–5)
ALBUMIN/GLOB SERPL: 1.5 {RATIO} (ref 0.8–1.7)
ALP SERPL-CCNC: 92 U/L (ref 45–117)
ALT SERPL-CCNC: 38 U/L (ref 16–61)
ANION GAP SERPL CALC-SCNC: 8 MMOL/L (ref 3–18)
AST SERPL-CCNC: 29 U/L (ref 15–37)
BILIRUB SERPL-MCNC: 1.8 MG/DL (ref 0.2–1)
BUN SERPL-MCNC: 12 MG/DL (ref 7–18)
BUN/CREAT SERPL: 13 (ref 12–20)
CALCIUM SERPL-MCNC: 9.4 MG/DL (ref 8.5–10.1)
CHLORIDE SERPL-SCNC: 111 MMOL/L (ref 100–108)
CHOLEST SERPL-MCNC: 132 MG/DL
CO2 SERPL-SCNC: 27 MMOL/L (ref 21–32)
CREAT SERPL-MCNC: 0.9 MG/DL (ref 0.6–1.3)
GLOBULIN SER CALC-MCNC: 2.8 G/DL (ref 2–4)
GLUCOSE SERPL-MCNC: 99 MG/DL (ref 74–99)
HDLC SERPL-MCNC: 59 MG/DL (ref 40–60)
HDLC SERPL: 2.2 {RATIO} (ref 0–5)
LDLC SERPL CALC-MCNC: 52.6 MG/DL (ref 0–100)
LIPID PROFILE,FLP: NORMAL
POTASSIUM SERPL-SCNC: 4.3 MMOL/L (ref 3.5–5.5)
PROT SERPL-MCNC: 7 G/DL (ref 6.4–8.2)
SODIUM SERPL-SCNC: 146 MMOL/L (ref 136–145)
TRIGL SERPL-MCNC: 102 MG/DL (ref ?–150)
VLDLC SERPL CALC-MCNC: 20.4 MG/DL

## 2019-03-19 PROCEDURE — 80053 COMPREHEN METABOLIC PANEL: CPT

## 2019-03-19 PROCEDURE — 80061 LIPID PANEL: CPT

## 2019-03-19 PROCEDURE — 36415 COLL VENOUS BLD VENIPUNCTURE: CPT

## 2019-03-26 ENCOUNTER — OFFICE VISIT (OUTPATIENT)
Dept: FAMILY MEDICINE CLINIC | Age: 79
End: 2019-03-26

## 2019-03-26 VITALS
HEIGHT: 72 IN | HEART RATE: 63 BPM | DIASTOLIC BLOOD PRESSURE: 59 MMHG | WEIGHT: 184.6 LBS | SYSTOLIC BLOOD PRESSURE: 102 MMHG | RESPIRATION RATE: 20 BRPM | BODY MASS INDEX: 25 KG/M2 | TEMPERATURE: 98 F

## 2019-03-26 DIAGNOSIS — E78.5 HYPERLIPIDEMIA, UNSPECIFIED HYPERLIPIDEMIA TYPE: ICD-10-CM

## 2019-03-26 DIAGNOSIS — I10 ESSENTIAL HYPERTENSION: Primary | Chronic | ICD-10-CM

## 2019-03-26 NOTE — PROGRESS NOTES
Chief Complaint   Patient presents with    Hypertension    Cholesterol Problem     high chol    Results     discuss lab results       Health Maintenance Due   Topic Date Due    Shingrix Vaccine Age 50> (1 of 2) 03/26/1991    GLAUCOMA SCREENING Q2Y  11/02/2018    MEDICARE YEARLY EXAM  01/04/2019       Health Maintenance reviewed     1. Have you been to the ER, urgent care clinic since your last visit? Hospitalized since your last visit? No    2. Have you seen or consulted any other health care providers outside of the [address] [place] [address] since your last visit? Include any pap smears or colon screening.  No

## 2019-03-26 NOTE — PROGRESS NOTES
Chief Complaint   Patient presents with    Hypertension    Cholesterol Problem     high chol    Results     discuss lab results         HPI    Leodan Samuel is a 67 y.o. male presenting today for routine follow up of htn, hld. Patient had labs on 3/19/19. Labs reviewed in detail with patient     Patient does not need medication refills today. New concerns today: none      ROS  Review of Systems   Constitutional: Negative. HENT: Negative. Respiratory: Negative. Cardiovascular: Negative. All other systems reviewed and are negative. Physical Exam  Nursing note and vitals reviewed. Constitutional: He is oriented to person, place, and time. He appears well-developed and well-nourished. HENT:   Head: Normocephalic and atraumatic. Right Ear: External ear normal.   Left Ear: External ear normal.   Nose: Nose normal.   Eyes: Conjunctivae and EOM are normal.   Neck: Normal range of motion. Neck supple. No JVD present. Carotid bruit is not present. No thyromegaly present. Cardiovascular: Normal rate, regular rhythm, normal heart sounds and intact distal pulses. Exam reveals no gallop and no friction rub. No murmur heard. Pulmonary/Chest: Effort normal and breath sounds normal. He has no wheezes. He has no rhonchi. He has no rales. Abdominal: Soft. Musculoskeletal: Normal range of motion. Neurological: He is alert and oriented to person, place, and time. Coordination normal.   Skin: Skin is warm and dry. Psychiatric: He has a normal mood and affect. His behavior is normal. Judgment and thought content normal.       Diagnoses and all orders for this visit:    1. Essential hypertension  Stable, cont pres tx plan. 2. Hyperlipidemia, unspecified hyperlipidemia type  Stable, cont pres tx plan. Follow-up and Dispositions    · Return in about 3 months (around 6/26/2019) for high cholesterol, high blood pressure.

## 2019-05-06 ENCOUNTER — OFFICE VISIT (OUTPATIENT)
Dept: CARDIOLOGY CLINIC | Age: 79
End: 2019-05-06

## 2019-05-06 VITALS
HEIGHT: 72 IN | SYSTOLIC BLOOD PRESSURE: 118 MMHG | HEART RATE: 56 BPM | WEIGHT: 185 LBS | DIASTOLIC BLOOD PRESSURE: 72 MMHG | BODY MASS INDEX: 25.06 KG/M2 | OXYGEN SATURATION: 98 %

## 2019-05-06 DIAGNOSIS — E78.2 MIXED HYPERLIPIDEMIA: ICD-10-CM

## 2019-05-06 DIAGNOSIS — I49.3 PVC'S (PREMATURE VENTRICULAR CONTRACTIONS): ICD-10-CM

## 2019-05-06 DIAGNOSIS — I77.9 BILATERAL CAROTID ARTERY DISEASE, UNSPECIFIED TYPE (HCC): ICD-10-CM

## 2019-05-06 DIAGNOSIS — I25.10 ATHEROSCLEROSIS OF NATIVE CORONARY ARTERY OF NATIVE HEART WITHOUT ANGINA PECTORIS: Primary | ICD-10-CM

## 2019-05-06 NOTE — PROGRESS NOTES
Joey Lombardo presents today for   Chief Complaint   Patient presents with    Coronary Artery Disease     6 month follow up       Joey Lombardo preferred language for health care discussion is english/other. Is someone accompanying this pt? no    Is the patient using any DME equipment during [address]? no    Depression Screening:  3 most recent PHQ Screens 5/6/2019   PHQ Not Done -   Little interest or pleasure in doing things Not at all   Feeling down, depressed, irritable, or hopeless Not at all   Total Score PHQ 2 0   Trouble falling or staying asleep, or sleeping too much -   Feeling tired or having little energy -   Poor appetite, weight loss, or overeating -   Feeling bad about yourself - or that you are a failure or have let yourself or your family down -   Trouble concentrating on things such as school, work, reading, or watching TV -   Moving or speaking so slowly that other people could have noticed; or the opposite being so fidgety that others notice -   Thoughts of being better off dead, or hurting yourself in some way -   PHQ 9 Score -   How difficult have these problems made it for you to do your work, take care of your home and get along with others -       Learning Assessment:  Learning Assessment 5/6/2019   PRIMARY LEARNER Patient   HIGHEST LEVEL OF EDUCATION - PRIMARY LEARNER  GRADUATED HIGH SCHOOL OR GED   BARRIERS PRIMARY LEARNER NONE   CO-LEARNER CAREGIVER No   PRIMARY LANGUAGE ENGLISH   LEARNER PREFERENCE PRIMARY READING   ANSWERED BY Akash   RELATIONSHIP SELF       Abuse Screening:  Abuse Screening Questionnaire 5/6/2019   Do you ever feel afraid of your partner? N   Are you in a relationship with someone who physically or mentally threatens you? N   Is it safe for you to go home? Y       Fall Risk  Fall Risk Assessment, last 12 mths 5/6/2019   Able to walk? Yes   Fall in past 12 months?  No   Fall with injury? -   Number of falls in past 12 months -   Fall Risk Score -       Pt currently taking Anticoagulant therapy? no    Coordination of Care:  1. Have you been to the ER, urgent care clinic since your last visit? Hospitalized since your last visit? no    2. Have you seen or consulted any other health care providers outside of the [address] [place] [address] since your last visit? Include any pap smears or colon screening. no

## 2019-05-06 NOTE — PROGRESS NOTES
HISTORY OF PRESENT ILLNESS  Jakob Adame is a 67 y.o. male. Hypertension   Pertinent negatives include no chest pain, no abdominal pain, no headaches and no shortness of breath. Patient presents for a followup office visit. He has a known history of coronary artery disease, status post a non-ST elevation myocardial infarction in 2009. He underwent a cardiac catheterization at that time and was found to have moderate, but nonobstructive two-vessel coronary disease involving his left circumflex and a right-sided posterior descending artery, that were assessed by pressure wire. The patient has been maintained on medical therapy since that time. The patient last underwent a pharmacological nuclear stress test in December 2015, which was a normal study, showing no ischemia, normal left ventricle ejection fraction. EF 67%. He has a history of mild to moderate carotid artery disease,  last evaluated with a carotid duplex in August 2018 which demonstrated moderate carotid disease of the left ICA and mild plaquing of the right. He also underwent an abdominal ultrasound which showed a very small infrarenal AAA. Both of these findings are unchanged compared to studies from a year ago. The patient was last seen in the office 6 months ago. Since last visit, his wife passed away several months ago, with whom he been  for over 48 years. As result he has been grieving significantly. He denies any new shortness of breath, no chest pain, leg swelling or claudication. No major change in his activity level. Past Medical History:   Diagnosis Date    AAA (abdominal aortic aneurysm) ([facility] [address] 03/2010    noted on CT (abdomen)    Abnormal LFT's 12/95, 04/16/03    Acute meniscal tear, lateral 01/2007    s/p arthorscopic surg (Dr. Marysol Gavin)    Atypical chest pain 02/04/09    CAD (coronary artery disease), native coronary artery     Cardiac cath 04/13/2009    dLM 30%. mLAD 30%. oD2 30%.   pCX 55% (FFR 0.95).  mRCA 40%. RPDA 65% (FFR 0.86). EF 65%.  Cardiac echocardiogram 04/03/2007    EF 60%. No RWMA. Gr 1 DDfx. LAE. No significant valvular heart disease.  Cardiac nuclear imaging test, low risk 12/04/2015    Low risk. No ischemia or prior infarction. No WMA. EF 67%. Neg EKG on pharm stress test.    Cardiovascular aorto-iliac duplex 06/29/2015    AAA measures 3.2 x 3.1 cm Trv. (Prev 3.2 x 3.4 cm on 6/19/14). Aneurysm is infrarenal & fusiform w/complex intraluminal thrombus within. Mod bilateral common iliac stenosis.  Carotid duplex 03/16/2016    Mild < 50% BETHEL stenosis. Mod 33-31% LICA stenosis. Similar to study of 6/29/15.  Cholecystitis chronic 03/2008    s/p sue    Diverticulosis     Diverticulosis 9-28-15    DR. 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  aspirin delayed-release (ADULT LOW DOSE ASPIRIN) 81 mg tablet Take 81 mg by mouth daily.       melatonin 10 mg cap Take 10 mg by mouth nightly as needed for Other (sleep).  traMADol (ULTRAM) 50 mg tablet Take 1 Tab by mouth every six (6) hours as needed for Pain. Max Daily Amount: 200 mg. 50 Tab 0    oxyCODONE IR (ROXICODONE) 5 mg immediate release tablet Take 1-3 Tabs by mouth every four (4) hours as needed for Pain. Max Daily Amount: 90 mg. 28 Tab 0    polyethylene glycol (MIRALAX) 17 gram packet Take 1 Packet by mouth daily. 30 Packet 0    nitroglycerin (NITROSTAT) 0.4 mg SL tablet 1 Tab by SubLINGual route every five (5) minutes as needed. (Patient taking differently: 1 Tab by SubLINGual route every five (5) minutes as needed for Chest Pain. prn chest pains up to 3 doses, then call 911   ) 25 Tab 3    FLUZONE HIGH-DOSE , PF, syrg injection ADM 0.5ML IM UTD  0    ezetimibe (ZETIA) 10 mg tablet Take 1 Tab by mouth daily. 90 Tab 3    brinzolamide-brimonidine (SIMBRINZA) 1-0.2 % drps Apply 1 Drop to eye two (2) times a day.  niacin (NIASPAN) 1,000 mg Tb24 tab Take 1 Tab by mouth daily. 90 Tab 3    amLODIPine (NORVASC) 10 mg tablet TAKE 1 TABLET BY MOUTH DAILY 90 Tab 3    rosuvastatin (CRESTOR) 20 mg tablet Take 1 Tab by mouth nightly. 90 Tab 3    coenzyme q10 (CO Q-10) 10 mg cap Take 1 Tab by mouth daily.  gabapentin (NEURONTIN) 300 mg capsule Take 300 mg by mouth three (3) times daily.  cholecalciferol, vitamin D3, (VITAMIN D3) 2,000 unit tab Take 1 Tab by mouth daily.  sildenafil citrate (VIAGRA) 100 mg tablet Take 1 Tab by mouth as needed. (Patient taking differently: Take 1 Tab by mouth as needed (sexual disfunction). ) 20 Tab 3    omega 3-dha-epa-fish oil 100-160-1,000 mg cap Take 1,000 mg by mouth daily.  90 Cap 3         Allergies   Allergen Reactions    Pollen Extracts Sneezing     Itchy eyes, runny nose        Social History     Tobacco Use    Smoking status: Former Smoker     Packs/day: 0.50     Years: 20.00     Pack years: 10.00     Types: Cigarettes     Last attempt to quit: 12/10/1995     Years since quittin.4    Smokeless tobacco: Never Used   Substance Use Topics    Alcohol use: Yes     Alcohol/week: 4.0 oz     Types: 7 Glasses of wine, 1 Shots of liquor per week     Comment: social    Drug use: No     Types: Prescription, OTC           Review of Systems   Constitutional: Negative for chills, fever and weight loss. HENT: Negative for nosebleeds. Eyes: Negative for blurred vision and double vision. Respiratory: Negative for cough, shortness of breath and wheezing. Cardiovascular: Negative for chest pain, palpitations, orthopnea, claudication, leg swelling and PND. Gastrointestinal: Negative for abdominal pain, heartburn, nausea and vomiting. Genitourinary: Negative for dysuria and hematuria. Musculoskeletal: Negative for falls, joint pain and myalgias. Skin: Negative for rash. Neurological: Negative for dizziness, focal weakness and headaches. Endo/Heme/Allergies: Does not bruise/bleed easily. Psychiatric/Behavioral: Negative for substance abuse. Visit Vitals  /72   Pulse (!) 56   Ht 6' (1.829 m)   Wt 83.9 kg (185 lb)   SpO2 98%   BMI 25.09 kg/m²      Physical Exam   Constitutional: He is oriented to person, place, and time. He appears well-developed and well-nourished. HENT:   Head: Normocephalic and atraumatic. Eyes: Conjunctivae are normal.   Neck: Neck supple. No JVD present. Carotid bruit is not present. Cardiovascular: Regular rhythm, S1 normal, S2 normal and normal pulses. Bradycardia present. Exam reveals no gallop and no S3. No murmur heard. Pulmonary/Chest: Breath sounds normal. He has no wheezes. He has no rales. Abdominal: Soft. Bowel sounds are normal. There is no tenderness. Musculoskeletal: He exhibits no edema. Neurological: He is alert and oriented to person, place, and time. Skin: Skin is warm and dry.      EKG: Sinus bradycardia versus low atrial rhythm, low voltage in the limb leads,  normal axis, normal QTc interval,  no ST or T wave abnormalities concerning for ischemia. Compared to previous EKG, no significant interval change. ASSESSMENT and PLAN    Asymptomatic coronary artery disease. Patient has moderate 2 vessel disease last assessed by cardiac catheterization in 2009, involving nonobstructive lesions in the proximal left circumflex in the right-sided PDA. He does have a history of a non-ST elevation myocardial infarction back in 2009, for which he was briefly treated with Plavix. He is now maintained on aspirin and a statin, and he has been intolerant to beta blockers because of bradycardia. He last underwent a negative pharmacologic nuclear stress test in December 2015. Patient remains on a potent statin and a daily aspirin. No new anginal type symptoms. I will continue his current medical regimen. Essential hypertension. Patient blood pressure remains well controlled on his current regimen, all of which I would continue. Dyslipidemia: Patient is now on Crestor 20 mg daily, Niaspan 1000 mg daily, Fish oil, and Zetia. His goal LDL should be as close to 70 as possible and this is been closely followed by his PCP. Historically it has been well controlled on this regimen. Carotid artery disease. This is now being followed annually by vascular surgery. His last carotid duplex scan from August 2018 showed moderate disease of the left and mild disease on the right. Infrarenal AAA. This was last evaluated by an ultrasound in August 2018. This remains small in size and did not significantly grow compared to the year prior.     Followup in 6 months time, sooner if needed

## 2019-06-03 DIAGNOSIS — I10 ESSENTIAL HYPERTENSION: ICD-10-CM

## 2019-06-03 RX ORDER — AMLODIPINE BESYLATE 10 MG/1
TABLET ORAL
Qty: 90 TAB | Refills: 3 | Status: SHIPPED | OUTPATIENT
Start: 2019-06-03 | End: 2020-06-22 | Stop reason: SDUPTHER

## 2019-06-03 NOTE — TELEPHONE ENCOUNTER
PCP: Jacoby Nunez MD    Last appt: 3/26/2019  Future Appointments   Date Time Provider Reza Bose   6/12/2019  9:30 AM Jacoby Nunez MD [facility] None   11/4/2019  9:00 AM Maninder Velásquez MD [address] [place] [address]   8/25/2020  8:00 AM [facility] 2[facility] EVELYN SCHED   8/25/2020  9:00 AM [facility] 1 2[facility]ELY[facility] SCHED   8/25/2020 11:30 AM RAHAT Gao 2VV EVELYN SCHED       Requested Prescriptions     Pending Prescriptions Disp Refills    amLODIPine (NORVASC) 10 mg tablet 90 Tab 3     Sig: TAKE 1 TABLET BY MOUTH DAILY

## 2019-06-03 NOTE — TELEPHONE ENCOUNTER
Patient needs a medication refill. He would like a paper copy he will come and pick it up.  Please advise    Requested Prescriptions     Pending Prescriptions Disp Refills    amLODIPine (NORVASC) 10 mg tablet 90 Tab 3     Sig: TAKE 1 TABLET BY MOUTH DAILY

## 2019-06-12 ENCOUNTER — OFFICE VISIT (OUTPATIENT)
Dept: FAMILY MEDICINE CLINIC | Age: 79
End: 2019-06-12

## 2019-06-12 VITALS
HEART RATE: 72 BPM | SYSTOLIC BLOOD PRESSURE: 127 MMHG | WEIGHT: 184.6 LBS | RESPIRATION RATE: 20 BRPM | DIASTOLIC BLOOD PRESSURE: 70 MMHG | HEIGHT: 72 IN | TEMPERATURE: 97.8 F | BODY MASS INDEX: 25 KG/M2

## 2019-06-12 DIAGNOSIS — E78.5 HYPERLIPIDEMIA, UNSPECIFIED HYPERLIPIDEMIA TYPE: ICD-10-CM

## 2019-06-12 DIAGNOSIS — Z00.00 MEDICARE ANNUAL WELLNESS VISIT, SUBSEQUENT: Primary | ICD-10-CM

## 2019-06-12 DIAGNOSIS — I25.119 ATHEROSCLEROSIS OF NATIVE CORONARY ARTERY WITH ANGINA PECTORIS, UNSPECIFIED WHETHER NATIVE OR TRANSPLANTED HEART (HCC): ICD-10-CM

## 2019-06-12 DIAGNOSIS — Z71.89 ACP (ADVANCE CARE PLANNING): ICD-10-CM

## 2019-06-12 DIAGNOSIS — I10 ESSENTIAL HYPERTENSION: ICD-10-CM

## 2019-06-12 DIAGNOSIS — R26.89 BALANCE PROBLEM: ICD-10-CM

## 2019-06-12 RX ORDER — NITROGLYCERIN 0.4 MG/1
0.4 TABLET SUBLINGUAL
Qty: 3 BOTTLE | Refills: 4 | Status: SHIPPED | OUTPATIENT
Start: 2019-06-12 | End: 2021-12-16 | Stop reason: SDUPTHER

## 2019-06-12 NOTE — PROGRESS NOTES
Johan Montanez presents today for   Chief Complaint   Patient presents with    Annual Wellness Visit     Medicare    Hypertension    Cholesterol Problem     high chol       Is someone accompanying this pt? no    Is the patient using any DME equipment during OV? no    Depression Screening:  3 most recent PHQ Screens 5/6/2019   PHQ Not Done -   Little interest or pleasure in doing things Not at all   Feeling down, depressed, irritable, or hopeless Not at all   Total Score PHQ 2 0   Trouble falling or staying asleep, or sleeping too much -   Feeling tired or having little energy -   Poor appetite, weight loss, or overeating -   Feeling bad about yourself - or that you are a failure or have let yourself or your family down -   Trouble concentrating on things such as school, work, reading, or watching TV -   Moving or speaking so slowly that other people could have noticed; or the opposite being so fidgety that others notice -   Thoughts of being better off dead, or hurting yourself in some way -   PHQ 9 Score -   How difficult have these problems made it for you to do your work, take care of your home and get along with others -       Learning Assessment:  Learning Assessment 5/6/2019   PRIMARY LEARNER Patient   HIGHEST LEVEL OF EDUCATION - PRIMARY LEARNER  GRADUATED HIGH SCHOOL OR GED   BARRIERS PRIMARY LEARNER NONE   CO-LEARNER CAREGIVER No   PRIMARY LANGUAGE ENGLISH   LEARNER PREFERENCE PRIMARY READING   ANSWERED BY Akash   RELATIONSHIP SELF       Abuse Screening:  Abuse Screening Questionnaire 5/6/2019   Do you ever feel afraid of your partner? N   Are you in a relationship with someone who physically or mentally threatens you? N   Is it safe for you to go home? Y       Fall Screening  Fall Risk Assessment, last 12 mths 5/6/2019   Able to walk? Yes   Fall in past 12 months?  No   Fall with injury? -   Number of falls in past 12 months -   Fall Risk Score -         Health Maintenance Due   Topic Date Due    Shingrix Vaccine Age 50> (1 of 2) 03/26/1991    GLAUCOMA SCREENING Q2Y  11/02/2018    MEDICARE YEARLY EXAM  01/04/2019   . Health Maintenance reviewed and discussed and ordered per Provider. Coordination of Care  1. Have you been to the ER, urgent care clinic since your last visit? Hospitalized since your last visit? no    2. Have you seen or consulted any other health care providers outside of the [address] [place] [address] since your last visit? Include any pap smears or colon screening. no      Advance Directive:  1. Do you have an advance directive in place?  Patient Reply:yes, will bring copy in so it can be scanned into system

## 2019-06-12 NOTE — PROGRESS NOTES
This is the Subsequent Medicare Annual Wellness Exam, performed 12 months or more after the Initial AWV or the last Subsequent AWV    I have reviewed the patient's medical history in detail and updated the computerized patient record. History     Past Medical History:   Diagnosis Date    AAA (abdominal aortic aneurysm) ([facility] [address] 03/2010    noted on CT (abdomen)    Abnormal LFT's 12/95, 04/16/03    Acute meniscal tear, lateral 01/2007    s/p arthorscopic surg (Dr. Doylene Meigs)    Atypical chest pain 02/04/09    CAD (coronary artery disease), native coronary artery     Cardiac cath 04/13/2009    dLM 30%. mLAD 30%. oD2 30%. pCX 55% (FFR 0.95). mRCA 40%. RPDA 65% (FFR 0.86). EF 65%.  Cardiac echocardiogram 04/03/2007    EF 60%. No RWMA. Gr 1 DDfx. LAE. No significant valvular heart disease.  Cardiac nuclear imaging test, low risk 12/04/2015    Low risk. No ischemia or prior infarction. No WMA. EF 67%. Neg EKG on pharm stress test.    Cardiovascular aorto-iliac duplex 06/29/2015    AAA measures 3.2 x 3.1 cm Trv. (Prev 3.2 x 3.4 cm on 6/19/14). Aneurysm is infrarenal & fusiform w/complex intraluminal thrombus within. Mod bilateral common iliac stenosis.  Carotid duplex 03/16/2016    Mild < 50% BETHEL stenosis. Mod 60-21% LICA stenosis. Similar to study of 6/29/15.  Cholecystitis chronic 03/2008    s/p sue    Diverticulosis     Diverticulosis 9-28-15    DR. 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    Plantar fasciitis     Rhinitis     Right knee pain     Right shoulder pain     Solar purpura (HCC)     Tinea versicolor 7/23/2012    2.8 x 2.9cm infra-renal    Tubular adenoma 9-28-15    Vitamin D deficiency 4/13/2011      Past Surgical History:   Procedure Laterality Date    ENDOSCOPY, COLON, DIAGNOSTIC      normal per patient;     HX ARTHROPLASTY  04/05/10    [place]-left knee    HX CATARACT REMOVAL Bilateral     November 2014    HX CHOLECYSTECTOMY  03/2008    chronic cholecystitis    HX COLONOSCOPY  9-28-15    HX HERNIA REPAIR  1995    L inguinal    HX KNEE ARTHROSCOPY  01/24/07    left knee    HX KNEE ARTHROSCOPY Left 4/16/2014    peripatellar debridement    HX KNEE REPLACEMENT Left 01/29/2013    [place]    HX MOHS PROCEDURES  11/2008    HX RHINOPLASTY  1983    HX TONSILLECTOMY      HX VASECTOMY  1977    HX WISDOM TEETH EXTRACTION      x4    OH ANESTH,SURGERY OF SHOULDER       Current Outpatient Medications   Medication Sig Dispense Refill    nitroglycerin (NITROSTAT) 0.4 mg SL tablet 1 Tab by SubLINGual route every five (5) minutes as needed for Chest Pain. 3 Bottle 4    amLODIPine (NORVASC) 10 mg tablet TAKE 1 TABLET BY MOUTH DAILY 90 Tab 3    aspirin delayed-release (ADULT LOW DOSE ASPIRIN) 81 mg tablet Take 81 mg by mouth daily.  melatonin 10 mg cap Take 10 mg by mouth nightly as needed for Other (sleep).  ezetimibe (ZETIA) 10 mg tablet Take 1 Tab by mouth daily. 90 Tab 3    brinzolamide-brimonidine (SIMBRINZA) 1-0.2 % drps Apply 1 Drop to eye two (2) times a day.  niacin (NIASPAN) 1,000 mg Tb24 tab Take 1 Tab by mouth daily. 90 Tab 3    rosuvastatin (CRESTOR) 20 mg tablet Take 1 Tab by mouth nightly. 90 Tab 3    coenzyme q10 (CO Q-10) 10 mg cap Take 1 Tab by mouth daily.  gabapentin (NEURONTIN) 300 mg capsule Take 300 mg by mouth three (3) times daily.       cholecalciferol, vitamin D3, (VITAMIN D3) 2,000 unit tab Take 1 Tab by mouth daily.  sildenafil citrate (VIAGRA) 100 mg tablet Take 1 Tab by mouth as needed. (Patient taking differently: Take 1 Tab by mouth as needed (sexual disfunction). ) 20 Tab 3    omega 3-dha-epa-fish oil 100-160-1,000 mg cap Take 1,000 mg by mouth daily. 80 Cap 3    FLUZONE HIGH-DOSE , PF, syrg injection ADM 0.5ML IM UTD  0     Allergies   Allergen Reactions    Pollen Extracts Sneezing     Itchy eyes, runny nose     Family History   Problem Relation Age of Onset    Heart Disease Mother     Stroke Father     No Known Problems Sister     No Known Problems Maternal Grandmother     No Known Problems Maternal Grandfather     Dementia Paternal Grandmother     No Known Problems Paternal Grandfather     Cancer Brother         lung CA     Social History     Tobacco Use    Smoking status: Former Smoker     Packs/day: 0.50     Years: 20.00     Pack years: 10.00     Types: Cigarettes     Last attempt to quit: 12/10/1995     Years since quittin.5    Smokeless tobacco: Never Used   Substance Use Topics    Alcohol use:  Yes     Alcohol/week: 4.0 oz     Types: 7 Glasses of wine, 1 Shots of liquor per week     Comment: social     Patient Active Problem List   Diagnosis Code    Hyperlipidemia E78.5    Coronary atherosclerosis of native coronary artery I25.10    AAA (abdominal aortic aneurysm) ([facility] [address] I71.4    Right knee pain M25.561    Abnormal LFTs (liver function tests) R94.5    Vitamin D deficiency E55.9    CLARKE (dyspnea on exertion) R06.09    Essential hypertension I10    Hyperglycemia R73.9    Tinea versicolor B36.0    S/P total knee replacement Z96.659    Fatty liver K76.0    PVC's (premature ventricular contractions) I49.3    Carotid artery disease (HCC) I77.9    Right shoulder pain M25.511    Acute back pain M54.9    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 8XXA    Solar purpura ([address] D69.2    Primary osteoarthritis of right knee M17.11       Depression Risk Factor Screening:     3 most recent PHQ Screens 5/6/2019   PHQ Not Done -   Little interest or pleasure in doing things Not at all   Feeling down, depressed, irritable, or hopeless Not at all   Total Score PHQ 2 0   Trouble falling or staying asleep, or sleeping too much -   Feeling tired or having little energy -   Poor appetite, weight loss, or overeating -   Feeling bad about yourself - or that you are a failure or have let yourself or your family down -   Trouble concentrating on things such as school, work, reading, or watching TV -   Moving or speaking so slowly that other people could have noticed; or the opposite being so fidgety that others notice -   Thoughts of being better off dead, or hurting yourself in some way -   PHQ 9 Score -   How difficult have these problems made it for you to do your work, take care of your home and get along with others -     Alcohol Risk Factor Screening: On any occasion in the past three months you have had more than 7 drinks containing alcohol    Functional Ability and Level of Safety:   Hearing Loss  Hearing is good. Activities of Daily Living  The home contains: handrails and grab bars  Patient does total self care    Fall Risk  Fall Risk Assessment, last 12 mths 5/6/2019   Able to walk? Yes   Fall in past 12 months?  No   Fall with injury? -   Number of falls in past 12 months -   Fall Risk Score -       Abuse Screen  Patient is not abused    Cognitive Screening   Evaluation of Cognitive Function:  Has your family/caregiver stated any concerns about your memory: no  Normal    Patient Care Team Patient Care Team:  Jessica Lloyd MD as PCP - General (Family Practice)  Kiet Cavazos MD (Vascular Surgery)  Denise Obando MD (Cardiology)  Concepción Camarena (Vascular Surgery)  Leeanne Keys MD (Orthopedic Surgery)    Assessment/Plan   Education and counseling provided:  Are appropriate based on today's review and evaluation  End-of-Life planning (with patient's consent)  Screening for glaucoma  shingles vaccine    Diagnoses and all orders for this visit:    1. Medicare annual wellness visit, subsequent    2.  ACP (advance care planning)        Health Maintenance Due   Topic Date Due    Shingrix Vaccine Age 49> (1 of 2) 03/26/1991    GLAUCOMA SCREENING Q2Y  11/02/2018

## 2019-06-12 NOTE — PROGRESS NOTES
Advance Care Planning (ACP) Provider Note - Comprehensive     Date of ACP Conversation: 06/12/19  Persons included in Conversation:  patient  Length of ACP Conversation in minutes:  16 minutes    Authorized Decision Maker (if patient is incapable of making informed decisions): This person is: Other Legally Authorized Decision Maker (e.g. Next of Kin)            General ACP for ALL Patients with Decision Making Capacity:   Importance of advance care planning, including choosing a healthcare agent to communicate patient's healthcare decisions if patient lost the ability to make decisions, such as after a sudden illness or accident  Understanding of the healthcare agent role was assessed and information provided  Exploration of values, goals, and preferences if recovery is not expected, even with continued medical treatment in the event of: Imminent death  Severe, permanent brain injury  \"In these circumstances, what matters most to you? \"  Care focused more on comfort or quality of life. Review of Existing Advance Directive:  Does this advance directive still reflect your preferences?   Yes (Provide new form/Refer for assistance in updating)    For Serious or Chronic Illness:  Understanding of medical condition      Interventions Provided:  Recommended communicating the plan and making copies for the healthcare agent, personal physician, and others as appropriate (e.g., health system)

## 2019-06-12 NOTE — PATIENT INSTRUCTIONS
Medicare Wellness Visit, Male The best way to live healthy is to have a lifestyle where you eat a well-balanced diet, exercise regularly, limit alcohol use, and quit all forms of tobacco/nicotine, if applicable. Regular preventive services are another way to keep healthy. Preventive services (vaccines, screening tests, monitoring & exams) can help personalize your care plan, which helps you manage your own care. Screening tests can find health problems at the earliest stages, when they are easiest to treat. [facility] follows the current, evidence-based guidelines published by the [facility] ([facility]) when recommending preventive services for our patients. Because we follow these guidelines, sometimes recommendations change over time as research supports it. (For example, a prostate screening blood test is no longer routinely recommended for men with no symptoms.) Of course, you and your doctor may decide to screen more often for some diseases, based on your risk and co-morbidities (chronic disease you are already diagnosed with). Preventive services for you include: - Medicare offers their members a free annual wellness visit, which is time for you and your primary care provider to discuss and plan for your preventive service needs. Take advantage of this benefit every year! 
-All adults over age 72 should receive the recommended pneumonia vaccines. Current USPSTF guidelines recommend a series of two vaccines for the best pneumonia protection.  
-All adults should have a flu vaccine yearly and an ECG.  All adults age 61 and older should receive a shingles vaccine once in their lifetime.   
-All adults age 38-68 who are overweight should have a diabetes screening test once every three years.  
-Other screening tests & preventive services for persons with diabetes include: an eye exam to screen for diabetic retinopathy, a kidney function test, a foot exam, and stricter control over your cholesterol.  
-Cardiovascular screening for adults with routine risk involves an electrocardiogram (ECG) at intervals determined by the provider.  
-Colorectal cancer screening should be done for adults age 54-65 with no increased risk factors for colorectal cancer. There are a number of acceptable methods of screening for this type of cancer. Each test has its own benefits and drawbacks. Discuss with your provider what is most appropriate for you during your annual wellness visit. The different tests include: colonoscopy (considered the best screening method), a fecal occult blood test, a fecal DNA test, and sigmoidoscopy. 
-All adults born between [facility] should be screened once for Hepatitis C. 
-An Abdominal Aortic Aneurysm (AAA) Screening is recommended for men age 73-68 who has ever smoked in their lifetime. Here is a list of your current Health Maintenance items (your personalized list of preventive services) with a due date: 
Health Maintenance Due Topic Date Due  Shingles Vaccine (1 of 2) 03/26/1991  Glaucoma Screening   11/02/2018 [address] [place] [address] Annual Well Visit  01/04/2019

## 2019-06-12 NOTE — PROGRESS NOTES
Chief Complaint   Patient presents with    Annual Wellness Visit     Medicare    Hypertension    Cholesterol Problem     high chol         HPI    Laine Veloz is a 67 y.o. male presenting today for 3 months follow up of htn, hld. He is doing well overall. Patient does need medication refills today. New concerns today: pt reports that he has been having issues with balance again. He has been to PT for balance in the past.   It did help. He is very active now and exercises almost daily. He does work out with a  3 of those days. Review of Systems   Constitutional: Negative. HENT: Negative. Respiratory: Negative. Cardiovascular: Negative. Neurological: Negative for dizziness. \"off balance\" when walking   All other systems reviewed and are negative. Physical Exam  Nursing note and vitals reviewed. Constitutional: He is oriented to person, place, and time. He appears well-developed and well-nourished. HENT:   Head: Normocephalic and atraumatic. Right Ear: External ear normal.   Left Ear: External ear normal.   Nose: Nose normal.   Eyes: Conjunctivae and EOM are normal.   Neck: Normal range of motion. Neck supple. No JVD present. Carotid bruit is not present. No thyromegaly present. Cardiovascular: Normal rate, regular rhythm, normal heart sounds and intact distal pulses. Exam reveals no gallop and no friction rub. No murmur heard. Pulmonary/Chest: Effort normal and breath sounds normal. He has no wheezes. He has no rhonchi. He has no rales. Abdominal: Soft. Musculoskeletal: Normal range of motion. Neurological: He is alert and oriented to person, place, and time. Coordination normal.   Skin: Skin is warm and dry. Psychiatric: He has a normal mood and affect. His behavior is normal. Judgment and thought content normal.       Diagnoses and all orders for this visit:    1.   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 2. Essential hypertension  -     METABOLIC PANEL, COMPREHENSIVE; Future  -     LIPID PANEL; Future    3. Hyperlipidemia, unspecified hyperlipidemia type  -     METABOLIC PANEL, COMPREHENSIVE; Future  -     LIPID PANEL; Future    4. Balance problem  -     REFERRAL TO NEUROLOGY      Follow-up and Dispositions    · Return in about 4 months (around 10/12/2019) for high blood pressure, high cholesterol, cad.

## 2019-07-01 DIAGNOSIS — E78.5 HYPERLIPIDEMIA, UNSPECIFIED HYPERLIPIDEMIA TYPE: ICD-10-CM

## 2019-07-01 NOTE — TELEPHONE ENCOUNTER
PCP: Yung Devine MD    Last appt: 6/12/2019  Future Appointments   Date Time Provider Reza Bose   10/10/2019  9:15 AM Yung Devine MD [facility] None   11/4/2019  9:00 AM Iván Ragsdale MD [address] [place] [address]   8/25/2020  8:00 AM [facility] 1 2VV EVELYN SCHED   8/25/2020  9:00 AM [facility] 1 2VV EVELYN SCHED   8/25/2020 11:30 AM RAHAT Dobbins 2VV EVELYN SCHED       Requested Prescriptions     Pending Prescriptions Disp Refills    niacin (NIASPAN) 1,000 mg Tb24 tab 90 Tab 3     Sig: Take 1 Tab by mouth daily.  rosuvastatin (CRESTOR) 20 mg tablet 90 Tab 3     Sig: Take 1 Tab by mouth nightly.

## 2019-07-02 RX ORDER — ROSUVASTATIN CALCIUM 20 MG/1
20 TABLET, COATED ORAL
Qty: 90 TAB | Refills: 3 | Status: SHIPPED | OUTPATIENT
Start: 2019-07-02 | End: 2020-10-26 | Stop reason: SDUPTHER

## 2019-07-02 RX ORDER — NIACIN 1000 MG/1
1000 TABLET, EXTENDED RELEASE ORAL DAILY
Qty: 90 TAB | Refills: 3 | Status: SHIPPED | OUTPATIENT
Start: 2019-07-02 | End: 2020-06-02

## 2019-09-25 ENCOUNTER — OFFICE VISIT (OUTPATIENT)
Dept: NEUROLOGY | Age: 79
End: 2019-09-25

## 2019-09-25 ENCOUNTER — HOSPITAL ENCOUNTER (OUTPATIENT)
Dept: LAB | Age: 79
Discharge: HOME | End: 2019-09-25
Payer: MEDICARE

## 2019-09-25 VITALS
TEMPERATURE: 98 F | DIASTOLIC BLOOD PRESSURE: 74 MMHG | OXYGEN SATURATION: 96 % | HEIGHT: 72 IN | WEIGHT: 196.4 LBS | RESPIRATION RATE: 20 BRPM | SYSTOLIC BLOOD PRESSURE: 122 MMHG | HEART RATE: 70 BPM | BODY MASS INDEX: 26.6 KG/M2

## 2019-09-25 DIAGNOSIS — R27.0 ATAXIA: ICD-10-CM

## 2019-09-25 DIAGNOSIS — I65.22 LEFT CAROTID ARTERY STENOSIS: ICD-10-CM

## 2019-09-25 DIAGNOSIS — G62.9 POLYNEUROPATHY: Primary | ICD-10-CM

## 2019-09-25 DIAGNOSIS — G62.9 POLYNEUROPATHY: ICD-10-CM

## 2019-09-25 DIAGNOSIS — R79.89 OTHER SPECIFIED ABNORMAL FINDINGS OF BLOOD CHEMISTRY: ICD-10-CM

## 2019-09-25 LAB
HBA1C MFR BLD: 5.1 % (ref 4.2–5.6)
T4 FREE SERPL-MCNC: 0.9 NG/DL (ref 0.7–1.5)
TSH SERPL DL<=0.05 MIU/L-ACNC: 0.95 UIU/ML (ref 0.36–3.74)
VIT B12 SERPL-MCNC: 355 PG/ML (ref 211–911)

## 2019-09-25 PROCEDURE — 83921 ORGANIC ACID SINGLE QUANT: CPT

## 2019-09-25 PROCEDURE — 36415 COLL VENOUS BLD VENIPUNCTURE: CPT

## 2019-09-25 PROCEDURE — 82784 ASSAY IGA/IGD/IGG/IGM EACH: CPT

## 2019-09-25 PROCEDURE — 84439 ASSAY OF FREE THYROXINE: CPT

## 2019-09-25 PROCEDURE — 82607 VITAMIN B-12: CPT

## 2019-09-25 PROCEDURE — 83036 HEMOGLOBIN GLYCOSYLATED A1C: CPT

## 2019-09-25 RX ORDER — PREGABALIN 75 MG/1
CAPSULE ORAL
Refills: 0 | COMMUNITY
Start: 2019-09-19 | End: 2019-09-25 | Stop reason: ALTCHOICE

## 2019-09-25 RX ORDER — DULOXETIN HYDROCHLORIDE 30 MG/1
30 CAPSULE, DELAYED RELEASE ORAL 2 TIMES DAILY
Qty: 60 CAP | Refills: 3 | Status: SHIPPED | OUTPATIENT
Start: 2019-09-25 | End: 2019-12-17 | Stop reason: SINTOL

## 2019-09-25 NOTE — LETTER
9/25/19 Patient: Angela Moctezuma YOB: 1940 Date of Visit: 9/25/2019 Charlene Hauser MD 
[id number] [address] [id number] VIA In Basket Dear Charlene Hauser MD, Thank you for referring Mr. Simone Schwarz to [facility] for evaluation. My notes for this consultation are attached. If you have questions, please do not hesitate to call me. I look forward to following your patient along with you.  
 
 
Sincerely, 
 
Alvina Mason MD

## 2019-09-25 NOTE — PROGRESS NOTES
HPI:  66 y/o male referred by Dr. Star Daly. Over the last 6 months he has noticed that when walking he would stumble, lose his balance. It has become a daily thing now and he has to be careful. Denies feeling weakness of one side more than the other. He has been told he has neuropathy as he has had tingling, burning in the soles of his feet, for over a year. He is not a diabetic. He is a drinker, glass of wine with dinner, a drink before bedtime. He takes lyrica, recently started about a week ago, seems to help, started by his podiatrist. He was on gabapentin tid and it did not seem to do much. He used to be a heavier drinker when younger. He used to be a smoker, quit around 1982. He denies problems with memory. 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 He reports that he does have some problems with sleep maintenance. He would fall asleep usually fine typically between 10 and 11 PM, sometimes later as he has a TV in his bedroom, he may wake up in the midline of the night to urinate and then may have a hard time falling back to sleep. He is usually up by 4:30 in the morning. He takes a nap most of the afternoons. He drinks no caffeine. He does admit that he is struggling with the depression, as he lost his wife of 46 years this past January.     Social History     Socioeconomic History    Marital status:      Spouse name: Not on file    Number of children: Not on file    Years of education: Not on file    Highest education level: Not on file   Occupational History    Not on file   Social Needs    Financial resource strain: Not on file    Food insecurity:     Worry: Not on file     Inability: Not on file    Transportation needs:     Medical: Not on file     Non-medical: Not on file   Tobacco Use    Smoking status: Former Smoker     Packs/day: 0.50     Years: 20.00     Pack years: 10.00     Types: Cigarettes     Last attempt to quit: 12/10/1995     Years since quittin.8    Smokeless tobacco: Never Used   Substance and Sexual Activity    Alcohol use: Yes     Alcohol/week: 6.7 standard drinks     Types: 7 Glasses of wine, 1 Shots of liquor per week     Comment: social    Drug use: No     Types: Prescription, OTC    Sexual activity: Not on file   Lifestyle    Physical activity:     Days per week: Not on file     Minutes per session: Not on file    Stress: Not on file   Relationships    Social connections:     Talks on phone: Not on file     Gets together: Not on file     Attends Holiness service: Not on file     Active member of club or organization: Not on file     Attends meetings of clubs or organizations: Not on file     Relationship status: Not on file    Intimate partner violence:     Fear of current or ex partner: Not on file     Emotionally abused: Not on file     Physically abused: Not on file     Forced sexual activity: Not on file   Other Topics Concern    Not on file   Social History Narrative    Not on file       Family History   Problem Relation Age of Onset    Heart Disease Mother     Stroke Father     No Known Problems Sister     No Known Problems Maternal Grandmother     No Known Problems Maternal Grandfather     Dementia Paternal Grandmother     No Known Problems Paternal Grandfather     Cancer Brother         lung CA       Current Outpatient Medications   Medication Sig Dispense Refill    ezetimibe (ZETIA) 10 mg tablet Take 1 Tab by mouth daily. 90 Tab 0    niacin (NIASPAN) 1,000 mg Tb24 tab Take 1 Tab by mouth daily. 90 Tab 3    rosuvastatin (CRESTOR) 20 mg tablet Take 1 Tab by mouth nightly. 90 Tab 3    nitroglycerin (NITROSTAT) 0.4 mg SL tablet 1 Tab by SubLINGual route every five (5) minutes as needed for Chest Pain.  3 Bottle 4    amLODIPine (NORVASC) 10 mg tablet TAKE 1 TABLET BY MOUTH DAILY 90 Tab 3    aspirin delayed-release (ADULT LOW DOSE ASPIRIN) 81 mg tablet Take 81 mg by mouth daily.      melatonin 10 mg cap Take 10 mg by mouth nightly as needed for Other (sleep).  brinzolamide-brimonidine (SIMBRINZA) 1-0.2 % drps Apply 1 Drop to eye two (2) times a day.  coenzyme q10 (CO Q-10) 10 mg cap Take 1 Tab by mouth daily.  cholecalciferol, vitamin D3, (VITAMIN D3) 2,000 unit tab Take 1 Tab by mouth daily.  sildenafil citrate (VIAGRA) 100 mg tablet Take 1 Tab by mouth as needed. (Patient taking differently: Take 1 Tab by mouth as needed (sexual disfunction). ) 20 Tab 3    omega 3-dha-epa-fish oil 100-160-1,000 mg cap Take 1,000 mg by mouth daily. 90 Cap 3    pregabalin (LYRICA) 75 mg capsule TK 1 C PO BID  0    FLUZONE HIGH-DOSE 2018-19, PF, syrg injection ADM 0.5ML IM UTD  0       Past Medical History:   Diagnosis Date    AAA (abdominal aortic aneurysm) ([facility] [address] 03/2010    noted on CT (abdomen)    Abnormal LFT's 12/95, 04/16/03    Acute meniscal tear, lateral 01/2007    s/p arthorscopic surg (Dr. Rachel Harper)    Atypical chest pain 02/04/09    CAD (coronary artery disease), native coronary artery     Cardiac cath 04/13/2009    dLM 30%. mLAD 30%. oD2 30%. pCX 55% (FFR 0.95). mRCA 40%. RPDA 65% (FFR 0.86). EF 65%.  Cardiac echocardiogram 04/03/2007    EF 60%. No RWMA. Gr 1 DDfx. LAE. No significant valvular heart disease.  Cardiac nuclear imaging test, low risk 12/04/2015    Low risk. No ischemia or prior infarction. No WMA. EF 67%. Neg EKG on pharm stress test.    Cardiovascular aorto-iliac duplex 06/29/2015    AAA measures 3.2 x 3.1 cm Trv. (Prev 3.2 x 3.4 cm on 6/19/14). Aneurysm is infrarenal & fusiform w/complex intraluminal thrombus within. Mod bilateral common iliac stenosis.  Carotid duplex 03/16/2016    Mild < 50% BETHEL stenosis. Mod 23-48% LICA stenosis. Similar to study of 6/29/15.  Cholecystitis chronic 03/2008    s/p sue    Diverticulosis     Diverticulosis 9-28-15    DR. 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    Plantar fasciitis     Rhinitis     Right knee pain     Right shoulder pain     Solar purpura (HCC)     Tinea versicolor 7/23/2012    2.8 x 2.9cm infra-renal    Tubular adenoma 9-28-15    Vitamin D deficiency 4/13/2011       Past Surgical History:   Procedure Laterality Date    ENDOSCOPY, COLON, DIAGNOSTIC      normal per patient;     HX ARTHROPLASTY  04/05/10    [place]-left knee    HX CATARACT REMOVAL Bilateral     November 2014    HX CHOLECYSTECTOMY  03/2008    chronic cholecystitis    HX COLONOSCOPY  9-28-15    HX HERNIA REPAIR  1995    L inguinal    HX KNEE ARTHROSCOPY  01/24/07    left knee    HX KNEE ARTHROSCOPY Left 4/16/2014    peripatellar debridement    HX KNEE REPLACEMENT Left 01/29/2013    [place]    HX MOHS PROCEDURES  11/2008    HX RHINOPLASTY  1983    HX TONSILLECTOMY      HX VASECTOMY  1977    HX WISDOM TEETH EXTRACTION      x4    MS ANESTH,SURGERY OF SHOULDER         Allergies   Allergen Reactions    Pollen Extracts Sneezing     Itchy eyes, runny nose       Patient Active Problem List   Diagnosis Code    Hyperlipidemia E78.5    Coronary atherosclerosis of native coronary artery I25.10    AAA (abdominal aortic aneurysm) ([facility] [address] I71.4    Right knee pain M25.561    Abnormal LFTs (liver function tests) R94.5    Vitamin D deficiency E55.9    CLARKE (dyspnea on exertion) R06.09    Essential hypertension I10    Hyperglycemia R73.9    Tinea versicolor B36.0    S/P total knee replacement Z96.659    Fatty liver K76.0    PVC's (premature ventricular contractions) I49.3    Carotid artery disease (HCC) I77.9    Right shoulder pain M25.511    Acute back pain M54.9    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.816    Lumbar neuritis M54.16    DDD (degenerative disc disease), lumbar M51.36    Radiculopathy, lumbar region M54.16    Degeneration of intervertebral disc of lumbar region M51.36    Mixed hyperlipidemia E78.2    ACP (advance care planning) Z71.89    Purpura (HCC) D69.2    Bruising T14. 8XXA    Solar purpura ([address] D69.2    Primary osteoarthritis of right knee M17.11         Review of Systems:   Constitutional: no fever or chills  Skin denies rash or itching  HEENT:  Denies tinnitus, hearing loss, or visual changes  Respiratory: denies shortness of breath  Cardiovascular: denies chest pain, dyspnea on exertion  Gastrointestinal: does not report nausea or vomiting  Genitourinary: does not report dysuria or incontinence  Musculoskeletal: does not report joint pain or swelling  Endocrine: denies weight change  Hematology: denies easy bruising or bleeding   Neurological: as above in HPI      PHYSICAL EXAMINATION:         Vital signs:    Visit Vitals  /74 (BP 1 Location: Left arm, BP Patient Position: Sitting)   Pulse 70   Temp 98 °F (36.7 °C) (Oral)   Resp 20   Ht 6' (1.829 m)   Wt 89.1 kg (196 lb 6.4 oz)   SpO2 96%   BMI 26.64 kg/m²         GENERAL:                  Well developed, well nourished, in no apparent distress. HEART:                       RR, no murmurs  EXTREMITIES:           No edema is identified. Pulses are +2. HEAD:                         Normocephalic, atraumatic. NEUROLOGIC EXAMINATION       MENTAL STATUS:     Awake, alert, and oriented x 4. Attention and STM are grossly normal. There is no aphasia. Fund of knowledge is adequate.   Mood and affect are appropriate  CRANIAL NERVES:   Visual fields are full to confrontation. Pupils are reactive to light and accommodation. Fundi are normal.   Extraocular movements are intact and there is no nystagmus. Facial sensation is normal  Face is symmetrical.   Hearing is present. SCM/TPZ 5/5  Tongue protrudes midline, palate elevates symmetrically. MOTOR:           5/5 strength throughout, normal tone. CEREBELLAR:           No tremors or dysmetria     SENSORY:       Decreased distal pinprick to midcalf level bilaterally, absent vibratory sense at bilateral malleolar level, normal distal toe proprioception and temperature sensation. Romberg with slight swaying. 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 It is possible his drinking may have been excessive enough at some points in his life to explain it, but we need to perform serology to find all potential explanations. There might be a component of cerebrovascular disease given his evidence of peripheral vascular disease and moderate and presumably asymptomatic left internal carotid artery stenosis. Finally, he does have a significant amount of depression  to consider in selection of medications for treatment of neuropathic pain. Plan: 1-I will take the liberty of stopping Lyrica, which may be responsible like a gabapentin for his bilateral lower extremity swelling and change him to duloxetine 30 mg in the morning for a week, then twice a day hoping to treat both neuropathic pain as well as his underlying depression. Side effects were discussed.    2- head CT, as he has a stent in his left eye and history of bilateral knee replacement. 3-vitamin B12, methylmalonic acid, TSH, free T4, hemoglobin A1c, serum protein and immunoelectrophoresis to investigate treatable causes of neuropathy. 4-follow-up after above tests for EMG and nerve conduction studies of bilateral lower extremities. PLEASE NOTE:   Portions of this document may have been produced using voice recognition software. Unrecognized errors in transcription may be present. This note will not be viewable in [address]

## 2019-09-27 LAB
ALBUMIN SERPL ELPH-MCNC: 4 G/DL (ref 2.9–4.4)
ALBUMIN/GLOB SERPL: 2.1 {RATIO} (ref 0.7–1.7)
ALPHA1 GLOB SERPL ELPH-MCNC: 0.2 G/DL (ref 0–0.4)
ALPHA2 GLOB SERPL ELPH-MCNC: 0.6 G/DL (ref 0.4–1)
B-GLOBULIN SERPL ELPH-MCNC: 0.7 G/DL (ref 0.7–1.3)
GAMMA GLOB SERPL ELPH-MCNC: 0.5 G/DL (ref 0.4–1.8)
GLOBULIN SER-MCNC: 2 G/DL (ref 2.2–3.9)
IGA SERPL-MCNC: 90 MG/DL (ref 61–437)
IGG SERPL-MCNC: 665 MG/DL (ref 700–1600)
IGM SERPL-MCNC: 123 MG/DL (ref 15–143)
INTERPRETATION SERPL IEP-IMP: ABNORMAL
M PROTEIN SERPL ELPH-MCNC: ABNORMAL G/DL
PROT SERPL-MCNC: 6 G/DL (ref 6–8.5)

## 2019-09-28 LAB
Lab: NORMAL
METHYLMALONATE SERPL-SCNC: 254 NMOL/L (ref 0–378)

## 2019-10-10 ENCOUNTER — OFFICE VISIT (OUTPATIENT)
Dept: FAMILY MEDICINE CLINIC | Age: 79
End: 2019-10-10

## 2019-10-10 VITALS
RESPIRATION RATE: 18 BRPM | OXYGEN SATURATION: 98 % | HEIGHT: 72 IN | DIASTOLIC BLOOD PRESSURE: 54 MMHG | SYSTOLIC BLOOD PRESSURE: 99 MMHG | TEMPERATURE: 97.6 F | HEART RATE: 63 BPM | WEIGHT: 189.6 LBS | BODY MASS INDEX: 25.68 KG/M2

## 2019-10-10 DIAGNOSIS — E78.5 HYPERLIPIDEMIA, UNSPECIFIED HYPERLIPIDEMIA TYPE: Primary | ICD-10-CM

## 2019-10-10 DIAGNOSIS — F43.21 GRIEF: ICD-10-CM

## 2019-10-10 DIAGNOSIS — I25.119 ATHEROSCLEROSIS OF NATIVE CORONARY ARTERY WITH ANGINA PECTORIS, UNSPECIFIED WHETHER NATIVE OR TRANSPLANTED HEART (HCC): ICD-10-CM

## 2019-10-10 DIAGNOSIS — I10 ESSENTIAL HYPERTENSION: ICD-10-CM

## 2019-10-10 RX ORDER — PREGABALIN 75 MG/1
75 CAPSULE ORAL 2 TIMES DAILY
COMMUNITY
End: 2020-10-26

## 2019-10-10 NOTE — PROGRESS NOTES
Chief Complaint   Patient presents with    Hypertension     follow up    Cholesterol Problem    Coronary Artery Disease         HPI    Randell Clifford is a 67 y.o. male presenting today for 3 months  follow up of high cholesterol, hypertension, coronary artery disease. Pt notes that today's bp is much lower than usual.  He has not seen a reading this low. Patient has seen neurology for evaluation of ataxia. Laboratory evaluation and imaging are planned. EMG has been planned as well. Patient will follow-up with neurology after all studies are complete. Patient's Lyrica, which had been started by podiatry, was discontinued and replaced with Cymbalta. Pt notes he could not tolerate it. He had dizziness,  insomnia, decreased appetite, and difficulty with urination. He has d/c'ed the medication. Patient had labs on 9/25/19. Labs reviewed in detail with patient     Patient does not need medication refills today. New concerns today: none      Review of Systems   Constitutional: Negative. HENT: Negative. Respiratory: Negative. Cardiovascular: Negative. Neurological:        Ataxia   All other systems reviewed and are negative. Physical Exam  Nursing note and vitals reviewed. Constitutional: He is oriented to person, place, and time. He appears well-developed and well-nourished. HENT:   Head: Normocephalic and atraumatic. Right Ear: External ear normal.   Left Ear: External ear normal.   Nose: Nose normal.   Eyes: Conjunctivae and EOM are normal.   Neck: Normal range of motion. Neck supple. No JVD present. Carotid bruit is not present. No thyromegaly present. Cardiovascular: Normal rate, regular rhythm, normal heart sounds and intact distal pulses. Exam reveals no gallop and no friction rub. No murmur heard. Pulmonary/Chest: Effort normal and breath sounds normal. He has no wheezes. He has no rhonchi. He has no rales. Abdominal: Soft.    Musculoskeletal: Normal range of motion. Neurological: He is alert and oriented to person, place, and time. Coordination normal.   Skin: Skin is warm and dry. Psychiatric: He has a normal mood and affect. His behavior is normal. Judgment and thought content normal.       Diagnoses and all orders for this visit:    1. Hyperlipidemia, unspecified hyperlipidemia type  -     METABOLIC PANEL, COMPREHENSIVE; Future  -     LIPID PANEL; Future    2. Essential hypertension  -     METABOLIC PANEL, COMPREHENSIVE; Future  -     LIPID PANEL; Future  Stable, cont pres tx plan. 3. 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, unspecified whether native or transplanted heart (HCC)  -     METABOLIC PANEL, COMPREHENSIVE; Future  -     LIPID PANEL; Future    4. Grief  Discussed grief counseling. Pt does attend a support group currently. He will look into counseling. Follow-up and Dispositions    · Return in about 3 months (around 1/10/2020) for high blood pressure, high cholesterol, cad.

## 2019-10-10 NOTE — PROGRESS NOTES
Shyanne Rosario presents today for   Chief Complaint   Patient presents with    Hypertension     follow up    Cholesterol Problem    Coronary Artery Disease       Is someone accompanying this pt? no    Is the patient using any DME equipment during OV? no    Depression Screening:  3 most recent PHQ Screens 10/10/2019   PHQ Not Done -   Little interest or pleasure in doing things Not at all   Feeling down, depressed, irritable, or hopeless Not at all   Total Score PHQ 2 0   Trouble falling or staying asleep, or sleeping too much -   Feeling tired or having little energy -   Poor appetite, weight loss, or overeating -   Feeling bad about yourself - or that you are a failure or have let yourself or your family down -   Trouble concentrating on things such as school, work, reading, or watching TV -   Moving or speaking so slowly that other people could have noticed; or the opposite being so fidgety that others notice -   Thoughts of being better off dead, or hurting yourself in some way -   PHQ 9 Score -   How difficult have these problems made it for you to do your work, take care of your home and get along with others -       Learning Assessment:  Learning Assessment 5/6/2019   PRIMARY LEARNER Patient   HIGHEST LEVEL OF EDUCATION - PRIMARY LEARNER  GRADUATED HIGH SCHOOL OR GED   BARRIERS PRIMARY LEARNER NONE   CO-LEARNER CAREGIVER No   PRIMARY LANGUAGE ENGLISH   LEARNER PREFERENCE PRIMARY READING   ANSWERED BY Akash   RELATIONSHIP SELF       Abuse Screening:  Abuse Screening Questionnaire 5/6/2019   Do you ever feel afraid of your partner? N   Are you in a relationship with someone who physically or mentally threatens you? N   Is it safe for you to go home? Y       Fall Screening  Fall Risk Assessment, last 12 mths 5/6/2019   Able to walk? Yes   Fall in past 12 months? No   Fall with injury? -   Number of falls in past 12 months -   Fall Risk Score -       Generalized Anxiety  No flowsheet data found.      Health Maintenance Due   Topic Date Due    Shingrix Vaccine Age 50> (1 of 2) 03/26/1991    GLAUCOMA SCREENING Q2Y  11/02/2018    Influenza Age 9 to Adult  08/01/2019   . Health Maintenance reviewed and discussed and ordered per Provider. Brandi Nallely is updated on all     Coordination of Care  1. Have you been to the ER, urgent care clinic since your last visit? Hospitalized since your last visit? no    2. Have you seen or consulted any other health care providers outside of the [address] [place] [address] since your last visit? Include any pap smears or colon screening. no      Advance Directive:  1. Do you have an advance directive in place? Patient Reply:no    2. If not, would you like material regarding how to put one in place?  Patient Reply: no

## 2019-10-11 ENCOUNTER — HOSPITAL ENCOUNTER (OUTPATIENT)
Dept: CT IMAGING | Age: 79
Discharge: HOME | End: 2019-10-11
Attending: PSYCHIATRY & NEUROLOGY
Payer: MEDICARE

## 2019-10-11 DIAGNOSIS — I65.22 LEFT CAROTID ARTERY STENOSIS: ICD-10-CM

## 2019-10-11 DIAGNOSIS — R27.0 ATAXIA: ICD-10-CM

## 2019-10-11 PROCEDURE — 70450 CT HEAD/BRAIN W/O DYE: CPT

## 2019-11-04 ENCOUNTER — OFFICE VISIT (OUTPATIENT)
Dept: CARDIOLOGY CLINIC | Age: 79
End: 2019-11-04

## 2019-11-04 VITALS
DIASTOLIC BLOOD PRESSURE: 60 MMHG | HEIGHT: 72 IN | HEART RATE: 67 BPM | WEIGHT: 194 LBS | OXYGEN SATURATION: 98 % | SYSTOLIC BLOOD PRESSURE: 120 MMHG | BODY MASS INDEX: 26.28 KG/M2

## 2019-11-04 DIAGNOSIS — E78.2 MIXED HYPERLIPIDEMIA: ICD-10-CM

## 2019-11-04 DIAGNOSIS — I49.3 PVC'S (PREMATURE VENTRICULAR CONTRACTIONS): ICD-10-CM

## 2019-11-04 DIAGNOSIS — I77.9 BILATERAL CAROTID ARTERY DISEASE, UNSPECIFIED TYPE (HCC): ICD-10-CM

## 2019-11-04 DIAGNOSIS — I65.22 ICAO (INTERNAL CAROTID ARTERY OCCLUSION), LEFT: Primary | ICD-10-CM

## 2019-11-04 DIAGNOSIS — N52.9 ERECTILE DYSFUNCTION, UNSPECIFIED ERECTILE DYSFUNCTION TYPE: ICD-10-CM

## 2019-11-04 DIAGNOSIS — I25.10 ATHEROSCLEROSIS OF NATIVE CORONARY ARTERY OF NATIVE HEART WITHOUT ANGINA PECTORIS: ICD-10-CM

## 2019-11-04 DIAGNOSIS — I10 ESSENTIAL HYPERTENSION: ICD-10-CM

## 2019-11-04 RX ORDER — SILDENAFIL 100 MG/1
100 TABLET, FILM COATED ORAL AS NEEDED
Qty: 20 TAB | Refills: 3 | Status: SHIPPED | OUTPATIENT
Start: 2019-11-04 | End: 2020-10-26 | Stop reason: SDUPTHER

## 2019-11-04 NOTE — PATIENT INSTRUCTIONS
Exercise nuc  Carotid duplex for ICA   If you have not heard from the central scheduler to schedule your testing in 48 hours, please call [phone].

## 2019-11-04 NOTE — PROGRESS NOTES
Clementina Landin presents today for   Chief Complaint   Patient presents with    Coronary Artery Disease     6 month follow up - no cardiac complaints        Helen Gonzales preferred language for health care discussion is english/other. Is someone accompanying this pt? no    Is the patient using any DME equipment during [address]? no    Depression Screening:  3 most recent PHQ Screens 10/10/2019   PHQ Not Done -   Little interest or pleasure in doing things Not at all   Feeling down, depressed, irritable, or hopeless Not at all   Total Score PHQ 2 0   Trouble falling or staying asleep, or sleeping too much -   Feeling tired or having little energy -   Poor appetite, weight loss, or overeating -   Feeling bad about yourself - or that you are a failure or have let yourself or your family down -   Trouble concentrating on things such as school, work, reading, or watching TV -   Moving or speaking so slowly that other people could have noticed; or the opposite being so fidgety that others notice -   Thoughts of being better off dead, or hurting yourself in some way -   PHQ 9 Score -   How difficult have these problems made it for you to do your work, take care of your home and get along with others -       Learning Assessment:  Learning Assessment 5/6/2019   PRIMARY LEARNER Patient   HIGHEST LEVEL OF EDUCATION - PRIMARY LEARNER  GRADUATED HIGH SCHOOL OR GED   BARRIERS PRIMARY LEARNER NONE   CO-LEARNER CAREGIVER No   PRIMARY LANGUAGE ENGLISH   LEARNER PREFERENCE PRIMARY READING   ANSWERED BY Akash   RELATIONSHIP SELF       Abuse Screening:  Abuse Screening Questionnaire 5/6/2019   Do you ever feel afraid of your partner? N   Are you in a relationship with someone who physically or mentally threatens you? N   Is it safe for you to go home? Y       Fall Risk  Fall Risk Assessment, last 12 mths 5/6/2019   Able to walk? Yes   Fall in past 12 months?  No   Fall with injury? -   Number of falls in past 12 months -   Fall Risk Score -       Pt currently taking Anticoagulant therapy? ASA 81mg every day     Coordination of Care:  1. Have you been to the ER, urgent care clinic since your last visit? Hospitalized since your last visit? no    2. Have you seen or consulted any other health care providers outside of the [address] [place] [address] since your last visit? Include any pap smears or colon screening.  no

## 2019-11-04 NOTE — PROGRESS NOTES
HISTORY OF PRESENT ILLNESS  Shyanne Rosario is a 67 y.o. male. Hypertension   Pertinent negatives include no chest pain, no abdominal pain, no headaches and no shortness of breath. Patient presents for a followup office visit. He has a known history of coronary artery disease, status post a non-ST elevation myocardial infarction in 2009. He underwent a cardiac catheterization at that time and was found to have moderate, but nonobstructive two-vessel coronary disease involving his left circumflex and a right-sided posterior descending artery, that were assessed by pressure wire. The patient has been maintained on medical therapy since that time. The patient last underwent a pharmacological nuclear stress test in December 2015, which was a normal study, showing no ischemia, normal left ventricle ejection fraction. EF 67%. He has a history of mild to moderate carotid artery disease,  last evaluated with a carotid duplex in August 2018 which demonstrated moderate carotid disease of the left ICA and mild plaquing of the right. He also underwent an abdominal ultrasound which showed a very small infrarenal AAA. Both of these findings are unchanged compared to studies from a year ago. The patient was last seen in the office 6 months ago. Patient has been exercising regularly now with a  at the [employer], 3 days a week. He exercised for 60 minutes doing both strength training, cardiovascular exercises and stretching. He denies any exertional chest pain or shortness of breath. No dizziness or heart palpitations. No leg swelling or claudication.     Past Medical History:   Diagnosis Date    AAA (abdominal aortic aneurysm) ([facility] [address] 03/2010    noted on CT (abdomen)    Abnormal LFT's 12/95, 04/16/03    Acute meniscal tear, lateral 01/2007    s/p arthorscopic surg (Dr. Claudette Ali)    Atypical chest pain 02/04/09    CAD (coronary artery disease), native coronary artery     Cardiac cath 04/13/2009    dLM 30%. mLAD 30%. oD2 30%. pCX 55% (FFR 0.95). mRCA 40%. RPDA 65% (FFR 0.86). EF 65%.  Cardiac echocardiogram 04/03/2007    EF 60%. No RWMA. Gr 1 DDfx. LAE. No significant valvular heart disease.  Cardiac nuclear imaging test, low risk 12/04/2015    Low risk. No ischemia or prior infarction. No WMA. EF 67%. Neg EKG on pharm stress test.    Cardiovascular aorto-iliac duplex 06/29/2015    AAA measures 3.2 x 3.1 cm Trv. (Prev 3.2 x 3.4 cm on 6/19/14). Aneurysm is infrarenal & fusiform w/complex intraluminal thrombus within. Mod bilateral common iliac stenosis.  Carotid duplex 03/16/2016    Mild < 50% BETHEL stenosis. Mod 28-72% LICA stenosis. Similar to study of 6/29/15.  Cholecystitis chronic 03/2008    s/p sue    Diverticulosis     Diverticulosis 9-28-15    DR. 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, left knee replacement    Glaucoma     Left eye    H/O: rotator cuff tear 09/25/08    History of colon polyps     Hypercholesterolemia     Hypertension 2009    Non-STEMI (non-ST elevated myocardial infarction) ([facility] [address] 04/13/2009    peripatellar fibrosis, left knee replacement    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 20 Tab 3    pregabalin (LYRICA) 75 mg capsule Take 75 mg by mouth two (2) times a day.       DULoxetine (CYMBALTA) 30 mg capsule Take 1 Cap by mouth two (2) times a day. ONE QAM FOR ONE WEEK, THEN ONE BID, TAKE WITH FOOD 60 Cap 3    ezetimibe (ZETIA) 10 mg tablet Take 1 Tab by mouth daily. 90 Tab 0    niacin (NIASPAN) 1,000 mg Tb24 tab Take 1 Tab by mouth daily. 90 Tab 3    rosuvastatin (CRESTOR) 20 mg tablet Take 1 Tab by mouth nightly. 90 Tab 3    nitroglycerin (NITROSTAT) 0.4 mg SL tablet 1 Tab by SubLINGual route every five (5) minutes as needed for Chest Pain. 3 Bottle 4    amLODIPine (NORVASC) 10 mg tablet TAKE 1 TABLET BY MOUTH DAILY 90 Tab 3    aspirin delayed-release (ADULT LOW DOSE ASPIRIN) 81 mg tablet Take 81 mg by mouth daily.  melatonin 10 mg cap Take 10 mg by mouth nightly as needed for Other (sleep).  FLUZONE HIGH-DOSE , PF, syrg injection ADM 0.5ML IM UTD  0    brinzolamide-brimonidine (SIMBRINZA) 1-0.2 % drps Apply 1 Drop to eye two (2) times a day.  coenzyme q10 (CO Q-10) 10 mg cap Take 1 Tab by mouth daily.  cholecalciferol, vitamin D3, (VITAMIN D3) 2,000 unit tab Take 1 Tab by mouth daily.  omega 3-dha-epa-fish oil 100-160-1,000 mg cap Take 1,000 mg by mouth daily. 90 Cap 3         Allergies   Allergen Reactions    Pollen Extracts Sneezing     Itchy eyes, runny nose        Social History     Tobacco Use    Smoking status: Former Smoker     Packs/day: 0.50     Years: 20.00     Pack years: 10.00     Types: Cigarettes     Last attempt to quit: 12/10/1995     Years since quittin.9    Smokeless tobacco: Never Used   Substance Use Topics    Alcohol use: Yes     Alcohol/week: 6.7 standard drinks     Types: 7 Glasses of wine, 1 Shots of liquor per week     Comment: social    Drug use: No     Types: Prescription, OTC           Review of Systems   Constitutional: Negative for chills, fever and weight loss. HENT: Negative for nosebleeds. Eyes: Negative for blurred vision and double vision.    Respiratory: Negative for cough, shortness of breath and wheezing. Cardiovascular: Negative for chest pain, palpitations, orthopnea, claudication, leg swelling and PND. Gastrointestinal: Negative for abdominal pain, heartburn, nausea and vomiting. Genitourinary: Negative for dysuria and hematuria. Musculoskeletal: Negative for falls, joint pain and myalgias. Skin: Negative for rash. Neurological: Negative for dizziness, focal weakness and headaches. Endo/Heme/Allergies: Does not bruise/bleed easily. Psychiatric/Behavioral: Negative for substance abuse. Visit Vitals  /60 (BP 1 Location: Left arm, BP Patient Position: Sitting)   Pulse 67   Ht 6' (1.829 m)   Wt 88 kg (194 lb)   SpO2 98%   BMI 26.31 kg/m²      Physical Exam   Constitutional: He is oriented to person, place, and time. He appears well-developed and well-nourished. HENT:   Head: Normocephalic and atraumatic. Eyes: Conjunctivae are normal.   Neck: Neck supple. No JVD present. Carotid bruit is not present. Cardiovascular: Normal rate, regular rhythm, S1 normal, S2 normal and normal pulses. Exam reveals no gallop and no S3. No murmur heard. Pulmonary/Chest: Breath sounds normal. He has no wheezes. He has no rales. Abdominal: Soft. Bowel sounds are normal. There is no tenderness. Musculoskeletal: He exhibits no edema. Neurological: He is alert and oriented to person, place, and time. Skin: Skin is warm and dry. EKG: Normal sinus rhythm, low voltage in the limb leads,  normal axis, normal QTc interval,  no ST or T wave abnormalities concerning for ischemia. Occasional PVCs. Compared to previous EKG, PVCs are now present. ASSESSMENT and PLAN    Asymptomatic coronary artery disease. Patient has moderate 2 vessel disease last assessed by cardiac catheterization in 2009, involving nonobstructive lesions in the proximal left circumflex in the right-sided PDA.   He does have a history of a non-ST elevation myocardial infarction back in 2009, for which he was briefly treated with Plavix. He is now maintained on aspirin and a statin, and he has been intolerant to beta blockers because of bradycardia. He last underwent a negative pharmacologic nuclear stress test in December 2015. I have recommended a follow-up stress test later this year. Essential hypertension. Patient blood pressure remains well controlled on his current regimen, all of which I would continue. Dyslipidemia: Patient is now on Crestor 20 mg daily, Niaspan 1000 mg daily, Fish oil, and Zetia. His goal LDL should be as close to 70 as possible and this is been closely followed by his PCP. Carotid artery disease. His last carotid duplex scan from August 2018 showed moderate disease of the left and mild disease on the right. I have ordered a follow-up duplex scan to reevaluate his carotid stenosis severity. Infrarenal AAA. This was last evaluated by an ultrasound in August 2018. This remains small in size and did not significantly grow compared to the year prior. Erectile dysfunction. This has responded well to Viagra in the past.  Prescription was refilled. He was advised not to use any nitroglycerin within 24 hours of taking a Viagra.     Followup in 6 months time, sooner if needed

## 2019-11-18 ENCOUNTER — HOSPITAL ENCOUNTER (OUTPATIENT)
Dept: VASCULAR SURGERY | Age: 79
Discharge: HOME | End: 2019-11-18
Attending: INTERNAL MEDICINE
Payer: MEDICARE

## 2019-11-18 ENCOUNTER — HOSPITAL ENCOUNTER (OUTPATIENT)
Dept: NON INVASIVE DIAGNOSTICS | Age: 79
Discharge: HOME | End: 2019-11-18
Attending: INTERNAL MEDICINE
Payer: MEDICARE

## 2019-11-18 VITALS — HEIGHT: 72 IN | BODY MASS INDEX: 26.28 KG/M2 | WEIGHT: 194 LBS

## 2019-11-18 DIAGNOSIS — I77.9 BILATERAL CAROTID ARTERY DISEASE, UNSPECIFIED TYPE (HCC): ICD-10-CM

## 2019-11-18 DIAGNOSIS — I65.22 ICAO (INTERNAL CAROTID ARTERY OCCLUSION), LEFT: ICD-10-CM

## 2019-11-18 LAB — STRESS TARGET HR: 142 BPM

## 2019-11-18 PROCEDURE — 93880 EXTRACRANIAL BILAT STUDY: CPT

## 2019-11-18 RX ORDER — SODIUM CHLORIDE 9 MG/ML
250 INJECTION, SOLUTION INTRAVENOUS ONCE
Status: DISCONTINUED | OUTPATIENT
Start: 2019-11-18 | End: 2019-11-18

## 2019-11-19 LAB
LEFT CCA DIST DIAS: 7.8 CM/S
LEFT CCA DIST SYS: 49.6 CM/S
LEFT CCA MID DIAS: 10.38 CM/S
LEFT CCA MID SYS: 47.89 CM/S
LEFT CCA PROX DIAS: 10.4 CM/S
LEFT CCA PROX SYS: 63.6 CM/S
LEFT ECA DIAS: 6.34 CM/S
LEFT ECA SYS: 74.9 CM/S
LEFT ICA DIST DIAS: 22.6 CM/S
LEFT ICA DIST SYS: 61.1 CM/S
LEFT ICA MID DIAS: 20.5 CM/S
LEFT ICA MID SYS: 118.4 CM/S
LEFT ICA PROX DIAS: 36.9 CM/S
LEFT ICA PROX SYS: 160.1 CM/S
LEFT ICA/CCA SYS: 3.22
LEFT VERTEBRAL DIAS: 13.03 CM/S
LEFT VERTEBRAL SYS: 42.7 CM/S
RIGHT CCA DIST DIAS: 10.4 CM/S
RIGHT CCA DIST SYS: 33.1 CM/S
RIGHT CCA MID DIAS: 6.89 CM/S
RIGHT CCA MID SYS: 41.79 CM/S
RIGHT CCA PROX DIAS: 7.7 CM/S
RIGHT CCA PROX SYS: 41.6 CM/S
RIGHT ECA DIAS: 8.93 CM/S
RIGHT ECA SYS: 65.9 CM/S
RIGHT ICA DIST DIAS: 12.8 CM/S
RIGHT ICA DIST SYS: 54.2 CM/S
RIGHT ICA MID DIAS: 12.2 CM/S
RIGHT ICA MID SYS: 77.4 CM/S
RIGHT ICA PROX DIAS: 17 CM/S
RIGHT ICA PROX SYS: 95 CM/S
RIGHT ICA/CCA SYS: 2.9
RIGHT SUBCLAVIAN DIAS: 0 CM/S
RIGHT SUBCLAVIAN SYS: 63.8 CM/S
RIGHT VERTEBRAL DIAS: 12.81 CM/S
RIGHT VERTEBRAL SYS: 47.8 CM/S

## 2019-11-19 NOTE — PROGRESS NOTES
Per your last note\" Carotid artery disease. His last carotid duplex scan from August 2018 showed moderate disease of the left and mild disease on the right. I have ordered a follow-up duplex scan to reevaluate his carotid stenosis severity.

## 2019-11-20 ENCOUNTER — HOSPITAL ENCOUNTER (OUTPATIENT)
Dept: NON INVASIVE DIAGNOSTICS | Age: 79
Discharge: HOME | End: 2019-11-20
Attending: INTERNAL MEDICINE
Payer: MEDICARE

## 2019-11-20 VITALS
DIASTOLIC BLOOD PRESSURE: 90 MMHG | WEIGHT: 194 LBS | HEIGHT: 72 IN | BODY MASS INDEX: 26.28 KG/M2 | SYSTOLIC BLOOD PRESSURE: 160 MMHG

## 2019-11-20 DIAGNOSIS — I73.9 PERIPHERAL VASCULAR DISEASE, UNSPECIFIED (HCC): ICD-10-CM

## 2019-11-20 DIAGNOSIS — I25.10 CAD (CORONARY ARTERY DISEASE): ICD-10-CM

## 2019-11-20 LAB
STRESS BASELINE DIAS BP: 90 MMHG
STRESS BASELINE HR: 76 BPM
STRESS BASELINE SYS BP: 160 MMHG
STRESS ESTIMATED WORKLOAD: 1 METS
STRESS EXERCISE DUR MIN: NORMAL
STRESS PEAK DIAS BP: 80 MMHG
STRESS PEAK SYS BP: 165 MMHG
STRESS PERCENT HR ACHIEVED: 65 %
STRESS POST PEAK HR: 93 BPM
STRESS RATE PRESSURE PRODUCT: NORMAL BPM*MMHG
STRESS ST DEPRESSION: 0 MM
STRESS ST ELEVATION: 0 MM
STRESS TARGET HR: 142 BPM

## 2019-11-20 PROCEDURE — 93017 CV STRESS TEST TRACING ONLY: CPT

## 2019-11-20 PROCEDURE — 74011250636 HC RX REV CODE- 250/636: Performed by: INTERNAL MEDICINE

## 2019-11-20 RX ORDER — SODIUM CHLORIDE 9 MG/ML
100 INJECTION, SOLUTION INTRAVENOUS ONCE
Status: COMPLETED | OUTPATIENT
Start: 2019-11-20 | End: 2019-11-20

## 2019-11-20 RX ADMIN — SODIUM CHLORIDE 100 ML/HR: 900 INJECTION, SOLUTION INTRAVENOUS at 09:00

## 2019-11-20 RX ADMIN — REGADENOSON 0.4 MG: 0.08 INJECTION, SOLUTION INTRAVENOUS at 09:00

## 2019-11-20 NOTE — PROGRESS NOTES
Patient was injected with 89.9 millicuries 99DDX Sestamibi on 11/20/19 at 0750. Patient was injected with 20.2 millicuries 95PKO Sestamibi on 11/20/19 at 0900. Patient's armbands were removed and placed in shred-it box.     Patient had a Nuclear Lexiscan Stress Test.

## 2019-11-20 NOTE — PROGRESS NOTES
Per your last note\" Asymptomatic coronary artery disease. Patient has moderate 2 vessel disease last assessed by cardiac catheterization in 2009, involving nonobstructive lesions in the proximal left circumflex in the right-sided PDA. He does have a history of a non-ST elevation myocardial infarction back in 2009, for which he was briefly treated with Plavix. He is now maintained on aspirin and a statin, and he has been intolerant to beta blockers because of bradycardia. He last underwent a negative pharmacologic nuclear stress test in December 2015. I have recommended a follow-up stress test later this year.

## 2019-11-21 ENCOUNTER — TELEPHONE (OUTPATIENT)
Dept: CARDIOLOGY CLINIC | Age: 79
End: 2019-11-21

## 2019-11-21 NOTE — LETTER
11/23/2019 7:51 AM 
 
Mr. Paz [address] [place] [address] Dear Mr. Lewiskorin Rosa M, We have been unable to reach you by phone to notify you of your test results. Please call our office at [phone] and ask to speak with my nurse in order to explain these results to you and advise you of any recommendations. Sincerely, Oscar Irvin MD

## 2019-11-21 NOTE — TELEPHONE ENCOUNTER
----- Message from Arian Adams MD sent at 11/19/2019  2:30 PM EST -----  Please let the patient know that his carotid duplex scan was unchanged compared to his study from 2018.  ----- Message -----  From: Edith Hunt LPN  Sent: [id number]  12:42 PM EST  To: Arian Adams MD    Per your last note\" Carotid artery disease. His last carotid duplex scan from August 2018 showed moderate disease of the left and mild disease on the right. I have ordered a follow-up duplex scan to reevaluate his carotid stenosis severity.

## 2019-11-21 NOTE — TELEPHONE ENCOUNTER
----- Message from Carlos Reza MD sent at 11/20/2019  3:10 PM EST -----  Please let the patient know that his stress test was normal  ----- Message -----  From: Zenia Solis LPN  Sent: [id number]/00/8309   3:00 PM EST  To: Carlos Reza MD    Per your last note\" Asymptomatic coronary artery disease. Patient has moderate 2 vessel disease last assessed by cardiac catheterization in 2009, involving nonobstructive lesions in the proximal left circumflex in the right-sided PDA. He does have a history of a non-ST elevation myocardial infarction back in 2009, for which he was briefly treated with Plavix. He is now maintained on aspirin and a statin, and he has been intolerant to beta blockers because of bradycardia. He last underwent a negative pharmacologic nuclear stress test in December 2015.    I have recommended a follow-up stress test later this year.

## 2019-11-29 DIAGNOSIS — E78.5 HYPERLIPIDEMIA, UNSPECIFIED HYPERLIPIDEMIA TYPE: ICD-10-CM

## 2019-11-29 NOTE — TELEPHONE ENCOUNTER
PCP: Ramo Dao MD    Last appt: 10/10/2019  Future Appointments   Date Time Provider Reza Bose   12/17/2019  9:20 AM [facility] [address]   1/16/2020  9:00 AM Ramo Dao MD [facility] None   5/4/2020  8:20 AM Mitchell Mcardle, MD [address] [place] [address]   8/25/2020  8:00 AM [facility] SCHED   8/25/2020  9:00 AM [facility] 1 2[facility] SCHED   8/25/2020 11:30 AM RAHAT Ariza 2VV [facility] SCHED       Requested Prescriptions     Pending Prescriptions Disp Refills    ezetimibe (ZETIA) 10 mg tablet 90 Tab 0     Sig: Take 1 Tab by mouth daily.

## 2019-12-02 RX ORDER — EZETIMIBE 10 MG/1
10 TABLET ORAL DAILY
Qty: 90 TAB | Refills: 4 | Status: SHIPPED | OUTPATIENT
Start: 2019-12-02 | End: 2021-01-20 | Stop reason: SDUPTHER

## 2019-12-17 ENCOUNTER — OFFICE VISIT (OUTPATIENT)
Dept: NEUROLOGY | Age: 79
End: 2019-12-17

## 2019-12-17 VITALS
HEART RATE: 72 BPM | DIASTOLIC BLOOD PRESSURE: 70 MMHG | SYSTOLIC BLOOD PRESSURE: 120 MMHG | BODY MASS INDEX: 25.71 KG/M2 | TEMPERATURE: 98.4 F | OXYGEN SATURATION: 97 % | WEIGHT: 189.8 LBS | HEIGHT: 72 IN | RESPIRATION RATE: 15 BRPM

## 2019-12-17 DIAGNOSIS — G62.9 POLYNEUROPATHY: Primary | ICD-10-CM

## 2019-12-17 DIAGNOSIS — I65.22 LEFT CAROTID ARTERY STENOSIS: ICD-10-CM

## 2019-12-17 DIAGNOSIS — R27.0 ATAXIA: ICD-10-CM

## 2019-12-17 NOTE — PROGRESS NOTES
12/17/2019 9:33 AM    SSN: [id number]    Subjective:   80-year-old male who I initially evaluated in September for evaluation of imbalance. I suspect that he had a polyneuropathy based on symptoms of tingling, burning in the soles of his feet, apparent improvement with Lyrica, albeit with side effects of leg swelling, and because of physical findings. There was concern about a contribution from cerebrovascular disease. Because of the depression stemming from the loss of his wife of 46 years, and his leg swelling, the plan was to stop Lyrica and change him to duloxetine 30 mg every morning for a week, then twice daily. However, he did try Cymbalta for about a week and he was so dizzy that he could not continue. This was not getting better as the days went by. He went back to Lyrica and he reports satisfactory control of his symptoms of tingling and burning. He admits that because he is depressed after losing his wife of 46 years that he is drinking quite often. He does not want to see a counselor or go for group therapy. He reports that he has a good support network from his family. He had a CT of the head which showed mild chronic small vessel disease. Carotid ultrasound showed a left internal carotid artery of 50 to 69% and on the right of less than 50% on November 18th. Vitamin B12 on September 25 was 355, hemoglobin A1c was 5.1, TSH then was 0.95 with a free T4 of 0.9.       Social History     Socioeconomic History    Marital status:      Spouse name: Not on file    Number of children: Not on file    Years of education: Not on file    Highest education level: Not on file   Occupational History    Not on file   Social Needs    Financial resource strain: Not on file    Food insecurity:     Worry: Not on file     Inability: Not on file    Transportation needs:     Medical: Not on file     Non-medical: Not on file   Tobacco Use    Smoking status: Former Smoker Pt's bp has been running   He reports dizziness intermittently all throughout the day since March .  This is positional and at rest.  It resolves once pt is not moving.  Pt was driving and he felt the dizziness coming on and he pulled over until it resolved in 2-3 mins.  He reiterates that he dizziness is not bad but is concerned about if it gets worst and the consistency.  Pt denies any cp, soa, palps.  Pt was at the ED twice in March for the dizziness.  He was dx with vertigo and prescribed Meclizine, but stopped taking due to it making him dizzy.      He reports that his bp has been elevated.  169/87 hr 60 earlier today, 1662/101 hr 60, 177/104 hr 60, 182/113 hr 60.     Pt's last OV 05/22/18 to return in a year, last PM 08/14/18 in [place].  Pt not on the schedule yet (will send to scheduling)     Pt recently started :  Allopruinol 100 mg q day     Daily:  ASA 81 mg   Atenolol 25 mg qd   Pt wants to know if he needs to increase his Atenolol?            Packs/day: 0.50     Years: 20.00     Pack years: 10.00     Types: Cigarettes     Last attempt to quit: 12/10/1995     Years since quittin.0    Smokeless tobacco: Never Used   Substance and Sexual Activity    Alcohol use: Yes     Alcohol/week: 6.7 standard drinks     Types: 7 Glasses of wine, 1 Shots of liquor per week     Comment: social    Drug use: No     Types: Prescription, OTC    Sexual activity: Not on file   Lifestyle    Physical activity:     Days per week: Not on file     Minutes per session: Not on file    Stress: Not on file   Relationships    Social connections:     Talks on phone: Not on file     Gets together: Not on file     Attends Gnosticist service: Not on file     Active member of club or organization: Not on file     Attends meetings of clubs or organizations: Not on file     Relationship status: Not on file    Intimate partner violence:     Fear of current or ex partner: Not on file     Emotionally abused: Not on file     Physically abused: Not on file     Forced sexual activity: Not on file   Other Topics Concern    Not on file   Social History Narrative    Not on file       Family History   Problem Relation Age of Onset    Heart Disease Mother     Stroke Father     No Known Problems Sister     No Known Problems Maternal Grandmother     No Known Problems Maternal Grandfather     Dementia Paternal Grandmother     No Known Problems Paternal Grandfather     Cancer Brother         lung CA       Current Outpatient Medications   Medication Sig Dispense Refill    ezetimibe (ZETIA) 10 mg tablet Take 1 Tab by mouth daily. 90 Tab 4    sildenafil citrate (VIAGRA) 100 mg tablet Take 1 Tab by mouth as needed (1 tablet by mouth once daily as needed). 20 Tab 3    pregabalin (LYRICA) 75 mg capsule Take 75 mg by mouth two (2) times a day.  DULoxetine (CYMBALTA) 30 mg capsule Take 1 Cap by mouth two (2) times a day.  ONE QAM FOR ONE WEEK, THEN ONE BID, TAKE WITH FOOD 60 Cap 3    niacin (NIASPAN) 1,000 mg Tb24 tab Take 1 Tab by mouth daily. 90 Tab 3    rosuvastatin (CRESTOR) 20 mg tablet Take 1 Tab by mouth nightly. 90 Tab 3    nitroglycerin (NITROSTAT) 0.4 mg SL tablet 1 Tab by SubLINGual route every five (5) minutes as needed for Chest Pain. 3 Bottle 4    amLODIPine (NORVASC) 10 mg tablet TAKE 1 TABLET BY MOUTH DAILY 90 Tab 3    aspirin delayed-release (ADULT LOW DOSE ASPIRIN) 81 mg tablet Take 81 mg by mouth daily.  melatonin 10 mg cap Take 10 mg by mouth nightly as needed for Other (sleep).  FLUZONE HIGH-DOSE 2018-19, PF, syrg injection ADM 0.5ML IM UTD  0    brinzolamide-brimonidine (SIMBRINZA) 1-0.2 % drps Apply 1 Drop to eye two (2) times a day.  coenzyme q10 (CO Q-10) 10 mg cap Take 1 Tab by mouth daily.  cholecalciferol, vitamin D3, (VITAMIN D3) 2,000 unit tab Take 1 Tab by mouth daily.  omega 3-dha-epa-fish oil 100-160-1,000 mg cap Take 1,000 mg by mouth daily. 80 Cap 3       Past Medical History:   Diagnosis Date    AAA (abdominal aortic aneurysm) ([facility] [address] 03/2010    noted on CT (abdomen)    Abnormal LFT's 12/95, 04/16/03    Acute meniscal tear, lateral 01/2007    s/p arthorscopic surg (Dr. Doron Jaeger)    Atypical chest pain 02/04/09    CAD (coronary artery disease), native coronary artery     Cardiac cath 04/13/2009    dLM 30%. mLAD 30%. oD2 30%. pCX 55% (FFR 0.95). mRCA 40%. RPDA 65% (FFR 0.86). EF 65%.  Cardiac echocardiogram 04/03/2007    EF 60%. No RWMA. Gr 1 DDfx. LAE. No significant valvular heart disease.  Cardiac nuclear imaging test, low risk 12/04/2015    Low risk. No ischemia or prior infarction. No WMA. EF 67%. Neg EKG on pharm stress test.    Cardiovascular aorto-iliac duplex 06/29/2015    AAA measures 3.2 x 3.1 cm Trv. (Prev 3.2 x 3.4 cm on 6/19/14). Aneurysm is infrarenal & fusiform w/complex intraluminal thrombus within. Mod bilateral common iliac stenosis.     Carotid duplex 03/16/2016    Mild < 50% BETHEL stenosis. Mod 63-61% LICA stenosis. Similar to study of 6/29/15.  Cholecystitis chronic 03/2008    s/p sue    Diverticulosis     Diverticulosis 9-28-15    DR. 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    CALRKE (Dyspnea on Exertion) 03/2007    echo and stress WNL    ED (erectile dysfunction) 11/11/04    Fatty liver 4/2012    noted on CT (abdomen)    Fibrosis of knee joint March 2014    peripatellar fibrosis, left knee replacement    Glaucoma     Left eye    H/O: rotator cuff tear 09/25/08    History of colon polyps     Hypercholesterolemia     Hypertension 2009    Non-STEMI (non-ST elevated myocardial infarction) ([facility] [address] 04/13/2009    peripatellar fibrosis, left knee replacement    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    Plantar fasciitis     Rhinitis     Right knee pain     Right shoulder pain     Solar purpura (HCC)     Tinea versicolor 7/23/2012    2.8 x 2.9cm infra-renal    Tubular adenoma 9-28-15    Vitamin D deficiency 4/13/2011       Past Surgical History:   Procedure Laterality Date    ENDOSCOPY, COLON, DIAGNOSTIC      normal per patient;     HX ARTHROPLASTY  04/05/10    [place]-left knee    HX CATARACT REMOVAL Bilateral     November 2014    HX CHOLECYSTECTOMY  03/2008    chronic cholecystitis    HX COLONOSCOPY  9-28-15    HX HERNIA REPAIR  1995    L inguinal    HX KNEE ARTHROSCOPY  01/24/07    left knee    HX KNEE ARTHROSCOPY Left 4/16/2014    peripatellar debridement    HX KNEE REPLACEMENT Left 01/29/2013    [place]    HX MOHS PROCEDURES  11/2008    HX RHINOPLASTY  1983    HX TONSILLECTOMY      HX VASECTOMY  1977    HX WISDOM TEETH EXTRACTION      x4    [place] ANESTH,SURGERY OF SHOULDER         Allergies   Allergen Reactions    Pollen Extracts Sneezing     Itchy eyes, runny nose         Vital signs:    Visit Vitals  /70 (BP 1 Location: Left arm, BP Patient Position: Sitting)   Pulse 72   Temp 98.4 °F (36.9 °C) (Oral) Resp 15   Ht 6' (1.829 m)   Wt 86.1 kg (189 lb 12.8 oz)   SpO2 97%   BMI 25.74 kg/m²       Review of Systems:   GENERAL: Denies fever or fatigue  CARDIAC: No CP or SOB  PULMONARY: No cough of SOB  MUSCULOSKELETAL: No new joint pain  NEURO: SEE HPI      EXAM: Alert, in NAD. Heart is regular. Oriented x3, EOM's are full, PERRL, no facial asymmetries. Strength and tone are normal. 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 Serologically there are no other treatable causes. I counseled him about alcohol abstinence. I counseled him about depression management, to consider options such as antidepressants with his primary care physician and psychological counseling. He will keep these things under consideration. He will return to neurology as needed. PLEASE NOTE:   Portions of this document may have been produced using voice recognition software. Unrecognized errors in transcription may be present. This note will not be viewable in [address]

## 2019-12-17 NOTE — PROGRESS NOTES
[facility] [address]  [facility] [phone]    Neurophysiology Report    Patient: Angela Handley     ID: [id number] Physician: Collette Mile, MD   Gender: Male Ref Phys: Collette Mile, MD   Handedness: Elizabeth Joya     Study Date: December 17, 2019       Patient History:  Bilateral feet numbness    Nerve Conduction Studies  Anti Sensory Summary Table     Stim Site NR Peak (ms) Norm Peak (ms) O-P Amp (µV) Norm O-P Amp Dist (cm) Enrico (m/s)   Left Sup Peron Anti Sensory (Lower Leg)   Ankle    3.9 <4.4 6.5 >6.0 14.0 42.4   Right Sup Peron Anti Sensory (Lower Leg)   Ankle    8.9 <4.4 22.3 >6.0 14.0 25.9   Left Sural Anti Sensory (Ankle)   Calf    3.6  11.6 >5 14.0 66.7   Right Sural Anti Sensory (Ankle)   Calf    3.7  6.4 >5 14.0 48.3     Motor Summary Table     Stim Site NR Onset (ms) Norm Onset (ms) O-P Amp (mV) Norm O-P Amp Dist (cm) Enrico (m/s) Norm Enrico (m/s)   Left Peroneal Motor (EDB)   Ankle    3.5 <6.5 3.0 >2.0 34.0 44.7 >44   Fibula (Head)    11.1  2.5  10.0 76.9    Pop Fossa    12.4  2.9       Right Peroneal Motor (EDB)   Ankle    3.3 <6.5 2.5 >2.0 36.0 38.3 >44   Fibula (Head)    12.7  2.9  10.0 62.5    Pop Fossa    14.3  3.0       Left Tibial Motor (Abd Panchal Brev)   Ankle    5.2 <5.8 5.1 >4.0      Right Tibial Motor (Abd Panchal Brev)   Ankle    4.9 <5.8 5.5 >4.0                      NCS/EMG FINDINGS:     Evaluation of the Left peroneal motor, the Left tibial motor, the Right tibial motor, the Left superficial peroneal sensory, the Left sural sensory, and the Right sural sensory nerves were unremarkable.  The Right peroneal motor nerve showed decreased conduction velocity (Fibula (Head)-Ankle, 38.3 m/s).  The Right superficial peroneal sensory nerve showed prolonged distal peak latency (8.9 ms) and decreased conduction velocity (Ankle-Lower Leg, 25.9 m/s). INTERPRETATION:   Normal BLE nerve conduction studies.  EMG was deferred d/t low expected yield.         ___________________________  Fracisco Hubbard MD  Board Certified in Neurology        Waveforms:                    Khalida Arroyo NOTE        Chart reviewed for the following:   Mallory Pride MD, have reviewed the History, Physical and updated the Allergic reactions for [address] [place] [address] Dr performed immediately prior to start of procedure:   Mallory Pride MD, have performed the following reviews on Shyanne Rosario prior to the start of the procedure:            * Patient was identified by name and date of birth   * Agreement on procedure being performed was verified  * Risks and Benefits explained to the patient  * Procedure site verified and marked as necessary  * Patient was positioned for comfort  * Consent was signed and verified     Time: 10:00 AM     Date of procedure: 12/17/2019    Procedure performed by:  PROCEDURE ROOM    Provider assisted by: Coleman Frederick     Patient assisted by: self    How tolerated by patient: tolerated the procedure well with no complications    Post Procedural Pain Scale: 0 - No Hurt    Comments: none

## 2019-12-17 NOTE — PROGRESS NOTES
Randell Clifford presents today for   Chief Complaint   Patient presents with    Procedure     EMG BLE       Is someone accompanying this pt? No    Is the patient using any DME equipment during OV? No    Are there any discrepancies in patient's vital signs?   No

## 2020-01-08 NOTE — LETTER
[address] [facility] CATHLEEN Mendoza [phone] To Dr Melody Romero                 Fax [phone] Missy Rivers 1940 is being referred by Dr. Iwona Cano  for Cardiology Clearance prior to their surgery. Clearance Appt. Date & Time November 1, 2018 at 9:40 a.m. Surgical Procedure:  Right Total Knee Arthroplasty Surgery Date:  11/20/2018 DX:  Primary Osteoarthritis/Chronic Knee pain, Right Knee DX Code:  M17.11 / M25.561 / B86.01 The patient has been [facility]: 
 
        CBC w/diff             Basic Panel                Type & Screen PT/INR                  EKG(Dr. Melody Romero to do)          Urinalysis PTT                       Chest X-ray                 UA with Culture and Sensitivity Hemoglobin A1C  
 
 
**PLEASE ADVISE IF PATIENT MAY STOP ASPIRIN 7-10 DAYS PRIOR TO SURGERY.** 
 
Please advise if patient __IS __IS NOT MEDICALLY CLEARED FOR SURGERY. Additional comments: ______________________________________________________ __________________________________________________________________________ __________________________________________________________________________ 
__________________________________________________________________________ 
__________________________________________________________________________ Please include office note on which this clearance is based Thyroid function is at goal continue current dose   Calcium level is high is patient taking any calcium supplement? ? - repeat BMP ordered to be done in the next 2 weeks  Kidney function is stable, patient has CKD stage III and must not take any NSAIDs ( ibuprofen, advil) FAX to [phone]. For questions/concerns please call Ashely Juarez at [phone]. Patient Medical Clearance/History & Physical 
 
Name: Maik Heard  Age:  69 y.o. 
 
SSN:  [id number] Patient scheduled for Right Total Knee Arthroplasty on November 20, 2018 [facility] Medical History: 
 
Surgical History: 
 
Food/Drug Allergies: 
 
Current Medications: PHYSICAL EXAMINATION: 
 
BP ____/____  Pulse ______  Ht ______  Wt _____ Temp _____  Respiration ______ GENERAL APPEARANCE HEENT: 
 
Heart/Lungs: Abdomen: 
 
Pelvic Examination: 
 
Rectal Examination: 
 
Extremities: 
 
Neurological Examination: 
 
Other Pertinent Findings: 
 
 
RECOMMENDATIONS 
 
____________________  _______________________  ___________ [address] Name - Print   Date of Exam 
 
     MEDICALLY CLEARED FOR SURGERY 
     _____Approved       _____NOT APPROVED

## 2020-01-16 ENCOUNTER — OFFICE VISIT (OUTPATIENT)
Dept: FAMILY MEDICINE CLINIC | Age: 80
End: 2020-01-16

## 2020-01-16 VITALS
SYSTOLIC BLOOD PRESSURE: 113 MMHG | HEIGHT: 72 IN | BODY MASS INDEX: 25.35 KG/M2 | DIASTOLIC BLOOD PRESSURE: 69 MMHG | RESPIRATION RATE: 18 BRPM | HEART RATE: 60 BPM | WEIGHT: 187.2 LBS | TEMPERATURE: 97.9 F

## 2020-01-16 DIAGNOSIS — I10 ESSENTIAL HYPERTENSION: Primary | ICD-10-CM

## 2020-01-16 DIAGNOSIS — R27.0 ATAXIA: ICD-10-CM

## 2020-01-16 DIAGNOSIS — I25.119 ATHEROSCLEROSIS OF NATIVE CORONARY ARTERY WITH ANGINA PECTORIS, UNSPECIFIED WHETHER NATIVE OR TRANSPLANTED HEART (HCC): ICD-10-CM

## 2020-01-16 DIAGNOSIS — E78.5 HYPERLIPIDEMIA, UNSPECIFIED HYPERLIPIDEMIA TYPE: ICD-10-CM

## 2020-01-16 NOTE — PROGRESS NOTES
Chief Complaint   Patient presents with    Hypertension     follow up    Cholesterol Problem    Coronary Artery Disease    Neurologic Problem     Patient stated that he is still having problems with his gait. HPI    Rupa Parent is a 67 y.o. male presenting today for 3 months  follow up of htn, hld, cad, ataxia. Pt notes that he is depressed right now but this month is the 1 yr anniversary of his wife's death. He feels he is doing better than he thought he would. He is still attending the support group. He feels this has been very helpful. Pt does not feel he needs to attend therapy sessions at this point. He does still cry daily when he visits the cemetary. He does still have some trouble staying asleep. His appetite is not great; he may eat once per day. Pt is exercising again and is doing yoga 2 times per week. He does also exercise with a  at the  3 days per week. Pt feels the exercise has helped the ataxia. Pt will get his labs drawn soon. Patient does not need medication refills today. New concerns today: none      ROS    Physical Exam  Nursing note and vitals reviewed. Constitutional: He is oriented to person, place, and time. He appears well-developed and well-nourished. HENT:   Head: Normocephalic and atraumatic. Right Ear: External ear normal.   Left Ear: External ear normal.   Nose: Nose normal.   Eyes: Conjunctivae and EOM are normal.   Neck: Normal range of motion. Neck supple. No JVD present. Carotid bruit is not present. No thyromegaly present. Cardiovascular: Normal rate, regular rhythm, normal heart sounds and intact distal pulses. Exam reveals no gallop and no friction rub. No murmur heard. Pulmonary/Chest: Effort normal and breath sounds normal. He has no wheezes. He has no rhonchi. He has no rales. Abdominal: Soft. Musculoskeletal: Normal range of motion.    Neurological: He is alert and oriented to person, place, and time. Coordination normal.   Skin: Skin is warm and dry. Psychiatric: He has a normal mood and affect. His behavior is normal. Judgment and thought content normal.       Diagnoses and all orders for this visit:    1. Essential hypertension  Stable, cont pres tx plan. Labs pending    2. Hyperlipidemia, unspecified hyperlipidemia type  Labs pending    3. 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, unspecified whether native or transplanted heart (HCC)  Stable, cont pres tx plan. 4. Ataxia  Improving with exercise. Follow-up and Dispositions    · Return in about 3 months (around 4/16/2020) for high blood pressure, high cholesterol.

## 2020-01-16 NOTE — PROGRESS NOTES
Janny Grimes presents today for   Chief Complaint   Patient presents with    Hypertension     follow up    Cholesterol Problem    Coronary Artery Disease    Neurologic Problem     Patient stated that he is still having problems with his gait. Is someone accompanying this pt? no    Is the patient using any DME equipment during [address]? no    Depression Screening:  3 most recent PHQ Screens 1/16/2020   PHQ Not Done -   Little interest or pleasure in doing things Several days   Feeling down, depressed, irritable, or hopeless More than half the days   Total Score PHQ 2 3   Trouble falling or staying asleep, or sleeping too much More than half the days   Feeling tired or having little energy Several days   Poor appetite, weight loss, or overeating More than half the days   Feeling bad about yourself - or that you are a failure or have let yourself or your family down Not at all   Trouble concentrating on things such as school, work, reading, or watching TV Not at all   Moving or speaking so slowly that other people could have noticed; or the opposite being so fidgety that others notice Several days   Thoughts of being better off dead, or hurting yourself in some way Not at all   PHQ 9 Score 9   How difficult have these problems made it for you to do your work, take care of your home and get along with others Somewhat difficult       Learning Assessment:  Learning Assessment 1/16/2020   PRIMARY LEARNER Patient   HIGHEST LEVEL OF EDUCATION - PRIMARY LEARNER  GRADUATED HIGH SCHOOL OR GED   BARRIERS PRIMARY LEARNER NONE   CO-LEARNER CAREGIVER No   PRIMARY LANGUAGE ENGLISH   LEARNER PREFERENCE PRIMARY LISTENING   ANSWERED BY self   RELATIONSHIP SELF       Abuse Screening:  Abuse Screening Questionnaire 1/16/2020   Do you ever feel afraid of your partner? N   Are you in a relationship with someone who physically or mentally threatens you? N   Is it safe for you to go home?  Y       Fall Screening  Fall Risk Assessment, last 12 mths 1/16/2020   Able to walk? Yes   Fall in past 12 months? No   Fall with injury? -   Number of falls in past 12 months -   Fall Risk Score -       Generalized Anxiety  No flowsheet data found. Health Maintenance Due   Topic Date Due    Shingrix Vaccine Age 49> (1 of 2) 03/26/1991    GLAUCOMA SCREENING Q2Y  11/02/2018   . Health Maintenance reviewed and discussed and ordered per Provider. Ari Rosas is updated on all     Coordination of Care  1. Have you been to the ER, urgent care clinic since your last visit? Hospitalized since your last visit? no    2. Have you seen or consulted any other health care providers outside of the [address] [place] [address] since your last visit? Include any pap smears or colon screening. no      Advance Directive:  1. Do you have an advance directive in place? Patient Reply:no    2. If not, would you like material regarding how to put one in place?  Patient Reply: no

## 2020-02-01 ENCOUNTER — HOSPITAL ENCOUNTER (OUTPATIENT)
Dept: LAB | Age: 80
Discharge: HOME | End: 2020-02-01
Payer: MEDICARE

## 2020-02-01 DIAGNOSIS — I10 ESSENTIAL HYPERTENSION: ICD-10-CM

## 2020-02-01 DIAGNOSIS — I25.119 ATHEROSCLEROSIS OF NATIVE CORONARY ARTERY WITH ANGINA PECTORIS, UNSPECIFIED WHETHER NATIVE OR TRANSPLANTED HEART (HCC): ICD-10-CM

## 2020-02-01 DIAGNOSIS — E78.5 HYPERLIPIDEMIA, UNSPECIFIED HYPERLIPIDEMIA TYPE: ICD-10-CM

## 2020-02-01 LAB
ALBUMIN SERPL-MCNC: 3.6 G/DL (ref 3.4–5)
ALBUMIN/GLOB SERPL: 1.3 {RATIO} (ref 0.8–1.7)
ALP SERPL-CCNC: 90 U/L (ref 45–117)
ALT SERPL-CCNC: 27 U/L (ref 16–61)
ANION GAP SERPL CALC-SCNC: 4 MMOL/L (ref 3–18)
AST SERPL-CCNC: 22 U/L (ref 10–38)
BILIRUB SERPL-MCNC: 1.7 MG/DL (ref 0.2–1)
BUN SERPL-MCNC: 9 MG/DL (ref 7–18)
BUN/CREAT SERPL: 10 (ref 12–20)
CALCIUM SERPL-MCNC: 8.6 MG/DL (ref 8.5–10.1)
CHLORIDE SERPL-SCNC: 112 MMOL/L (ref 100–111)
CHOLEST SERPL-MCNC: 93 MG/DL
CO2 SERPL-SCNC: 28 MMOL/L (ref 21–32)
CREAT SERPL-MCNC: 0.94 MG/DL (ref 0.6–1.3)
GLOBULIN SER CALC-MCNC: 2.7 G/DL (ref 2–4)
GLUCOSE SERPL-MCNC: 114 MG/DL (ref 74–99)
HDLC SERPL-MCNC: 47 MG/DL (ref 40–60)
HDLC SERPL: 2 {RATIO} (ref 0–5)
LDLC SERPL CALC-MCNC: 36.2 MG/DL (ref 0–100)
LIPID PROFILE,FLP: NORMAL
POTASSIUM SERPL-SCNC: 4.1 MMOL/L (ref 3.5–5.5)
PROT SERPL-MCNC: 6.3 G/DL (ref 6.4–8.2)
SODIUM SERPL-SCNC: 144 MMOL/L (ref 136–145)
TRIGL SERPL-MCNC: 49 MG/DL (ref ?–150)
VLDLC SERPL CALC-MCNC: 9.8 MG/DL

## 2020-02-01 PROCEDURE — 80061 LIPID PANEL: CPT

## 2020-02-01 PROCEDURE — 36415 COLL VENOUS BLD VENIPUNCTURE: CPT

## 2020-02-01 PROCEDURE — 80053 COMPREHEN METABOLIC PANEL: CPT

## 2020-04-28 ENCOUNTER — VIRTUAL VISIT (OUTPATIENT)
Dept: CARDIOLOGY CLINIC | Age: 80
End: 2020-04-28

## 2020-04-28 VITALS
HEART RATE: 66 BPM | WEIGHT: 188 LBS | DIASTOLIC BLOOD PRESSURE: 74 MMHG | BODY MASS INDEX: 25.47 KG/M2 | SYSTOLIC BLOOD PRESSURE: 134 MMHG | HEIGHT: 72 IN

## 2020-04-28 DIAGNOSIS — I65.23 BILATERAL CAROTID ARTERY STENOSIS: ICD-10-CM

## 2020-04-28 DIAGNOSIS — I10 ESSENTIAL HYPERTENSION: ICD-10-CM

## 2020-04-28 DIAGNOSIS — I25.10 ATHEROSCLEROSIS OF NATIVE CORONARY ARTERY OF NATIVE HEART WITHOUT ANGINA PECTORIS: Primary | ICD-10-CM

## 2020-04-28 DIAGNOSIS — E78.2 MIXED HYPERLIPIDEMIA: ICD-10-CM

## 2020-04-28 NOTE — PROGRESS NOTES
Palmira Zuluaga is a 79 y.o. male who was seen by synchronous (real-time) audio-video technology on 4/28/2020. Consent: Palmira Zuluaga, who was seen by synchronous (real-time) audio-video technology, and/or his healthcare decision maker, is aware that this patient-initiated, Telehealth encounter on 4/28/2020 is a billable service, with coverage as determined by his insurance carrier. He is aware that he may receive a bill and has provided verbal consent to proceed: Yes. Assessment & Plan:   Diagnoses and all orders for this visit:    1. Atherosclerosis of native coronary artery of native heart without angina pectoris    2. Mixed hyperlipidemia    3. Essential hypertension    4. Bilateral carotid artery stenosis    Asymptomatic coronary artery disease. Patient has moderate 2 vessel disease last assessed by cardiac catheterization in 2009, involving nonobstructive lesions in the proximal left circumflex in the right-sided PDA. He does have a history of a non-ST elevation myocardial infarction back in 2009, for which he was briefly treated with Plavix. He is now maintained on aspirin and a statin, and he has been intolerant to beta blockers because of bradycardia. He last underwent a negative pharmacologic nuclear stress test in November 2019 which was a normal low risk study. No new symptoms concerning for angina. I will continue his current medical regimen. Essential hypertension. Patient blood pressure remains well controlled on his current regimen, all of which I would continue.     Dyslipidemia: Patient remains on a potent statin, Zetia and Niaspan. His most recent lipid panel from February 2020 was excellent: Total cholesterol 93, HDL 47, LDL 36. I would continue this regimen.     Carotid artery disease. Moderate left ICA disease unchanged on a recent follow-up carotid duplex scan in November 2019.   This can be reevaluated every other year.       Followup in 6 months time, sooner if needed         I spent at least 23 minutes on this visit with this established patient. [phone]  Subjective:   Norma Miller is a 79 y.o. male who was seen for Follow-up (6 month follow up)    Patient presents for a scheduled virtual visit. He has a known history of coronary artery disease, status post a non-ST elevation myocardial infarction in 2009. He underwent a cardiac catheterization at that time and was found to have moderate, but nonobstructive two-vessel coronary disease involving his left circumflex and a right-sided posterior descending artery, that were assessed by pressure wire. The patient has been maintained on medical therapy since that time. The patient last underwent a pharmacological nuclear stress test in December 2015, which was a normal study, showing no ischemia, normal left ventricle ejection fraction. EF 67%. He has a history of mild to moderate carotid artery disease,  last evaluated with a carotid duplex in November 2019 which demonstrated moderate carotid disease of the left ICA and mild plaquing of the right. This was unchanged compared to his prior study. Patient also underwent a pharmacological nuclear stress test November 2019 which was a low risk study. Ejection fraction 56%, no evidence of ischemia or infarction. He was last seen in our office 6 months ago. Since last visit he has been doing quite well. He cannot get to the gym due to the pandemic, but is trying to walk most days 30 to 45 minutes at a brisk pace. He denies any change in his activity tolerance. No new chest pain or shortness of breath. No leg swelling. He has lost about 6 to 7 pounds in weight over the past 6 months.     Past Medical History:   Diagnosis Date    AAA (abdominal aortic aneurysm) ([facility] 03/2010    noted on CT (abdomen)    Abnormal LFT's 12/95, 04/16/03    Acute meniscal tear, lateral 01/2007    s/p arthorscopic surg (Dr. Gledny Felder)    Atypical chest pain 02/04/09    CAD (coronary artery disease), native coronary artery     Cardiac cath 04/13/2009    dLM 30%. mLAD 30%. oD2 30%. pCX 55% (FFR 0.95). mRCA 40%. RPDA 65% (FFR 0.86). EF 65%.  Cardiac echocardiogram 04/03/2007    EF 60%. No RWMA. Gr 1 DDfx. LAE. No significant valvular heart disease.  Cardiac nuclear imaging test, low risk 12/04/2015    Low risk. No ischemia or prior infarction. No WMA. EF 67%. Neg EKG on pharm stress test.    Cardiovascular aorto-iliac duplex 06/29/2015    AAA measures 3.2 x 3.1 cm Trv. (Prev 3.2 x 3.4 cm on 6/19/14). Aneurysm is infrarenal & fusiform w/complex intraluminal thrombus within. Mod bilateral common iliac stenosis.  Carotid duplex 03/16/2016    Mild < 50% BETHEL stenosis. Mod 36-04% LICA stenosis. Similar to study of 6/29/15.  Cholecystitis chronic 03/2008    s/p sue    Diverticulosis     Diverticulosis 9-28-15    DR. 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, left knee replacement    Glaucoma     Left eye    H/O: rotator cuff tear 09/25/08    History of colon polyps     Hypercholesterolemia     Hypertension 2009    Non-STEMI (non-ST elevated myocardial infarction) ([facility] [address] 04/13/2009    peripatellar fibrosis, left knee replacement    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 Authorizing Provider   ezetimibe (ZETIA) 10 mg tablet Take 1 Tab by mouth daily.  12/2/19  Yes Justin Hendrickson MD   sildenafil citrate (VIAGRA) 100 mg tablet Take 1 Tab by mouth as needed (1 tablet by mouth once daily as needed). 11/4/19  Yes Caty Turner MD   pregabalin (LYRICA) 75 mg capsule Take 75 mg by mouth two (2) times a day. Yes Provider, Historical   niacin (NIASPAN) 1,000 mg Tb24 tab Take 1 Tab by mouth daily. 7/2/19  Yes Oc Prater MD   rosuvastatin (CRESTOR) 20 mg tablet Take 1 Tab by mouth nightly. 7/2/19  Yes Oc Prater MD   nitroglycerin (NITROSTAT) 0.4 mg SL tablet 1 Tab by SubLINGual route every five (5) minutes as needed for Chest Pain. 6/12/19  Yes Kaykay Price MD   amLODIPine (NORVASC) 10 mg tablet TAKE 1 TABLET BY MOUTH DAILY 6/3/19  Yes Oc Prater MD   aspirin delayed-release (ADULT LOW DOSE ASPIRIN) 81 mg tablet Take 81 mg by mouth daily. Yes Provider, Historical   brinzolamide-brimonidine (SIMBRINZA) 1-0.2 % drps Apply 1 Drop to eye two (2) times a day. Yes Provider, Historical   coenzyme q10 (CO Q-10) 10 mg cap Take 1 Tab by mouth daily. Yes Provider, Historical   cholecalciferol, vitamin D3, (VITAMIN D3) 2,000 unit tab Take 1 Tab by mouth daily. Yes Provider, Historical   omega 3-dha-epa-fish oil 100-160-1,000 mg cap Take 1,000 mg by mouth daily. 4/11/14  Yes Caty Turner MD     Allergies   Allergen Reactions    Pollen Extracts Sneezing     Itchy eyes, runny nose           Review of Systems   Constitutional: Negative for chills, fever and weight loss. HENT: Negative for nosebleeds. Eyes: Negative for blurred vision and double vision. Respiratory: Negative for cough, shortness of breath and wheezing. Cardiovascular: Negative for chest pain, palpitations, orthopnea, claudication, leg swelling and PND. Gastrointestinal: Negative for abdominal pain, heartburn, nausea and vomiting. Genitourinary: Negative for dysuria and hematuria. Musculoskeletal: Negative for falls and myalgias. Skin: Negative for rash. Neurological: Negative for dizziness, focal weakness and headaches. Endo/Heme/Allergies: Does not bruise/bleed easily. Psychiatric/Behavioral: Negative for substance abuse. Objective:     Visit Vitals  /74 (BP 1 Location: Left arm, BP Patient Position: Sitting)   Pulse 66   Ht 6' (1.829 m)   Wt 85.3 kg (188 lb)   BMI 25.50 kg/m²      General: alert, cooperative, no distress   Mental  status: normal mood, behavior, speech, dress, motor activity, and thought processes, able to follow commands   HENT: NCAT   Neck: no visualized mass   Resp: no respiratory distress   Neuro: no gross deficits   Skin: no discoloration or lesions of concern on visible areas   Psychiatric: normal affect, consistent with stated mood, no evidence of hallucinations     Additional exam findings: We discussed the expected course, resolution and complications of the diagnosis(es) in detail. Medication risks, benefits, costs, interactions, and alternatives were discussed as indicated. I advised him to contact the office if his condition worsens, changes or fails to improve as anticipated. He expressed understanding with the diagnosis(es) and plan. Hamida Gordillo is a 79 y.o. male who was evaluated by a video visit encounter for concerns as above. Patient identification was verified prior to start of the visit. A caregiver was present when appropriate. Due to this being a TeleHealth encounter (During BRZGE-14 public health emergency), evaluation of the following organ systems was limited: Vitals/Constitutional/EENT/Resp/CV/GI//MS/Neuro/Skin/Heme-Lymph-Imm. Pursuant to the emergency declaration under the [facility] waiver authority and the [employer] Act, this Virtual  Visit was conducted, with patient's (and/or legal guardian's) consent, to reduce the patient's risk of exposure to COVID-19 and provide necessary medical care.      Services were provided through a video synchronous discussion virtually to substitute for in-person clinic visit. Patient was located at his home and provider was in the office.       Shashank Devine MD

## 2020-04-28 NOTE — PROGRESS NOTES
Deidre Reddy presents today for   Chief Complaint   Patient presents with    Follow-up     6 month follow up       Deidre Reddy preferred language for health care discussion is english/other. Is someone accompanying this pt? Virtual Visit    Is the patient using any DME equipment during OV? Virtual Visit    Depression Screening:  3 most recent PHQ Screens 4/28/2020   PHQ Not Done -   Little interest or pleasure in doing things Not at all   Feeling down, depressed, irritable, or hopeless Not at all   Total Score PHQ 2 0   Trouble falling or staying asleep, or sleeping too much -   Feeling tired or having little energy -   Poor appetite, weight loss, or overeating -   Feeling bad about yourself - or that you are a failure or have let yourself or your family down -   Trouble concentrating on things such as school, work, reading, or watching TV -   Moving or speaking so slowly that other people could have noticed; or the opposite being so fidgety that others notice -   Thoughts of being better off dead, or hurting yourself in some way -   PHQ 9 Score -   How difficult have these problems made it for you to do your work, take care of your home and get along with others -       Learning Assessment:  Learning Assessment 1/16/2020   PRIMARY LEARNER Patient   HIGHEST LEVEL OF EDUCATION - PRIMARY LEARNER  GRADUATED HIGH SCHOOL OR GED   BARRIERS PRIMARY LEARNER NONE   CO-LEARNER CAREGIVER No   PRIMARY LANGUAGE ENGLISH   LEARNER PREFERENCE PRIMARY LISTENING   ANSWERED BY self   RELATIONSHIP SELF       Abuse Screening:  Abuse Screening Questionnaire 4/28/2020   Do you ever feel afraid of your partner? N   Are you in a relationship with someone who physically or mentally threatens you? N   Is it safe for you to go home? Y       Fall Risk  Fall Risk Assessment, last 12 mths 4/28/2020   Able to walk? Yes   Fall in past 12 months?  No   Fall with injury? -   Number of falls in past 12 months -   Fall Risk Score - Pt currently taking Anticoagulant therapy? ASA 81 mg once a day    Coordination of Care:  1. Have you been to the ER, urgent care clinic since your last visit? Hospitalized since your last visit? No    2. Have you seen or consulted any other health care providers outside of the [address] [place] [address] since your last visit? Include any pap smears or colon screening.  no

## 2020-06-02 ENCOUNTER — VIRTUAL VISIT (OUTPATIENT)
Dept: FAMILY MEDICINE CLINIC | Age: 80
End: 2020-06-02

## 2020-06-02 DIAGNOSIS — E78.5 HYPERLIPIDEMIA, UNSPECIFIED HYPERLIPIDEMIA TYPE: Primary | ICD-10-CM

## 2020-06-02 DIAGNOSIS — I10 ESSENTIAL HYPERTENSION: ICD-10-CM

## 2020-06-02 RX ORDER — NIACIN 500 MG/1
500 TABLET, EXTENDED RELEASE ORAL DAILY
Qty: 90 TAB | Refills: 4 | Status: SHIPPED | OUTPATIENT
Start: 2020-06-02 | End: 2021-08-17 | Stop reason: SDUPTHER

## 2020-06-02 NOTE — PROGRESS NOTES
Palmira Zuluaga presents today for   Chief Complaint   Patient presents with    Hypertension     Follow up    Cholesterol Problem     Follow up       Palmira Zuluaga preferred language for health care discussion is english/other. Is someone accompanying this pt? No    Is the patient using any DME equipment during OV? No    Depression Screening:  3 most recent PHQ Screens 4/28/2020   PHQ Not Done -   Little interest or pleasure in doing things Not at all   Feeling down, depressed, irritable, or hopeless Not at all   Total Score PHQ 2 0   Trouble falling or staying asleep, or sleeping too much -   Feeling tired or having little energy -   Poor appetite, weight loss, or overeating -   Feeling bad about yourself - or that you are a failure or have let yourself or your family down -   Trouble concentrating on things such as school, work, reading, or watching TV -   Moving or speaking so slowly that other people could have noticed; or the opposite being so fidgety that others notice -   Thoughts of being better off dead, or hurting yourself in some way -   PHQ 9 Score -   How difficult have these problems made it for you to do your work, take care of your home and get along with others -       Learning Assessment:  Learning Assessment 1/16/2020   PRIMARY LEARNER Patient   HIGHEST LEVEL OF EDUCATION - PRIMARY LEARNER  GRADUATED HIGH SCHOOL OR GED   BARRIERS PRIMARY LEARNER NONE   CO-LEARNER CAREGIVER No   PRIMARY LANGUAGE ENGLISH   LEARNER PREFERENCE PRIMARY LISTENING   ANSWERED BY self   RELATIONSHIP SELF       Abuse Screening:  Abuse Screening Questionnaire 6/2/2020   Do you ever feel afraid of your partner? N   Are you in a relationship with someone who physically or mentally threatens you? N   Is it safe for you to go home? Y       Fall Risk  Fall Risk Assessment, last 12 mths 4/28/2020   Able to walk? Yes   Fall in past 12 months?  No   Fall with injury? -   Number of falls in past 12 months -   Fall Risk Score -         Coordination of Care:  1. Have you been to the ER, urgent care clinic since your last visit? Hospitalized since your last visit? Pt states that he went to a [facility] about 2 months ago related to cold symptoms. 2. Have you seen or consulted any other health care providers outside of the [address] [place] [address] since your last visit? Include any pap smears or colon screening. No  Advance Directive:  1. Do you have an advance directive in place? Patient Reply: No    2. If not, would you like material regarding how to put one in place?  Patient Reply: No

## 2020-06-02 NOTE — PROGRESS NOTES
Roe Zamarripa is a 79 y.o. male evaluated via audio only technology on 6/2/2020. Consent: He and/or his health care decision maker is aware that he may receive a bill for this audio only encounter, depending on his insurance coverage, and has provided verbal consent to proceed: Yes    I communicated with the patient and/or health care decision maker about the nature and details of the following:  Assessment & Plan:   Diagnoses and all orders for this visit:    1. Hyperlipidemia, unspecified hyperlipidemia type  -     METABOLIC PANEL, COMPREHENSIVE; Future  -     LIPID PANEL; Future  -     niacin ER (NIASPAN) 500 mg tablet; Take 1 Tab by mouth daily. Will decrease one med; cont others as previously rx'ed. Recheck in 3 months. If not well controlled, will resume 1000mg daily. 2. Essential hypertension  -     METABOLIC PANEL, COMPREHENSIVE; Future  -     LIPID PANEL; Future  Stable, cont pres tx plan. Follow-up and Dispositions    · Return in about 3 months (around 9/2/2020) for high blood pressure, high cholesterol, lab review. 12  Subjective:   Roe Zamarripa is a 79 y.o. male who was seen for Hypertension (Follow up) and Cholesterol Problem (Follow up)    Unable to connect via doxy. Pt reports that he has been doing well lately. Pt is walking 3 days per week. Home bp readings are normotensive. Pt's ataxia is lessened by regular exercise. Pt wonders if he needs to cont with all 3 meds for his lipids. Prior to Admission medications    Medication Sig Start Date End Date Taking? Authorizing Provider   niacin ER (NIASPAN) 500 mg tablet Take 1 Tab by mouth daily. 6/2/20  Yes Lani Duran MD   ezetimibe (ZETIA) 10 mg tablet Take 1 Tab by mouth daily. 12/2/19  Yes Lani Duran MD   sildenafil citrate (VIAGRA) 100 mg tablet Take 1 Tab by mouth as needed (1 tablet by mouth once daily as needed).  11/4/19  Yes Danish Moran MD   pregabalin (LYRICA) 75 mg capsule Take 75 mg by mouth two (2) times a day. Yes Provider, Historical   rosuvastatin (CRESTOR) 20 mg tablet Take 1 Tab by mouth nightly. 7/2/19  Yes Thomas Humphrey MD   nitroglycerin (NITROSTAT) 0.4 mg SL tablet 1 Tab by SubLINGual route every five (5) minutes as needed for Chest Pain. 6/12/19  Yes Jamie Price MD   amLODIPine (NORVASC) 10 mg tablet TAKE 1 TABLET BY MOUTH DAILY 6/3/19  Yes Thomas Humphrey MD   aspirin delayed-release (ADULT LOW DOSE ASPIRIN) 81 mg tablet Take 81 mg by mouth daily. Yes Provider, Historical   brinzolamide-brimonidine (SIMBRINZA) 1-0.2 % drps Apply 1 Drop to eye two (2) times a day. Yes Provider, Historical   coenzyme q10 (CO Q-10) 10 mg cap Take 1 Tab by mouth daily. Yes Provider, Historical   cholecalciferol, vitamin D3, (VITAMIN D3) 2,000 unit tab Take 1 Tab by mouth daily. Yes Provider, Historical   omega 3-dha-epa-fish oil 100-160-1,000 mg cap Take 1,000 mg by mouth daily.  4/11/14  Yes Rey Moore MD     Allergies   Allergen Reactions    Pollen Extracts Sneezing     Itchy eyes, runny nose       Patient Active Problem List   Diagnosis Code    Hyperlipidemia E78.5    Coronary atherosclerosis of native coronary artery I25.10    AAA (abdominal aortic aneurysm) ([facility] I71.4    Right knee pain M25.561    Abnormal LFTs (liver function tests) R94.5    Vitamin D deficiency E55.9    CLARKE (dyspnea on exertion) R06.00    Essential hypertension I10    Hyperglycemia R73.9    Tinea versicolor B36.0    S/P total knee replacement Z96.659    Fatty liver K76.0    PVC's (premature ventricular contractions) I49.3    Carotid artery disease (HCC) I77.9    Right shoulder pain M25.511    Acute back pain M54.9    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 8XXA    Solar purpura ([facility] [address] [facility] D69.2    Primary osteoarthritis of right knee M17.11     Current Outpatient Medications   Medication Sig Dispense Refill    niacin ER (NIASPAN) 500 mg tablet Take 1 Tab by mouth daily. 90 Tab 4    ezetimibe (ZETIA) 10 mg tablet Take 1 Tab by mouth daily. 90 Tab 4    sildenafil citrate (VIAGRA) 100 mg tablet Take 1 Tab by mouth as needed (1 tablet by mouth once daily as needed). 20 Tab 3    pregabalin (LYRICA) 75 mg capsule Take 75 mg by mouth two (2) times a day.  rosuvastatin (CRESTOR) 20 mg tablet Take 1 Tab by mouth nightly. 90 Tab 3    nitroglycerin (NITROSTAT) 0.4 mg SL tablet 1 Tab by SubLINGual route every five (5) minutes as needed for Chest Pain. 3 Bottle 4    amLODIPine (NORVASC) 10 mg tablet TAKE 1 TABLET BY MOUTH DAILY 90 Tab 3    aspirin delayed-release (ADULT LOW DOSE ASPIRIN) 81 mg tablet Take 81 mg by mouth daily.  brinzolamide-brimonidine (SIMBRINZA) 1-0.2 % drps Apply 1 Drop to eye two (2) times a day.  coenzyme q10 (CO Q-10) 10 mg cap Take 1 Tab by mouth daily.  cholecalciferol, vitamin D3, (VITAMIN D3) 2,000 unit tab Take 1 Tab by mouth daily.  omega 3-dha-epa-fish oil 100-160-1,000 mg cap Take 1,000 mg by mouth daily. 90 Cap 3     Allergies   Allergen Reactions    Pollen Extracts Sneezing     Itchy eyes, runny nose     Past Medical History:   Diagnosis Date    AAA (abdominal aortic aneurysm) ([facility] [address] 03/2010    noted on CT (abdomen)    Abnormal LFT's 12/95, 04/16/03    Acute meniscal tear, lateral 01/2007    s/p arthorscopic surg (Dr. Heidi Alberts)    Atypical chest pain 02/04/09    CAD (coronary artery disease), native coronary artery     Cardiac cath 04/13/2009    dLM 30%. mLAD 30%. oD2 30%. pCX 55% (FFR 0.95). mRCA 40%. RPDA 65% (FFR 0.86). EF 65%.  Cardiac echocardiogram 04/03/2007    EF 60%. No RWMA. Gr 1 DDfx. LAE.  No significant valvular heart disease.  Cardiac nuclear imaging test, low risk 12/04/2015    Low risk. No ischemia or prior infarction. No WMA. EF 67%. Neg EKG on pharm stress test.    Cardiovascular aorto-iliac duplex 06/29/2015    AAA measures 3.2 x 3.1 cm Trv. (Prev 3.2 x 3.4 cm on 6/19/14). Aneurysm is infrarenal & fusiform w/complex intraluminal thrombus within. Mod bilateral common iliac stenosis.  Carotid duplex 03/16/2016    Mild < 50% BETHEL stenosis. Mod 02-39% LICA stenosis. Similar to study of 6/29/15.  Cholecystitis chronic 03/2008    s/p sue    Diverticulosis     Diverticulosis 9-28-15    DR. 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    Plantar fasciitis     Rhinitis     Right knee pain     Right shoulder pain     Solar purpura ([address]     Tinea versicolor 7/23/2012    2.8 x 2.9cm infra-renal    Tubular adenoma 9-28-15    Vitamin D deficiency 4/13/2011     Past Surgical History:   Procedure Laterality Date    ENDOSCOPY, COLON, DIAGNOSTIC      normal per patient; Edilberto Vidalesela    HX ARTHROPLASTY  04/05/10    [place]-left knee    HX CATARACT REMOVAL Bilateral     November 2014    HX CHOLECYSTECTOMY  03/2008    chronic cholecystitis    HX COLONOSCOPY  9-28-15    HX HERNIA REPAIR  1995    L inguinal    HX KNEE ARTHROSCOPY  01/24/07    left knee    HX KNEE ARTHROSCOPY Left 4/16/2014    peripatellar debridement  HX KNEE REPLACEMENT Left 2013    [place]    HX MOHS PROCEDURES  2008    HX RHINOPLASTY  1983    HX TONSILLECTOMY      HX VASECTOMY  1977    HX WISDOM TEETH EXTRACTION      x4    NE ANESTH,SURGERY OF SHOULDER       Family History   Problem Relation Age of Onset    Heart Disease Mother     Stroke Father     No Known Problems Sister     No Known Problems Maternal Grandmother     No Known Problems Maternal Grandfather     Dementia Paternal Grandmother     No Known Problems Paternal Grandfather     Cancer Brother         lung CA     Social History     Tobacco Use    Smoking status: Former Smoker     Packs/day: 0.50     Years: 20.00     Pack years: 10.00     Types: Cigarettes     Last attempt to quit: 12/10/1995     Years since quittin.4    Smokeless tobacco: Never Used   Substance Use Topics    Alcohol use: Yes     Alcohol/week: 6.7 standard drinks     Types: 7 Glasses of wine, 1 Shots of liquor per week     Comment: social       Review of Systems   Constitutional: Negative. HENT: Negative. Respiratory: Negative. Cardiovascular: Negative. All other systems reviewed and are negative. I affirm this is a Patient-Initiated Episode with a Patient who has not had a related appointment within my department in the past 7 days or scheduled within the next 24 hours.     Total Time: minutes: 11-20 minutes    Note: not billable if this call serves to triage the patient into an appointment for the relevant concern      Zoey Lopez MD

## 2020-06-22 DIAGNOSIS — I10 ESSENTIAL HYPERTENSION: ICD-10-CM

## 2020-06-22 NOTE — TELEPHONE ENCOUNTER
Pt called requesting refill for medication .   Requested Prescriptions     Pending Prescriptions Disp Refills    amLODIPine (NORVASC) 10 mg tablet 90 Tab 3     Sig: TAKE 1 TABLET BY MOUTH DAILY

## 2020-06-25 RX ORDER — AMLODIPINE BESYLATE 10 MG/1
TABLET ORAL
Qty: 90 TAB | Refills: 3 | Status: SHIPPED | OUTPATIENT
Start: 2020-06-25 | End: 2021-08-17 | Stop reason: SDUPTHER

## 2020-08-14 ENCOUNTER — OFFICE VISIT (OUTPATIENT)
Dept: ORTHOPEDIC SURGERY | Facility: CLINIC | Age: 80
End: 2020-08-14

## 2020-08-14 VITALS
HEIGHT: 72 IN | OXYGEN SATURATION: 97 % | WEIGHT: 193.4 LBS | SYSTOLIC BLOOD PRESSURE: 145 MMHG | BODY MASS INDEX: 26.19 KG/M2 | TEMPERATURE: 97.2 F | DIASTOLIC BLOOD PRESSURE: 71 MMHG | HEART RATE: 58 BPM

## 2020-08-14 DIAGNOSIS — Z96.651 S/P TKR (TOTAL KNEE REPLACEMENT), RIGHT: Primary | ICD-10-CM

## 2020-08-14 DIAGNOSIS — M19.012 PRIMARY OSTEOARTHRITIS, LEFT SHOULDER: ICD-10-CM

## 2020-08-14 DIAGNOSIS — M70.51 PES ANSERINUS BURSITIS OF RIGHT KNEE: ICD-10-CM

## 2020-08-14 DIAGNOSIS — G89.29 CHRONIC LEFT SHOULDER PAIN: ICD-10-CM

## 2020-08-14 DIAGNOSIS — M25.512 CHRONIC LEFT SHOULDER PAIN: ICD-10-CM

## 2020-08-14 RX ORDER — DICLOFENAC SODIUM 10 MG/G
4 GEL TOPICAL 4 TIMES DAILY
Qty: 5 EACH | Refills: 5 | Status: SHIPPED | OUTPATIENT
Start: 2020-08-14 | End: 2021-12-16

## 2020-08-14 RX ORDER — DICLOFENAC SODIUM 10 MG/G
4 GEL TOPICAL 4 TIMES DAILY
Qty: 5 EACH | Refills: 5 | Status: SHIPPED | OUTPATIENT
Start: 2020-08-14 | End: 2020-08-14 | Stop reason: SDUPTHER

## 2020-08-14 RX ORDER — BETAMETHASONE SODIUM PHOSPHATE AND BETAMETHASONE ACETATE 3; 3 MG/ML; MG/ML
6 INJECTION, SUSPENSION INTRA-ARTICULAR; INTRALESIONAL; INTRAMUSCULAR; SOFT TISSUE ONCE
Qty: 0.5 ML | Refills: 0
Start: 2020-08-14 | End: 2020-08-14

## 2020-08-14 NOTE — PROGRESS NOTES
Patient: Brandi Browning                MRN: [id number]       SSN: [id number]  YOB: 1940        AGE: 79 y.o. SEX: male     PCP: Juli Lesch, MD  08/14/20    Chief Complaint   Patient presents with    Shoulder Pain     Left    Knee Pain     Right     HISTORY:  Brandi Browning is a 79 y.o. male who is seen for right knee and left shoulder pain. He is s/p right TKR on 11/20/18 by [facility]. He notes cracking and popping in his right when when rising from chairs. 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 He is S/P [employer] revision to left TKR 1/29/13--doing well . He is also seen for  left shoulder pain. He also has stiffness and pain in his left shoulder. He reports significant relief from previous left shoulder injections. He has pain when lifting his shoulder overhead. He experiences cracking and popping. He has been seen at the [address] [place] [address] in the past.    Pain Assessment  8/14/2020   Location of Pain Knee; Shoulder   Pain Location Comment -   Location Modifiers Left;Right   Severity of Pain 8   Quality of Pain Sharp   Quality of Pain Comment -   Duration of Pain A few minutes   Frequency of Pain Intermittent   Aggravating Factors Other (Comment)   Aggravating Factors Comment moving shoulder in certain position. knee pain when getting up sitting position   Limiting Behavior Some   Relieving Factors Nothing   Relieving Factors Comment -   Result of Injury No   Work-Related Injury -   Type of Injury -   Type of Injury Comment -     Occupation, etc:  Mr. Lei Mederos is retired. He was previously the  of [employer] and an avid member of the [employer]. His wife passed away January 2019 from breast cancer. He lost his mother, mother-in-law, sister-in-law, and brother all this year. He has no longer been able to exercise on the track. He is going back to [address] [place] [address] for three weeks on Monday.  He travelled to [place] to visit his son recently. He notes that he used to live in [facility] when he was in [facility] previously. He goes to the  to do yoga and works out with an . He grew up in [place]. He recently lost 11 pounds. His current weight is 193 lbs. Last 3 Recorded Weights in this Encounter    08/14/20 1100   Weight: 193 lb 6.4 oz (87.7 kg)     Body mass index is 26.23 kg/m². Patient Active Problem List   Diagnosis Code    Hyperlipidemia E78.5    Coronary atherosclerosis of native coronary artery I25.10    AAA (abdominal aortic aneurysm) ([facility] [address] I71.4    Right knee pain M25.561    Abnormal LFTs (liver function tests) R94.5    Vitamin D deficiency E55.9    CLARKE (dyspnea on exertion) R06.00    Essential hypertension I10    Hyperglycemia R73.9    Tinea versicolor B36.0    S/P total knee replacement Z96.659    Fatty liver K76.0    PVC's (premature ventricular contractions) I49.3    Carotid artery disease (HCC) I77.9    Right shoulder pain M25.511    Acute back pain M54.9    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.816    Lumbar neuritis M54.16    DDD (degenerative disc disease), lumbar M51.36    Radiculopathy, lumbar region M54.16    Degeneration of intervertebral disc of lumbar region M51.36    Mixed hyperlipidemia E78.2    ACP (advance care planning) Z71.89    Purpura ([facility] [facility] D69.2    Bruising T14. 8XXA    Solar purpura ([facility] [address] [facility] D69.2    Primary osteoarthritis of right knee M17.11     REVIEW OF SYSTEMS: All Below are Negative except: See HPI   Constitutional: negative for fever, chills, and weight loss. Cardiovascular: negative for chest pain, claudication, leg swelling, SOB, CLARKE   Gastrointestinal: Negative for pain, N/V/C/D, Blood in stool or urine, dysuria,  hematuria, incontinence, pelvic pain. Musculoskeletal: See HPI   Neurological: Negative for dizziness and weakness.    Negative for headaches, Visual changes, confusion, seizures   Phychiatric/Behavioral: Negative for depression, memory loss, substance  abuse. Extremities: Negative for hair changes, rash, or skin lesion changes. Hematologic: Negative for bleeding problems, bruising, pallor or swollen lymph  nodes   Peripheral Vascular: No calf pain, no circulation deficits. Social History     Socioeconomic History    Marital status:      Spouse name: Not on file    Number of children: Not on file    Years of education: Not on file    Highest education level: Not on file   Occupational History    Not on file   Social Needs    Financial resource strain: Not on file    Food insecurity     Worry: Not on file     Inability: Not on file    Transportation needs     Medical: Not on file     Non-medical: Not on file   Tobacco Use    Smoking status: Former Smoker     Packs/day: 0.50     Years: 20.00     Pack years: 10.00     Types: Cigarettes     Last attempt to quit: 12/10/1995     Years since quittin.6    Smokeless tobacco: Never Used   Substance and Sexual Activity    Alcohol use:  Yes     Alcohol/week: 6.7 standard drinks     Types: 7 Glasses of wine, 1 Shots of liquor per week     Comment: social    Drug use: No     Types: Prescription, OTC    Sexual activity: Not on file   Lifestyle    Physical activity     Days per week: Not on file     Minutes per session: Not on file    Stress: Not on file   Relationships    Social connections     Talks on phone: Not on file     Gets together: Not on file     Attends Mu-ism service: Not on file     Active member of club or organization: Not on file     Attends meetings of clubs or organizations: Not on file     Relationship status: Not on file    Intimate partner violence     Fear of current or ex partner: Not on file     Emotionally abused: Not on file     Physically abused: Not on file     Forced sexual activity: Not on file   Other Topics Concern    Not on file   Social History Narrative    Not on file Allergies   Allergen Reactions    Pollen Extracts Sneezing     Itchy eyes, runny nose     Current Outpatient Medications   Medication Sig    betamethasone (Celestone Soluspan) 6 mg/mL injection 1 mL by Intra artICUlar route once for 1 dose.  amLODIPine (NORVASC) 10 mg tablet TAKE 1 TABLET BY MOUTH DAILY    niacin ER (NIASPAN) 500 mg tablet Take 1 Tab by mouth daily.  ezetimibe (ZETIA) 10 mg tablet Take 1 Tab by mouth daily.  sildenafil citrate (VIAGRA) 100 mg tablet Take 1 Tab by mouth as needed (1 tablet by mouth once daily as needed).  pregabalin (LYRICA) 75 mg capsule Take 75 mg by mouth two (2) times a day.  rosuvastatin (CRESTOR) 20 mg tablet Take 1 Tab by mouth nightly.  nitroglycerin (NITROSTAT) 0.4 mg SL tablet 1 Tab by SubLINGual route every five (5) minutes as needed for Chest Pain.  aspirin delayed-release (ADULT LOW DOSE ASPIRIN) 81 mg tablet Take 81 mg by mouth daily.  brinzolamide-brimonidine (SIMBRINZA) 1-0.2 % drps Apply 1 Drop to eye two (2) times a day.  coenzyme q10 (CO Q-10) 10 mg cap Take 1 Tab by mouth daily.  cholecalciferol, vitamin D3, (VITAMIN D3) 2,000 unit tab Take 1 Tab by mouth daily.  omega 3-dha-epa-fish oil 100-160-1,000 mg cap Take 1,000 mg by mouth daily. No current facility-administered medications for this visit.       ORTHO EXAMINATION:    Examination Right shoulder Left shoulder   Skin Intact Intact   Effusion - -   Biceps deformity - -   Atrophy - -   AC joint tenderness - -   Acromial tenderness - +   Biceps tenderness - -   Forward flexion/Elevation  170   Active abduction  170   External rotation ROM 40 20   Internal rotation  85   Apprehension - -   Impingement - +   Drop Arm Test - -   Neurovascular Intact Intact   Difficulty removing left arm from shirt  Painful arc of motion beyond 90    Examination Right knee Left knee   Skin Intact, well healed incision site Well healed TKR incision    Range of motion 120-0 110-0   Effusion - -   Medial joint line tenderness + -   Lateral joint line tenderness - -   Popliteal tenderness - -   Osteophytes palpable - -   Missaels - -   Patella crepitus + +   Anterior drawer - -   Lateral laxity - -   Medial laxity - -   Varus deformity - -   Valgus deformity - -   Pretibial edema - -   Calf tenderness - -   He is wearing a Alexandr Copper knee sleeve    TIME OUT:  Chart reviewed for the following:   Maycol Melendrez MD, have reviewed the History, Physical and updated the Allergic reactions for [address] [place] [address],[address] performed immediately prior to start of procedure:  Maycol Melendrez MD, have performed the following reviews on [address] prior to the start of the procedure:          * Patient was identified by name and date of birth   * Agreement on procedure being performed was verified  * Risks and Benefits explained to the patient  * Procedure site verified and marked as necessary  * Patient was positioned for comfort  * Consent was obtained     Time: 11:46 AM     Date of procedure: 8/14/2020  Procedure performed by:  Yuliana Elena MD  Mr. Geremias Shah tolerated the procedure well with no complications. MRI LEFT SHOULDER W CONT 04/27/2018  IMPRESSION:  1. Supraspinatus moderate tendinosis and small focal full-thickness tear  anterior insertion. No muscle atrophy or tendon retraction. 2. Moderate subscapularis tendinosis and partial-thickness tears. Intra-articular biceps tendinosis. 3. Superior labral tear extending posteriorly/SLAP tear type II. Posterior  inferior labral degeneration and tear. 4. Moderate acromioclavicular osteoarthrosis with inflammation. Small  subacromial spur. Subacromial subdeltoid bursitis. RADIOLOGY:  XR RIGHT TKR 8/14/20 [facility]  IMPRESSION:  Two views - No fracture, well-fixed prosthetic components in satisfactory position and alignment.      XR RIGHT KNEE PREOP 10/22/18 [facility]  IMPRESSION:  Three views - No fractures, no effusion, severe/complete medial joint space narrowing, + osteophytes present. IKDC Grade D. Femoral valgus angle 6.1 degrees. Chondrocalcinosis of lateral mensicus. Well fixed left knee implants    XR LEFT SHOULDER 4/12/18 [facility]  IMPRESSION:  Three views - No fractures, no acromioclavicular narrowing, mild glenohumeral narrowing, no calcific densities. XR KNEES [facility] 10/19/17  IMPRESSION:  Moderately severe medial joint space narrowing right knee, well fixed L TKR components    XR LEFT SHOULDER 5/5/16  IMPRESSION:  No fractures, mild acromioclavicular narrowing, no glenohumeral narrowing, no calcific densities. IMPRESSION:      ICD-10-CM ICD-9-CM    1. S/P TKR (total knee replacement), right  Z96.651 V43.65 diclofenac (Voltaren) 1 % gel      DISCONTINUED: diclofenac (Voltaren) 1 % gel   2. Chronic left shoulder pain  M25.512 719.41 betamethasone (Celestone Soluspan) 6 mg/mL injection    G89.29 338.29 BETAMETHASONE ACETATE & SODIUM PHOSPHATE INJECTION 3 MG EA.      DRAIN/INJECT LARGE JOINT/BURSA   3. Primary osteoarthritis, left shoulder  M19.012 715.11 betamethasone (Celestone Soluspan) 6 mg/mL injection      BETAMETHASONE ACETATE & SODIUM PHOSPHATE INJECTION 3 MG EA.      DRAIN/INJECT LARGE JOINT/BURSA   4. Pes anserinus bursitis of right knee  M70.51 726.61 diclofenac (Voltaren) 1 % gel      DISCONTINUED: diclofenac (Voltaren) 1 % gel     PLAN: Voltaren gel Rx provided. After discussing treatment options, patient's left shoulder was injected with 4 cc Marcaine and 1/2 cc Celestone. There is no need for further knee surgery at this time. We discussed a possible need for left shoulder MR arthrogram in the future if pain continues. He will follow up as needed.      Scribed by Juan Owens  ([address]) as dictated by Naren Bland MD

## 2020-08-25 ENCOUNTER — OFFICE VISIT (OUTPATIENT)
Dept: VASCULAR SURGERY | Age: 80
End: 2020-08-25

## 2020-08-25 VITALS
RESPIRATION RATE: 20 BRPM | DIASTOLIC BLOOD PRESSURE: 64 MMHG | OXYGEN SATURATION: 96 % | HEART RATE: 58 BPM | SYSTOLIC BLOOD PRESSURE: 120 MMHG

## 2020-08-25 DIAGNOSIS — I77.9 BILATERAL CAROTID ARTERY DISEASE, UNSPECIFIED TYPE (HCC): Primary | ICD-10-CM

## 2020-08-25 DIAGNOSIS — I71.40 ABDOMINAL AORTIC ANEURYSM (AAA) WITHOUT RUPTURE: Primary | ICD-10-CM

## 2020-08-25 DIAGNOSIS — I77.9 BILATERAL CAROTID ARTERY DISEASE, UNSPECIFIED TYPE (HCC): ICD-10-CM

## 2020-08-25 DIAGNOSIS — I71.40 ABDOMINAL AORTIC ANEURYSM (AAA) WITHOUT RUPTURE: ICD-10-CM

## 2020-08-25 NOTE — PROGRESS NOTES
1. Have you been to the ER, urgent care clinic since your last visit? Hospitalized since your last visit? Yes Reason for visit: [facility] for back pain    2. Have you seen or consulted any other health care providers outside of the [address] [place] [address] since your last visit? Include any pap smears or colon screening.  Yes Reason for visit: pcp, cardiology, dentist, eye doctor       3 most recent [facility] 36 Screens 8/25/2020   PHQ Not Done -   Little interest or pleasure in doing things Not at all   Feeling down, depressed, irritable, or hopeless Not at all   Total Score PHQ 2 0   Trouble falling or staying asleep, or sleeping too much -   Feeling tired or having little energy -   Poor appetite, weight loss, or overeating -   Feeling bad about yourself - or that you are a failure or have let yourself or your family down -   Trouble concentrating on things such as school, work, reading, or watching TV -   Moving or speaking so slowly that other people could have noticed; or the opposite being so fidgety that others notice -   Thoughts of being better off dead, or hurting yourself in some way -   PHQ 9 Score -   How difficult have these problems made it for you to do your work, take care of your home and get along with others -

## 2020-08-25 NOTE — PROGRESS NOTES
[address]    Chief Complaint   Patient presents with    Abdominal Aortic Aneurysm    Carotid Artery Stenosis       History and Physical    Mr Mei Sun is here to follow up on his AAA and carotid stenosis. He had requested baseline screening a number of years ago when it came up that he had relatives getting ready for elective repairs.    He is doing well without complaints   Health is stable, no changes in meds  Denies claudication symptoms    He did lose his wife early 2019 which was since his last visit. Though coping has become easier it is still rough for him many days    Past Medical History:   Diagnosis Date    AAA (abdominal aortic aneurysm) ([address] 03/2010    noted on CT (abdomen)    Abnormal LFT's 12/95, 04/16/03    Acute meniscal tear, lateral 01/2007    s/p arthorscopic surg (Dr. Leslie Centeno)    Atypical chest pain 02/04/09    CAD (coronary artery disease), native coronary artery     Cardiac cath 04/13/2009    dLM 30%. mLAD 30%. oD2 30%. pCX 55% (FFR 0.95). mRCA 40%. RPDA 65% (FFR 0.86). EF 65%.  Cardiac echocardiogram 04/03/2007    EF 60%. No RWMA. Gr 1 DDfx. LAE. No significant valvular heart disease.  Cardiac nuclear imaging test, low risk 12/04/2015    Low risk. No ischemia or prior infarction. No WMA. EF 67%. Neg EKG on pharm stress test.    Cardiovascular aorto-iliac duplex 06/29/2015    AAA measures 3.2 x 3.1 cm Trv. (Prev 3.2 x 3.4 cm on 6/19/14). Aneurysm is infrarenal & fusiform w/complex intraluminal thrombus within. Mod bilateral common iliac stenosis.  Carotid duplex 03/16/2016    Mild < 50% BETHEL stenosis. Mod 51-98% LICA stenosis. Similar to study of 6/29/15.  Cholecystitis chronic 03/2008    s/p sue    Diverticulosis     Diverticulosis 9-28-15    DR. NAPOLEON ALBERTOD (degenerative joint disease) of lumbar spine 12/18/01    CLARKE (Dyspnea on Exertion) 03/2007    echo and stress WNL    ED (erectile dysfunction) 11/11/04    Fatty liver 4/2012 noted on CT (abdomen)    Fibrosis of knee joint March 2014    peripatellar fibrosis, left knee replacement    Glaucoma     Left eye    H/O: rotator cuff tear 09/25/08    History of colon polyps     Hypercholesterolemia     Hypertension 2009    Non-STEMI (non-ST elevated myocardial infarction) ([facility] 04/13/2009    peripatellar fibrosis, left knee replacement    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    Plantar fasciitis     Rhinitis     Right knee pain     Right shoulder pain     Solar purpura (HCC)     Tinea versicolor 7/23/2012    2.8 x 2.9cm infra-renal    Tubular adenoma 9-28-15    Vitamin D deficiency 4/13/2011     Patient Active Problem List   Diagnosis Code    Hyperlipidemia E78.5    Coronary atherosclerosis of native coronary artery I25.10    AAA (abdominal aortic aneurysm) ([facility] I71.4    Right knee pain M25.561    Abnormal LFTs (liver function tests) R94.5    Vitamin D deficiency E55.9    CLARKE (dyspnea on exertion) R06.00    Essential hypertension I10    Hyperglycemia R73.9    Tinea versicolor B36.0    S/P total knee replacement Z96.659    Fatty liver K76.0    PVC's (premature ventricular contractions) I49.3    Carotid artery disease (HCC) I77.9    Right shoulder pain M25.511    Acute back pain M54.9    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.816    Lumbar neuritis M54.16    DDD (degenerative disc disease), lumbar M51.36    Radiculopathy, lumbar region M54.16    Degeneration of intervertebral disc of lumbar region M51.36    Mixed hyperlipidemia E78.2    ACP (advance care planning) Z71.89    Purpura (HCC) D69.2    Bruising T14. 8XXA    Solar purpura ([facility] D69.2    Primary osteoarthritis of right knee M17.11     Past Surgical History:   Procedure Laterality Date    ENDOSCOPY, COLON, DIAGNOSTIC      normal per patient;     HX ARTHROPLASTY  04/05/10    [place]-left knee    HX CATARACT REMOVAL Bilateral     November 2014    HX CHOLECYSTECTOMY  03/2008    chronic cholecystitis    HX COLONOSCOPY  9-28-15    HX HERNIA REPAIR  1995    L inguinal    HX KNEE ARTHROSCOPY  01/24/07    left knee    HX KNEE ARTHROSCOPY Left 4/16/2014    peripatellar debridement    HX KNEE REPLACEMENT Left 01/29/2013    bon secours    HX MOHS PROCEDURES  11/2008    HX RHINOPLASTY  1983    HX TONSILLECTOMY      HX VASECTOMY  1977    HX WISDOM TEETH EXTRACTION      x4    [place] ANESTH,SURGERY OF SHOULDER       Current Outpatient Medications   Medication Sig Dispense Refill    diclofenac (Voltaren) 1 % gel Apply 4 g to affected area four (4) times daily. Right knee 5 Each 5    amLODIPine (NORVASC) 10 mg tablet TAKE 1 TABLET BY MOUTH DAILY 90 Tab 3    niacin ER (NIASPAN) 500 mg tablet Take 1 Tab by mouth daily. 90 Tab 4    ezetimibe (ZETIA) 10 mg tablet Take 1 Tab by mouth daily. 90 Tab 4    sildenafil citrate (VIAGRA) 100 mg tablet Take 1 Tab by mouth as needed (1 tablet by mouth once daily as needed). 20 Tab 3    pregabalin (LYRICA) 75 mg capsule Take 75 mg by mouth two (2) times a day.  rosuvastatin (CRESTOR) 20 mg tablet Take 1 Tab by mouth nightly. 90 Tab 3    nitroglycerin (NITROSTAT) 0.4 mg SL tablet 1 Tab by SubLINGual route every five (5) minutes as needed for Chest Pain. 3 Bottle 4    aspirin delayed-release (ADULT LOW DOSE ASPIRIN) 81 mg tablet Take 81 mg by mouth daily.  brinzolamide-brimonidine (SIMBRINZA) 1-0.2 % drps Apply 1 Drop to eye two (2) times a day.  coenzyme q10 (CO Q-10) 10 mg cap Take 1 Tab by mouth daily.  cholecalciferol, vitamin D3, (VITAMIN D3) 2,000 unit tab Take 1 Tab by mouth daily.  omega 3-dha-epa-fish oil 100-160-1,000 mg cap Take 1,000 mg by mouth daily.  90 Cap 3     Allergies   Allergen Reactions    Pollen Extracts Sneezing     Itchy eyes, runny nose     Physical   Visit Vitals  /64 (BP 1 Location: Right arm, BP Patient Position: Sitting)   Pulse (!) 58   Resp 20   SpO2 96%     General:   Alert, cooperative, no distress.     Head:   Normocephalic, without obvious abnormality, atraumatic.     Eyes:  Conjunctivae/corneas clear. Pupils equal, round, reactive to light. Extraocular movements intact.     Neck:    No JVD   Lungs:    No increased respiratory effort   Extremities:    No significant ankle edema   Pulses:    Pulses are palpable of the lower extremities bilaterally   Skin:    No skin changes or ulcerations     Vascular studies:  Abdominal Aorta     Limited visualization due to bowel gas. A fusiform infrarenal aneurysm was visualized with dimensions 3.44 cm AP x 3.41 cm TR. There is a thrombus present. Aneurysmal dilatation of 2.60 x 2.62 cm Trv noted in the juxtarenal level of the abdominal aorta. Continued evidence of elevated velocities within the bilateral common iliac arteries. Calcific plaquing noted. Renal     Limited visualization due to bowel gas. Bilateral renal arteries patent distally. Mesenteric     Limited visualization due to bowel gas. Celiac artery is not visualized. Inferior mesenteric artery is not visualized. Patent mid SMA. Right Carotid     The right CCA is patent. There is moderate stenosis in the right ICA (50-69%). The right ICA has heterogeneous and diffuse plaque. The right ECA is patent. The right vertebral is antegrade. The right subclavian is normal with triphasic waveforms. Left Carotid     The left CCA is patent. There is moderate stenosis in the left ICA (50-69%). The left ICA has heterogeneous and diffuse plaque. The left ECA is patent. The left vertebral is antegrade. The left subclavian is normal with triphasic waveforms     The exam was compared to the study performed on 11/18/2019. There has been an increase in severity on the right and no significant changes on the left. Impression/Plan:     ICD-10-CM ICD-9-CM    1. Abdominal aortic aneurysm (AAA) without rupture (HCC)  I71.4 441.4 DUPLEX AORTA ILIAC GRAFT COMPLETE   2. Bilateral carotid artery disease, unspecified type (HCC)  I77.9 447.9 DUPLEX CAROTID BILATERAL     No orders of the defined types were placed in this encounter. I did review with him both of the changes, but changes not significant enough that we need to start talking treatment. But rather than doing her normal 2-year follow-up I would suggest that we do yearly surveillance now which he is understanding and agreeable to    RAHAT Ferrer    Portions of this note have been entered using voice recognition software.

## 2020-09-01 ENCOUNTER — VIRTUAL VISIT (OUTPATIENT)
Dept: FAMILY MEDICINE CLINIC | Age: 80
End: 2020-09-01

## 2020-09-01 DIAGNOSIS — I10 ESSENTIAL HYPERTENSION: Primary | ICD-10-CM

## 2020-09-01 DIAGNOSIS — I71.40 ABDOMINAL AORTIC ANEURYSM (AAA) WITHOUT RUPTURE: ICD-10-CM

## 2020-09-01 DIAGNOSIS — E78.5 HYPERLIPIDEMIA, UNSPECIFIED HYPERLIPIDEMIA TYPE: ICD-10-CM

## 2020-09-01 NOTE — PROGRESS NOTES
Felicia Allan is a 79 y.o. male who was seen by synchronous (real-time) audio-video technology on 9/1/2020 for Hypertension; Cholesterol Problem; and Labs        Assessment & Plan:   Diagnoses and all orders for this visit:    1. Essential hypertension  Stable, cont pres tx plan. Labs pending    2. Hyperlipidemia, unspecified hyperlipidemia type  Labs pending    3. Abdominal aortic aneurysm (AAA) without rupture (HCC)  Dx discussed in detail. Care as per vas surg. The complexity of medical decision making for this visit is moderate   Follow-up and Dispositions    · Return in about 3 months (around 12/1/2020) for high blood pressure, high cholesterol. 712  Subjective:   Patient contacted via doxy. me. Pt cont to walk, do yoga, and do exercises with his . Pt did decrease his niacin as we discussed. He will get his labs tomorrow. Pt has seen [facility] surgery. He will have annual f/u with them. Pt saw ortho. He had an injection of the L shoulder; it has helped some. Home bp readings have been normotensive. No new concerns. Prior to Admission medications    Medication Sig Start Date End Date Taking? Authorizing Provider   diclofenac (Voltaren) 1 % gel Apply 4 g to affected area four (4) times daily. Right knee 8/14/20  Yes Michelle Dawkins MD   amLODIPine (NORVASC) 10 mg tablet TAKE 1 TABLET BY MOUTH DAILY 6/25/20  Yes Claire March MD   niacin ER (NIASPAN) 500 mg tablet Take 1 Tab by mouth daily. 6/2/20  Yes Claire March MD   sildenafil citrate (VIAGRA) 100 mg tablet Take 1 Tab by mouth as needed (1 tablet by mouth once daily as needed). 11/4/19  Yes Cinthia Monzon MD   pregabalin (LYRICA) 75 mg capsule Take 75 mg by mouth two (2) times a day. Yes Provider, Historical   rosuvastatin (CRESTOR) 20 mg tablet Take 1 Tab by mouth nightly.  7/2/19  Yes Claire March MD   aspirin delayed-release (ADULT LOW DOSE ASPIRIN) 81 mg tablet Take 81 mg by mouth daily. Yes Provider, Historical   brinzolamide-brimonidine (SIMBRINZA) 1-0.2 % drps Apply 1 Drop to eye two (2) times a day. Yes Provider, Historical   coenzyme q10 (CO Q-10) 10 mg cap Take 1 Tab by mouth daily. Yes Provider, Historical   cholecalciferol, vitamin D3, (VITAMIN D3) 2,000 unit tab Take 1 Tab by mouth daily. Yes Provider, Historical   omega 3-dha-epa-fish oil 100-160-1,000 mg cap Take 1,000 mg by mouth daily. 4/11/14  Yes Matty Coelho MD   ezetimibe (ZETIA) 10 mg tablet Take 1 Tab by mouth daily. 12/2/19   Regino Mccurdy MD   nitroglycerin (NITROSTAT) 0.4 mg SL tablet 1 Tab by SubLINGual route every five (5) minutes as needed for Chest Pain. 6/12/19   Regino Mccurdy MD     Patient Active Problem List   Diagnosis Code    Hyperlipidemia E78.5    Coronary atherosclerosis of native coronary artery I25.10    AAA (abdominal aortic aneurysm) ([facility] I71.4    Right knee pain M25.561    Abnormal LFTs (liver function tests) R94.5    Vitamin D deficiency E55.9    CLARKE (dyspnea on exertion) R06.00    Essential hypertension I10    Hyperglycemia R73.9    Tinea versicolor B36.0    S/P total knee replacement Z96.659    Fatty liver K76.0    PVC's (premature ventricular contractions) I49.3    Carotid artery disease (HCC) I77.9    Right shoulder pain M25.511    Acute back pain M54.9    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 8XXA    Solar purpura ([facility] D69.2    Primary osteoarthritis of right knee M17.11     Current Outpatient Medications   Medication Sig Dispense Refill    diclofenac (Voltaren) 1 % gel Apply 4 g to affected area four (4) times daily.  Right knee 5 Each 5    amLODIPine (NORVASC) 10 mg tablet TAKE 1 TABLET BY MOUTH DAILY 90 Tab 3    niacin ER (NIASPAN) 500 mg tablet Take 1 Tab by mouth daily. 90 Tab 4    sildenafil citrate (VIAGRA) 100 mg tablet Take 1 Tab by mouth as needed (1 tablet by mouth once daily as needed). 20 Tab 3    pregabalin (LYRICA) 75 mg capsule Take 75 mg by mouth two (2) times a day.  rosuvastatin (CRESTOR) 20 mg tablet Take 1 Tab by mouth nightly. 90 Tab 3    aspirin delayed-release (ADULT LOW DOSE ASPIRIN) 81 mg tablet Take 81 mg by mouth daily.  brinzolamide-brimonidine (SIMBRINZA) 1-0.2 % drps Apply 1 Drop to eye two (2) times a day.  coenzyme q10 (CO Q-10) 10 mg cap Take 1 Tab by mouth daily.  cholecalciferol, vitamin D3, (VITAMIN D3) 2,000 unit tab Take 1 Tab by mouth daily.  omega 3-dha-epa-fish oil 100-160-1,000 mg cap Take 1,000 mg by mouth daily. 90 Cap 3    ezetimibe (ZETIA) 10 mg tablet Take 1 Tab by mouth daily. 90 Tab 4    nitroglycerin (NITROSTAT) 0.4 mg SL tablet 1 Tab by SubLINGual route every five (5) minutes as needed for Chest Pain. 3 Bottle 4     Allergies   Allergen Reactions    Pollen Extracts Sneezing     Itchy eyes, runny nose     Past Medical History:   Diagnosis Date    AAA (abdominal aortic aneurysm) ([facility] 03/2010    noted on CT (abdomen)    Abnormal LFT's 12/95, 04/16/03    Acute meniscal tear, lateral 01/2007    s/p arthorscopic surg (Dr. Kamara Parent)    Atypical chest pain 02/04/09    CAD (coronary artery disease), native coronary artery     Cardiac cath 04/13/2009    dLM 30%. mLAD 30%. oD2 30%. pCX 55% (FFR 0.95). mRCA 40%. RPDA 65% (FFR 0.86). EF 65%.  Cardiac echocardiogram 04/03/2007    EF 60%. No RWMA. Gr 1 DDfx. LAE. No significant valvular heart disease.  Cardiac nuclear imaging test, low risk 12/04/2015    Low risk. No ischemia or prior infarction. No WMA. EF 67%.   Neg EKG on pharm stress test.    Cardiovascular aorto-iliac duplex 06/29/2015 AAA measures 3.2 x 3.1 cm Trv. (Prev 3.2 x 3.4 cm on 6/19/14). Aneurysm is infrarenal & fusiform w/complex intraluminal thrombus within. Mod bilateral common iliac stenosis.  Carotid duplex 03/16/2016    Mild < 50% BETHEL stenosis. Mod 73-46% LICA stenosis. Similar to study of 6/29/15.  Cholecystitis chronic 03/2008    s/p sue    Diverticulosis     Diverticulosis 9-28-15    DR. 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    Plantar fasciitis     Rhinitis     Right knee pain     Right shoulder pain     Solar purpura ([address]     Tinea versicolor 7/23/2012    2.8 x 2.9cm infra-renal    Tubular adenoma 9-28-15    Vitamin D deficiency 4/13/2011     Past Surgical History:   Procedure Laterality Date    ENDOSCOPY, COLON, DIAGNOSTIC      normal per patient;     HX ARTHROPLASTY  04/05/10    [place]-left knee    HX CATARACT REMOVAL Bilateral     November 2014    HX CHOLECYSTECTOMY  03/2008    chronic cholecystitis    HX COLONOSCOPY  9-28-15    HX HERNIA REPAIR  1995    L inguinal    HX KNEE ARTHROSCOPY  01/24/07    left knee    HX KNEE ARTHROSCOPY Left 4/16/2014    peripatellar debridement    HX KNEE REPLACEMENT Left 01/29/2013    [place]    HX MOHS PROCEDURES  11/2008    HX RHINOPLASTY  1983    HX TONSILLECTOMY      HX VASECTOMY  1977    HX WISDOM TEETH EXTRACTION      x4    [place] ANESTH,SURGERY OF SHOULDER       Family History   Problem Relation Age of Onset    Heart Disease Mother     Stroke Father     No Known Problems Sister     No Known Problems Maternal Grandmother     No Known Problems Maternal Grandfather     Dementia Paternal Grandmother     No Known Problems Paternal Grandfather     Cancer Brother         lung CA     Social History     Tobacco Use    Smoking status: Former Smoker     Packs/day: 0.50     Years: 20.00     Pack years: 10.00     Types: Cigarettes     Last attempt to quit: 12/10/1995     Years since quittin.7    Smokeless tobacco: Never Used   Substance Use Topics    Alcohol use: Yes     Alcohol/week: 6.7 standard drinks     Types: 7 Glasses of wine, 1 Shots of liquor per week     Comment: social       Review of Systems   Constitutional: Negative. HENT: Negative. Respiratory: Negative. Cardiovascular: Negative. All other systems reviewed and are negative. Objective:   No flowsheet data found. General: alert, cooperative, no distress   Mental  status: normal mood, behavior, speech, dress, motor activity, and thought processes, able to follow commands   HENT: NCAT   Neck: no visualized mass   Resp: no respiratory distress   Neuro: no gross deficits   Skin: no discoloration or lesions of concern on visible areas   Psychiatric: normal affect, consistent with stated mood, no evidence of hallucinations     Additional exam findings: none      We discussed the expected course, resolution and complications of the diagnosis(es) in detail. Medication risks, benefits, costs, interactions, and alternatives were discussed as indicated. I advised him to contact the office if his condition worsens, changes or fails to improve as anticipated. He expressed understanding with the diagnosis(es) and plan.        Palmira Ramos, who was evaluated through a patient-initiated, synchronous (real-time) audio-video encounter, and/or his healthcare decision maker, is aware that it is a billable service, with coverage as determined by his insurance carrier. He provided verbal consent to proceed: n/a- consent obtained within past 12 months, and patient identification was verified. It was conducted pursuant to the emergency declaration under the [address] [place] [address], [address] [place] [address] authority and the [employer] Act. A caregiver was present when appropriate. Ability to conduct physical exam was limited. I was in the office. The patient was at home.       Florecita Jorge MD

## 2020-09-01 NOTE — PROGRESS NOTES
Vicky Naylor presents today for   Chief Complaint   Patient presents with    Hypertension    Cholesterol Problem    Labs       Vicky Naylor preferred language for health care discussion is english/other. Is someone accompanying this pt? No    Is the patient using any DME equipment during OV? No    Depression Screening:  3 most recent PHQ Screens 8/25/2020   PHQ Not Done -   Little interest or pleasure in doing things Not at all   Feeling down, depressed, irritable, or hopeless Not at all   Total Score PHQ 2 0   Trouble falling or staying asleep, or sleeping too much -   Feeling tired or having little energy -   Poor appetite, weight loss, or overeating -   Feeling bad about yourself - or that you are a failure or have let yourself or your family down -   Trouble concentrating on things such as school, work, reading, or watching TV -   Moving or speaking so slowly that other people could have noticed; or the opposite being so fidgety that others notice -   Thoughts of being better off dead, or hurting yourself in some way -   PHQ 9 Score -   How difficult have these problems made it for you to do your work, take care of your home and get along with others -       Learning Assessment:  Learning Assessment 1/16/2020   PRIMARY LEARNER Patient   HIGHEST LEVEL OF EDUCATION - PRIMARY LEARNER  GRADUATED HIGH SCHOOL OR GED   BARRIERS PRIMARY LEARNER NONE   CO-LEARNER CAREGIVER No   PRIMARY LANGUAGE ENGLISH   LEARNER PREFERENCE PRIMARY LISTENING   ANSWERED BY self   RELATIONSHIP SELF       Abuse Screening:  Abuse Screening Questionnaire 9/1/2020   Do you ever feel afraid of your partner? N   Are you in a relationship with someone who physically or mentally threatens you? N   Is it safe for you to go home? Y       Fall Risk  Fall Risk Assessment, last 12 mths 8/25/2020   Able to walk? Yes   Fall in past 12 months?  No   Fall with injury? -   Number of falls in past 12 months -   Fall Risk Score - Coordination of Care:  1. Have you been to the ER, urgent care clinic since your last visit? Hospitalized since your last visit? No    2. Have you seen or consulted any other health care providers outside of the [address] [place] [address] since your last visit? Include any pap smears or colon screening. No      Advance Directive:  1. Do you have an advance directive in place? Patient Reply:No    2. If not, would you like material regarding how to put one in place?  Patient Reply: No

## 2020-09-21 ENCOUNTER — HOSPITAL ENCOUNTER (OUTPATIENT)
Dept: LAB | Age: 80
Discharge: HOME | End: 2020-09-21
Payer: MEDICARE

## 2020-09-21 DIAGNOSIS — E78.5 HYPERLIPIDEMIA, UNSPECIFIED HYPERLIPIDEMIA TYPE: ICD-10-CM

## 2020-09-21 DIAGNOSIS — I10 ESSENTIAL HYPERTENSION: ICD-10-CM

## 2020-09-21 LAB
ALBUMIN SERPL-MCNC: 3.7 G/DL (ref 3.4–5)
ALBUMIN/GLOB SERPL: 1.3 {RATIO} (ref 0.8–1.7)
ALP SERPL-CCNC: 112 U/L (ref 45–117)
ALT SERPL-CCNC: 23 U/L (ref 16–61)
ANION GAP SERPL CALC-SCNC: 4 MMOL/L (ref 3–18)
AST SERPL-CCNC: 16 U/L (ref 10–38)
BILIRUB SERPL-MCNC: 1.4 MG/DL (ref 0.2–1)
BUN SERPL-MCNC: 13 MG/DL (ref 7–18)
BUN/CREAT SERPL: 12 (ref 12–20)
CALCIUM SERPL-MCNC: 8.9 MG/DL (ref 8.5–10.1)
CHLORIDE SERPL-SCNC: 112 MMOL/L (ref 100–111)
CHOLEST SERPL-MCNC: 164 MG/DL
CO2 SERPL-SCNC: 29 MMOL/L (ref 21–32)
CREAT SERPL-MCNC: 1.1 MG/DL (ref 0.6–1.3)
GLOBULIN SER CALC-MCNC: 2.9 G/DL (ref 2–4)
GLUCOSE SERPL-MCNC: 112 MG/DL (ref 74–99)
HDLC SERPL-MCNC: 42 MG/DL (ref 40–60)
HDLC SERPL: 3.9 {RATIO} (ref 0–5)
LDLC SERPL CALC-MCNC: 105.6 MG/DL (ref 0–100)
LIPID PROFILE,FLP: ABNORMAL
POTASSIUM SERPL-SCNC: 4.5 MMOL/L (ref 3.5–5.5)
PROT SERPL-MCNC: 6.6 G/DL (ref 6.4–8.2)
SODIUM SERPL-SCNC: 145 MMOL/L (ref 136–145)
TRIGL SERPL-MCNC: 82 MG/DL (ref ?–150)
VLDLC SERPL CALC-MCNC: 16.4 MG/DL

## 2020-09-21 PROCEDURE — 80053 COMPREHEN METABOLIC PANEL: CPT

## 2020-09-21 PROCEDURE — 36415 COLL VENOUS BLD VENIPUNCTURE: CPT

## 2020-09-21 PROCEDURE — 80061 LIPID PANEL: CPT

## 2020-10-26 ENCOUNTER — OFFICE VISIT (OUTPATIENT)
Dept: CARDIOLOGY CLINIC | Age: 80
End: 2020-10-26
Payer: MEDICARE

## 2020-10-26 VITALS
SYSTOLIC BLOOD PRESSURE: 140 MMHG | WEIGHT: 194 LBS | HEIGHT: 72 IN | HEART RATE: 64 BPM | BODY MASS INDEX: 26.28 KG/M2 | DIASTOLIC BLOOD PRESSURE: 68 MMHG | OXYGEN SATURATION: 97 %

## 2020-10-26 DIAGNOSIS — I71.40 ABDOMINAL AORTIC ANEURYSM (AAA) WITHOUT RUPTURE: ICD-10-CM

## 2020-10-26 DIAGNOSIS — I49.3 PVC'S (PREMATURE VENTRICULAR CONTRACTIONS): ICD-10-CM

## 2020-10-26 DIAGNOSIS — I10 ESSENTIAL HYPERTENSION: ICD-10-CM

## 2020-10-26 DIAGNOSIS — I25.10 ATHEROSCLEROSIS OF NATIVE CORONARY ARTERY OF NATIVE HEART WITHOUT ANGINA PECTORIS: Primary | ICD-10-CM

## 2020-10-26 DIAGNOSIS — E78.2 MIXED HYPERLIPIDEMIA: ICD-10-CM

## 2020-10-26 DIAGNOSIS — N52.9 ERECTILE DYSFUNCTION, UNSPECIFIED ERECTILE DYSFUNCTION TYPE: ICD-10-CM

## 2020-10-26 DIAGNOSIS — E78.5 HYPERLIPIDEMIA, UNSPECIFIED HYPERLIPIDEMIA TYPE: ICD-10-CM

## 2020-10-26 DIAGNOSIS — I65.23 BILATERAL CAROTID ARTERY STENOSIS: ICD-10-CM

## 2020-10-26 PROCEDURE — 93000 ELECTROCARDIOGRAM COMPLETE: CPT | Performed by: INTERNAL MEDICINE

## 2020-10-26 PROCEDURE — G8754 DIAS BP LESS 90: HCPCS | Performed by: INTERNAL MEDICINE

## 2020-10-26 PROCEDURE — G8427 DOCREV CUR MEDS BY ELIG CLIN: HCPCS | Performed by: INTERNAL MEDICINE

## 2020-10-26 PROCEDURE — 99214 OFFICE O/P EST MOD 30 MIN: CPT | Performed by: INTERNAL MEDICINE

## 2020-10-26 PROCEDURE — G8536 NO DOC ELDER MAL SCRN: HCPCS | Performed by: INTERNAL MEDICINE

## 2020-10-26 PROCEDURE — G8753 SYS BP > OR = 140: HCPCS | Performed by: INTERNAL MEDICINE

## 2020-10-26 PROCEDURE — G8419 CALC BMI OUT NRM PARAM NOF/U: HCPCS | Performed by: INTERNAL MEDICINE

## 2020-10-26 PROCEDURE — G8431 POS CLIN DEPRES SCRN F/U DOC: HCPCS | Performed by: INTERNAL MEDICINE

## 2020-10-26 RX ORDER — SILDENAFIL 100 MG/1
100 TABLET, FILM COATED ORAL AS NEEDED
Qty: 18 TAB | Refills: 3 | Status: SHIPPED | OUTPATIENT
Start: 2020-10-26 | End: 2022-03-15 | Stop reason: SDUPTHER

## 2020-10-26 RX ORDER — ROSUVASTATIN CALCIUM 20 MG/1
20 TABLET, COATED ORAL
Qty: 90 TAB | Refills: 3 | Status: SHIPPED | OUTPATIENT
Start: 2020-10-26 | End: 2021-12-01 | Stop reason: SDUPTHER

## 2020-10-26 NOTE — PROGRESS NOTES
HISTORY OF PRESENT ILLNESS  Demetria Parra is a 79 y.o. male. Hypertension   Pertinent negatives include no chest pain, no abdominal pain, no headaches and no shortness of breath. Follow-up   Pertinent negatives include no chest pain, no abdominal pain, no headaches and no shortness of breath. Patient presents for a followup office visit. He has a known history of coronary artery disease, status post a non-ST elevation myocardial infarction in 2009. He underwent a cardiac catheterization at that time and was found to have moderate, but nonobstructive two-vessel coronary disease involving his left circumflex and a right-sided posterior descending artery, that were assessed by pressure wire. The patient has been maintained on medical therapy since that time. The patient last underwent a pharmacological nuclear stress test in December 2015, which was a normal study, showing no ischemia, normal left ventricle ejection fraction. EF 67%. He has a history of mild to moderate carotid artery disease,  last evaluated with a carotid duplex in August 2018 which demonstrated moderate carotid disease of the left ICA and mild plaquing of the right. He also underwent an abdominal ultrasound which showed a very small infrarenal AAA. Patient underwent a pharmacologic nuclear stress test in November 2019 which was a normal and low risk study. Patient was last seen in  via a virtual visit 6 months ago. He recently underwent follow-up abdominal ultrasound and carotid duplex scan in August 2020 which showed a small AAA and moderate bilateral carotid artery disease essentially unchanged compared to his previous studies. Since last visit, he states his been feeling well. He is trying to exercise at least 4 days a week. He states he works out with a  for 60 minutes twice a week and then will participate in yoga 2-3 times a week. He denies any major change in his activity level.   No new exertional chest pain or shortness of breath, no leg swelling or claudication. No heart palpitations, dizziness, nor syncope. Past Medical History:   Diagnosis Date    AAA (abdominal aortic aneurysm) ([facility] [address] 03/2010    noted on CT (abdomen)    Abnormal LFT's 12/95, 04/16/03    Acute meniscal tear, lateral 01/2007    s/p arthorscopic surg (Dr. Fabienne Villaseñor)    Atypical chest pain 02/04/09    CAD (coronary artery disease), native coronary artery     Cardiac cath 04/13/2009    dLM 30%. mLAD 30%. oD2 30%. pCX 55% (FFR 0.95). mRCA 40%. RPDA 65% (FFR 0.86). EF 65%.  Cardiac echocardiogram 04/03/2007    EF 60%. No RWMA. Gr 1 DDfx. LAE. No significant valvular heart disease.  Cardiac nuclear imaging test, low risk 12/04/2015    Low risk. No ischemia or prior infarction. No WMA. EF 67%. Neg EKG on pharm stress test.    Cardiovascular aorto-iliac duplex 06/29/2015    AAA measures 3.2 x 3.1 cm Trv. (Prev 3.2 x 3.4 cm on 6/19/14). Aneurysm is infrarenal & fusiform w/complex intraluminal thrombus within. Mod bilateral common iliac stenosis.  Carotid duplex 03/16/2016    Mild < 50% BETHEL stenosis. Mod 41-27% LICA stenosis. Similar to study of 6/29/15.  Cholecystitis chronic 03/2008    s/p sue    Diverticulosis     Diverticulosis 9-28-15    DR. 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 18 Tab 3    diclofenac (Voltaren) 1 % gel Apply 4 g to affected area four (4) times daily. Right knee 5 Each 5    amLODIPine (NORVASC) 10 mg tablet TAKE 1 TABLET BY MOUTH DAILY 90 Tab 3    niacin ER (NIASPAN) 500 mg tablet Take 1 Tab by mouth daily. 90 Tab 4    ezetimibe (ZETIA) 10 mg tablet Take 1 Tab by mouth daily. 90 Tab 4    rosuvastatin (CRESTOR) 20 mg tablet Take 1 Tab by mouth nightly. 90 Tab 3    nitroglycerin (NITROSTAT) 0.4 mg SL tablet 1 Tab by SubLINGual route every five (5) minutes as needed for Chest Pain. 3 Bottle 4    aspirin delayed-release (ADULT LOW DOSE ASPIRIN) 81 mg tablet Take 81 mg by mouth daily.  brinzolamide-brimonidine (Simbrinza) 1-0.2 % drps Apply 1 Drop to eye two (2) times a day.  coenzyme q10 (CO Q-10) 10 mg cap Take 1 Tab by mouth daily.  cholecalciferol, vitamin D3, (VITAMIN D3) 2,000 unit tab Take 1 Tab by mouth daily.  omega 3-dha-epa-fish oil 100-160-1,000 mg cap Take 1,000 mg by mouth daily. 90 Cap 3         Allergies   Allergen Reactions    Pollen Extracts Sneezing     Itchy eyes, runny nose        Social History     Tobacco Use    Smoking status: Former Smoker     Packs/day: 0.50     Years: 20.00     Pack years: 10.00     Types: Cigarettes     Last attempt to quit: 12/10/1995     Years since quittin.8    Smokeless tobacco: Never Used   Substance Use Topics    Alcohol use:  Yes     Alcohol/week: 6.7 standard drinks     Types: 7 Glasses of wine, 1 Shots of liquor per week     Comment: social    Drug use: No     Types: Prescription, OTC           Review of Systems   Constitutional: Negative for chills, fever and weight loss. HENT: Negative for nosebleeds. Eyes: Negative for blurred vision and double vision. Respiratory: Negative for cough, shortness of breath and wheezing. Cardiovascular: Negative for chest pain, palpitations, orthopnea, claudication, leg swelling and PND. Gastrointestinal: Negative for abdominal pain, heartburn, nausea and vomiting. Genitourinary: Negative for dysuria and hematuria. Musculoskeletal: Negative for falls, joint pain and myalgias. Skin: Negative for rash. Neurological: Negative for dizziness, focal weakness and headaches. Endo/Heme/Allergies: Does not bruise/bleed easily. Psychiatric/Behavioral: Negative for substance abuse. Visit Vitals  BP (!) 140/68 (BP 1 Location: Left arm, BP Patient Position: Sitting)   Pulse 64   Ht 6' (1.829 m)   Wt 88 kg (194 lb)   SpO2 97%   BMI 26.31 kg/m²      Physical Exam   Constitutional: He is oriented to person, place, and time. He appears well-developed and well-nourished. HENT:   Head: Normocephalic and atraumatic. Eyes: Conjunctivae are normal.   Neck: Neck supple. No JVD present. Carotid bruit is not present. Cardiovascular: Normal rate, regular rhythm, S1 normal, S2 normal and normal pulses. Frequent extrasystoles are present. Exam reveals no gallop and no S3. No murmur heard. Pulmonary/Chest: Breath sounds normal. He has no wheezes. He has no rales. Abdominal: Soft. Bowel sounds are normal. There is no abdominal tenderness. Musculoskeletal:         General: No edema. Neurological: He is alert and oriented to person, place, and time. Skin: Skin is warm and dry. EKG: Normal sinus rhythm, low voltage in the limb leads,  normal axis, normal QTc interval,  no ST or T wave abnormalities concerning for ischemia. Frequent PVCs in a trigeminal pattern. Compared to previous EKG, PVCs are now more frequent. ASSESSMENT and PLAN    Asymptomatic coronary artery disease.   Patient has moderate 2 vessel disease last assessed by cardiac catheterization in 2009, involving nonobstructive lesions in the proximal left circumflex in the right-sided PDA. He does have a history of a non-ST elevation myocardial infarction back in 2009, for which he was briefly treated with Plavix. He is now maintained on aspirin and a statin, and he has been intolerant to beta blockers because of bradycardia. He last underwent a negative pharmacologic nuclear stress test in November 2019. No new anginal type symptoms. I will continue his current medical regimen. Frequent PVCs. These were initially seen on EKG a year ago. He does not PVCs in a bigeminal pattern today. He is asymptomatic. No need for further work-up at this time. Essential hypertension. Patient blood pressure remains well controlled on his current regimen, all of which I would continue. Dyslipidemia: Patient is now on Crestor 20 mg daily, Niaspan 1000 mg daily, Fish oil, and Zetia. Recent lipid panel from September 2020 did show a significant increase in his total cholesterol and LDL compared to a year ago. I am concerned he may not be taking his medications regularly, so I did advise him to ensure that he is taking his Crestor and Zetia on a regular basis. I have recommended repeating his lipid profile in 2 to 3 months. Carotid artery disease. Patient has moderate bilateral carotid artery disease which was last assessed on carotid duplex  in August 2020. Is being followed by vascular surgery. Infrarenal AAA. This was last evaluated by an ultrasound in August 2020. This remains small in size and being followed regular by vascular surgery. Erectile dysfunction. This has responded well to Viagra in the past.  Prescription was refilled. He was advised not to use any nitroglycerin within 24 hours of taking a Viagra.     Followup in 6 months time, sooner if needed

## 2020-10-26 NOTE — TELEPHONE ENCOUNTER
Pt called requesting refill for medication . Requested Prescriptions     Pending Prescriptions Disp Refills    rosuvastatin (CRESTOR) 20 mg tablet 90 Tab 3     Sig: Take 1 Tab by mouth nightly.

## 2020-10-26 NOTE — PROGRESS NOTES
Jorgito Madrigal presents today for   Chief Complaint   Patient presents with    Follow-up     6 month follow up kecia Madrigal preferred language for health care discussion is english/other. Is someone accompanying this pt? no    Is the patient using any DME equipment during [address]? no    Depression Screening:  3 most recent PHQ Screens 10/26/2020   PHQ Not Done -   Little interest or pleasure in doing things Nearly every day   Feeling down, depressed, irritable, or hopeless Nearly every day   Total Score PHQ 2 6   Trouble falling or staying asleep, or sleeping too much More than half the days   Feeling tired or having little energy More than half the days   Poor appetite, weight loss, or overeating Not at all   Feeling bad about yourself - or that you are a failure or have let yourself or your family down Not at all   Trouble concentrating on things such as school, work, reading, or watching TV Not at all   Moving or speaking so slowly that other people could have noticed; or the opposite being so fidgety that others notice Not at all   Thoughts of being better off dead, or hurting yourself in some way Not at all   PHQ 9 Score 10   How difficult have these problems made it for you to do your work, take care of your home and get along with others Not difficult at all       Learning Assessment:  Learning Assessment 1/16/2020   PRIMARY LEARNER Patient   HIGHEST LEVEL OF EDUCATION - PRIMARY LEARNER  [address] [facility] PRIMARY LEARNER [address] CAREGIVER No   PRIMARY LANGUAGE ENGLISH   LEARNER PREFERENCE PRIMARY LISTENING   ANSWERED BY self   RELATIONSHIP SELF       Abuse Screening:  Abuse Screening Questionnaire 10/26/2020   Do you ever feel afraid of your partner? N   Are you in a relationship with someone who physically or mentally threatens you? N   Is it safe for you to go home? Y       Fall Risk  Fall Risk Assessment, last 12 mths 10/26/2020   Able to walk?  Yes Fall in past 12 months? No   Fall with injury? -   Number of falls in past 12 months -   Fall Risk Score -       Pt currently taking Anticoagulant therapy? no    Coordination of Care:  1. Have you been to the ER, urgent care clinic since your last visit? Hospitalized since your last visit? no    2. Have you seen or consulted any other health care providers outside of the [address] [place] [address] since your last visit? Include any pap smears or colon screening.  no

## 2020-12-01 ENCOUNTER — VIRTUAL VISIT (OUTPATIENT)
Dept: FAMILY MEDICINE CLINIC | Age: 80
End: 2020-12-01
Payer: MEDICARE

## 2020-12-01 DIAGNOSIS — I10 ESSENTIAL HYPERTENSION: Primary | ICD-10-CM

## 2020-12-01 DIAGNOSIS — E78.5 HYPERLIPIDEMIA, UNSPECIFIED HYPERLIPIDEMIA TYPE: ICD-10-CM

## 2020-12-01 PROBLEM — D69.2 PURPURA (HCC): Status: RESOLVED | Noted: 2018-08-27 | Resolved: 2020-12-01

## 2020-12-01 PROCEDURE — G8427 DOCREV CUR MEDS BY ELIG CLIN: HCPCS | Performed by: FAMILY MEDICINE

## 2020-12-01 PROCEDURE — 99213 OFFICE O/P EST LOW 20 MIN: CPT | Performed by: FAMILY MEDICINE

## 2020-12-01 PROCEDURE — 1101F PT FALLS ASSESS-DOCD LE1/YR: CPT | Performed by: FAMILY MEDICINE

## 2020-12-01 PROCEDURE — G0463 HOSPITAL OUTPT CLINIC VISIT: HCPCS | Performed by: FAMILY MEDICINE

## 2020-12-01 PROCEDURE — G8756 NO BP MEASURE DOC: HCPCS | Performed by: FAMILY MEDICINE

## 2020-12-01 PROCEDURE — G8432 DEP SCR NOT DOC, RNG: HCPCS | Performed by: FAMILY MEDICINE

## 2020-12-01 NOTE — PROGRESS NOTES
Dick Ling is a 79 y.o. male who was seen by synchronous (real-time) audio-video technology on 12/1/2020 for Hypertension; Cholesterol Problem; and Abdominal Aortic Aneurysm        Assessment & Plan:   Diagnoses and all orders for this visit:    1. Essential hypertension  Stable, cont pres tx plan. 2. Hyperlipidemia, unspecified hyperlipidemia type  Pt has resumed meds as per cards. Lipid panel ordered by cards; will have this done prior to next appt. The complexity of medical decision making for this visit is moderate   Follow-up and Dispositions    · Return in about 3 months (around 3/1/2021) for high blood pressure, high cholesterol, lab review. 712  Subjective:   Patient contacted via doxy. me. Pt has been doing well. Pt is still exercising regularly. Pt has seen cards. He is taking all of his cholesterol meds. Home bp readings are normotensive. Pt does not have any new concerns. Prior to Admission medications    Medication Sig Start Date End Date Taking? Authorizing Provider   sildenafil citrate (Viagra) 100 mg tablet Take 1 Tab by mouth as needed (1 tablet by mouth once daily as needed). 10/26/20   Fiorella Mcclendon MD   rosuvastatin (CRESTOR) 20 mg tablet Take 1 Tab by mouth nightly. 10/26/20   Maya Montes De Oca MD   diclofenac (Voltaren) 1 % gel Apply 4 g to affected area four (4) times daily. Right knee 8/14/20   Jael Lei MD   amLODIPine (NORVASC) 10 mg tablet TAKE 1 TABLET BY MOUTH DAILY 6/25/20   Maya Montes De Oca MD   niacin ER (NIASPAN) 500 mg tablet Take 1 Tab by mouth daily. 6/2/20   Maya Montes De Oca MD   ezetimibe (ZETIA) 10 mg tablet Take 1 Tab by mouth daily. 12/2/19   Maya Montes De Oca MD   nitroglycerin (NITROSTAT) 0.4 mg SL tablet 1 Tab by SubLINGual route every five (5) minutes as needed for Chest Pain. 6/12/19   Maya Montes De Oca MD   aspirin delayed-release (ADULT LOW DOSE ASPIRIN) 81 mg tablet Take 81 mg by mouth daily. Provider, Historical   brinzolamide-brimonidine (Simbrinza) 1-0.2 % drps Apply 1 Drop to eye two (2) times a day. Provider, Historical   coenzyme q10 (CO Q-10) 10 mg cap Take 1 Tab by mouth daily. Provider, Historical   cholecalciferol, vitamin D3, (VITAMIN D3) 2,000 unit tab Take 1 Tab by mouth daily. Provider, Historical   omega 3-dha-epa-fish oil 100-160-1,000 mg cap Take 1,000 mg by mouth daily. 4/11/14   Anika Dwyer MD     Patient Active Problem List   Diagnosis Code    Hyperlipidemia E78.5    Coronary atherosclerosis of native coronary artery I25.10    AAA (abdominal aortic aneurysm) ([facility] [address] I71.4    Right knee pain M25.561    Abnormal LFTs (liver function tests) R94.5    Vitamin D deficiency E55.9    CLARKE (dyspnea on exertion) R06.00    Essential hypertension I10    Hyperglycemia R73.9    Tinea versicolor B36.0    S/P total knee replacement Z96.659    Fatty liver K76.0    PVC's (premature ventricular contractions) I49.3    Carotid artery disease (HCC) I77.9    Right shoulder pain M25.511    Acute back pain M54.9    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 8XXA    Primary osteoarthritis of right knee M17.11     Current Outpatient Medications   Medication Sig Dispense Refill    sildenafil citrate (Viagra) 100 mg tablet Take 1 Tab by mouth as needed (1 tablet by mouth once daily as needed). 18 Tab 3    rosuvastatin (CRESTOR) 20 mg tablet Take 1 Tab by mouth nightly. 90 Tab 3    diclofenac (Voltaren) 1 % gel Apply 4 g to affected area four (4) times daily.  Right knee 5 Each 5    amLODIPine (NORVASC) 10 mg tablet TAKE 1 TABLET BY MOUTH DAILY 90 Tab 3    niacin ER (NIASPAN) 500 mg tablet Take 1 Tab by mouth daily. 90 Tab 4    ezetimibe (ZETIA) 10 mg tablet Take 1 Tab by mouth daily. 90 Tab 4    nitroglycerin (NITROSTAT) 0.4 mg SL tablet 1 Tab by SubLINGual route every five (5) minutes as needed for Chest Pain. 3 Bottle 4    aspirin delayed-release (ADULT LOW DOSE ASPIRIN) 81 mg tablet Take 81 mg by mouth daily.  brinzolamide-brimonidine (Simbrinza) 1-0.2 % drps Apply 1 Drop to eye two (2) times a day.  coenzyme q10 (CO Q-10) 10 mg cap Take 1 Tab by mouth daily.  cholecalciferol, vitamin D3, (VITAMIN D3) 2,000 unit tab Take 1 Tab by mouth daily.  omega 3-dha-epa-fish oil 100-160-1,000 mg cap Take 1,000 mg by mouth daily. 90 Cap 3     Allergies   Allergen Reactions    Pollen Extracts Sneezing     Itchy eyes, runny nose     Past Medical History:   Diagnosis Date    AAA (abdominal aortic aneurysm) ([facility] [address] 03/2010    noted on CT (abdomen)    Abnormal LFT's 12/95, 04/16/03    Acute meniscal tear, lateral 01/2007    s/p arthorscopic surg (Dr. Kasia Sky)    Atypical chest pain 02/04/09    CAD (coronary artery disease), native coronary artery     Cardiac cath 04/13/2009    dLM 30%. mLAD 30%. oD2 30%. pCX 55% (FFR 0.95). mRCA 40%. RPDA 65% (FFR 0.86). EF 65%.  Cardiac echocardiogram 04/03/2007    EF 60%. No RWMA. Gr 1 DDfx. LAE. No significant valvular heart disease.  Cardiac nuclear imaging test, low risk 12/04/2015    Low risk. No ischemia or prior infarction. No WMA. EF 67%. Neg EKG on pharm stress test.    Cardiovascular aorto-iliac duplex 06/29/2015    AAA measures 3.2 x 3.1 cm Trv. (Prev 3.2 x 3.4 cm on 6/19/14). Aneurysm is infrarenal & fusiform w/complex intraluminal thrombus within. Mod bilateral common iliac stenosis.  Carotid duplex 03/16/2016    Mild < 50% BETHEL stenosis. Mod 79-26% LICA stenosis. Similar to study of 6/29/15.  Cholecystitis chronic 03/2008    s/p sue    Diverticulosis     Diverticulosis 9-28-15    DR. 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    Plantar fasciitis     Rhinitis     Right knee pain     Right shoulder pain     Solar purpura ([address]     Tinea versicolor 7/23/2012    2.8 x 2.9cm infra-renal    Tubular adenoma 9-28-15    Vitamin D deficiency 4/13/2011     Past Surgical History:   Procedure Laterality Date    ENDOSCOPY, COLON, DIAGNOSTIC      normal per patient;     HX ARTHROPLASTY  04/05/10    [place]-left knee    HX CATARACT REMOVAL Bilateral     November 2014    HX CHOLECYSTECTOMY  03/2008    chronic cholecystitis    HX COLONOSCOPY  9-28-15    HX HERNIA REPAIR  1995    L inguinal    HX KNEE ARTHROSCOPY  01/24/07    left knee    HX KNEE ARTHROSCOPY Left 4/16/2014    peripatellar debridement    HX KNEE REPLACEMENT Left 01/29/2013    [place]    HX MOHS PROCEDURES  11/2008    HX RHINOPLASTY  1983    HX TONSILLECTOMY      HX VASECTOMY  1977    HX WISDOM TEETH EXTRACTION      x4    NH ANESTH,SURGERY OF SHOULDER       Family History   Problem Relation Age of Onset    Heart Disease Mother     Stroke Father     No Known Problems Sister     No Known Problems Maternal Grandmother     No Known Problems Maternal Grandfather     Dementia Paternal Grandmother     No Known Problems Paternal Grandfather     Cancer Brother         lung CA     Social History     Tobacco Use    Smoking status: Former Smoker     Packs/day: 0.50 Years: 20.00     Pack years: 10.00     Types: Cigarettes     Last attempt to quit: 12/10/1995     Years since quittin.9    Smokeless tobacco: Never Used   Substance Use Topics    Alcohol use: Yes     Alcohol/week: 6.7 standard drinks     Types: 7 Glasses of wine, 1 Shots of liquor per week     Comment: social       Review of Systems   Constitutional: Negative. HENT: Negative. Respiratory: Negative. Cardiovascular: Negative. All other systems reviewed and are negative. Objective:   No flowsheet data found. General: alert, cooperative, no distress   Mental  status: normal mood, behavior, speech, dress, motor activity, and thought processes, able to follow commands   HENT: NCAT   Neck: no visualized mass   Resp: no respiratory distress   Neuro: no gross deficits   Skin: no discoloration or lesions of concern on visible areas   Psychiatric: normal affect, consistent with stated mood, no evidence of hallucinations     Additional exam findings: none      We discussed the expected course, resolution and complications of the diagnosis(es) in detail. Medication risks, benefits, costs, interactions, and alternatives were discussed as indicated. I advised him to contact the office if his condition worsens, changes or fails to improve as anticipated. He expressed understanding with the diagnosis(es) and plan. Ben Mi, who was evaluated through a patient-initiated, synchronous (real-time) audio-video encounter, and/or his healthcare decision maker, is aware that it is a billable service, with coverage as determined by his insurance carrier. He provided verbal consent to proceed: n/a- consent obtained within past 12 months, and patient identification was verified. It was conducted pursuant to the emergency declaration under the [address] [place] [address] authority and the [employer] Act.  A caregiver was present when appropriate. Ability to conduct physical exam was limited. I was in the office. The patient was at home.       Ivonne Solomon MD

## 2020-12-01 NOTE — PROGRESS NOTES
Chief Complaint   Patient presents with    Hypertension    Cholesterol Problem    Abdominal Aortic Aneurysm     Pt 3 mo f/u chronic medical conditions. 1. Have you been to the ER, urgent care clinic since your last visit? Hospitalized since your last visit? No    2. Have you seen or consulted any other health care providers outside of the [address] [place] [address] since your last visit? Include any pap smears or colon screening.  No ]

## 2020-12-07 NOTE — PROGRESS NOTES
Advance Care Planning       Advance Care Planning (ACP) Physician/NP/PA (Provider) Conversation        Date of ACP Conversation: 12/9/2020    Conversation Conducted with:   Patient with Decision Making [address] Maker:    Current Designated Health Care Decision Maker:   (If there is a valid [address] named in the [address]" box in the ACP activity, but it is not visible above, be sure to open that field and then select the health care decision maker relationship (ie \"primary\") in the blank space to the right of the name.)    Note: Assess and validate information in current ACP documents, as indicated. If no Authorized Decision Maker has previously been identified, then patient chooses [address]:  \"Who would you like to name as your primary health care decision-maker? \"    Name: Karla George   Relationship: Daughter Phone number: [phone]  Radha Mcallister this person be reached easily? \" YES  \"Who would you like to name as your back-up decision maker? \"   Name: Daniel Whitaker  Relationship: Son Phone number: [phone]  Radha Mcallister this person be reached easily? \" YES    Note: If the relationship of these Decision-Makers to the patient does NOT follow your state's Next of Kin hierarchy, recommend that patient complete ACP document that meets state-specific requirements to allow them to act on the patient's behalf when appropriate. Care Preferences:    Hospitalization: \"If your health worsens and it becomes clear that your chance of recovery is unlikely, what would your preference be regarding hospitalization? \"  The patient would prefer comfort-focused treatment without hospitalization. Ventilation: \"If you were in your present state of health and suddenly became very ill and were unable to breathe on your own, what would your preference be about the use of a ventilator (breathing machine) if it was available to you? \"    The patient would desire the use of a ventilator. \"If your health worsens and it becomes clear that your chance of recovery is unlikely, what would your preference be about the use of a ventilator (breathing machine) if it was available to you? \"   yes to ventilator      Resuscitation:  \"CPR works best to restart the heart when there is a sudden event, like a heart attack, in someone who is otherwise healthy. Unfortunately, CPR does not typically restart the heart for people who have serious health conditions or who are very sick. \"    \"In the event your heart stopped as a result of an underlying serious health condition, would you want attempts to be made to restart your heart (answer \"yes\" for attempt to resuscitate) or would you prefer a natural death (answer \"no\" for do not attempt to resuscitate)? \"   Yes, attempt to resuscitate. NOTE: If the patient has a valid advance directive AND provides care preference(s) that are inconsistent with that prior directive, advise the patient to consider either: creating a new advance directive that complies with state-specific requirements; or, if that is not possible, orally revoking that prior directive in accordance with state-specific requirements, which must be documented in the EHR.     Conversation Outcomes / Follow-Up Plan:   ACP complete - no further action today, advised patient to review his current ACP to make sure his wishes is consistent with those we discussed today      Length of Voluntary ACP Conversation in minutes:  16 minutes      Efrain Navas NP

## 2020-12-09 ENCOUNTER — VIRTUAL VISIT (OUTPATIENT)
Dept: FAMILY MEDICINE CLINIC | Age: 80
End: 2020-12-09
Payer: MEDICARE

## 2020-12-09 DIAGNOSIS — Z71.89 ACP (ADVANCE CARE PLANNING): ICD-10-CM

## 2020-12-09 DIAGNOSIS — Z00.00 MEDICARE ANNUAL WELLNESS VISIT, SUBSEQUENT: Primary | ICD-10-CM

## 2020-12-09 PROCEDURE — G0439 PPPS, SUBSEQ VISIT: HCPCS | Performed by: NURSE PRACTITIONER

## 2020-12-09 PROCEDURE — 99497 ADVNCD CARE PLAN 30 MIN: CPT | Performed by: NURSE PRACTITIONER

## 2020-12-09 NOTE — PATIENT INSTRUCTIONS
Medicare Wellness Visit, Male    The best way to live healthy is to have a lifestyle where you eat a well-balanced diet, exercise regularly, limit alcohol use, and quit all forms of tobacco/nicotine, if applicable. Regular preventive services are another way to keep healthy. Preventive services (vaccines, screening tests, monitoring & exams) can help personalize your care plan, which helps you manage your own care. Screening tests can find health problems at the earliest stages, when they are easiest to treat. [facility] follows the current, evidence-based guidelines published by the [facility] Bruce Jackie ([facility]) when recommending preventive services for our patients. Because we follow these guidelines, sometimes recommendations change over time as research supports it. (For example, a prostate screening blood test is no longer routinely recommended for men with no symptoms). Of course, you and your doctor may decide to screen more often for some diseases, based on your risk and co-morbidities (chronic disease you are already diagnosed with). Preventive services for you include:  - Medicare offers their members a free annual wellness visit, which is time for you and your primary care provider to discuss and plan for your preventive service needs. Take advantage of this benefit every year!  -All adults over age 72 should receive the recommended pneumonia vaccines. Current USPSTF guidelines recommend a series of two vaccines for the best pneumonia protection.   -All adults should have a flu vaccine yearly and tetanus vaccine every 10 years.  -All adults age 48 and older should receive the shingles vaccines (series of two vaccines).        -All adults age 38-68 who are overweight should have a diabetes screening test once every three years.   -Other screening tests & preventive services for persons with diabetes include: an eye exam to screen for diabetic retinopathy, a kidney function test, a foot exam, and stricter control over your cholesterol.   -Cardiovascular screening for adults with routine risk involves an electrocardiogram (ECG) at intervals determined by the provider.   -Colorectal cancer screening should be done for adults age 54-65 with no increased risk factors for colorectal cancer. There are a number of acceptable methods of screening for this type of cancer. Each test has its own benefits and drawbacks. Discuss with your provider what is most appropriate for you during your annual wellness visit. The different tests include: colonoscopy (considered the best screening method), a fecal occult blood test, a fecal DNA test, and sigmoidoscopy.  -All adults born between [facility] should be screened once for Hepatitis C.  -An Abdominal Aortic Aneurysm (AAA) Screening is recommended for men age 73-68 who has ever smoked in their lifetime.      Here is a list of your current Health Maintenance items (your personalized list of preventive services) with a due date:  Health Maintenance Due   Topic Date Due    Shingles Vaccine (1 of 2) 03/26/1991    Glaucoma Screening   11/02/2018    Annual Well Visit  06/12/2020

## 2020-12-09 NOTE — PROGRESS NOTES
This is the Subsequent Medicare Annual Wellness Exam, performed 12 months or more after the Initial AWV or the last Subsequent AWV    I have reviewed the patient's medical history in detail and updated the computerized patient record. Depression Risk Factor Screening:     3 most recent PHQ Screens 10/26/2020   PHQ Not Done -   Little interest or pleasure in doing things Nearly every day   Feeling down, depressed, irritable, or hopeless Nearly every day   Total Score PHQ 2 6   Trouble falling or staying asleep, or sleeping too much More than half the days   Feeling tired or having little energy More than half the days   Poor appetite, weight loss, or overeating Not at all   Feeling bad about yourself - or that you are a failure or have let yourself or your family down Not at all   Trouble concentrating on things such as school, work, reading, or watching TV Not at all   Moving or speaking so slowly that other people could have noticed; or the opposite being so fidgety that others notice Not at all   Thoughts of being better off dead, or hurting yourself in some way Not at all   PHQ 9 Score 10   How difficult have these problems made it for you to do your work, take care of your home and get along with others Not difficult at all       Alcohol Risk Screen   Do you average more than 1 drink per night or more than 7 drinks a week: No    In the past three months have you have had more than 4 drinks containing alcohol on one occasion: Yes      Functional Ability and Level of Safety:   Hearing: Hearing is good. Activities of Daily Living: The home contains: grab bars  Patient does total self care     Ambulation: with no difficulty     Fall Risk:  Fall Risk Assessment, last 12 mths 10/26/2020   Able to walk? Yes   Fall in past 12 months?  No   Fall with injury? -   Number of falls in past 12 months -   Fall Risk Score -     Abuse Screen:  Patient is not abused     Cognitive Screening   Has your family/caregiver stated any concerns about your memory: no    Assessment/Plan   Education and counseling provided:  Are appropriate based on today's review and evaluation  End-of-Life planning (with patient's consent)  Screening for glaucoma    Diagnoses and all orders for this visit:    1. Medicare annual wellness visit, subsequent    2. ACP (advance care planning)      Follow-up and Dispositions    · Follow-up as scheduled with PCP in 3 mos for  HTN, HLD, lab results.        Health Maintenance Due     Health Maintenance Due   Topic Date Due    Shingrix Vaccine Age 49> (1 of 2) 03/26/1991    GLAUCOMA SCREENING Q2Y  11/02/2018    Medicare Yearly Exam  06/12/2020     Health Maintenance:  Last Eye exam - 3 mos ago with [facility] with Dr. Silverio Mars in [facility], records requested  Shingles vaccine - received first dose a year ago from [facility], will schedule booster dose    Patient Care Team   Patient Care Team:  Mildred Noble MD as PCP - General (Family Medicine)  Aern Cintron MD (Vascular Surgery)  Jake Esteves MD (Cardiology)  Concepción Ríos (Vascular Surgery)  Gisel Holcomb MD (Orthopedic Surgery)  Romelia Brown MD as Consulting Provider (Neurology)    History     Patient Active Problem List   Diagnosis Code    Hyperlipidemia E78.5    Coronary atherosclerosis of native coronary artery I25.10    AAA (abdominal aortic aneurysm) ([facility] [address] I71.4    Right knee pain M25.561    Abnormal LFTs (liver function tests) R94.5    Vitamin D deficiency E55.9    CLARKE (dyspnea on exertion) R06.00    Essential hypertension I10    Hyperglycemia R73.9    Tinea versicolor B36.0    S/P total knee replacement Z96.659    Fatty liver K76.0    PVC's (premature ventricular contractions) I49.3    Carotid artery disease (HCC) I77.9    Right shoulder pain M25.511    Acute back pain M54.9    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 8XXA    Primary osteoarthritis of right knee M17.11     Past Medical History:   Diagnosis Date    AAA (abdominal aortic aneurysm) ([facility] [address] 03/2010    noted on CT (abdomen)    Abnormal LFT's 12/95, 04/16/03    Acute meniscal tear, lateral 01/2007    s/p arthorscopic surg (Dr. Levon Nugent)    Atypical chest pain 02/04/09    CAD (coronary artery disease), native coronary artery     Cardiac cath 04/13/2009    dLM 30%. mLAD 30%. oD2 30%. pCX 55% (FFR 0.95). mRCA 40%. RPDA 65% (FFR 0.86). EF 65%.  Cardiac echocardiogram 04/03/2007    EF 60%. No RWMA. Gr 1 DDfx. LAE. No significant valvular heart disease.  Cardiac nuclear imaging test, low risk 12/04/2015    Low risk. No ischemia or prior infarction. No WMA. EF 67%. Neg EKG on pharm stress test.    Cardiovascular aorto-iliac duplex 06/29/2015    AAA measures 3.2 x 3.1 cm Trv. (Prev 3.2 x 3.4 cm on 6/19/14). Aneurysm is infrarenal & fusiform w/complex intraluminal thrombus within. Mod bilateral common iliac stenosis.  Carotid duplex 03/16/2016    Mild < 50% BETHEL stenosis. Mod 47-74% LICA stenosis. Similar to study of 6/29/15.  Cholecystitis chronic 03/2008    s/p sue    Diverticulosis     Diverticulosis 9-28-15    DR. 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    Plantar fasciitis     Rhinitis     Right knee pain     Right shoulder pain     Solar purpura (HCC)     Tinea versicolor 7/23/2012    2.8 x 2.9cm infra-renal    Tubular adenoma 9-28-15    Vitamin D deficiency 4/13/2011      Past Surgical History:   Procedure Laterality Date    ENDOSCOPY, COLON, DIAGNOSTIC      normal per patient;     HX ARTHROPLASTY  04/05/10    [place]-left knee    HX CATARACT REMOVAL Bilateral     November 2014    HX CHOLECYSTECTOMY  03/2008    chronic cholecystitis    HX COLONOSCOPY  9-28-15    HX HERNIA REPAIR  1995    L inguinal    HX KNEE ARTHROSCOPY  01/24/07    left knee    HX KNEE ARTHROSCOPY Left 4/16/2014    peripatellar debridement    HX KNEE REPLACEMENT Left 01/29/2013    [place]    HX MOHS PROCEDURES  11/2008    HX RHINOPLASTY  1983    HX TONSILLECTOMY      HX VASECTOMY  1977    HX WISDOM TEETH EXTRACTION      x4    [place] ANESTH,SURGERY OF SHOULDER       Current Outpatient Medications   Medication Sig Dispense Refill    sildenafil citrate (Viagra) 100 mg tablet Take 1 Tab by mouth as needed (1 tablet by mouth once daily as needed). 18 Tab 3    rosuvastatin (CRESTOR) 20 mg tablet Take 1 Tab by mouth nightly. 90 Tab 3    diclofenac (Voltaren) 1 % gel Apply 4 g to affected area four (4) times daily. Right knee 5 Each 5    amLODIPine (NORVASC) 10 mg tablet TAKE 1 TABLET BY MOUTH DAILY 90 Tab 3    niacin ER (NIASPAN) 500 mg tablet Take 1 Tab by mouth daily. 90 Tab 4    ezetimibe (ZETIA) 10 mg tablet Take 1 Tab by mouth daily. 90 Tab 4    nitroglycerin (NITROSTAT) 0.4 mg SL tablet 1 Tab by SubLINGual route every five (5) minutes as needed for Chest Pain. 3 Bottle 4    aspirin delayed-release (ADULT LOW DOSE ASPIRIN) 81 mg tablet Take 81 mg by mouth daily.       brinzolamide-brimonidine (Simbrinza) 1-0.2 % drps Apply 1 Drop to eye two (2) times a day.  coenzyme q10 (CO Q-10) 10 mg cap Take 1 Tab by mouth daily.  cholecalciferol, vitamin D3, (VITAMIN D3) 2,000 unit tab Take 1 Tab by mouth daily.  omega 3-dha-epa-fish oil 100-160-1,000 mg cap Take 1,000 mg by mouth daily. 90 Cap 3     Allergies   Allergen Reactions    Pollen Extracts Sneezing     Itchy eyes, runny nose       Family History   Problem Relation Age of Onset    Heart Disease Mother     Stroke Father     No Known Problems Sister     No Known Problems Maternal Grandmother     No Known Problems Maternal Grandfather     Dementia Paternal Grandmother     No Known Problems Paternal Grandfather     Cancer Brother         lung CA     Social History     Tobacco Use    Smoking status: Former Smoker     Packs/day: 0.50     Years: 20.00     Pack years: 10.00     Types: Cigarettes     Last attempt to quit: 12/10/1995     Years since quittin.0    Smokeless tobacco: Never Used   Substance Use Topics    Alcohol use: Yes     Alcohol/week: 6.7 standard drinks     Types: 7 Glasses of wine, 1 Shots of liquor per week     Comment: social Wendy Kathleen, who was evaluated through a synchronous (real-time) audio-video encounter, and/or his healthcare decision maker, is aware that it is a billable service, with coverage as determined by his insurance carrier. He provided verbal consent to proceed: Yes, and patient identification was verified. It was conducted pursuant to the emergency declaration under the [address], [facility] authority and the [employer] Act. A caregiver was present when appropriate. Ability to conduct physical exam was limited. I was in the office. The patient was at home.     Jin Babb NP

## 2020-12-09 NOTE — PROGRESS NOTES
Fátimaeros LeKittson presents today for   Chief Complaint   Patient presents with   [facility] Annual Wellness Visit     Medicare       Is someone accompanying this pt? no    Is the patient using any DME equipment during [address]? no    Depression Screening:  3 most recent PHQ Screens 10/26/2020   PHQ Not Done -   Little interest or pleasure in doing things Nearly every day   Feeling down, depressed, irritable, or hopeless Nearly every day   Total Score PHQ 2 6   Trouble falling or staying asleep, or sleeping too much More than half the days   Feeling tired or having little energy More than half the days   Poor appetite, weight loss, or overeating Not at all   Feeling bad about yourself - or that you are a failure or have let yourself or your family down Not at all   Trouble concentrating on things such as school, work, reading, or watching TV Not at all   Moving or speaking so slowly that other people could have noticed; or the opposite being so fidgety that others notice Not at all   Thoughts of being better off dead, or hurting yourself in some way Not at all   PHQ 9 Score 10   How difficult have these problems made it for you to do your work, take care of your home and get along with others Not difficult at all       Learning Assessment:  Learning Assessment 1/16/2020   PRIMARY LEARNER Patient   HIGHEST LEVEL OF EDUCATION - PRIMARY LEARNER  [address] PRIMARY LEARNER [address] CAREGIVER No   PRIMARY LANGUAGE ENGLISH   LEARNER PREFERENCE PRIMARY LISTENING   ANSWERED BY self   RELATIONSHIP SELF       Abuse Screening:  Abuse Screening Questionnaire 10/26/2020   Do you ever feel afraid of your partner? N   Are you in a relationship with someone who physically or mentally threatens you? N   Is it safe for you to go home? Y       Fall Screening  Fall Risk Assessment, last 12 mths 10/26/2020   Able to walk? Yes   Fall in past 12 months?  No   Fall with injury? -   Number of falls in past 12 months - Fall Risk Score -       Generalized Anxiety  No flowsheet data found. Health Maintenance Due   Topic Date Due    Shingrix Vaccine Age 49> (1 of 2) 03/26/1991    GLAUCOMA SCREENING Q2Y  11/02/2018    Medicare Yearly Exam  06/12/2020   . Health Maintenance reviewed and discussed and ordered per Provider. Coordination of Care  1. Have you been to the ER, urgent care clinic since your last visit? Hospitalized since your last visit? no    2. Have you seen or consulted any other health care providers outside of the [address] [place] [address] since your last visit? Include any pap smears or colon screening.  no

## 2021-01-20 DIAGNOSIS — E78.5 HYPERLIPIDEMIA, UNSPECIFIED HYPERLIPIDEMIA TYPE: ICD-10-CM

## 2021-01-21 RX ORDER — EZETIMIBE 10 MG/1
10 TABLET ORAL DAILY
Qty: 90 TAB | Refills: 4 | Status: SHIPPED | OUTPATIENT
Start: 2021-01-21 | End: 2022-02-28 | Stop reason: SDUPTHER

## 2021-02-10 ENCOUNTER — OFFICE VISIT (OUTPATIENT)
Dept: ORTHOPEDIC SURGERY | Age: 81
End: 2021-02-10
Payer: MEDICARE

## 2021-02-10 VITALS
TEMPERATURE: 97 F | SYSTOLIC BLOOD PRESSURE: 109 MMHG | BODY MASS INDEX: 26.28 KG/M2 | OXYGEN SATURATION: 97 % | DIASTOLIC BLOOD PRESSURE: 57 MMHG | WEIGHT: 194 LBS | HEIGHT: 72 IN | HEART RATE: 67 BPM | RESPIRATION RATE: 16 BRPM

## 2021-02-10 DIAGNOSIS — G89.29 CHRONIC LEFT SHOULDER PAIN: ICD-10-CM

## 2021-02-10 DIAGNOSIS — M25.512 CHRONIC LEFT SHOULDER PAIN: ICD-10-CM

## 2021-02-10 DIAGNOSIS — M19.012 PRIMARY OSTEOARTHRITIS, LEFT SHOULDER: Primary | ICD-10-CM

## 2021-02-10 PROCEDURE — G8510 SCR DEP NEG, NO PLAN REQD: HCPCS | Performed by: SPECIALIST

## 2021-02-10 PROCEDURE — G8752 SYS BP LESS 140: HCPCS | Performed by: SPECIALIST

## 2021-02-10 PROCEDURE — G8419 CALC BMI OUT NRM PARAM NOF/U: HCPCS | Performed by: SPECIALIST

## 2021-02-10 PROCEDURE — 20610 DRAIN/INJ JOINT/BURSA W/O US: CPT | Performed by: SPECIALIST

## 2021-02-10 PROCEDURE — 99213 OFFICE O/P EST LOW 20 MIN: CPT | Performed by: SPECIALIST

## 2021-02-10 PROCEDURE — G8754 DIAS BP LESS 90: HCPCS | Performed by: SPECIALIST

## 2021-02-10 PROCEDURE — G8536 NO DOC ELDER MAL SCRN: HCPCS | Performed by: SPECIALIST

## 2021-02-10 PROCEDURE — 1101F PT FALLS ASSESS-DOCD LE1/YR: CPT | Performed by: SPECIALIST

## 2021-02-10 PROCEDURE — G8427 DOCREV CUR MEDS BY ELIG CLIN: HCPCS | Performed by: SPECIALIST

## 2021-02-10 RX ORDER — BETAMETHASONE SODIUM PHOSPHATE AND BETAMETHASONE ACETATE 3; 3 MG/ML; MG/ML
3 INJECTION, SUSPENSION INTRA-ARTICULAR; INTRALESIONAL; INTRAMUSCULAR; SOFT TISSUE ONCE
Status: COMPLETED | OUTPATIENT
Start: 2021-02-10 | End: 2021-02-10

## 2021-02-10 RX ADMIN — BETAMETHASONE SODIUM PHOSPHATE AND BETAMETHASONE ACETATE 3 MG: 3; 3 INJECTION, SUSPENSION INTRA-ARTICULAR; INTRALESIONAL; INTRAMUSCULAR; SOFT TISSUE at 10:19

## 2021-02-10 NOTE — PROGRESS NOTES
Patient: Natasha Aguirre                MRN: [id number]       SSN: [id number]  YOB: 1940        AGE: 79 y.o. SEX: male     PCP: Rachel Talamantes MD  02/10/21    CC: LEFT SHOULDER PAIN    HISTORY:  Natasha Aguirre is a 79 y.o. male who is seen for left shoulder pain. He has been feeling increased stiffness and pain in his left shoulder recently. He reports significant relief from previous left shoulder injections. He experiences cracking, popping pain when lifting his shoulder overhead. He is s/p right TKR on 11/20/18 by [facility]. He notes mild popping in his right kne when when rising from a chair. He is two years s/p left total  knee arthroscopy for peripatellar fibrosis. He is S/P [employer] revision to left TKR 1/29/13--doing well . He has been seen at the [address] [place] [address] in the past.    Pain Assessment  2/10/2021   Location of Pain Shoulder   Pain Location Comment -   Location Modifiers Left   Severity of Pain 8   Quality of Pain Sharp   Quality of Pain Comment sharp when moving   Duration of Pain -   Frequency of Pain Intermittent   Aggravating Factors Other (Comment)   Aggravating Factors Comment movement, sleeping on left side   Limiting Behavior No   Relieving Factors NSAID   Relieving Factors Comment -   Result of Injury No   Work-Related Injury -   Type of Injury -   Type of Injury Comment -     Occupation, etc:  Mr. Kiana Mcclelland is retired. He was previously the  of [employer] and an avid member of the [employer]. His wife passed away January 2019 from breast cancer. He lost his mother, mother-in-law, sister-in-law, and brother all this year. He has no longer been able to exercise on the track. He likes to travel [address]  but he has been unable to travel due to the Coronavirus pandemic. His niece is a cardiologist  in [place]. He notes that he used to live in [facility] when he was in the [employer].  He goes to the  to do yoga and works out with an . He grew up in [place]. He recently lost 11 pounds. His current weight is 194 lbs. He will receive his first Covid-19 vaccine on 2/18/21. Last 3 Recorded Weights in this Encounter    02/10/21 0939   Weight: 194 lb (88 kg)     Body mass index is 26.31 kg/m². Patient Active Problem List   Diagnosis Code    Hyperlipidemia E78.5    Coronary atherosclerosis of native coronary artery I25.10    AAA (abdominal aortic aneurysm) ([facility] [address] I71.4    Right knee pain M25.561    Abnormal LFTs (liver function tests) R94.5    Vitamin D deficiency E55.9    CLARKE (dyspnea on exertion) R06.00    Essential hypertension I10    Hyperglycemia R73.9    Tinea versicolor B36.0    S/P total knee replacement Z96.659    Fatty liver K76.0    PVC's (premature ventricular contractions) I49.3    Carotid artery disease (HCC) I77.9    Right shoulder pain M25.511    Acute back pain M54.9    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 8XXA    Primary osteoarthritis of right knee M17.11     REVIEW OF SYSTEMS: All Below are Negative except: See HPI   Constitutional: negative for fever, chills, and weight loss. Cardiovascular: negative for chest pain, claudication, leg swelling, SOB, CLARKE   Gastrointestinal: Negative for pain, N/V/C/D, Blood in stool or urine, dysuria,  hematuria, incontinence, pelvic pain. Musculoskeletal: See HPI   Neurological: Negative for dizziness and weakness. Negative for headaches, Visual changes, confusion, seizures   Phychiatric/Behavioral: Negative for depression, memory loss, substance  abuse.     Extremities: Negative for hair changes, rash, or skin lesion changes. Hematologic: Negative for bleeding problems, bruising, pallor or swollen lymph  nodes   Peripheral Vascular: No calf pain, no circulation deficits. Social History     Socioeconomic History    Marital status:      Spouse name: Not on file    Number of children: Not on file    Years of education: Not on file    Highest education level: Not on file   Occupational History    Not on file   Social Needs    Financial resource strain: Not on file    Food insecurity     Worry: Not on file     Inability: Not on file    Transportation needs     Medical: Not on file     Non-medical: Not on file   Tobacco Use    Smoking status: Former Smoker     Packs/day: 0.50     Years: 20.00     Pack years: 10.00     Types: Cigarettes     Quit date: 12/10/1995     Years since quittin.1    Smokeless tobacco: Never Used   Substance and Sexual Activity    Alcohol use:  Yes     Alcohol/week: 6.7 standard drinks     Types: 7 Glasses of wine, 1 Shots of liquor per week     Comment: social    Drug use: No     Types: Prescription, OTC    Sexual activity: Not on file   Lifestyle    Physical activity     Days per week: Not on file     Minutes per session: Not on file    Stress: Not on file   Relationships    Social connections     Talks on phone: Not on file     Gets together: Not on file     Attends Jain service: Not on file     Active member of club or organization: Not on file     Attends meetings of clubs or organizations: Not on file     Relationship status: Not on file    Intimate partner violence     Fear of current or ex partner: Not on file     Emotionally abused: Not on file     Physically abused: Not on file     Forced sexual activity: Not on file   Other Topics Concern    Not on file   Social History Narrative    Not on file     Allergies   Allergen Reactions    Pollen Extracts Sneezing     Itchy eyes, runny nose     Current Outpatient Medications   Medication Sig    ezetimibe (Zetia) 10 mg tablet Take 1 Tab by mouth daily.  rosuvastatin (CRESTOR) 20 mg tablet Take 1 Tab by mouth nightly.  amLODIPine (NORVASC) 10 mg tablet TAKE 1 TABLET BY MOUTH DAILY    niacin ER (NIASPAN) 500 mg tablet Take 1 Tab by mouth daily.  nitroglycerin (NITROSTAT) 0.4 mg SL tablet 1 Tab by SubLINGual route every five (5) minutes as needed for Chest Pain.  aspirin delayed-release (ADULT LOW DOSE ASPIRIN) 81 mg tablet Take 81 mg by mouth daily.  brinzolamide-brimonidine (Simbrinza) 1-0.2 % drps Apply 1 Drop to eye two (2) times a day.  omega 3-dha-epa-fish oil 100-160-1,000 mg cap Take 1,000 mg by mouth daily.  sildenafil citrate (Viagra) 100 mg tablet Take 1 Tab by mouth as needed (1 tablet by mouth once daily as needed).  diclofenac (Voltaren) 1 % gel Apply 4 g to affected area four (4) times daily. Right knee    coenzyme q10 (CO Q-10) 10 mg cap Take 1 Tab by mouth daily.  cholecalciferol, vitamin D3, (VITAMIN D3) 2,000 unit tab Take 1 Tab by mouth daily. No current facility-administered medications for this visit.       ORTHO EXAMINATION:    Examination Right shoulder Left shoulder   Skin Intact Intact   Effusion - -   Biceps deformity - -   Atrophy - + mild deltoid   AC joint tenderness - -   Acromial tenderness - +   Biceps tenderness - -   Forward flexion/Elevation  170   Active abduction  170   External rotation ROM 40 15   Internal rotation  65   Apprehension - -   Impingement - +   Drop Arm Test - -   Neurovascular Intact Intact   Difficulty removing left arm from shirt  Crepitation  Pain with returning arm to side  Painful arc of motion beyond 90  Mild restriction of motion  TIME OUT:  Chart reviewed for the following:   Bhakti Whyte MD, have reviewed the History, Physical and updated the Allergic reactions for [address] [place] [address],[facility] Floor performed immediately prior to start of procedure:  Bhakti Whyte MD, have performed the following reviews on Joanne Nunez prior to the start of the procedure:          * Patient was identified by name and date of birth   * Agreement on procedure being performed was verified  * Risks and Benefits explained to the patient  * Procedure site verified and marked as necessary  * Patient was positioned for comfort  * Consent was obtained     Time: 10:04 AM      Date of procedure: 2/10/2021  Procedure performed by:  Janell Ladd MD  Mr. Shaquille Lange tolerated the procedure well with no complications. MRI LEFT SHOULDER W CONT 04/27/2018  IMPRESSION:  1. Supraspinatus moderate tendinosis and small focal full-thickness tear  anterior insertion. No muscle atrophy or tendon retraction. 2. Moderate subscapularis tendinosis and partial-thickness tears. Intra-articular biceps tendinosis. 3. Superior labral tear extending posteriorly/SLAP tear type II. Posterior  inferior labral degeneration and tear. 4. Moderate acromioclavicular osteoarthrosis with inflammation. Small  subacromial spur. Subacromial subdeltoid bursitis. RADIOLOGY:  XR RIGHT TKR 8/14/20 [facility]  IMPRESSION:  Two views - No fracture, well-fixed prosthetic components in satisfactory position and alignment. XR RIGHT KNEE PREOP 10/22/18 [facility]  IMPRESSION:  Three views - No fractures, no effusion, severe/complete medial joint space narrowing, + osteophytes present. IKDC Grade D. Femoral valgus angle 6.1 degrees. Chondrocalcinosis of lateral mensicus. Well fixed left knee implants    XR LEFT SHOULDER 4/12/18 [facility]  IMPRESSION:  Three views - No fractures, no acromioclavicular narrowing, mild glenohumeral narrowing, no calcific densities. XR KNEES [facility] 10/19/17  IMPRESSION:  Moderately severe medial joint space narrowing right knee, well fixed L TKR components    XR LEFT SHOULDER 5/5/16  IMPRESSION:  No fractures, mild acromioclavicular narrowing, no glenohumeral narrowing, no calcific densities. IMPRESSION:      ICD-10-CM ICD-9-CM    1.  Primary osteoarthritis, left shoulder  M19.012 715.11 betamethasone (CELESTONE) injection 3 mg      DRAIN/INJECT LARGE JOINT/BURSA      REFERRAL TO PHYSICAL THERAPY   2. Chronic left shoulder pain  M25.512 719.41 betamethasone (CELESTONE) injection 3 mg    G89.29 338.29 DRAIN/INJECT LARGE JOINT/BURSA      REFERRAL TO PHYSICAL THERAPY     PLAN: He will start a brief course of outpatient physical therapy. After discussing treatment options, patient's left shoulder was injected with 4 cc Marcaine and 1/2 cc Celestone. We discussed possible need for left shoulder arthroscopy at some time in the future if pain continues. He will follow up PRN with Dr. Ananya Obregon for orthopedic shoulder surgery consultation.       Scribed by [facility]  ([address]) as dictated by Sangeeta Anderson MD

## 2021-02-15 ENCOUNTER — HOSPITAL ENCOUNTER (OUTPATIENT)
Dept: PHYSICAL THERAPY | Age: 81
Discharge: HOME | End: 2021-02-15
Payer: MEDICARE

## 2021-02-15 PROCEDURE — 97110 THERAPEUTIC EXERCISES: CPT

## 2021-02-15 PROCEDURE — 97140 MANUAL THERAPY 1/> REGIONS: CPT

## 2021-02-15 PROCEDURE — 97162 PT EVAL MOD COMPLEX 30 MIN: CPT

## 2021-02-15 NOTE — PROGRESS NOTES
PT DAILY TREATMENT NOTE     Patient Name: Yanet Asp  Date:2/15/2021  : 1940  [x]  Patient  Verified  Payor: VA MEDICARE / Plan: VA MEDICARE PART A & B / Product Type: Medicare /    In time:1130  Out time:1212  Total Treatment Time (min): 42  Visit #: 1 of 8    Medicare/BCBS Only   Total Timed Codes (min):  24 1:1 Treatment Time:  42       Treatment Area: Left shoulder pain [M25.512]    SUBJECTIVE  Pain Level (0-10 scale): 3  Any medication changes, allergies to medications, adverse drug reactions, diagnosis change, or new procedure performed?: [x] No    [] Yes (see summary sheet for update)  Subjective functional status/changes:   [] No changes reported       OBJECTIVE        Min Type Additional Details    [] Estim:  []Unatt       []IFC  []Premod                        []Other:  []w/ice   []w/heat  Position:  Location:    [] Estim: []Att    []TENS instruct  []NMES                    []Other:  []w/US   []w/ice   []w/heat  Position:  Location:    []  Traction: [] Cervical       []Lumbar                       [] Prone          []Supine                       []Intermittent   []Continuous Lbs:  [] before manual  [] after manual    []  Ultrasound: []Continuous   [] Pulsed                           []1MHz   []3MHz W/cm2:  Location:    []  Iontophoresis with dexamethasone         Location: [] Take home patch   [] In clinic    []  Ice     []  heat  []  Ice massage  []  Laser   []  Anodyne Position:  Location:    []  Laser with stim  []  Other:  Position:  Location:    []  Vasopneumatic Device Pressure:       [] lo [] med [] hi   Temperature: [] lo [] med [] hi   [] Skin assessment post-treatment:  []intact []redness- no adverse reaction    []redness  adverse reaction:     18 min [x]Eval                  []Re-Eval       16 min Therapeutic Exercise:  [] See flow sheet : HEP   Rationale: increase ROM and increase strength to improve the patients ability to perform ADl    8 min Manual Therapy:  TPR to left infraspinatus, PROM left shoulder   The manual therapy interventions were performed at a separate and distinct time from the therapeutic activities interventions. Rationale: decrease pain, increase ROM, increase tissue extensibility and decrease trigger points to improve functional use of left UE . With   [] TE   [] TA   [] neuro   [] other: Patient Education: [x] Review HEP    [] Progressed/Changed HEP based on:   [] positioning   [] body mechanics   [] transfers   [] heat/ice application    [] other:      Other Objective/Functional Measures:       Pain Level (0-10 scale) post treatment: 3    ASSESSMENT/Changes in Function:      Patient will continue to benefit from skilled PT services to modify and progress therapeutic interventions, address functional mobility deficits, address ROM deficits, address strength deficits, analyze and address soft tissue restrictions and analyze and cue movement patterns to attain remaining goals. [x]  See Plan of Care  []  See progress note/recertification  []  See Discharge Summary         Progress towards goals / Updated goals:  Short Term Goals: To be accomplished in 1 weeks:   1. The pt will be I and compliant with HEP. IE issued HEP  Long Term Goals: To be accomplished in 4 weeks:   1. The pt will demonstrate 130 degrees of left shoulder ABD to improve ability for functional tasks. IE- 105 degrees   2. Improve left shoulder full can to 4/5 to improve ability for daily tasks   IE- 3+/5 with pain   3. Improve FOTO score to predicted outcome to improve ability for daily tasks   IE- 50   4. The pt will report no difficulty with reaching a shoulder height shelf.    IE- some difficulty     PLAN  []  Upgrade activities as tolerated     [x]  Continue plan of care  []  Update interventions per flow sheet       []  Discharge due to:_  []  Other:_      Marry Kenney, PT 2/15/2021  11:09 AM    Future Appointments   Date Time Provider Reza Bose   2/15/2021 11:30 AM Martin Macedo, PT [facility]   2/23/2021  3:00 PM Elaine Hampton MD [facility] BS AMB   3/2/2021 10:00 AM Faviola Reyes MD [facility] BS AMB   4/27/2021  9:00 AM Brittani Birmingham MD [facility] BS AMB   8/26/2021  8:00 AM [facility] BS AMB   8/26/2021  9:00 AM [facility] BS AMB   8/26/2021 10:45 AM Abbey Lazaro PA [facility] BS AMB

## 2021-02-15 NOTE — PROGRESS NOTES
[facility]  [address]  [place], [address]  [phone] fax    Plan of Care/ Statement of Necessity for Physical Therapy Services    Patient name: Jose Alejandro Esteves Start of Care: 2/15/2021   Referral source: Brennan Cao MD : 1940    Medical Diagnosis: Left shoulder pain [M25.512]  Payor: VA MEDICARE / Plan: VA MEDICARE PART A & B / Product Type: Medicare /  Onset Date:initlal over a year, increase 2020    Treatment Diagnosis: left shoulder pain   Prior Hospitalization: see medical history Provider#: [id number]   Medications: Verified on Patient summary List    Comorbidities: depression, OA, TKA, RTC repair   Prior Level of Function: functionally I with daily tasks. The Plan of Care and following information is based on the information from the initial evaluation. Assessment/ key information: 80 y/o male presents with c/o left shoulder pain that started over a year ago but increased around 2020. No mechanism of injury is noted. The pt demonstrates limited left shoulder AROM  Flexion, scaption, abduction and ER. PROM limited with abd and ER. + full and empty can tests and + pain with active elevation and eccentric lowering. Signs and symptoms consistent with RTC pathology. The pt will benefit from PT to address the aforementioned impariments. Evaluation Complexity History MEDIUM  Complexity : 1-2 comorbidities / personal factors will impact the outcome/ POC ; Examination MEDIUM Complexity : 3 Standardized tests and measures addressing body structure, function, activity limitation and / or participation in recreation  ;Presentation MEDIUM Complexity : Evolving with changing characteristics  ; Clinical Decision Making MEDIUM Complexity : FOTO score of 26-74  Overall Complexity Rating: MEDIUM  Problem List: pain affecting function, decrease ROM, decrease strength, decrease ADL/ functional abilitiies, decrease activity tolerance and decrease flexibility/ joint mobility   Treatment Plan may include any combination of the following: Therapeutic exercise, Therapeutic activities, Neuromuscular re-education, Physical agent/modality, Manual therapy, Patient education and Self Care training  Patient / Family readiness to learn indicated by: asking questions, trying to perform skills and interest  Persons(s) to be included in education: patient (P)  Barriers to Learning/Limitations: None  Patient Goal (s): To improve flexibility  Patient Self Reported Health Status: good  Rehabilitation Potential: good    Short Term Goals: To be accomplished in 1 weeks:   1. The pt will be I and compliant with HEP. Long Term Goals: To be accomplished in 4 weeks:   1. The pt will demonstrate 130 degrees of left shoulder ABD to improve ability for functional tasks. 2. Improve left shoulder full can to 4/5 to improve ability for daily tasks   3. Improve FOTO score to predicted outcome to improve ability for daily tasks   4. The pt will report no difficulty with reaching a shoulder height shelf. Frequency / Duration: Patient to be seen 2 times per week for 4 weeks. Patient/ Caregiver education and instruction: Diagnosis, prognosis, self care, activity modification and exercises   [x]  Plan of care has been reviewed with PTA    Certification Period: 2-15-21 to 3-16-21  Stan Leyden, PT 2/15/2021 11:12 AM    ________________________________________________________________________    I certify that the above Therapy Services are being furnished while the patient is under my care. I agree with the treatment plan and certify that this therapy is necessary.     [de-identified] Signature:____________________  Date:____________Time: _________     Bernarda Norton MD  Please sign and return to [facility] [address]  [place], [address]  [phone] fax

## 2021-02-23 ENCOUNTER — OFFICE VISIT (OUTPATIENT)
Dept: ORTHOPEDIC SURGERY | Age: 81
End: 2021-02-23
Payer: MEDICARE

## 2021-02-23 ENCOUNTER — HOSPITAL ENCOUNTER (OUTPATIENT)
Dept: PHYSICAL THERAPY | Age: 81
Discharge: HOME | End: 2021-02-23
Payer: MEDICARE

## 2021-02-23 VITALS
BODY MASS INDEX: 26.14 KG/M2 | DIASTOLIC BLOOD PRESSURE: 58 MMHG | OXYGEN SATURATION: 96 % | TEMPERATURE: 97.1 F | HEART RATE: 68 BPM | RESPIRATION RATE: 18 BRPM | SYSTOLIC BLOOD PRESSURE: 111 MMHG | WEIGHT: 193 LBS | HEIGHT: 72 IN

## 2021-02-23 DIAGNOSIS — M75.102 ROTATOR CUFF SYNDROME, LEFT: ICD-10-CM

## 2021-02-23 DIAGNOSIS — M19.012 PRIMARY OSTEOARTHRITIS, LEFT SHOULDER: Primary | ICD-10-CM

## 2021-02-23 PROCEDURE — G8754 DIAS BP LESS 90: HCPCS | Performed by: ORTHOPAEDIC SURGERY

## 2021-02-23 PROCEDURE — G8752 SYS BP LESS 140: HCPCS | Performed by: ORTHOPAEDIC SURGERY

## 2021-02-23 PROCEDURE — G8536 NO DOC ELDER MAL SCRN: HCPCS | Performed by: ORTHOPAEDIC SURGERY

## 2021-02-23 PROCEDURE — G8419 CALC BMI OUT NRM PARAM NOF/U: HCPCS | Performed by: ORTHOPAEDIC SURGERY

## 2021-02-23 PROCEDURE — 97140 MANUAL THERAPY 1/> REGIONS: CPT

## 2021-02-23 PROCEDURE — 1101F PT FALLS ASSESS-DOCD LE1/YR: CPT | Performed by: ORTHOPAEDIC SURGERY

## 2021-02-23 PROCEDURE — G8510 SCR DEP NEG, NO PLAN REQD: HCPCS | Performed by: ORTHOPAEDIC SURGERY

## 2021-02-23 PROCEDURE — 73030 X-RAY EXAM OF SHOULDER: CPT | Performed by: ORTHOPAEDIC SURGERY

## 2021-02-23 PROCEDURE — 97110 THERAPEUTIC EXERCISES: CPT

## 2021-02-23 PROCEDURE — 99213 OFFICE O/P EST LOW 20 MIN: CPT | Performed by: ORTHOPAEDIC SURGERY

## 2021-02-23 PROCEDURE — G8427 DOCREV CUR MEDS BY ELIG CLIN: HCPCS | Performed by: ORTHOPAEDIC SURGERY

## 2021-02-23 NOTE — PROGRESS NOTES
PT DAILY TREATMENT NOTE     Patient Name: Palmira Zuluaga  Date:2021  : 1940  [x]  Patient  Verified  Payor: Anna Montoya / Plan: VA MEDICARE PART A & B / Product Type: Medicare /    In time:1:37  Out time:2:17  Total Treatment Time (min): 40  Visit #: 2 of 8    Medicare/BCBS Only   Total Timed Codes (min):  40 1:1 Treatment Time:  40       Treatment Area: Left shoulder pain [M25.512]    SUBJECTIVE  Pain Level (0-10 scale): 3  Any medication changes, allergies to medications, adverse drug reactions, diagnosis change, or new procedure performed?: [x] No    [] Yes (see summary sheet for update)  Subjective functional status/changes:   [] No changes reported  The pt reports HEP compliance and states he seems to be doing a little better.      OBJECTIVE    Modality rationale:    Min Type Additional Details    [] Estim:  []Unatt       []IFC  []Premod                        []Other:  []w/ice   []w/heat  Position:  Location:    [] Estim: []Att    []TENS instruct  []NMES                    []Other:  []w/US   []w/ice   []w/heat  Position:  Location:    []  Traction: [] Cervical       []Lumbar                       [] Prone          []Supine                       []Intermittent   []Continuous Lbs:  [] before manual  [] after manual    []  Ultrasound: []Continuous   [] Pulsed                           []1MHz   []3MHz W/cm2:  Location:    []  Iontophoresis with dexamethasone         Location: [] Take home patch   [] In clinic    []  Ice     []  heat  []  Ice massage  []  Laser   []  Anodyne Position:  Location:    []  Laser with stim  []  Other:  Position:  Location:    []  Vasopneumatic Device Pressure:       [] lo [] med [] hi   Temperature: [] lo [] med [] hi   [] Skin assessment post-treatment:  []intact []redness- no adverse reaction    []redness  adverse reaction:       32 min Therapeutic Exercise:  [x] See flow sheet :   Rationale: increase ROM and increase strength to improve the patients ability to perform ADL    8 min Manual Therapy:  Pt right sidelying-STJ mobs, TPR to left infraspinatus, PROM left shoulder   The manual therapy interventions were performed at a separate and distinct time from the therapeutic activities interventions. Rationale: decrease pain, increase ROM and increase tissue extensibility to improve functional use of left UE            With   [] TE   [] TA   [] neuro   [] other: Patient Education: [x] Review HEP    [] Progressed/Changed HEP based on:   [] positioning   [] body mechanics   [] transfers   [] heat/ice application    [] other:      Other Objective/Functional Measures: initiated treatment per flow sheet     Pain Level (0-10 scale) post treatment: 0    ASSESSMENT/Changes in Function:  The pt is showing progress with current therex. + Trigger point noted in the left infraspinatus with good relief following release. May be ready to progress towards standing cane flexion next session. Patient will continue to benefit from skilled PT services to modify and progress therapeutic interventions, address functional mobility deficits, address ROM deficits, address strength deficits, analyze and address soft tissue restrictions and analyze and cue movement patterns to attain remaining goals. [x]  See Plan of Care  []  See progress note/recertification  []  See Discharge Summary         Progress towards goals / Updated goals:  Short Term Goals: To be accomplished in 1 weeks:              1. The pt will be I and compliant with HEP. IE issued HEP   Current: goal MET on 2-23-21  Long Term Goals: To be accomplished in 4 weeks:              1. The pt will demonstrate 130 degrees of left shoulder ABD to improve ability for functional tasks.               IE- 105 degrees              2. Improve left shoulder full can to 4/5 to improve ability for daily tasks              IE- 3+/5 with pain              3. Improve FOTO score to predicted outcome to improve ability for daily tasks              IE- 50              4. The pt will report no difficulty with reaching a shoulder height shelf.               IE- some difficulty     PLAN  []  Upgrade activities as tolerated     []  Continue plan of care  []  Update interventions per flow sheet       []  Discharge due to:_  []  Other:_      Aries Covarrubias, PT 2/23/2021  2:19 PM    Future Appointments   Date Time Provider Reza Bose   2/23/2021  3:00 PM Sheri Bhatia MD [facility] BS AMB   2/24/2021 12:15 PM Arturomiguel Mendoza, PT [facility]   3/2/2021 10:00 AM Jaimie Martinez MD [facility] BS AMB   3/2/2021  1:45 PM Arturomiguel Mendoza, PT [facility]   3/3/2021 12:00 PM [facility], PTA [facility]   3/9/2021 12:00 PM [facility], PTA [facility]   3/10/2021 12:00 PM [facility], PTA [facility]   3/16/2021  1:45 PM Arturo Reji, PT [facility]   3/17/2021 12:15 PM Arturomiguel Mendoza, PT [facility]   4/27/2021  9:00 AM Aileen Cox MD [facility] BS AMB   8/26/2021  8:00 AM [facility] AMB   8/26/2021  9:00 AM [facility] BS AMB   8/26/2021 10:45 AM Elsa Lazaro PA [facility] BS AMB

## 2021-02-23 NOTE — PROGRESS NOTES
Patient: Cortes Murrell                MRN: [id number]       SSN: [id number]  YOB: 1940        AGE: 79 y.o. SEX: male  Body mass index is 26.18 kg/m². PCP: Jaimie Martinez MD  02/23/21    CHIEF COMPLAINT: Left shoulder pain    Pain 5/10    HPI: Cortes Murrell is a 79 y.o. male patient who presents to the office today with left shoulder pain. He says he is been having some left shoulder pain for the past few years. It slowly gotten worse. He has had x-rays, and injection, and started physical therapy. He has not had an MRI. He denies any specific injury to the left shoulder. He does report pain at night. The injection has given him some relief. Past Medical History:   Diagnosis Date    AAA (abdominal aortic aneurysm) ([facility] [address] 03/2010    noted on CT (abdomen)    Abnormal LFT's 12/95, 04/16/03    Acute meniscal tear, lateral 01/2007    s/p arthorscopic surg (Dr. Tony Love)    Atypical chest pain 02/04/09    CAD (coronary artery disease), native coronary artery     Cardiac cath 04/13/2009    dLM 30%. mLAD 30%. oD2 30%. pCX 55% (FFR 0.95). mRCA 40%. RPDA 65% (FFR 0.86). EF 65%.  Cardiac echocardiogram 04/03/2007    EF 60%. No RWMA. Gr 1 DDfx. LAE. No significant valvular heart disease.  Cardiac nuclear imaging test, low risk 12/04/2015    Low risk. No ischemia or prior infarction. No WMA. EF 67%. Neg EKG on pharm stress test.    Cardiovascular aorto-iliac duplex 06/29/2015    AAA measures 3.2 x 3.1 cm Trv. (Prev 3.2 x 3.4 cm on 6/19/14). Aneurysm is infrarenal & fusiform w/complex intraluminal thrombus within. Mod bilateral common iliac stenosis.  Carotid duplex 03/16/2016    Mild < 50% BETHEL stenosis. Mod 25-70% LICA stenosis. Similar to study of 6/29/15.  Cholecystitis chronic 03/2008    s/p sue    Diverticulosis     Diverticulosis 9-28-15    DR. 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 [address]     Tinea versicolor 7/23/2012    2.8 x 2.9cm infra-renal    Tubular adenoma 9-28-15    Vitamin D deficiency 4/13/2011       Family History   Problem Relation Age of Onset    Heart Disease Mother     Stroke Father     No Known Problems Sister     No Known Problems Maternal Grandmother     No Known Problems Maternal Grandfather     Dementia Paternal Grandmother     No Known Problems Paternal Grandfather     Cancer Brother         lung CA       Current Outpatient Medications   Medication Sig Dispense Refill    ezetimibe (Zetia) 10 mg tablet Take 1 Tab by mouth daily. 90 Tab 4    sildenafil citrate (Viagra) 100 mg tablet Take 1 Tab by mouth as needed (1 tablet by mouth once daily as needed). 18 Tab 3    rosuvastatin (CRESTOR) 20 mg tablet Take 1 Tab by mouth nightly. 90 Tab 3    amLODIPine (NORVASC) 10 mg tablet TAKE 1 TABLET BY MOUTH DAILY 90 Tab 3    niacin ER (NIASPAN) 500 mg tablet Take 1 Tab by mouth daily. 90 Tab 4    nitroglycerin (NITROSTAT) 0.4 mg SL tablet 1 Tab by SubLINGual route every five (5) minutes as needed for Chest Pain. 3 Bottle 4    aspirin delayed-release (ADULT LOW DOSE ASPIRIN) 81 mg tablet Take 81 mg by mouth daily.  brinzolamide-brimonidine (Simbrinza) 1-0.2 % drps Apply 1 Drop to eye two (2) times a day.       coenzyme q10 (CO Q-10) 10 mg cap Take 1 Tab by mouth daily.  cholecalciferol, vitamin D3, (VITAMIN D3) 2,000 unit tab Take 1 Tab by mouth daily.  omega 3-dha-epa-fish oil 100-160-1,000 mg cap Take 1,000 mg by mouth daily. 90 Cap 3    diclofenac (Voltaren) 1 % gel Apply 4 g to affected area four (4) times daily. Right knee 5 Each 5       Allergies   Allergen Reactions    Pollen Extracts Sneezing     Itchy eyes, runny nose       Past Surgical History:   Procedure Laterality Date    ENDOSCOPY, COLON, DIAGNOSTIC      normal per patient;     HX ARTHROPLASTY  04/05/10    [place]-left knee    HX CATARACT REMOVAL Bilateral     2014    HX CHOLECYSTECTOMY  2008    chronic cholecystitis    HX COLONOSCOPY  9-28-15    HX HERNIA REPAIR      L inguinal    HX KNEE ARTHROSCOPY  07    left knee    HX KNEE ARTHROSCOPY Left 2014    peripatellar debridement    HX KNEE REPLACEMENT Left 2013    [place]    HX MOHS PROCEDURES  2008    HX RHINOPLASTY      HX TONSILLECTOMY      HX VASECTOMY      HX WISDOM TEETH EXTRACTION      x4    MN ANESTH,SURGERY OF SHOULDER         Social History     Socioeconomic History    Marital status:      Spouse name: Not on file    Number of children: Not on file    Years of education: Not on file    Highest education level: Not on file   Occupational History    Not on file   Social Needs    Financial resource strain: Not on file    Food insecurity     Worry: Not on file     Inability: Not on file    Transportation needs     Medical: Not on file     Non-medical: Not on file   Tobacco Use    Smoking status: Former Smoker     Packs/day: 0.50     Years: 20.00     Pack years: 10.00     Types: Cigarettes     Quit date: 12/10/1995     Years since quittin.2    Smokeless tobacco: Never Used   Substance and Sexual Activity    Alcohol use:  Yes     Alcohol/week: 6.7 standard drinks     Types: 7 Glasses of wine, 1 Shots of liquor per week     Comment: social    Drug use: No     Types: Prescription, OTC    Sexual activity: Not on file   Lifestyle    Physical activity     Days per week: Not on file     Minutes per session: Not on file    Stress: Not on file   Relationships    Social connections     Talks on phone: Not on file     Gets together: Not on file     Attends Yazdanism service: Not on file     Active member of club or organization: Not on file     Attends meetings of clubs or organizations: Not on file     Relationship status: Not on file    Intimate partner violence     Fear of current or ex partner: Not on file     Emotionally abused: Not on file     Physically abused: Not on file     Forced sexual activity: Not on file   Other Topics Concern    Not on file   Social History Narrative    Not on file       REVIEW OF SYSTEMS:      CON: negative for recent weight loss/gain, fever, or chills  EYE: negative for double or blurry vision  ENT: negative for hoarseness  RS:   negative for cough, URI, SOB  CV:  negative for chest pain, palpitations  GI:    negative for blood in stool, nausea/vomiting  :  negative for blood in urine  MS: As per HPI  Other systems reviewed and noted below. PHYSICAL EXAMINATION:  Visit Vitals  BP (!) 111/58 (BP 1 Location: Left upper arm, BP Patient Position: Sitting, BP Cuff Size: Large adult)   Pulse 68   Temp 97.1 °F (36.2 °C) (Temporal)   Resp 18   Ht 6' (1.829 m)   Wt 193 lb (87.5 kg)   SpO2 96%   BMI 26.18 kg/m²     Body mass index is 26.18 kg/m². GENERAL: Alert and oriented x3, in no acute distress, well-developed, well-nourished. HEENT: Normocephalic, atraumatic. RESP: Non labored breathing with equal chest rise on inspiration. CV: Well perfused extremities. No cyanosis or clubbing noted. ABDOMEN: Soft, non-tender, non-distended.    Shoulder Examination     R   L  ROM   FF  Full   Full  ER  Full   Full   IR  Full   Full  Rotator Cuff Pain   Supra  -   +   Infra  -   -   Subscap -   -  Crepitus  -   -  Effusion  -   -  Warmth  -   -   Erythema  -   -  Instability  -   -  AC Joint TTP  -   -  Clavicle   Deformity -   -   TTP  -   -  Proximal Humerus   Deformity -   -   TTP  -   -  Deltoid Strength 5   5  Biceps Strength 5   5  Biceps Deformity -   -  Biceps Groove Pain -   -  Impingement Sign -   -       IMAGING:  X-rays 4 views of the left shoulder were taken the office today which do not show any acute or chronic bony abnormalities. There is no arthritic change in the glenohumeral joint appreciated. ASSESSMENT & PLAN  Diagnosis: Left shoulder supraspinatus rotator cuff pain    [address] has pain and weakness with supraspinatus rotator cuff testing. He is in physical therapy and I recommend continuing that for the next 5 to 6 weeks. He has had a corticosteroid injection. At this point I would recommend continuing with conservative treatment as we can hopefully avoid surgery with him with a rehab program and physical therapy. I will see him back in 6 weeks to see how he is doing. If his symptoms persist we may end up ordering an MRI.       Electronically signed by: Tamika Tuttle MD

## 2021-02-24 ENCOUNTER — HOSPITAL ENCOUNTER (OUTPATIENT)
Dept: PHYSICAL THERAPY | Age: 81
Discharge: HOME | End: 2021-02-24
Payer: MEDICARE

## 2021-02-24 PROCEDURE — 97110 THERAPEUTIC EXERCISES: CPT

## 2021-02-24 PROCEDURE — 97140 MANUAL THERAPY 1/> REGIONS: CPT

## 2021-02-24 NOTE — PROGRESS NOTES
PT DAILY TREATMENT NOTE     Patient Name: Yee Abernathy  Date:2021  : 1940  [x]  Patient  Verified  Payor: Rupa Flores / Plan: [place] & [place] / Product Type: Medicare /    In time:12:15  Out time:12:45   Total Treatment Time (min): 30  Visit #: 3 of 8    Medicare/BCBS Only   Total Timed Codes (min):  30 1:1 Treatment Time:  30       Treatment Area: Left shoulder pain [M25.512]    SUBJECTIVE  Pain Level (0-10 scale): 2  Any medication changes, allergies to medications, adverse drug reactions, diagnosis change, or new procedure performed?: [x] No    [] Yes (see summary sheet for update)  Subjective functional status/changes:   [] No changes reported  The pt reports improvement with PT    OBJECTIVE    Modality rationale:    Min Type Additional Details    [] Estim:  []Unatt       []IFC  []Premod                        []Other:  []w/ice   []w/heat  Position:  Location:    [] Estim: []Att    []TENS instruct  []NMES                    []Other:  []w/US   []w/ice   []w/heat  Position:  Location:    []  Traction: [] Cervical       []Lumbar                       [] Prone          []Supine                       []Intermittent   []Continuous Lbs:  [] before manual  [] after manual    []  Ultrasound: []Continuous   [] Pulsed                           []1MHz   []3MHz W/cm2:  Location:    []  Iontophoresis with dexamethasone         Location: [] Take home patch   [] In clinic    []  Ice     []  heat  []  Ice massage  []  Laser   []  Anodyne Position:  Location:    []  Laser with stim  []  Other:  Position:  Location:    []  Vasopneumatic Device Pressure:       [] lo [] med [] hi   Temperature: [] lo [] med [] hi   [] Skin assessment post-treatment:  []intact []redness- no adverse reaction    []redness  adverse reaction:       22 min Therapeutic Exercise:  [x] See flow sheet :   Rationale: increase ROM and increase strength to improve the patients ability to perform functional tasks.      8 min Manual Therapy:  Left STJ mobs, TPR to left supraspinatus with pt in right sidelying   The manual therapy interventions were performed at a separate and distinct time from the therapeutic activities interventions. Rationale: decrease pain, increase ROM, increase tissue extensibility and decrease trigger points to improve ability for ADL            With   [] TE   [] TA   [] neuro   [] other: Patient Education: [x] Review HEP    [] Progressed/Changed HEP based on:   [] positioning   [] body mechanics   [] transfers   [] heat/ice application    [] other:      Other Objective/Functional Measures: Progressed to standing cane with AAROM and increased reps per flow sheet    Pain Level (0-10 scale) post treatment: 0    ASSESSMENT/Changes in Function:  The pt is progressing nicely with PT as noted with decreased overall pain and improve ROM. Patient will continue to benefit from skilled PT services to modify and progress therapeutic interventions, address functional mobility deficits, address ROM deficits, address strength deficits, analyze and address soft tissue restrictions and analyze and cue movement patterns to attain remaining goals. []  See Plan of Care  []  See progress note/recertification  []  See Discharge Summary         Progress towards goals / Updated goals:  Short Term Goals: To be accomplished in 1 weeks:              7. The pt will be I and compliant with HEP.             CB issued HEP              Current: goal MET on 2-23-21  Long Term Goals: To be accomplished in 4 weeks:              5. The pt will demonstrate 130 degrees of left shoulder ABD to improve ability for functional tasks.              IE- 105 degrees   Current:  degrees on 2-24-21              2.  Improve left shoulder full can to 4/5 to improve ability for daily tasks              IE- 3+/5 with pain              3. Improve FOTO score to predicted outcome to improve ability for daily tasks              EZ- 50              4. The pt will report no difficulty with reaching a shoulder height shelf.              IE- some difficulty     PLAN  []  Upgrade activities as tolerated     [x]  Continue plan of care  []  Update interventions per flow sheet       []  Discharge due to:_  []  Other:_      Tressa Ruby, PT 2/24/2021  12:29 PM    Future Appointments   Date Time Provider Reza Lidya   3/2/2021 10:00 AM Cash Spivey MD [facility] BS AMB   3/2/2021  1:45 PM German Sanchez, PT [facility]   3/3/2021 12:00 PM Mitchell Patel, PTA [facility]   3/9/2021 11:30 AM German Sanchez, PT [facility]   3/10/2021 10:45 AM German Sanchez, PT [facility]   3/16/2021  1:45 PM German Sanchez, PT [facility]   3/17/2021 12:15 PM German Sanchez, PT [facility]   4/7/2021  1:45 PM Taylor Beaver MD [facility] BS AMB   4/27/2021  9:00 AM Lewis Kelley MD [facility] BS AMB   8/26/2021  8:00 AM [facility] AMB   8/26/2021  9:00 AM [facility] AM[facility]   8/26/2021 10:45 AM Priyanka Lazaro PA [facility] BS AMB

## 2021-03-02 ENCOUNTER — VIRTUAL VISIT (OUTPATIENT)
Dept: FAMILY MEDICINE CLINIC | Age: 81
End: 2021-03-02
Payer: MEDICARE

## 2021-03-02 ENCOUNTER — HOSPITAL ENCOUNTER (OUTPATIENT)
Dept: PHYSICAL THERAPY | Age: 81
Discharge: HOME | End: 2021-03-02
Payer: MEDICARE

## 2021-03-02 DIAGNOSIS — E78.5 HYPERLIPIDEMIA, UNSPECIFIED HYPERLIPIDEMIA TYPE: Primary | ICD-10-CM

## 2021-03-02 DIAGNOSIS — S52.102G: ICD-10-CM

## 2021-03-02 DIAGNOSIS — I25.119 ATHEROSCLEROSIS OF NATIVE CORONARY ARTERY WITH ANGINA PECTORIS, UNSPECIFIED WHETHER NATIVE OR TRANSPLANTED HEART (HCC): ICD-10-CM

## 2021-03-02 DIAGNOSIS — I10 ESSENTIAL HYPERTENSION: ICD-10-CM

## 2021-03-02 DIAGNOSIS — F33.9 DEPRESSION, RECURRENT (HCC): ICD-10-CM

## 2021-03-02 DIAGNOSIS — I71.40 ABDOMINAL AORTIC ANEURYSM (AAA) WITHOUT RUPTURE: ICD-10-CM

## 2021-03-02 DIAGNOSIS — M75.102 ROTATOR CUFF SYNDROME, LEFT: ICD-10-CM

## 2021-03-02 PROCEDURE — 97110 THERAPEUTIC EXERCISES: CPT

## 2021-03-02 PROCEDURE — G8756 NO BP MEASURE DOC: HCPCS | Performed by: FAMILY MEDICINE

## 2021-03-02 PROCEDURE — 97140 MANUAL THERAPY 1/> REGIONS: CPT

## 2021-03-02 PROCEDURE — G8427 DOCREV CUR MEDS BY ELIG CLIN: HCPCS | Performed by: FAMILY MEDICINE

## 2021-03-02 PROCEDURE — G8510 SCR DEP NEG, NO PLAN REQD: HCPCS | Performed by: FAMILY MEDICINE

## 2021-03-02 PROCEDURE — 99214 OFFICE O/P EST MOD 30 MIN: CPT | Performed by: FAMILY MEDICINE

## 2021-03-02 PROCEDURE — 1101F PT FALLS ASSESS-DOCD LE1/YR: CPT | Performed by: FAMILY MEDICINE

## 2021-03-02 PROCEDURE — G0463 HOSPITAL OUTPT CLINIC VISIT: HCPCS | Performed by: FAMILY MEDICINE

## 2021-03-02 NOTE — PROGRESS NOTES
PT DAILY TREATMENT NOTE     Patient Name: Santiago Gonzales  Date:3/2/2021  : 1940  [x]  Patient  Verified  Payor: [place] MEDICARE / Plan: [place] MEDICARE PART A & B / Product Type: Medicare /    In time:1:48  Out time:2:24  Total Treatment Time (min): 36  Visit #: 4 of 8    Medicare/BCBS Only   Total Timed Codes (min):  36 1:1 Treatment Time:  36       Treatment Area: Left shoulder pain [M25.512]    SUBJECTIVE  Pain Level (0-10 scale): 3  Any medication changes, allergies to medications, adverse drug reactions, diagnosis change, or new procedure performed?: [x] No    [] Yes (see summary sheet for update)  Subjective functional status/changes:   [] No changes reported  The pt reports he seems to be doing better. Pain noted with movement.    OBJECTIVE      28 min Therapeutic Exercise:  [x] See flow sheet :   Rationale: increase ROM and increase strength to improve the patient’s ability to perform functional tasks.          8 min Manual Therapy:  Left STJ clocks, TPR to left infraspinatus and supraspinatus with pt in right sidelying. Pt supine - PROM ER in scapular plane   The manual therapy interventions were performed at a separate and distinct time from the therapeutic activities interventions.  Rationale: decrease pain, increase ROM and increase tissue extensibility to perform ADL              With   [] TE   [] TA   [] neuro   [] other: Patient Education: [x] Review HEP    [] Progressed/Changed HEP based on:   [] positioning   [] body mechanics   [] transfers   [] heat/ice application    [] other:      Other Objective/Functional Measures: added 1/2 prone Ts and Is. Added t-band IR /ER.     Pain Level (0-10 scale) post treatment: 0    ASSESSMENT/Changes in Function:  The pt was able to progress with therex today without pain increase. He is progressing well with PT.     Patient will continue to benefit from skilled PT services to modify and progress therapeutic interventions, address functional mobility  deficits, address ROM deficits, address strength deficits, analyze and address soft tissue restrictions, analyze and cue movement patterns and analyze and modify body mechanics/ergonomics to attain remaining goals. []  See Plan of Care  []  See progress note/recertification  []  See Discharge Summary         Progress towards goals / Updated goals:  Short Term Goals: To be accomplished in 1 weeks:              4. The pt will be I and compliant with HEP.             SE issued HEP              [id number]: goal MET on 2-23-21  Long Term Goals: To be accomplished in 4 weeks:              1. The pt will demonstrate 130 degrees of left shoulder ABD to improve ability for functional tasks.              IE- 105 degrees              Current:  degrees on 2-24-21              2. Improve left shoulder full can to 4/5 to improve ability for daily tasks              IE- 3+/5 with pain              3. Improve FOTO score to predicted outcome to improve ability for daily tasks              WR- 50              4.  The pt will report no difficulty with reaching a shoulder height shelf.              IE- some difficulty     PLAN  []  Upgrade activities as tolerated     [x]  Continue plan of care  []  Update interventions per flow sheet       []  Discharge due to:_  []  Other:_      Israel Kim, PT 3/2/2021  2:03 PM    Future Appointments   Date Time Provider Reza Bose   3/3/2021 12:00 PM Denisse Sommer, PTA [facility]   3/9/2021 11:30 AM Angelia Patience, PT [facility]   3/10/2021 10:45 AM Angelia Patience, PT [facility]   3/16/2021  1:45 PM Angelia Patience, PT [facility]   3/17/2021 12:15 PM Angelia Patience, PT [facility]   4/7/2021  1:45 PM Glory Cantor MD [facility] BS AMB   4/27/2021  9:00 AM Holly Garzon MD [facility] BS AMB   8/26/2021  8:00 AM [facility] AMB   8/26/2021  9:00 AM [facility] AMB   8/26/2021 10:45 AM Talon Lazaro PA [facility] AMB

## 2021-03-02 NOTE — PROGRESS NOTES
Manny Crowe presents today for   Chief Complaint   Patient presents with    Hypertension    Cholesterol Problem    Labs     10/26/20       Virtual/telephone visit    Depression Screening:  3 most recent PHQ Screens 3/2/2021   PHQ Not Done -   Little interest or pleasure in doing things Several days   Feeling down, depressed, irritable, or hopeless Several days   Total Score PHQ 2 2   Trouble falling or staying asleep, or sleeping too much -   Feeling tired or having little energy -   Poor appetite, weight loss, or overeating -   Feeling bad about yourself - or that you are a failure or have let yourself or your family down -   Trouble concentrating on things such as school, work, reading, or watching TV -   Moving or speaking so slowly that other people could have noticed; or the opposite being so fidgety that others notice -   Thoughts of being better off dead, or hurting yourself in some way -   PHQ 9 Score -   How difficult have these problems made it for you to do your work, take care of your home and get along with others -       Learning Assessment:  Learning Assessment 1/16/2020   PRIMARY LEARNER Patient   HIGHEST LEVEL OF EDUCATION - PRIMARY LEARNER  GRADUATED HIGH SCHOOL OR GED   BARRIERS PRIMARY LEARNER NONE   CO-LEARNER CAREGIVER No   PRIMARY LANGUAGE ENGLISH   LEARNER PREFERENCE PRIMARY LISTENING   ANSWERED BY self   RELATIONSHIP SELF       Fall Risk  Fall Risk Assessment, last 12 mths 3/2/2021   Able to walk? Yes   Fall in past 12 months? 0   Do you feel unsteady? 0   Are you worried about falling 0   Number of falls in past 12 months -   Fall with injury? -       Travel Screening:   Travel Screening     Question   Response    In the last month, have you been in contact with someone who was confirmed or suspected to have Coronavirus / COVID-19? No / Unsure    Have you had a COVID-19 viral test in the last 14 days? No    Do you have any of the following new or worsening symptoms?   None of these    Have you traveled internationally in the last month? No      Travel History   Travel since 02/02/21     No documented travel since 02/02/21          Health Maintenance reviewed and discussed and ordered per Provider. Health Maintenance Due   Topic Date Due    Hepatitis C Screening  Never done    COVID-19 Vaccine (1 of 2) Never done    Shingrix Vaccine Age 50> (1 of 2) Never done   . Coordination of Care:  1. Have you been to the ER, urgent care clinic since your last visit? Hospitalized since your last visit? No    2. Have you seen or consulted any other health care providers outside of the [address] [place] [address] since your last visit? Include any pap smears or colon screening. PT r/t left shoulder injury.

## 2021-03-02 NOTE — PROGRESS NOTES
Natasha Aguirre is a 79 y.o. male who was seen by synchronous (real-time) audio-video technology on 3/2/2021 for Hypertension, Cholesterol Problem, and Labs (10/26/20)        Assessment & Plan:   Diagnoses and all orders for this visit:    1. Hyperlipidemia, unspecified hyperlipidemia type  -     METABOLIC PANEL, COMPREHENSIVE; Future  Lipid panel already ordered. Labs to be drawn prior to upcoming cards appt. 2. Essential hypertension  -     METABOLIC PANEL, COMPREHENSIVE; Future  Stable, cont pres tx plan. 3. Closed fracture of proximal end of left radius with delayed healing, subsequent encounter  Assessment & Plan:  Healed       4. Depression, recurrent ([facility] [address]  Assessment & Plan:  resolved      5. Abdominal aortic aneurysm (AAA) without rupture [facility])  Assessment & Plan: This condition is managed by Specialist.      6. 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, unspecified whether native or transplanted heart [facility])  Assessment & Plan: This condition is managed by Specialist.      7. Closed fracture of proximal end of left radius with delayed healing, unspecified fracture morphology, subsequent encounter  Assessment & Plan:  Healed       8. Rotator cuff syndrome, left  Care as per ortho. Improving with PT. The complexity of medical decision making for this visit is moderate   Follow-up and Dispositions    · Return in about 3 months (around 6/2/2021) for high blood pressure, high cholesterol, lab review. 712  Subjective:   Patient contacted via doxy. me. Pt has been doing well. Pt will get his first covid shot later this week. Home bp readings are good. It was 105/68 at his last PT session. Pt is seeing Dr. Juhi Strong for his shoulder pain. He was sent to PT for his rotator cuff tear. The goal is to avoid surgery. Pt will see cards at the end of April 2021. He will get the lipids checked prior to that appt.       Pt continues to exercise regularly. He is doing yoga and walking. Prior to Admission medications    Medication Sig Start Date End Date Taking? Authorizing Provider   sildenafil citrate (Viagra) 100 mg tablet Take 1 Tab by mouth as needed (1 tablet by mouth once daily as needed). 10/26/20  Yes Simone Schaefer MD   rosuvastatin (CRESTOR) 20 mg tablet Take 1 Tab by mouth nightly. 10/26/20  Yes Gordon Wong MD   diclofenac (Voltaren) 1 % gel Apply 4 g to affected area four (4) times daily. Right knee 8/14/20  Yes Roxanne Castro MD   amLODIPine (NORVASC) 10 mg tablet TAKE 1 TABLET BY MOUTH DAILY 6/25/20  Yes Gordon Wong MD   niacin ER (NIASPAN) 500 mg tablet Take 1 Tab by mouth daily. 6/2/20  Yes Gordon Wong MD   nitroglycerin (NITROSTAT) 0.4 mg SL tablet 1 Tab by SubLINGual route every five (5) minutes as needed for Chest Pain. 6/12/19  Yes Gordon Wong MD   aspirin delayed-release (ADULT LOW DOSE ASPIRIN) 81 mg tablet Take 81 mg by mouth daily. Yes Provider, Historical   brinzolamide-brimonidine (Simbrinza) 1-0.2 % drps Apply 1 Drop to eye two (2) times a day. Yes Provider, Historical   coenzyme q10 (CO Q-10) 10 mg cap Take 1 Tab by mouth daily. Yes Provider, Historical   cholecalciferol, vitamin D3, (VITAMIN D3) 2,000 unit tab Take 1 Tab by mouth daily. Yes Provider, Historical   omega 3-dha-epa-fish oil 100-160-1,000 mg cap Take 1,000 mg by mouth daily. 4/11/14  Yes Simone Schaefer MD   ezetimibe (Zetia) 10 mg tablet Take 1 Tab by mouth daily.  1/21/21   Gordon Wong MD     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 ([facility] [address] I25.119    AAA (abdominal aortic aneurysm) ([facility] [address] I71.4    Right knee pain M25.561    Abnormal LFTs (liver function tests) R94.5    Vitamin D deficiency E55.9    CLARKE (dyspnea on exertion) R06.00    Essential hypertension I10    Hyperglycemia R73.9    Tinea versicolor B36.0    S/P total knee replacement Z96.659    Fatty liver K76.0    PVC's (premature ventricular contractions) I49.3    Carotid artery disease (HCC) I77.9    Right shoulder pain M25.511    Acute back pain M54.9    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 8XXA    Primary osteoarthritis of right knee M17.11    Closed fracture of upper end of left radius with delayed healing S52.102G    Rotator cuff syndrome, left M75.102     Current Outpatient Medications   Medication Sig Dispense Refill    sildenafil citrate (Viagra) 100 mg tablet Take 1 Tab by mouth as needed (1 tablet by mouth once daily as needed). 18 Tab 3    rosuvastatin (CRESTOR) 20 mg tablet Take 1 Tab by mouth nightly. 90 Tab 3    diclofenac (Voltaren) 1 % gel Apply 4 g to affected area four (4) times daily. Right knee 5 Each 5    amLODIPine (NORVASC) 10 mg tablet TAKE 1 TABLET BY MOUTH DAILY 90 Tab 3    niacin ER (NIASPAN) 500 mg tablet Take 1 Tab by mouth daily. 90 Tab 4    nitroglycerin (NITROSTAT) 0.4 mg SL tablet 1 Tab by SubLINGual route every five (5) minutes as needed for Chest Pain. 3 Bottle 4    aspirin delayed-release (ADULT LOW DOSE ASPIRIN) 81 mg tablet Take 81 mg by mouth daily.  brinzolamide-brimonidine (Simbrinza) 1-0.2 % drps Apply 1 Drop to eye two (2) times a day.  coenzyme q10 (CO Q-10) 10 mg cap Take 1 Tab by mouth daily.  cholecalciferol, vitamin D3, (VITAMIN D3) 2,000 unit tab Take 1 Tab by mouth daily.  omega 3-dha-epa-fish oil 100-160-1,000 mg cap Take 1,000 mg by mouth daily. 90 Cap 3    ezetimibe (Zetia) 10 mg tablet Take 1 Tab by mouth daily.  90 Tab 4     Allergies   Allergen Reactions    Pollen Extracts Sneezing     Itchy eyes, runny nose     Past Medical History:   Diagnosis Date    AAA (abdominal aortic aneurysm) ([facility] [address] 03/2010    noted on CT (abdomen)    Abnormal LFT's 12/95, 04/16/03    Acute meniscal tear, lateral 01/2007    s/p arthorscopic surg (Dr. Isaac Allen)    Atypical chest pain 02/04/09    CAD (coronary artery disease), native coronary artery     Cardiac cath 04/13/2009    dLM 30%. mLAD 30%. oD2 30%. pCX 55% (FFR 0.95). mRCA 40%. RPDA 65% (FFR 0.86). EF 65%.  Cardiac echocardiogram 04/03/2007    EF 60%. No RWMA. Gr 1 DDfx. LAE. No significant valvular heart disease.  Cardiac nuclear imaging test, low risk 12/04/2015    Low risk. No ischemia or prior infarction. No WMA. EF 67%. Neg EKG on pharm stress test.    Cardiovascular aorto-iliac duplex 06/29/2015    AAA measures 3.2 x 3.1 cm Trv. (Prev 3.2 x 3.4 cm on 6/19/14). Aneurysm is infrarenal & fusiform w/complex intraluminal thrombus within. Mod bilateral common iliac stenosis.  Carotid duplex 03/16/2016    Mild < 50% BETHEL stenosis. Mod 64-99% LICA stenosis. Similar to study of 6/29/15.  Cholecystitis chronic 03/2008    s/p sue    Closed fracture of proximal end of left radius with delayed healing, subsequent encounter 3/2/2021    Depression, recurrent ([address]     Diverticulosis     Diverticulosis 9-28-15    DR. 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    Plantar fasciitis     Rhinitis     Right knee pain     Right shoulder pain     Solar purpura (HCC)     Tinea versicolor 2012    2.8 x 2.9cm infra-renal    Tubular adenoma 9-28-15    Vitamin D deficiency 2011     Past Surgical History:   Procedure Laterality Date    ENDOSCOPY, COLON, DIAGNOSTIC      normal per patient;     HX ARTHROPLASTY  04/05/10    [place]-left knee    HX CATARACT REMOVAL Bilateral     2014    HX CHOLECYSTECTOMY  2008    chronic cholecystitis    HX COLONOSCOPY  9-28-15    HX HERNIA REPAIR      L inguinal    HX KNEE ARTHROSCOPY  07    left knee    HX KNEE ARTHROSCOPY Left 2014    peripatellar debridement    HX KNEE REPLACEMENT Left 2013    [facility]    HX MOHS PROCEDURES  2008    HX RHINOPLASTY      HX TONSILLECTOMY      HX VASECTOMY      HX WISDOM TEETH EXTRACTION      x4    MI ANESTH,SURGERY OF SHOULDER       Family History   Problem Relation Age of Onset    Heart Disease Mother     Stroke Father     No Known Problems Sister     No Known Problems Maternal Grandmother     No Known Problems Maternal Grandfather     Dementia Paternal Grandmother     No Known Problems Paternal Grandfather     Cancer Brother         lung CA     Social History     Tobacco Use    Smoking status: Former Smoker     Packs/day: 0.50     Years: 20.00     Pack years: 10.00     Types: Cigarettes     Quit date: 12/10/1995     Years since quittin.2    Smokeless tobacco: Never Used   Substance Use Topics    Alcohol use: Yes     Alcohol/week: 6.7 standard drinks     Types: 7 Glasses of wine, 1 Shots of liquor per week     Comment: social       Review of Systems   Constitutional: Negative. HENT: Negative. Respiratory: Negative. Cardiovascular: Negative. All other systems reviewed and are negative. Objective:   No flowsheet data found.    General: alert, cooperative, no distress   Mental  status: normal mood, behavior, speech, dress, motor activity, and thought processes, able to follow commands   HENT: NCAT   Neck: no visualized mass   Resp: no respiratory distress   Neuro: no gross deficits   Skin: no discoloration or lesions of concern on visible areas   Psychiatric: normal affect, consistent with stated mood, no evidence of hallucinations     Additional exam findings: none      We discussed the expected course, resolution and complications of the diagnosis(es) in detail. Medication risks, benefits, costs, interactions, and alternatives were discussed as indicated. I advised him to contact the office if his condition worsens, changes or fails to improve as anticipated. He expressed understanding with the diagnosis(es) and plan. Norma Miller, was evaluated through a synchronous (real-time) audio-video encounter. The patient (or guardian if applicable) is aware that this is a billable service. Verbal consent to proceed has been obtained within the past 12 months. The visit was conducted pursuant to the emergency declaration under the [facility] [address] authority and the [employer] General Act. Patient identification was verified, and a caregiver was present when appropriate. The patient was located in a state where the provider was credentialed to provide care.     Cecilia Powell MD

## 2021-03-03 ENCOUNTER — HOSPITAL ENCOUNTER (OUTPATIENT)
Dept: PHYSICAL THERAPY | Age: 81
Discharge: HOME | End: 2021-03-03
Payer: MEDICARE

## 2021-03-03 PROCEDURE — 97140 MANUAL THERAPY 1/> REGIONS: CPT

## 2021-03-03 PROCEDURE — 97110 THERAPEUTIC EXERCISES: CPT

## 2021-03-03 NOTE — PROGRESS NOTES
PT DAILY TREATMENT NOTE     Patient Name: Yanet Asp  Date:3/3/2021  : 1940  [x]  Patient  Verified  Payor: [place] MEDICARE / Plan: [place] MEDICARE PART A & B / Product Type: Medicare /    In time:12:00  Out time:12:32  Total Treatment Time (min): 32  Visit #: 5 of 8    Medicare/BCBS Only   Total Timed Codes (min):  32 1:1 Treatment Time:  32       Treatment Area: Left shoulder pain [M25.512]    SUBJECTIVE  Pain Level (0-10 scale): 3/10  Any medication changes, allergies to medications, adverse drug reactions, diagnosis change, or new procedure performed?: [x] No    [] Yes (see summary sheet for update)  Subjective functional status/changes:   [] No changes reported  \"It's been feeling a bit better since starting therapy. \"    OBJECTIVE    24 min Therapeutic Exercise:  [x] See flow sheet :   Rationale: increase ROM, increase strength and increase proprioception to improve the patients ability to perform ADL's.    8 min Manual Therapy:  LEft ST/GH inf/post mobs grade III. STM/DTM Left UT, lev scap, infraspinatus. Left shoulder PROM flex, scap, ER/IR. Pt Right side-lying and supine. The manual therapy interventions were performed at a separate and distinct time from the therapeutic activities interventions. Rationale: decrease pain, increase ROM, increase tissue extensibility and decrease trigger points to improve ease of performing OH activities. With   [x] TE   [] TA   [] neuro   [] other: Patient Education: [x] Review HEP    [] Progressed/Changed HEP based on:   [] positioning   [] body mechanics   [] transfers   [] heat/ice application    [] other:      Other Objective/Functional Measures: UBE in use at the beginning of treatment, PD performing at the end of treatment. PD ice post-treatment. Pain Level (0-10 scale) post treatment: 0/10    ASSESSMENT/Changes in Function: Pt fully participated in treatment. Pt given blue and orange t-band for home use. PROM WFL's.  Pt left in no apparent distress and thanked clinician for services. Patient will continue to benefit from skilled PT services to modify and progress therapeutic interventions, address functional mobility deficits, address ROM deficits, address strength deficits, analyze and address soft tissue restrictions, analyze and cue movement patterns and analyze and modify body mechanics/ergonomics to attain remaining goals. [x]  See Plan of Care  []  See progress note/recertification  []  See Discharge Summary         Progress towards goals / Updated goals:  Short Term Goals: To be accomplished in 1 weeks:              8. The pt will be I and compliant with HEP.             JUANITO issued HEP              FJOKLEG: goal MET on 2-23-21  Long Term Goals: To be accomplished in 4 weeks:              1. The pt will demonstrate 130 degrees of left shoulder ABD to improve ability for functional tasks.              IE- 105 degrees              [id number]:  degrees on 2-24-21              2. Improve left shoulder full can to 4/5 to improve ability for daily tasks              IE- 3+/5 with pain              3. Improve FOTO score to predicted outcome to improve ability for daily tasks              BU- 50              4.  The pt will report no difficulty with reaching a shoulder height shelf.              IE- some difficulty     PLAN  []  Upgrade activities as tolerated     [x]  Continue plan of care  []  Update interventions per flow sheet       []  Discharge due to:_  []  Other:_      David Malik, PTA 3/3/2021  12:01 PM    Future Appointments   Date Time Provider Reza Bose   3/9/2021 11:30 AM Walt Mura, PT [facility]   3/10/2021 10:45 AM Fults Mura, PT [facility]   3/16/2021  1:45 PM Fults Mura, PT [facility]   3/17/2021 12:15 PM Fults Mura, PT [facility]   4/7/2021  1:45 PM Breana Lopez MD [facility] BS AMB   4/27/2021  9:00 AM Katarzyna Baugh MD [facility] BS AMB   8/26/2021  8:00 AM [facility] BS AMB   8/26/2021  9:00 AM [facility] BS AMB   8/26/2021 10:45 AM Darwin Lazaro PA [facility] BS AMB

## 2021-03-09 ENCOUNTER — HOSPITAL ENCOUNTER (OUTPATIENT)
Dept: PHYSICAL THERAPY | Age: 81
Discharge: HOME | End: 2021-03-09
Payer: MEDICARE

## 2021-03-09 PROCEDURE — 97140 MANUAL THERAPY 1/> REGIONS: CPT

## 2021-03-09 PROCEDURE — 97110 THERAPEUTIC EXERCISES: CPT

## 2021-03-09 NOTE — PROGRESS NOTES
PT DAILY TREATMENT NOTE     Patient Name: Deidre Reddy  Date:3/9/2021  : 1940  [x]  Patient  Verified  Payor: VA MEDICARE / Plan: VA MEDICARE PART A & B / Product Type: Medicare /    In time:1123  Out time:1156  Total Treatment Time (min): 33  Visit #: 6 of 8    Medicare/BCBS Only   Total Timed Codes (min):  33 1:1 Treatment Time:  33       Treatment Area: Left shoulder pain [M25.512]    SUBJECTIVE  Pain Level (0-10 scale): 3  Any medication changes, allergies to medications, adverse drug reactions, diagnosis change, or new procedure performed?: [x] No    [] Yes (see summary sheet for update)  Subjective functional status/changes:   [] No changes reported  The pt reports he is doing pretty good. Pain is with elevation mainly. OBJECTIVE      25 min Therapeutic Exercise:  [x] See flow sheet :   Rationale: increase ROM and increase strength to improve the patients ability to perform ADL      8 min Manual Therapy:  Pt right sidelying- STJ clocks, TPR infraspinatus   The manual therapy interventions were performed at a separate and distinct time from the therapeutic activities interventions. Rationale: decrease pain, increase ROM, increase tissue extensibility and decrease trigger points to improve functional activities            With   [] TE   [] TA   [] neuro   [] other: Patient Education: [x] Review HEP    [] Progressed/Changed HEP based on:   [] positioning   [] body mechanics   [] transfers   [] heat/ice application    [] other:      Other Objective/Functional Measures: upgraded therex per flow sheet     Pain Level (0-10 scale) post treatment: 1    ASSESSMENT/Changes in Function:  The pt was able to progress with therex today with added resistance without pain increase. Decreased pain level noted post treatment.      Patient will continue to benefit from skilled PT services to modify and progress therapeutic interventions, address functional mobility deficits, address ROM deficits, address strength deficits, analyze and address soft tissue restrictions and analyze and cue movement patterns to attain remaining goals. []  See Plan of Care  []  See progress note/recertification  []  See Discharge Summary         Progress towards goals / Updated goals:  Short Term Goals: To be accomplished in 1 weeks:              2. The pt will be I and compliant with HEP.             TA issued HEP              [id number]: goal MET on 2-23-21  Long Term Goals: To be accomplished in 4 weeks:              1. The pt will demonstrate 130 degrees of left shoulder ABD to improve ability for functional tasks.              IE- 105 degrees              ZQJNTIP:  degrees on 2-24-21              2. Improve left shoulder full can to 4/5 to improve ability for daily tasks              IE- 3+/5 with pain   Current: gradual progress with therex 3-9-21              3. Improve FOTO score to predicted outcome to improve ability for daily tasks              IE- 50              4.  The pt will report no difficulty with reaching a shoulder height shelf.              IE- some difficulty         PLAN  []  Upgrade activities as tolerated     [x]  Continue plan of care  []  Update interventions per flow sheet       []  Discharge due to:_  []  Other:_      William Andrews, PT 3/9/2021  11:30 AM    Future Appointments   Date Time Provider Reza Bose   3/10/2021 10:45 AM Patsi Milaca, PT [facility]   3/16/2021  1:45 PM Patsi Milaca, PT [facility]   3/17/2021 12:15 PM Patsi Milaca, PT [facility]   4/7/2021  1:45 PM Elyse Leiva MD [facility] BS AMB   4/27/2021  9:00 AM Ninfa Gerardo MD [facility] BS AMB   6/3/2021 10:30 AM Brenna Wyatt MD [facility] BS AMB   8/26/2021  8:00 AM [facility] BS AMB   8/26/2021  9:00 AM [facility] BS AMB   8/26/2021 10:45 AM Lizzeth Lazaro PA [facility] BS AMB

## 2021-03-10 ENCOUNTER — HOSPITAL ENCOUNTER (OUTPATIENT)
Dept: PHYSICAL THERAPY | Age: 81
Discharge: HOME | End: 2021-03-10
Payer: MEDICARE

## 2021-03-10 PROCEDURE — 97110 THERAPEUTIC EXERCISES: CPT

## 2021-03-10 PROCEDURE — 97140 MANUAL THERAPY 1/> REGIONS: CPT

## 2021-03-10 NOTE — PROGRESS NOTES
PT DAILY TREATMENT NOTE     Patient Name: Norma Miller  Date:3/10/2021  : 1940  [x]  Patient  Verified  Payor: VA MEDICARE / Plan: VA MEDICARE PART A & B / Product Type: Medicare /    In time:5  Out time:11:17  Total Treatment Time (min): 32  Visit #: 7 of 8    Medicare/BCBS Only   Total Timed Codes (min):  32 1:1 Treatment Time:  32       Treatment Area: Left shoulder pain [M25.512]    SUBJECTIVE  Pain Level (0-10 scale): 3  Any medication changes, allergies to medications, adverse drug reactions, diagnosis change, or new procedure performed?: [x] No    [] Yes (see summary sheet for update)  Subjective functional status/changes:   [] No changes reported  The pt reports he is did well after last session. OBJECTIVE      24 min Therapeutic Exercise:  [x] See flow sheet :   Rationale: increase ROM and increase strength to improve the patients ability to perform ADL    8 min Manual Therapy:  STM/TPR to left infraspinatus, Scapular clock mobs  With pt right sidelying   The manual therapy interventions were performed at a separate and distinct time from the therapeutic activities interventions. Rationale: decrease pain, increase tissue extensibility and decrease trigger points  to improve functional use of left UE. With   [] TE   [] TA   [] neuro   [] other: Patient Education: [x] Review HEP    [] Progressed/Changed HEP based on:   [] positioning   [] body mechanics   [] transfers   [] heat/ice application    [] other:      Other Objective/Functional Measures: no changes     Pain Level (0-10 scale) post treatment: 0    ASSESSMENT/Changes in Function:  The pt does demonstrates signs of RTC pathology but is making good progress with PT.      Patient will continue to benefit from skilled PT services to modify and progress therapeutic interventions, address functional mobility deficits, address ROM deficits, address strength deficits, analyze and address soft tissue restrictions and analyze and cue movement patterns to attain remaining goals. []  See Plan of Care  []  See progress note/recertification  []  See Discharge Summary         Progress towards goals / Updated goals:  Short Term Goals: To be accomplished in 1 weeks:              0. The pt will be I and compliant with HEP.             QQ issued HEP              [id number]: goal MET on 2-23-21  Long Term Goals: To be accomplished in 4 weeks:              1. The pt will demonstrate 130 degrees of left shoulder ABD to improve ability for functional tasks.              IE- 105 degrees              AGYUGZB:  degrees on 2-24-21              2. Improve left shoulder full can to 4/5 to improve ability for daily tasks              IE- 3+/5 with pain              Current: gradual progress with therex 3-9-21              3. Improve FOTO score to predicted outcome to improve ability for daily tasks              IE- 50              4.  The pt will report no difficulty with reaching a shoulder height shelf.              IE- some difficulty      PLAN  []  Upgrade activities as tolerated     [x]  Continue plan of care  []  Update interventions per flow sheet       []  Discharge due to:_  []  Other:_      Danyelle Salinas, PT 3/10/2021  10:48 AM    Future Appointments   Date Time Provider Reza Bose   3/16/2021  1:45 PM [facility]   3/17/2021 12:15 PM Manju Mullen, PT [facility]   4/7/2021  1:45 PM Chantale Arciniega MD [facility] BS AMB   4/27/2021  9:00 AM Christopher Paul MD [facility] BS AMB   6/3/2021 10:30 AM MD [facility] BS AMB   8/26/2021  8:00 AM [facility] BS AMB   8/26/2021  9:00 AM [facility] BS AMB   8/26/2021 10:45 AM Ashely Lazaro PA [facility] BS AMB

## 2021-03-15 ENCOUNTER — HOSPITAL ENCOUNTER (OUTPATIENT)
Dept: PHYSICAL THERAPY | Age: 81
Discharge: HOME | End: 2021-03-15
Payer: MEDICARE

## 2021-03-15 PROCEDURE — 97140 MANUAL THERAPY 1/> REGIONS: CPT

## 2021-03-15 PROCEDURE — 97110 THERAPEUTIC EXERCISES: CPT

## 2021-03-15 NOTE — PROGRESS NOTES
[facility]  [address]  [place], [address]  [phone] fax    Continued Plan of Care/ Re-certification for Physical Therapy Services    Patient name: Carlos Olvera Start of Care: 2/15/2021   Referral source: Deann Staples MD : 1940               Medical Diagnosis: Left shoulder pain [M25.512]  Payor: [place] MEDICARE / Plan: [place] MEDICARE PART A & B / Product Type: Medicare /  Onset Date:initlal over a year, increase 2020               Treatment Diagnosis: left shoulder pain   Prior Hospitalization: see medical history Provider#: [id number]   Medications: Verified on Patient summary List    Comorbidities: depression, OA, TKA, RTC repair   Prior Level of Function: functionally I with daily tasks. Visits from Start of Care: 8    Missed Visits: 0    The Plan of Care and following information is based on the patient's current status:              Progress towards goals   Short Term Goals: To be accomplished in 1 weeks:              8. The pt will be I and compliant with HEP.             HX issued HEP              GCWMAUA: goal MET on 21  Long Term Goals: To be accomplished in 4 weeks:              1. The pt will demonstrate 130 degrees of left shoulder ABD to improve ability for functional tasks.              IE- 105 degrees              BTGHVJH:  degrees on 21              2. Improve left shoulder full can to 4/5 to improve ability for daily tasks              IE- 3+/5 with pain              Current: gradual progress with therex 3-9-21              3. Improve FOTO score to predicted outcome to improve ability for daily tasks              IE- 50   Current: MET goal, 72 on 3-15-21              4. The pt will report no difficulty with reaching a shoulder height shelf.              IE- some difficulty   Current: MET goal      Key functional changes: The pt is making good progress with PT.  He has met all current goals and will benefit from continuation of PT to further his current progress. Problems/ barriers to goal attainment: none     Problem List: pain affecting function, decrease strength, decrease ADL/ functional abilitiies, decrease activity tolerance and decrease flexibility/ joint mobility    Treatment Plan: Therapeutic exercise, Therapeutic activities, Neuromuscular re-education, Physical agent/modality, Manual therapy, Patient education and Self Care training     Patient Goal (s) has been updated and includes: \"To keep improving\"     Goals for this certification period to be accomplished in 3-4 weeks:  1. Improve left shoulder full can to 4/5 to improve ability for daily tasks              PN- 4-/5 with pain  2. The pt will demonstrates 4+/5 left shoulder ER MMT to improve abilty for daily tasks   PN: 4-/5  3. The pt will report at least 75% improvement in the left shoulder for improved ability for ADLs   PN: 50%            Frequency / Duration: Patient to be seen 2 times per week for 3-4 weeks:    Assessment / Recommendations:Patient will continue to benefit from skilled PT services to modify and progress therapeutic interventions, address functional mobility deficits, address ROM deficits, address strength deficits and analyze and address soft tissue restrictions to attain remaining goals. Certification Period: 3-16-21 to 4-14-21    Yobani Fraser, PT 3/15/2021 2:41 PM    ________________________________________________________________________  I certify that the above Therapy Services are being furnished while the patient is under my care. I agree with the treatment plan and certify that this therapy is necessary. [] I have read the above and request that my patient continue as recommended.   [] I have read the above report and request that my patient continue therapy with the following changes/special instructions: _____________________________________________  [] I have read the above report and request that my patient be discharged from therapy    Physician's Signature:____________Date:_________TIME:________     Nikki Mantilla MD  ** Signature, Date and Time must be completed for valid certification **    Please sign and return to [facility] [address]  [place] [address]  [place], [address]  [phone] fax

## 2021-03-15 NOTE — PROGRESS NOTES
[facility] PT DAILY TREATMENT NOTE 11-    Patient Name: Carlos Olvera  Date:3/15/2021  : 1940  [x]  Patient  Verified  Payor: Jey Smith / Plan: VA MEDICARE PART A & B / Product Type: Medicare /    In time:1:46  Out time:2:20  Total Treatment Time (min): 34  Visit #: 8 of 8    Medicare/BCBS Only   Total Timed Codes (min):  34 1:1 Treatment Time:  34       Treatment Area: Left shoulder pain [M25.512]    SUBJECTIVE  Pain Level (0-10 scale): 3-4  Any medication changes, allergies to medications, adverse drug reactions, diagnosis change, or new procedure performed?: [x] No    [] Yes (see summary sheet for update)  Subjective functional status/changes:   [] No changes reported  The pt reports he feels he is making progress and will benefit from continuation. OBJECTIVE      26 min Therapeutic Exercise:  [x] See flow sheet :   Rationale: increase ROM and increase strength to improve the patients ability to perform functional tasks    8 min Manual Therapy:  STM/TPR left infraspinatus, STJ clock mobs with pt in right sidelying   The manual therapy interventions were performed at a separate and distinct time from the therapeutic activities interventions. Rationale: decrease pain, increase ROM, increase tissue extensibility and decrease trigger points to perform ADL            With   [] TE   [] TA   [] neuro   [] other: Patient Education: [x] Review HEP    [] Progressed/Changed HEP based on:   [] positioning   [] body mechanics   [] transfers   [] heat/ice application    [] other:      Other Objective/Functional Measures: added serratus push-up +     Pain Level (0-10 scale) post treatment: 0    ASSESSMENT/Changes in Function: The pt is making good progress with PT. He has met all current goals and will benefit from continuation of PT to further his current progress.      Patient will continue to benefit from skilled PT services to modify and progress therapeutic interventions, address functional mobility deficits, address ROM deficits, address strength deficits and analyze and address soft tissue restrictions to attain remaining goals. []  See Plan of Care  [x]  See progress note/recertification  []  See Discharge Summary         Progress towards goals / Updated goals:  Short Term Goals: To be accomplished in 1 weeks:              3. The pt will be I and compliant with HEP.              issued HEP              DWHMDEA: goal MET on 2-23-21  Long Term Goals: To be accomplished in 4 weeks:              1. The pt will demonstrate 130 degrees of left shoulder ABD to improve ability for functional tasks.              IE- 105 degrees              ALDHDNW:  degrees on 2-24-21              2. Improve left shoulder full can to 4/5 to improve ability for daily tasks              IE- 3+/5 with pain              Current: gradual progress with therex 3-9-21              3. Improve FOTO score to predicted outcome to improve ability for daily tasks              IE- 50   Current: MET goal, 72 on 3-15-21              4.  The pt will report no difficulty with reaching a shoulder height shelf.              IE- some difficulty   Current: MET goal         PLAN  []  Upgrade activities as tolerated     [x]  Continue plan of care  []  Update interventions per flow sheet       []  Discharge due to:_  []  Other:_      Kendrick Zpaata PT 3/15/2021  1:56 PM    Future Appointments   Date Time Provider Reza Bose   3/17/2021 12:15 PM Hill Gill, PT [facility]   4/7/2021  1:45 PM Hardik Madrid MD [facility] BS AMB   4/27/2021  9:00 AM Fitz Salazar MD [facility] BS AMB   6/3/2021 10:30 AM Justin Hendrickson MD [facility] BS AMB   8/26/2021  8:00 AM [facility] BS AMB   8/26/2021  9:00 AM [facility] 1 [facility] BS AMB   8/26/2021 10:45 AM Christina Lazaro PA [facility] BS AMB

## 2021-03-16 ENCOUNTER — APPOINTMENT (OUTPATIENT)
Dept: PHYSICAL THERAPY | Age: 81
End: 2021-03-16
Payer: MEDICARE

## 2021-03-17 ENCOUNTER — HOSPITAL ENCOUNTER (OUTPATIENT)
Dept: PHYSICAL THERAPY | Age: 81
Discharge: HOME | End: 2021-03-17
Payer: MEDICARE

## 2021-03-17 PROCEDURE — 97140 MANUAL THERAPY 1/> REGIONS: CPT

## 2021-03-17 PROCEDURE — 97110 THERAPEUTIC EXERCISES: CPT

## 2021-03-17 NOTE — PROGRESS NOTES
PT DAILY TREATMENT NOTE 11    Patient Name: Dorys Nieto  Date:3/17/2021  : 1940  [x]  Patient  Verified  Payor: Mikayla Felipe / Plan: VA MEDICARE PART A & B / Product Type: Medicare /    In time:12:17  Out time:1250  Total Treatment Time (min): 33  Visit #: 1 of 6-8    Medicare/BCBS Only   Total Timed Codes (min):  33 1:1 Treatment Time:  33       Treatment Area: Left shoulder pain [M25.512]    SUBJECTIVE  Pain Level (0-10 scale): 2-3  Any medication changes, allergies to medications, adverse drug reactions, diagnosis change, or new procedure performed?: [x] No    [] Yes (see summary sheet for update)  Subjective functional status/changes:   [] No changes reported   The pt reports he is doing well. OBJECTIVE       25 min Therapeutic Exercise:  [x] See flow sheet :   Rationale: increase ROM and increase strength to improve the patients ability to perform daily tasks. 8 min Manual Therapy:  Pt right sidelying- scapular clock mobs, TPR left infraspinatus   The manual therapy interventions were performed at a separate and distinct time from the therapeutic activities interventions. Rationale: decrease pain, increase tissue extensibility and decrease trigger points to perform ADL            With   [] TE   [] TA   [] neuro   [] other: Patient Education: [x] Review HEP    [] Progressed/Changed HEP based on:   [] positioning   [] body mechanics   [] transfers   [] heat/ice application    [] other:      Other Objective/Functional Measures:  Increased resistance per flow sheet with therex     Pain Level (0-10 scale) post treatment: 1    ASSESSMENT/Changes in Function:  The pt is making gradual progress with strengthening. He will benefit from continued gradual progress without pain increase.      Patient will continue to benefit from skilled PT services to modify and progress therapeutic interventions, address functional mobility deficits, address strength deficits and analyze and address soft tissue restrictions to attain remaining goals. []  See Plan of Care  []  See progress note/recertification  []  See Discharge Summary         Progress towards goals / Updated goals:  Goals for this certification period to be accomplished in 3-4 weeks:  1. Improve left shoulder full can to 4/5 to improve ability for daily tasks              PN- 4-/5 with pain   Current: gradual progression 3-17-21  2. The pt will demonstrates 4+/5 left shoulder ER MMT to improve abilty for daily tasks               PN: 4-/5  3.  The pt will report at least 75% improvement in the left shoulder for improved ability for ADLs               PN: 50%    PLAN  []  Upgrade activities as tolerated     []  Continue plan of care  []  Update interventions per flow sheet       []  Discharge due to:_  []  Other:_      Tressa Beltran, PT 3/17/2021  12:41 PM    Future Appointments   Date Time Provider Reza Bose   3/23/2021  3:00 PM Birdena Sleek, PTA [facility]   3/25/2021  2:30 PM Birdena Sleek, PTA [facility]   3/30/2021  9:15 AM German Sanchez, PT [facility]   4/1/2021 10:00 AM Birdena Sleek, PTA [facility]   4/6/2021 10:30 AM Birdena Sleek, PTA [facility]   4/7/2021  1:45 PM Taylor Beaver MD [facility] BS AMB   4/8/2021 10:00 AM Birdena Sleek, PTA [facility]   4/27/2021  9:00 AM Lewis Kelley MD [facility] BS AMB   6/3/2021 10:30 AM Cash Spivey MD [facility] BS AMB   8/26/2021  8:00 AM [facility] 1 [facility] BS AMB   8/26/2021  9:00 AM [facility] 1 [facility] BS AMB   8/26/2021 10:45 AM Priyanka Lazaro PA [facility] BS AMB

## 2021-03-23 ENCOUNTER — HOSPITAL ENCOUNTER (OUTPATIENT)
Dept: PHYSICAL THERAPY | Age: 81
Discharge: HOME | End: 2021-03-23
Payer: MEDICARE

## 2021-03-23 PROCEDURE — 97110 THERAPEUTIC EXERCISES: CPT

## 2021-03-23 PROCEDURE — 97140 MANUAL THERAPY 1/> REGIONS: CPT

## 2021-03-25 ENCOUNTER — HOSPITAL ENCOUNTER (OUTPATIENT)
Dept: PHYSICAL THERAPY | Age: 81
Discharge: HOME | End: 2021-03-25
Payer: MEDICARE

## 2021-03-25 PROCEDURE — 97140 MANUAL THERAPY 1/> REGIONS: CPT

## 2021-03-25 PROCEDURE — 97110 THERAPEUTIC EXERCISES: CPT

## 2021-03-25 NOTE — PROGRESS NOTES
PT DAILY TREATMENT NOTE     Patient Name: Dorys Nieto  Date:3/25/2021  : 1940  [x]  Patient  Verified  Payor: Mikayla Felipe / Plan: VA MEDICARE PART A & B / Product Type: Medicare /    In time:2:30  Out time:3:10  Total Treatment Time (min): 40  Visit #: 3 of 6-8    Medicare/BCBS Only   Total Timed Codes (min):  40 1:1 Treatment Time:  40       Treatment Area: Left shoulder pain [M25.512]    SUBJECTIVE  Pain Level (0-10 scale): 1/10  Any medication changes, allergies to medications, adverse drug reactions, diagnosis change, or new procedure performed?: [x] No    [] Yes (see summary sheet for update)  Subjective functional status/changes:   [x] No changes reported  No new complaints. OBJECTIVE    32 min Therapeutic Exercise:  [x] See flow sheet :   Rationale: increase ROM, increase strength and increase proprioception to improve the patients ability to perform ADL's.    8 min Manual Therapy:  Left ST/GH inf/post mobs grade III. STM/DTM Left UT, lev scap, infraspinatus and rhomboids. Left shoulder PROM flex, scap, ER/IR. Pt Right side-lying and supine. The manual therapy interventions were performed at a separate and distinct time from the therapeutic activities interventions. Rationale: decrease pain, increase ROM, increase tissue extensibility and decrease trigger points to improve ease of performing OH activities. With   [x] TE   [] TA   [] neuro   [] other: Patient Education: [x] Review HEP    [] Progressed/Changed HEP based on:   [] positioning   [] body mechanics   [] transfers   [] heat/ice application    [] other:      Other Objective/Functional Measures: Added lift offs with wall slides, challenged with scaption set. Pain Level (0-10 scale) post treatment: 0/10    ASSESSMENT/Changes in Function: Pt reports ~60% overall subjective improvement. Pt is making gradual improvement.  Demonstrated improved strength evident by pt's ability to perform exercises with increase resistance. Continues to have significant trigger point in Left UT. Continue PT to further increase shoulder stability/strength to improve ease of performing OH activities. Patient will continue to benefit from skilled PT services to modify and progress therapeutic interventions, address functional mobility deficits, address ROM deficits, address strength deficits, analyze and address soft tissue restrictions and analyze and modify body mechanics/ergonomics to attain remaining goals. [x]  See Plan of Care  []  See progress note/recertification  []  See Discharge Summary         Progress towards goals / Updated goals:  Goals for this certification period to be accomplished in 3-4 weeks:  1. Improve left shoulder full can to 4/5 to improve ability for daily tasks              PN- 4-/5 with pain              Current: gradual progression 3-17-21  2. The pt will demonstrates 4+/5 left shoulder ER MMT to improve abilty for daily tasks               PN: 4-/5  3. The pt will report at least 75% improvement in the left shoulder for improved ability for ADLs               PN: 50%   Current: Pt reports ~60% overall subjective improvement.  3/25/2021    PLAN  []  Upgrade activities as tolerated     [x]  Continue plan of care  []  Update interventions per flow sheet       []  Discharge due to:_  []  Other:_      Rodolfo Azevedo, PTA 3/25/2021  2:41 PM    Future Appointments   Date Time Provider Reza Bose   3/30/2021  9:15 AM [facility]   4/1/2021 10:00 AM Felton Gee, PTA [facility]   4/6/2021 10:30 AM Felton Gee, PTA [facility]   4/7/2021  1:45 PM Patrick Gtz MD [facility] BS AMB   4/8/2021 10:00 AM Felton Gee, PTA [facility]   4/27/2021  9:00 AM Jose F Guajardo MD [facility] BS AMB   6/3/2021 10:30 AM Candice Durbin MD [facility] BS AMB   8/26/2021  8:00 AM [facility] 1 [facility] BS AMB   8/26/2021  9:00 AM [facility] 1 [facility] BS AMB   8/26/2021 10:45 AM Tejas RAHAT Mathias [facility] BS AMB

## 2021-03-30 ENCOUNTER — HOSPITAL ENCOUNTER (OUTPATIENT)
Dept: PHYSICAL THERAPY | Age: 81
Discharge: HOME | End: 2021-03-30
Payer: MEDICARE

## 2021-03-30 PROCEDURE — 97140 MANUAL THERAPY 1/> REGIONS: CPT

## 2021-03-30 PROCEDURE — 97110 THERAPEUTIC EXERCISES: CPT

## 2021-03-30 NOTE — PROGRESS NOTES
PT DAILY TREATMENT NOTE     Patient Name: Jb Pugh  Date:3/30/2021  : 1940  [x]  Patient  Verified  Payor: Shereen Racer / Plan: VA MEDICARE PART A & B / Product Type: Medicare /    In ZPLK:1427  Out time:1009   Total Treatment Time (min): 37  Visit #: 4 of 6-8    Medicare/BCBS Only   Total Timed Codes (min):  37 1:1 Treatment Time:  28       Treatment Area: Left shoulder pain [M25.512]    SUBJECTIVE  Pain Level (0-10 scale): 1-2  Any medication changes, allergies to medications, adverse drug reactions, diagnosis change, or new procedure performed?: [x] No    [] Yes (see summary sheet for update)  Subjective functional status/changes:   [] No changes reported  The pt reports he is doing well. Some c/o pain increase with elevation of the arm but seems manageable.      OBJECTIVE    Modality rationale:    Min Type Additional Details    [] Estim:  []Unatt       []IFC  []Premod                        []Other:  []w/ice   []w/heat  Position:  Location:    [] Estim: []Att    []TENS instruct  []NMES                    []Other:  []w/US   []w/ice   []w/heat  Position:  Location:    []  Traction: [] Cervical       []Lumbar                       [] Prone          []Supine                       []Intermittent   []Continuous Lbs:  [] before manual  [] after manual    []  Ultrasound: []Continuous   [] Pulsed                           []1MHz   []3MHz W/cm2:  Location:    []  Iontophoresis with dexamethasone         Location: [] Take home patch   [] In clinic    []  Ice     []  heat  []  Ice massage  []  Laser   []  Anodyne Position:  Location:    []  Laser with stim  []  Other:  Position:  Location:    []  Vasopneumatic Device Pressure:       [] lo [] med [] hi   Temperature: [] lo [] med [] hi   [] Skin assessment post-treatment:  []intact []redness- no adverse reaction    []redness  adverse reaction:     29 min Therapeutic Exercise:  [x] See flow sheet :   Rationale: increase ROM and increase strength to improve the patients ability to perform ADL    8 min Manual Therapy:  Pt right sidelying- scapular clocks/mobs, TPR left infraspinatus   The manual therapy interventions were performed at a separate and distinct time from the therapeutic activities interventions. Rationale: decrease pain, increase tissue extensibility and decrease trigger points to improve ability for left UE use with daily tasks. With   [] TE   [] TA   [] neuro   [] other: Patient Education: [x] Review HEP    [] Progressed/Changed HEP based on:   [] positioning   [] body mechanics   [] transfers   [] heat/ice application    [] other:      Other Objective/Functional Measures:  MMT full can 4/5, ER 4+/5     Pain Level (0-10 scale) post treatment: 0    ASSESSMENT/Changes in Function:  The pt demonstrates improved strength and diminished pain. He is progressing well and will benefit from gradual strength progression    Patient will continue to benefit from skilled PT services to modify and progress therapeutic interventions, address functional mobility deficits, address ROM deficits, address strength deficits, analyze and address soft tissue restrictions and analyze and cue movement patterns to attain remaining goals. []  See Plan of Care  []  See progress note/recertification  []  See Discharge Summary         Progress towards goals / Updated goals:  Goals for this certification period to be accomplished in 3-4 weeks:  1. Improve left shoulder full can to 4/5 to improve ability for daily tasks              PN- 4-/5 with pain              Current: MET 4/5 on 3-30-21   2. The pt will demonstrates 4+/5 left shoulder ER MMT to improve abilty for daily tasks               PN: 4-/5   Current: MET 4+/5 on 3-30-21  3. The pt will report at least 75% improvement in the left shoulder for improved ability for ADLs               PN: 50%              Current: Pt reports ~60% overall subjective improvement.  3/25/2021    PLAN  []  Upgrade activities as tolerated     [x]  Continue plan of care  []  Update interventions per flow sheet       []  Discharge due to:_  []  Other:_      Elizabeth Olivas, PT 3/30/2021  9:37 AM    Future Appointments   Date Time Provider Reza Lidya   4/1/2021 10:00 AM Lonia Camel, PTA [facility]   4/6/2021 10:30 AM Lonia Camel, PTA [facility]   4/7/2021  1:45 PM Yamilet Whalen MD [facility] BS AMB   4/8/2021 10:00 AM Lonia Camel, PTA [facility]   4/27/2021  9:00 AM Indra Floyd MD [facility] BS AMB   6/3/2021 10:30 AM Sami Woody MD [facility] BS AMB   8/26/2021  8:00 AM [facility] 1 [facility] BS AMB   8/26/2021  9:00 AM [facility] 1 [facility] BS AMB   8/26/2021 10:45 AM Bc Lazaro PA [facility] BS AMB

## 2021-04-01 ENCOUNTER — APPOINTMENT (OUTPATIENT)
Dept: PHYSICAL THERAPY | Age: 81
End: 2021-04-01
Payer: MEDICARE

## 2021-04-06 ENCOUNTER — HOSPITAL ENCOUNTER (OUTPATIENT)
Dept: PHYSICAL THERAPY | Age: 81
Discharge: HOME | End: 2021-04-06
Payer: MEDICARE

## 2021-04-06 PROCEDURE — 97110 THERAPEUTIC EXERCISES: CPT

## 2021-04-06 PROCEDURE — 97140 MANUAL THERAPY 1/> REGIONS: CPT

## 2021-04-06 NOTE — PROGRESS NOTES
PT DAILY TREATMENT NOTE     Patient Name: Bernard More   Date:2021  : 1940  [x]  Patient  Verified  Payor: VA MEDICARE / Plan: VA MEDICARE PART A & B / Product Type: Medicare /    In time:10:30  Out time:11:08  Total Treatment Time (min): 38  Visit #: 5 of 6-8    Medicare/BCBS Only   Total Timed Codes (min):  38 1:1 Treatment Time:  38       Treatment Area: Left shoulder pain [M25.512]    SUBJECTIVE  Pain Level (0-10 scale): 0/10  Any medication changes, allergies to medications, adverse drug reactions, diagnosis change, or new procedure performed?: [x] No    [] Yes (see summary sheet for update)  Subjective functional status/changes:   [] No changes reported  \"Feeling pretty good this morning. \"    OBJECTIVE    30 min Therapeutic Exercise:  [x] See flow sheet :   Rationale: increase ROM and increase strength to improve the patients ability to perform ADL's.    8 min Manual Therapy:  Left ST/GH inf/post mobs grade III. STM/DTM Left UT, lev scap, CFM to infraspinatus. Left shoulder PROM flex, scap, ER/IR. Pt Right side-lying and supine. The manual therapy interventions were performed at a separate and distinct time from the therapeutic activities interventions. Rationale: decrease pain, increase ROM, increase tissue extensibility and decrease trigger points to improve ease of performing OH activities. With   [x] TE   [] TA   [] neuro   [] other: Patient Education: [x] Review HEP    [] Progressed/Changed HEP based on:   [] positioning   [] body mechanics   [] transfers   [] heat/ice application    [] other:      Other Objective/Functional Measures: Added 1# to 1/2 prone series, added side-lying flex 0# 12 reps. Pain Level (0-10 scale) post treatment: 0/10    ASSESSMENT/Changes in Function: Demonstrates good AROM with flexion wall slides and lift, mildly challenged with scaption. Pt has made good progress since IE, anticipate D/C to HEP after the next two visits.     Patient will continue to benefit from skilled PT services to modify and progress therapeutic interventions, address functional mobility deficits, address ROM deficits, address strength deficits, analyze and address soft tissue restrictions and analyze and modify body mechanics/ergonomics to attain remaining goals. [x]  See Plan of Care  []  See progress note/recertification  []  See Discharge Summary         Progress towards goals / Updated goals:  Goals for this certification period to be accomplished in 3-4 weeks:  1. Improve left shoulder full can to 4/5 to improve ability for daily tasks              PN- 4-/5 with pain              Current: MET 4/5 on 3-30-21   2. The pt will demonstrates 4+/5 left shoulder ER MMT to improve abilty for daily tasks               PN: 4-/5              Current: MET 4+/5 on 3-30-21  3. The pt will report at least 75% improvement in the left shoulder for improved ability for ADLs               PN: 50%              Current: Pt reports ~60% overall subjective improvement.  3/25/2021    PLAN  []  Upgrade activities as tolerated     [x]  Continue plan of care  []  Update interventions per flow sheet       []  Discharge due to:_  []  Other:_      Ce Dee, PTA 4/6/2021  10:24 AM    Future Appointments   Date Time Provider Reza Lidya   4/6/2021 10:30 AM Sheila Richard PTA [facility]   4/8/2021 10:00 AM Sheila Richard PTA [facility]   4/13/2021  9:15 AM Jose Henderson, PT [facility]   4/13/2021  2:00 PM Nikos Willis MD [facility] BS AMB   4/27/2021  9:00 AM Arian Chen MD [facility] BS AMB   6/3/2021 10:30 AM Artemio Land MD [facility] BS AMB   8/26/2021  8:00 AM [facility] 1 [facility] BS AMB   8/26/2021  9:00 AM [facility] 1 [facility] BS AMB   8/26/2021 10:45 AM Sherrie Lazaro PA [facility] BS AMB

## 2021-04-08 ENCOUNTER — APPOINTMENT (OUTPATIENT)
Dept: PHYSICAL THERAPY | Age: 81
End: 2021-04-08
Payer: MEDICARE

## 2021-04-12 ENCOUNTER — HOSPITAL ENCOUNTER (OUTPATIENT)
Dept: PHYSICAL THERAPY | Age: 81
Discharge: HOME | End: 2021-04-12
Payer: MEDICARE

## 2021-04-12 PROCEDURE — 97140 MANUAL THERAPY 1/> REGIONS: CPT

## 2021-04-12 PROCEDURE — 97110 THERAPEUTIC EXERCISES: CPT

## 2021-04-12 NOTE — PROGRESS NOTES
PT DAILY TREATMENT NOTE     Patient Name: Gary Walker  Date:2021  : 1940  [x]  Patient  Verified  Payor: Katy Canadaer / Plan: [place] MED[place] & B / Product Type: Medicare /    In time:9:00  Out time:9:40  Total Treatment Time (min): 40  Visit #: 6 of 6-8    Medicare/BCBS Only   Total Timed Codes (min):  40 1:1 Treatment Time:  40       Treatment Area: Left shoulder pain [M25.512]    SUBJECTIVE  Pain Level (0-10 scale): 0/10  Any medication changes, allergies to medications, adverse drug reactions, diagnosis change, or new procedure performed?: [x] No    [] Yes (see summary sheet for update)  Subjective functional status/changes:   [] No changes reported  \"Feeling pretty good. \"    OBJECTIVE    32 min Therapeutic Exercise:  [x] See flow sheet :   Rationale: increase ROM, increase strength and increase proprioception to improve the patients ability to perform ADL's.    8 min Manual Therapy:  Left ST/GH inf/post mobs grade III. STM/DTM Left UT, lev scap, CFM to infraspinatus. Left shoulder PROM flex, scap, ER/IR. Pt Right side-lying and supine. The manual therapy interventions were performed at a separate and distinct time from the therapeutic activities interventions. Rationale: decrease pain, increase ROM, increase tissue extensibility and decrease trigger points to improve ease of performing OH activities. With   [x] TE   [] TA   [] neuro   [] other: Patient Education: [x] Review HEP    [] Progressed/Changed HEP based on:   [] positioning   [] body mechanics   [] transfers   [] heat/ice application    [] other:      Other Objective/Functional Measures: Increased standing wand to 6#. Wall push-ups to being performed on plyobox. Added 0# 1/2 prone 'W' to further increase scapular stability. Pain Level (0-10 scale) post treatment: 0/10    ASSESSMENT/Changes in Function: Pt has made considerable progress since IE.  Pt reports significant improvement with ADL's and a decrease in pain level. Plan to D/C to HEP next visit. Patient will continue to benefit from skilled PT services to modify and progress therapeutic interventions, address functional mobility deficits, address ROM deficits, address strength deficits, analyze and address soft tissue restrictions and analyze and modify body mechanics/ergonomics to attain remaining goals. [x]  See Plan of Care  []  See progress note/recertification  []  See Discharge Summary         Progress towards goals / Updated goals:  Goals for this certification period to be accomplished in 3-4 weeks:  1. Improve left shoulder full can to 4/5 to improve ability for daily tasks              PN- 4-/5 with pain              Current: MET 4/5 on 3-30-21   2. The pt will demonstrates 4+/5 left shoulder ER MMT to improve abilty for daily tasks               PN: 4-/5              Current: MET 4+/5 on 3-30-21  3. The pt will report at least 75% improvement in the left shoulder for improved ability for ADLs               PN: 50%              Current: Pt reports ~60% overall subjective improvement.  3/25/2021    PLAN  []  Upgrade activities as tolerated     [x]  Continue plan of care  []  Update interventions per flow sheet       []  Discharge due to:_  []  Other:_      Phuong Mendoza, PTA 4/12/2021  9:02 AM    Future Appointments   Date Time Provider Reza Bose   4/13/2021  9:15 AM Daniela Craig PT [id number]   4/13/2021  2:00 PM John Basilio MD [facility] BS AMB   4/27/2021  9:00 AM Sandra Mariee MD [id number] BS AMB   6/3/2021 10:30 AM [facility]   8/26/2021  8:00 AM [facility] BS AMB   8/26/2021  9:00 AM [facility] BS AMB   8/26/2021 10:45 AM Agatha Lazaro PA [facility] BS AMB

## 2021-04-13 ENCOUNTER — OFFICE VISIT (OUTPATIENT)
Dept: ORTHOPEDIC SURGERY | Age: 81
End: 2021-04-13
Payer: MEDICARE

## 2021-04-13 ENCOUNTER — HOSPITAL ENCOUNTER (OUTPATIENT)
Dept: PHYSICAL THERAPY | Age: 81
Discharge: HOME | End: 2021-04-13
Payer: MEDICARE

## 2021-04-13 VITALS
RESPIRATION RATE: 16 BRPM | OXYGEN SATURATION: 96 % | HEART RATE: 63 BPM | TEMPERATURE: 97.1 F | BODY MASS INDEX: 26.01 KG/M2 | WEIGHT: 192 LBS | HEIGHT: 72 IN

## 2021-04-13 DIAGNOSIS — M75.102 ROTATOR CUFF SYNDROME, LEFT: Primary | ICD-10-CM

## 2021-04-13 PROCEDURE — 1101F PT FALLS ASSESS-DOCD LE1/YR: CPT | Performed by: ORTHOPAEDIC SURGERY

## 2021-04-13 PROCEDURE — 99213 OFFICE O/P EST LOW 20 MIN: CPT | Performed by: ORTHOPAEDIC SURGERY

## 2021-04-13 PROCEDURE — 97110 THERAPEUTIC EXERCISES: CPT

## 2021-04-13 PROCEDURE — G8419 CALC BMI OUT NRM PARAM NOF/U: HCPCS | Performed by: ORTHOPAEDIC SURGERY

## 2021-04-13 PROCEDURE — G8427 DOCREV CUR MEDS BY ELIG CLIN: HCPCS | Performed by: ORTHOPAEDIC SURGERY

## 2021-04-13 PROCEDURE — G8756 NO BP MEASURE DOC: HCPCS | Performed by: ORTHOPAEDIC SURGERY

## 2021-04-13 PROCEDURE — G8536 NO DOC ELDER MAL SCRN: HCPCS | Performed by: ORTHOPAEDIC SURGERY

## 2021-04-13 PROCEDURE — G8432 DEP SCR NOT DOC, RNG: HCPCS | Performed by: ORTHOPAEDIC SURGERY

## 2021-04-13 NOTE — PROGRESS NOTES
Patient: Sakshi Garces                MRN: [id number]       SSN: [id number]  YOB: 1940        AGE: [de-identified] y.o. SEX: male  Body mass index is 26.04 kg/m². PCP: Alex Ramirez MD  04/13/21    Chief Complaint: Left shoulder follow up    Pain 0/10    HPI: Sakshi Garces is a [de-identified] y.o. male patient who returns to the office today for his left shoulder. He has been in physical therapy and completed this morning. He reports his pain in his left shoulder has completely improved. He has minimal complaints today. Past Medical History:   Diagnosis Date    AAA (abdominal aortic aneurysm) ([facility] [address] 03/2010    noted on CT (abdomen)    Abnormal LFT's 12/95, 04/16/03    Acute meniscal tear, lateral 01/2007    s/p arthorscopic surg (Dr. Caden Ivy)    Atypical chest pain 02/04/09    CAD (coronary artery disease), native coronary artery     Cardiac cath 04/13/2009    dLM 30%. mLAD 30%. oD2 30%. pCX 55% (FFR 0.95). mRCA 40%. RPDA 65% (FFR 0.86). EF 65%.  Cardiac echocardiogram 04/03/2007    EF 60%. No RWMA. Gr 1 DDfx. LAE. No significant valvular heart disease.  Cardiac nuclear imaging test, low risk 12/04/2015    Low risk. No ischemia or prior infarction. No WMA. EF 67%. Neg EKG on pharm stress test.    Cardiovascular aorto-iliac duplex 06/29/2015    AAA measures 3.2 x 3.1 cm Trv. (Prev 3.2 x 3.4 cm on 6/19/14). Aneurysm is infrarenal & fusiform w/complex intraluminal thrombus within. Mod bilateral common iliac stenosis.  Carotid duplex 03/16/2016    Mild < 50% BETHEL stenosis. Mod 36-75% LICA stenosis. Similar to study of 6/29/15.  Cholecystitis chronic 03/2008    s/p sue    Closed fracture of proximal end of left radius with delayed healing, subsequent encounter 3/2/2021    Depression, recurrent ([address]     Diverticulosis     Diverticulosis 9-28-15    DR. 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    Plantar fasciitis     Rhinitis     Right knee pain     Right shoulder pain     Solar purpura ([facility] [address]     Tinea versicolor 7/23/2012    2.8 x 2.9cm infra-renal    Tubular adenoma 9-28-15    Vitamin D deficiency 4/13/2011       Family History   Problem Relation Age of Onset    Heart Disease Mother     Stroke Father     No Known Problems Sister     No Known Problems Maternal Grandmother     No Known Problems Maternal Grandfather     Dementia Paternal Grandmother     No Known Problems Paternal Grandfather     Cancer Brother         lung CA       Current Outpatient Medications   Medication Sig Dispense Refill    ezetimibe (Zetia) 10 mg tablet Take 1 Tab by mouth daily. 90 Tab 4    sildenafil citrate (Viagra) 100 mg tablet Take 1 Tab by mouth as needed (1 tablet by mouth once daily as needed). 18 Tab 3    rosuvastatin (CRESTOR) 20 mg tablet Take 1 Tab by mouth nightly. 90 Tab 3    diclofenac (Voltaren) 1 % gel Apply 4 g to affected area four (4) times daily. Right knee 5 Each 5    amLODIPine (NORVASC) 10 mg tablet TAKE 1 TABLET BY MOUTH DAILY 90 Tab 3    niacin ER (NIASPAN) 500 mg tablet Take 1 Tab by mouth daily. 90 Tab 4    nitroglycerin (NITROSTAT) 0.4 mg SL tablet 1 Tab by SubLINGual route every five (5) minutes as needed for Chest Pain. 3 Bottle 4    aspirin delayed-release (ADULT LOW DOSE ASPIRIN) 81 mg tablet Take 81 mg by mouth daily.       brinzolamide-brimonidine (Simbrinza) 1-0.2 % drps Apply 1 Drop to eye two (2) times a day.  coenzyme q10 (CO Q-10) 10 mg cap Take 1 Tab by mouth daily.  cholecalciferol, vitamin D3, (VITAMIN D3) 2,000 unit tab Take 1 Tab by mouth daily.  omega 3-dha-epa-fish oil 100-160-1,000 mg cap Take 1,000 mg by mouth daily. 90 Cap 3       Allergies   Allergen Reactions    Pollen Extracts Sneezing     Itchy eyes, runny nose       Past Surgical History:   Procedure Laterality Date    ENDOSCOPY, COLON, DIAGNOSTIC      normal per patient;     HX ARTHROPLASTY  04/05/10    [place]-left knee    HX CATARACT REMOVAL Bilateral     2014    HX CHOLECYSTECTOMY  2008    chronic cholecystitis    HX COLONOSCOPY  9-28-15    HX HERNIA REPAIR      L inguinal    HX KNEE ARTHROSCOPY  07    left knee    HX KNEE ARTHROSCOPY Left 2014    peripatellar debridement    HX KNEE REPLACEMENT Left 2013    [place]    HX MOHS PROCEDURES  2008    HX RHINOPLASTY      HX TONSILLECTOMY      HX VASECTOMY      HX WISDOM TEETH EXTRACTION      x4    [facility] ANESTH,SURGERY OF SHOULDER         Social History     Socioeconomic History    Marital status:      Spouse name: Not on file    Number of children: Not on file    Years of education: Not on file    Highest education level: Not on file   Occupational History    Not on file   Social Needs    Financial resource strain: Not on file    Food insecurity     Worry: Not on file     Inability: Not on file    Transportation needs     Medical: Not on file     Non-medical: Not on file   Tobacco Use    Smoking status: Former Smoker     Packs/day: 0.50     Years: 20.00     Pack years: 10.00     Types: Cigarettes     Quit date: 12/10/1995     Years since quittin.3    Smokeless tobacco: Never Used   Substance and Sexual Activity    Alcohol use:  Yes     Alcohol/week: 6.7 standard drinks     Types: 7 Glasses of wine, 1 Shots of liquor per week     Comment: social    Drug use: No     Types: Prescription, OTC    Sexual activity: Not on file   Lifestyle    Physical activity     Days per week: Not on file     Minutes per session: Not on file    Stress: Not on file   Relationships    Social connections     Talks on phone: Not on file     Gets together: Not on file     Attends Tenriism service: Not on file     Active member of club or organization: Not on file     Attends meetings of clubs or organizations: Not on file     Relationship status: Not on file    Intimate partner violence     Fear of current or ex partner: Not on file     Emotionally abused: Not on file     Physically abused: Not on file     Forced sexual activity: Not on file   Other Topics Concern    Not on file   Social History Narrative    Not on file       REVIEW OF SYSTEMS:      No changes from previous review of systems unless noted. PHYSICAL EXAMINATION:  Visit Vitals  Pulse 63   Temp 97.1 °F (36.2 °C)   Resp 16   Ht 6' (1.829 m)   Wt 192 lb (87.1 kg)   SpO2 96%   BMI 26.04 kg/m²     Body mass index is 26.04 kg/m². GENERAL: Alert and oriented x3, in no acute distress. HEENT: Normocephalic, atraumatic. RESP: Non labored breathing. SKIN: No rashes or lesions noted. Shoulder Examination     R   L  ROM   FF  Full   Full  ER  Full   Full   IR  Full   Full  Rotator Cuff Pain   Supra  -   -   Infra  -   -   Subscap -   -  Crepitus  -   -  Effusion  -   -  Warmth  -   -   Erythema  -   -  Instability  -   -  AC Joint TTP  -   -  Clavicle   Deformity -   -   TTP  -   -  Proximal Humerus   Deformity -   -   TTP  -   -  Deltoid Strength 5   5  Biceps Strength 5   5  Biceps Deformity -   -  Biceps Groove Pain -   -  Impingement Sign -   -       IMAGING:  No imaging today    ASSESSMENT & PLAN  Diagnosis: Left rotator cuff syndrome, resolved    Santiago's left shoulder rotator cuff pain has resolved with physical therapy. He is doing well.   I have recommended continue with the home exercises on a daily or every other day basis. He will follow-up if he has any further problems.       Electronically signed by: Yesi Ocampo MD

## 2021-04-13 NOTE — PROGRESS NOTES
PT DISCHARGE DAILY NOTE AND [id number]    Date:2021  Patient name: Santiago Gonzales Start of Care: 2/15/2021   Referral Katy Muro MD : [id number]               Medical Diagnosis: Left shoulder pain [M25.512]  Payor: [facility] MEDICARE / Plan: VA MEDICARE PART A & B / Product Type: Medicare /  Onset Date:in over a year, increase 2020               Treatment Diagnosis: left shoulder pain   Prior Hospitalization: see medical history Provider#: [id number]   Medications: Verified on Patient summary List    Comorbidities: depression, OA, TKA, RTC repair   Prior Level of Function: functionally I with daily tasks.       Visits from Start of Care: 15    Missed Visits: 0    Reporting Period : 3-15-21 to 21    [x]  Patient  Verified  Payor: VA MEDICARE / Plan: VA MEDICARE PART A & B / Product Type: Medicare /    In [id number]  Out HSJ  Total Treatment Time (min): 30  Visit #: 8 of 8    Medicare/BCBS Only   Total Timed Codes (min):  30 1:1 Treatment Time:  30       SUBJECTIVE  Pain Level (0-10 scale): 0  Any medication changes, allergies to medications, adverse drug reactions, diagnosis change, or new procedure performed?: [x] No    [] Yes (see summary sheet for update)  Subjective functional status/changes:   [] No changes reported  The pt reports 85% improvement with PT.     OBJECTIVE      30 min Therapeutic Exercise:  [x] See flow sheet :   Rationale: increase ROM and increase strength to improve the patients ability to perform ADL              With   [] TE   [] TA   [] neuro   [] other: Patient Education: [x] Review HEP    [] Progressed/Changed HEP based on:   [] positioning   [] body mechanics   [] transfers   [] heat/ice application    [] other:      Other Objective/Functional Measures:       Pain Level (0-10 scale) post treatment: 0    Summary of Care:  Goals for this certification period to be accomplished in 3-4 weeks:  1. Improve left shoulder full can to 4/5 to improve ability for daily tasks              PN- 4-/5 with pain              Current: MET 5/5 on 3-30-21   2. The pt will demonstrates 4+/5 left shoulder ER MMT to improve abilty for daily tasks               PN: 4-/5              Current: MET 4+/5 on 3-30-21  3. The pt will report at least 75% improvement in the left shoulder for improved ability for ADLs               PN: 50%              Current: MET-Pt reports 85% improvement 4-13-21     ASSESSMENT/Changes in Function: The pt has made significant progress with PT. He has met all PT goals and is ready for discharge at this time.      Thank you for this referral!     PLAN  [x]Discontinue therapy: [x]Patient has reached or is progressing toward set goals      []Patient is non-compliant or has abdicated      []Due to lack of appreciable progress towards set [address], PT 4/13/2021  9:26 AM

## 2021-04-27 ENCOUNTER — OFFICE VISIT (OUTPATIENT)
Dept: CARDIOLOGY CLINIC | Age: 81
End: 2021-04-27
Payer: MEDICARE

## 2021-04-27 VITALS
OXYGEN SATURATION: 98 % | HEIGHT: 72 IN | WEIGHT: 193 LBS | BODY MASS INDEX: 26.14 KG/M2 | HEART RATE: 42 BPM | SYSTOLIC BLOOD PRESSURE: 132 MMHG | DIASTOLIC BLOOD PRESSURE: 64 MMHG

## 2021-04-27 DIAGNOSIS — R00.1 BRADYCARDIA: ICD-10-CM

## 2021-04-27 DIAGNOSIS — I25.10 ATHEROSCLEROSIS OF NATIVE CORONARY ARTERY OF NATIVE HEART WITHOUT ANGINA PECTORIS: ICD-10-CM

## 2021-04-27 DIAGNOSIS — I48.91 ATRIAL FIBRILLATION, UNSPECIFIED TYPE (HCC): ICD-10-CM

## 2021-04-27 DIAGNOSIS — I10 ESSENTIAL HYPERTENSION: ICD-10-CM

## 2021-04-27 DIAGNOSIS — E78.5 HYPERLIPIDEMIA, UNSPECIFIED HYPERLIPIDEMIA TYPE: ICD-10-CM

## 2021-04-27 DIAGNOSIS — I48.91 ATRIAL FIBRILLATION, UNSPECIFIED TYPE (HCC): Primary | ICD-10-CM

## 2021-04-27 PROCEDURE — G8427 DOCREV CUR MEDS BY ELIG CLIN: HCPCS | Performed by: INTERNAL MEDICINE

## 2021-04-27 PROCEDURE — G8536 NO DOC ELDER MAL SCRN: HCPCS | Performed by: INTERNAL MEDICINE

## 2021-04-27 PROCEDURE — 93000 ELECTROCARDIOGRAM COMPLETE: CPT | Performed by: INTERNAL MEDICINE

## 2021-04-27 PROCEDURE — G8432 DEP SCR NOT DOC, RNG: HCPCS | Performed by: INTERNAL MEDICINE

## 2021-04-27 PROCEDURE — G8754 DIAS BP LESS 90: HCPCS | Performed by: INTERNAL MEDICINE

## 2021-04-27 PROCEDURE — 99215 OFFICE O/P EST HI 40 MIN: CPT | Performed by: INTERNAL MEDICINE

## 2021-04-27 PROCEDURE — G8419 CALC BMI OUT NRM PARAM NOF/U: HCPCS | Performed by: INTERNAL MEDICINE

## 2021-04-27 PROCEDURE — G8752 SYS BP LESS 140: HCPCS | Performed by: INTERNAL MEDICINE

## 2021-04-27 PROCEDURE — 1101F PT FALLS ASSESS-DOCD LE1/YR: CPT | Performed by: INTERNAL MEDICINE

## 2021-04-27 NOTE — PATIENT INSTRUCTIONS
Follow up with Dr. Stanley Saldana in [address] [place] [address] 20mg once daily  Blood work ( tsh, cmp,mag,cbc,lipids)  Echo  48 hr holter

## 2021-04-27 NOTE — PROGRESS NOTES
Shyannedank Duboisevre presents today for   Chief Complaint   Patient presents with    Follow-up     6 month folow up        Shyanne Rosario preferred language for health care discussion is english/other. Is someone accompanying this pt? no    Is the patient using any DME equipment during [address]? no    Depression Screening:  3 most recent PHQ Screens 3/2/2021   PHQ Not Done -   Little interest or pleasure in doing things Several days   Feeling down, depressed, irritable, or hopeless Several days   Total Score PHQ 2 2   Trouble falling or staying asleep, or sleeping too much -   Feeling tired or having little energy -   Poor appetite, weight loss, or overeating -   Feeling bad about yourself - or that you are a failure or have let yourself or your family down -   Trouble concentrating on things such as school, work, reading, or watching TV -   Moving or speaking so slowly that other people could have noticed; or the opposite being so fidgety that others notice -   Thoughts of being better off dead, or hurting yourself in some way -   PHQ 9 Score -   How difficult have these problems made it for you to do your work, take care of your home and get along with others -       Learning Assessment:  Learning Assessment 1/16/2020   PRIMARY LEARNER Patient   HIGHEST LEVEL OF EDUCATION - PRIMARY LEARNER  GRADUATED HIGH SCHOOL OR GED   BARRIERS PRIMARY LEARNER NONE   CO-LEARNER CAREGIVER No   PRIMARY LANGUAGE ENGLISH   LEARNER PREFERENCE PRIMARY LISTENING   ANSWERED BY self   RELATIONSHIP SELF       Abuse Screening:  Abuse Screening Questionnaire 3/2/2021   Do you ever feel afraid of your partner? N   Are you in a relationship with someone who physically or mentally threatens you? N   Is it safe for you to go home? Y       Fall Risk  Fall Risk Assessment, last 12 mths 4/13/2021   Able to walk?  Yes   Fall in past 12 months? 0   Do you feel unsteady? -   Are you worried about falling -   Number of falls in past 12 months -   Fall with injury? -       Pt currently taking Anticoagulant therapy? no    Coordination of Care:  1. Have you been to the ER, urgent care clinic since your last visit? Hospitalized since your last visit? no    2. Have you seen or consulted any other health care providers outside of the [address] [place] [address] since your last visit? Include any pap smears or colon screening.  no

## 2021-04-27 NOTE — PROGRESS NOTES
HISTORY OF PRESENT ILLNESS  Kobe Alcazar is a [de-identified] y.o. male. Follow-up  Pertinent negatives include no chest pain, no abdominal pain, no headaches and no shortness of breath. Hypertension  Pertinent negatives include no chest pain, no abdominal pain, no headaches and no shortness of breath. Patient presents for a followup office visit. He has a known history of coronary artery disease, status post a non-ST elevation myocardial infarction in 2009. He underwent a cardiac catheterization at that time and was found to have moderate, but nonobstructive two-vessel coronary disease involving his left circumflex and a right-sided posterior descending artery, that were assessed by pressure wire. The patient has been maintained on medical therapy since that time. The patient last underwent a pharmacological nuclear stress test in December 2015, which was a normal study, showing no ischemia, normal left ventricle ejection fraction. EF 67%. He has a history of mild to moderate carotid artery disease,  last evaluated with a carotid duplex in August 2018 which demonstrated moderate carotid disease of the left ICA and mild plaquing of the right. He also underwent an abdominal ultrasound which showed a very small infrarenal AAA. Patient last underwent a pharmacologic nuclear stress test in November 2019 which was a normal and low risk study. He recently underwent follow-up abdominal ultrasound and carotid duplex scan in August 2020 which showed a small AAA and moderate bilateral carotid artery disease essentially unchanged compared to his previous studies. Patient was last seen in our office 6 months ago. Since last visit, he continues to exercise regularly. He states he is doing a variety of activities a least 4 days a week including cardiovascular exercises and yoga. He denies any new shortness of breath, chest pain, heart palpitations, dizziness or syncope.   He has noted that his energy level has been a little off the past 2 months. Past Medical History:   Diagnosis Date    AAA (abdominal aortic aneurysm) ([address] 03/2010    noted on CT (abdomen)    Abnormal LFT's 12/95, 04/16/03    Acute meniscal tear, lateral 01/2007    s/p arthorscopic surg (Dr. Amalia Richardson)    Atypical chest pain 02/04/09    CAD (coronary artery disease), native coronary artery     Cardiac cath 04/13/2009    dLM 30%. mLAD 30%. oD2 30%. pCX 55% (FFR 0.95). mRCA 40%. RPDA 65% (FFR 0.86). EF 65%.  Cardiac echocardiogram 04/03/2007    EF 60%. No RWMA. Gr 1 DDfx. LAE. No significant valvular heart disease.  Cardiac nuclear imaging test, low risk 12/04/2015    Low risk. No ischemia or prior infarction. No WMA. EF 67%. Neg EKG on pharm stress test.    Cardiovascular aorto-iliac duplex 06/29/2015    AAA measures 3.2 x 3.1 cm Trv. (Prev 3.2 x 3.4 cm on 6/19/14). Aneurysm is infrarenal & fusiform w/complex intraluminal thrombus within. Mod bilateral common iliac stenosis.  Carotid duplex 03/16/2016    Mild < 50% BETHEL stenosis. Mod 22-76% LICA stenosis. Similar to study of 6/29/15.  Cholecystitis chronic 03/2008    s/p sue    Closed fracture of proximal end of left radius with delayed healing, subsequent encounter 3/2/2021    Depression, recurrent ([address]     Diverticulosis     Diverticulosis 9-28-15    DR. 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 30 Tab 5    ezetimibe (Zetia) 10 mg tablet Take 1 Tab by mouth daily. 90 Tab 4    sildenafil citrate (Viagra) 100 mg tablet Take 1 Tab by mouth as needed (1 tablet by mouth once daily as needed). 18 Tab 3    rosuvastatin (CRESTOR) 20 mg tablet Take 1 Tab by mouth nightly. 90 Tab 3    diclofenac (Voltaren) 1 % gel Apply 4 g to affected area four (4) times daily. Right knee 5 Each 5    amLODIPine (NORVASC) 10 mg tablet TAKE 1 TABLET BY MOUTH DAILY 90 Tab 3    niacin ER (NIASPAN) 500 mg tablet Take 1 Tab by mouth daily. 90 Tab 4    nitroglycerin (NITROSTAT) 0.4 mg SL tablet 1 Tab by SubLINGual route every five (5) minutes as needed for Chest Pain. 3 Bottle 4    aspirin delayed-release (ADULT LOW DOSE ASPIRIN) 81 mg tablet Take 81 mg by mouth daily.  brinzolamide-brimonidine (Simbrinza) 1-0.2 % drps Apply 1 Drop to eye two (2) times a day.  coenzyme q10 (CO Q-10) 10 mg cap Take 1 Tab by mouth daily.  cholecalciferol, vitamin D3, (VITAMIN D3) 2,000 unit tab Take 1 Tab by mouth daily.  omega 3-dha-epa-fish oil 100-160-1,000 mg cap Take 1,000 mg by mouth daily. 90 Cap 3         Allergies   Allergen Reactions    Pollen Extracts Sneezing     Itchy eyes, runny nose        Social History     Tobacco Use    Smoking status: Former Smoker     Packs/day: 0.50     Years: 20.00     Pack years: 10.00     Types: Cigarettes     Quit date: 12/10/1995     Years since quittin.3    Smokeless tobacco: Never Used   Substance Use Topics    Alcohol use:  Yes     Alcohol/week: 6.7 standard drinks     Types: 7 Glasses of wine, 1 Shots of liquor per week Comment: social    Drug use: No     Types: Prescription, OTC           Review of Systems   Constitutional: Positive for malaise/fatigue. Negative for chills, fever and weight loss. HENT: Negative for nosebleeds. Eyes: Negative for blurred vision and double vision. Respiratory: Negative for cough, shortness of breath and wheezing. Cardiovascular: Negative for chest pain, palpitations, orthopnea, claudication, leg swelling and PND. Gastrointestinal: Negative for abdominal pain, heartburn, nausea and vomiting. Genitourinary: Negative for dysuria and hematuria. Musculoskeletal: Negative for falls, joint pain and myalgias. Skin: Negative for rash. Neurological: Negative for dizziness, focal weakness and headaches. Endo/Heme/Allergies: Does not bruise/bleed easily. Psychiatric/Behavioral: Negative for substance abuse. Visit Vitals  /64 (BP 1 Location: Left upper arm, BP Patient Position: Sitting, BP Cuff Size: Adult)   Pulse (!) 42   Ht 6' (1.829 m)   Wt 87.5 kg (193 lb)   SpO2 98%   BMI 26.18 kg/m²      Physical Exam   Constitutional: He is oriented to person, place, and time. He appears well-developed and well-nourished. HENT:   Head: Normocephalic and atraumatic. Eyes: Conjunctivae are normal.   Neck: Neck supple. No JVD present. Carotid bruit is not present. Cardiovascular: S1 normal, S2 normal and normal pulses. An irregularly irregular rhythm present. No extrasystoles are present. Bradycardia present. Exam reveals no gallop and no S3. No murmur heard. Pulmonary/Chest: Breath sounds normal. He has no wheezes. He has no rales. Abdominal: Soft. Bowel sounds are normal. There is no abdominal tenderness. Musculoskeletal:         General: No edema. Neurological: He is alert and oriented to person, place, and time. Skin: Skin is warm and dry.      EKG: Atrial fibrillation with slow ventricular rate in the 40s, leftward axis, no ST or T wave abnormalities concerning for ischemia. Compared to the previous EKG, atrial fibrillation has replaced sinus rhythm. PVCs are no longer present. Heart rate has decreased. ASSESSMENT and PLAN    Atrial fibrillation. Newly diagnosed on EKG today. My suspicion is that this may have been going on for the past 2 months since he has been having a little more fatigued than usual.  Given his age and risk factors, I have recommended starting him on oral anticoagulation with Xarelto. I have also recommended an echocardiogram to reevaluate his LV function, arrange for a 48-hour Holter monitor to assess his average heart rate, and check for any thyroid issues or electrolyte disturbance with a full set of blood work. Asymptomatic coronary artery disease. Patient has moderate 2 vessel disease last assessed by cardiac catheterization in 2009, involving nonobstructive lesions in the proximal left circumflex in the right-sided PDA. He does have a history of a non-ST elevation myocardial infarction back in 2009, for which he was briefly treated with Plavix. He is now maintained on aspirin and a statin, and he has been intolerant to beta blockers because of bradycardia. He last underwent a negative pharmacologic nuclear stress test in November 2019. No new anginal type symptoms. For now the patient can continue his aspirin, but can stop this medication if he notices significant bleeding or bruising after he is started on Xarelto. Bradycardia. This has been present in the past.  He appears to be asymptomatic. I ordered a Holter monitor primarily to assess his atrial fibrillation rates. Essential hypertension. Patient blood pressure remains well controlled on his current regimen, all of which I would continue. Dyslipidemia: Patient is now on Crestor 20 mg daily, Niaspan 1000 mg daily, Fish oil, and Zetia. A repeat fasting lipid panel will be checked with his next blood draw. Carotid artery disease.   Patient has moderate bilateral carotid artery disease which was last assessed on carotid duplex  in August 2020. This is followed by vascular surgery. Infrarenal AAA. This was last evaluated by an ultrasound in August 2020. This remains small in size and being followed regular by vascular surgery.     Followup in 2 months time, sooner if needed

## 2021-04-29 ENCOUNTER — HOSPITAL ENCOUNTER (OUTPATIENT)
Dept: LAB | Age: 81
Discharge: HOME | End: 2021-04-29
Payer: MEDICARE

## 2021-04-29 DIAGNOSIS — I48.91 ATRIAL FIBRILLATION, UNSPECIFIED TYPE (HCC): ICD-10-CM

## 2021-04-29 LAB
ALBUMIN SERPL-MCNC: 4.2 G/DL (ref 3.4–5)
ALBUMIN/GLOB SERPL: 1.4 {RATIO} (ref 0.8–1.7)
ALP SERPL-CCNC: 107 U/L (ref 45–117)
ALT SERPL-CCNC: 31 U/L (ref 16–61)
ANION GAP SERPL CALC-SCNC: 7 MMOL/L (ref 3–18)
AST SERPL-CCNC: 20 U/L (ref 10–38)
BASOPHILS # BLD: 0.1 K/UL (ref 0–0.1)
BASOPHILS NFR BLD: 1 % (ref 0–2)
BILIRUB SERPL-MCNC: 2.5 MG/DL (ref 0.2–1)
BUN SERPL-MCNC: 13 MG/DL (ref 7–18)
BUN/CREAT SERPL: 14 (ref 12–20)
CALCIUM SERPL-MCNC: 9 MG/DL (ref 8.5–10.1)
CHLORIDE SERPL-SCNC: 105 MMOL/L (ref 100–111)
CHOLEST SERPL-MCNC: 107 MG/DL
CO2 SERPL-SCNC: 28 MMOL/L (ref 21–32)
CREAT SERPL-MCNC: 0.96 MG/DL (ref 0.6–1.3)
DIFFERENTIAL METHOD BLD: ABNORMAL
EOSINOPHIL # BLD: 0.4 K/UL (ref 0–0.4)
EOSINOPHIL NFR BLD: 5 % (ref 0–5)
ERYTHROCYTE [DISTWIDTH] IN BLOOD BY AUTOMATED COUNT: 13 % (ref 11.6–14.5)
GLOBULIN SER CALC-MCNC: 2.9 G/DL (ref 2–4)
GLUCOSE SERPL-MCNC: 89 MG/DL (ref 74–99)
HCT VFR BLD AUTO: 51.5 % (ref 36–48)
HDLC SERPL-MCNC: 39 MG/DL (ref 40–60)
HDLC SERPL: 2.7 {RATIO} (ref 0–5)
HGB BLD-MCNC: 17.1 G/DL (ref 13–16)
LDLC SERPL CALC-MCNC: 44.6 MG/DL (ref 0–100)
LIPID PROFILE,FLP: ABNORMAL
LYMPHOCYTES # BLD: 2.1 K/UL (ref 0.9–3.6)
LYMPHOCYTES NFR BLD: 27 % (ref 21–52)
MAGNESIUM SERPL-MCNC: 2.1 MG/DL (ref 1.6–2.6)
MCH RBC QN AUTO: 32.1 PG (ref 24–34)
MCHC RBC AUTO-ENTMCNC: 33.2 G/DL (ref 31–37)
MCV RBC AUTO: 96.8 FL (ref 74–97)
MONOCYTES # BLD: 0.7 K/UL (ref 0.05–1.2)
MONOCYTES NFR BLD: 9 % (ref 3–10)
NEUTS SEG # BLD: 4.5 K/UL (ref 1.8–8)
NEUTS SEG NFR BLD: 57 % (ref 40–73)
PLATELET # BLD AUTO: 301 K/UL (ref 135–420)
PMV BLD AUTO: 9.6 FL (ref 9.2–11.8)
POTASSIUM SERPL-SCNC: 3.8 MMOL/L (ref 3.5–5.5)
PROT SERPL-MCNC: 7.1 G/DL (ref 6.4–8.2)
RBC # BLD AUTO: 5.32 M/UL (ref 4.35–5.65)
SODIUM SERPL-SCNC: 140 MMOL/L (ref 136–145)
T4 FREE SERPL-MCNC: 1 NG/DL (ref 0.7–1.5)
TRIGL SERPL-MCNC: 117 MG/DL (ref ?–150)
TSH SERPL DL<=0.05 MIU/L-ACNC: 1.57 UIU/ML (ref 0.36–3.74)
VLDLC SERPL CALC-MCNC: 23.4 MG/DL
WBC # BLD AUTO: 7.8 K/UL (ref 4.6–13.2)

## 2021-04-29 PROCEDURE — 84439 ASSAY OF FREE THYROXINE: CPT

## 2021-04-29 PROCEDURE — 83735 ASSAY OF MAGNESIUM: CPT

## 2021-04-29 PROCEDURE — 80061 LIPID PANEL: CPT

## 2021-04-29 PROCEDURE — 85025 COMPLETE CBC W/AUTO DIFF WBC: CPT

## 2021-04-29 PROCEDURE — 36415 COLL VENOUS BLD VENIPUNCTURE: CPT

## 2021-04-29 PROCEDURE — 80053 COMPREHEN METABOLIC PANEL: CPT

## 2021-05-03 ENCOUNTER — TELEPHONE (OUTPATIENT)
Dept: CARDIOLOGY CLINIC | Age: 81
End: 2021-05-03

## 2021-05-03 NOTE — TELEPHONE ENCOUNTER
----- Message from Romie Fritz MD sent at 4/30/2021  8:52 AM EDT -----  Please let the patient know that all of his labs were normal.  His cholesterol numbers were excellent, his electrolytes were normal, his thyroid was normal.  ----- Message -----  From: Nidhi Bianchi  Sent: 4/29/2021   2:47 PM EDT  To: Romie Fritz MD    Per your last note \" Atrial fibrillation. Newly diagnosed on EKG today. My suspicion is that this may have been going on for the past 2 months since he has been having a little more fatigued than usual.  Given his age and risk factors, I have recommended starting him on oral anticoagulation with Xarelto. I have also recommended an echocardiogram to reevaluate his LV function, arrange for a 48-hour Holter monitor to assess his average heart rate, and check for any thyroid issues or electrolyte disturbance with a full set of blood work.

## 2021-05-03 NOTE — TELEPHONE ENCOUNTER
----- Message from Alysa Morris MD sent at 4/30/2021  8:52 AM EDT -----  Please let the patient know that all of his labs were normal.  His cholesterol numbers were excellent, his electrolytes were normal, his thyroid was normal.  ----- Message -----  From: Mariana Mccray  Sent: 4/29/2021   2:47 PM EDT  To: Alysa Morris MD    Per your last note \" Atrial fibrillation. Newly diagnosed on EKG today. My suspicion is that this may have been going on for the past 2 months since he has been having a little more fatigued than usual.  Given his age and risk factors, I have recommended starting him on oral anticoagulation with Xarelto. I have also recommended an echocardiogram to reevaluate his LV function, arrange for a 48-hour Holter monitor to assess his average heart rate, and check for any thyroid issues or electrolyte disturbance with a full set of blood work.

## 2021-05-19 LAB
ECHO AO ASC DIAM: 3.64 CM
ECHO AO ROOT DIAM: 3.23 CM
ECHO IVC PROX: 2.39 CM
ECHO LA AREA 4C: 33.28 CM2
ECHO LA VOL 2C: 140.57 ML (ref 18–58)
ECHO LA VOL 4C: 105.2 ML (ref 18–58)
ECHO LA VOL BP: 127.38 ML (ref 18–58)
ECHO LA VOL/BSA BIPLANE: 60.66 ML/M2 (ref 16–28)
ECHO LA VOLUME INDEX A2C: 66.94 ML/M2 (ref 16–28)
ECHO LA VOLUME INDEX A4C: 50.1 ML/M2 (ref 16–28)
ECHO LV E' LATERAL VELOCITY: 9.96 CM/S
ECHO LV E' SEPTAL VELOCITY: 6.05 CM/S
ECHO LV EDV A2C: 97.66 ML
ECHO LV EDV A4C: 89.88 ML
ECHO LV EDV BP: 93.79 ML (ref 67–155)
ECHO LV EDV INDEX A4C: 42.8 ML/M2
ECHO LV EDV INDEX BP: 44.7 ML/M2
ECHO LV EDV NDEX A2C: 46.5 ML/M2
ECHO LV EJECTION FRACTION A2C: 54 PERCENT
ECHO LV EJECTION FRACTION A4C: 67 PERCENT
ECHO LV EJECTION FRACTION BIPLANE: 60.2 PERCENT (ref 55–100)
ECHO LV ESV A2C: 44.97 ML
ECHO LV ESV A4C: 29.34 ML
ECHO LV ESV BP: 37.3 ML (ref 22–58)
ECHO LV ESV INDEX A2C: 21.4 ML/M2
ECHO LV ESV INDEX A4C: 14 ML/M2
ECHO LV ESV INDEX BP: 17.8 ML/M2
ECHO LV INTERNAL DIMENSION DIASTOLIC: 3.83 CM (ref 4.2–5.9)
ECHO LV INTERNAL DIMENSION SYSTOLIC: 3.38 CM
ECHO LV IVSD: 1.32 CM (ref 0.6–1)
ECHO LV MASS 2D: 163.9 G (ref 88–224)
ECHO LV MASS INDEX 2D: 78 G/M2 (ref 49–115)
ECHO LV POSTERIOR WALL DIASTOLIC: 1.17 CM (ref 0.6–1)
ECHO LVOT DIAM: 2.08 CM
ECHO LVOT PEAK GRADIENT: 3.54 MMHG
ECHO LVOT PEAK VELOCITY: 94.14 CM/S
ECHO LVOT SV: 52.7 ML
ECHO LVOT SV: 79.1 ML
ECHO LVOT VTI: 23.21 CM
ECHO MV A VELOCITY: 129.2 CM/S
ECHO MV AREA PHT: 2.55 CM2
ECHO MV AREA VTI: 1.93 CM2
ECHO MV E DECELERATION TIME (DT): 305.47 MS
ECHO MV E VELOCITY: 117.42 CM/S
ECHO MV E/A RATIO: 0.91
ECHO MV E/E' LATERAL: 11.79
ECHO MV E/E' RATIO (AVERAGED): 15.6
ECHO MV E/E' SEPTAL: 19.41
ECHO MV MAX VELOCITY: 129.23 CM/S
ECHO MV MEAN GRADIENT: 3.18 MMHG
ECHO MV PEAK GRADIENT: 6.43 MMHG
ECHO MV PEAK GRADIENT: 6.68 MMHG
ECHO MV PEAK GRADIENT: 6.93 MMHG
ECHO MV PRESSURE HALF TIME (PHT): 86.19 MS
ECHO MV VTI: 41.03 CM
ECHO PV REGURGITANT MAX VELOCITY: 216.96 CM/S
ECHO RV TAPSE: 1.95 CM (ref 1.5–2)
ECHO TV REGURGITANT MAX VELOCITY: 234 CM/S
ECHO TV REGURGITANT PEAK GRADIENT: 21.9 MMHG
LVOT MG: 2.18 MMHG

## 2021-05-20 ENCOUNTER — TELEPHONE (OUTPATIENT)
Dept: CARDIOLOGY CLINIC | Age: 81
End: 2021-05-20

## 2021-05-20 NOTE — TELEPHONE ENCOUNTER
----- Message from Vikash Carlton MD sent at 5/20/2021 11:09 AM EDT -----  Please let the patient know that his heart function remains normal on echocardiogram.  He did not have any valvular heart disease.  ----- Message -----  From: Sandra Rian  Sent: 5/20/2021   8:51 AM EDT  To: Vikash Carlton MD    Per your last note\" Atrial fibrillation. Newly diagnosed on EKG today. My suspicion is that this may have been going on for the past 2 months since he has been having a little more fatigued than usual.  Given his age and risk factors, I have recommended starting him on oral anticoagulation with Xarelto. I have also recommended an echocardiogram to reevaluate his LV function, arrange for a 48-hour Holter monitor to assess his average heart rate, and check for any thyroid issues or electrolyte disturbance with a full set of blood work.     Asymptomatic coronary artery disease. Patient has moderate 2 vessel disease last assessed by cardiac catheterization in 2009, involving nonobstructive lesions in the proximal left circumflex in the right-sided PDA. He does have a history of a non-ST elevation myocardial infarction back in 2009, for which he was briefly treated with Plavix. He is now maintained on aspirin and a statin, and he has been intolerant to beta blockers because of bradycardia. He last underwent a negative pharmacologic nuclear stress test in November 2019. No new anginal type symptoms. For now the patient can continue his aspirin, but can stop this medication if he notices significant bleeding or bruising after he is started on Xarelto.     Bradycardia. This has been present in the past.  He appears to be asymptomatic. I ordered a Holter monitor primarily to assess his atrial fibrillation rates.     Essential hypertension.   Patient blood pressure remains well controlled on his current regimen, all of which I would continue.     Dyslipidemia: Patient is now on Crestor 20 mg daily, Niaspan 1000 mg daily, Fish oil, and Zetia. A repeat fasting lipid panel will be checked with his next blood draw.     Carotid artery disease. Patient has moderate bilateral carotid artery disease which was last assessed on carotid duplex  in August 2020. This is followed by vascular surgery.     Infrarenal AAA. This was last evaluated by an ultrasound in August 2020. This remains small in size and being followed regular by vascular surgery.

## 2021-05-20 NOTE — PROGRESS NOTES
Per your last note\" Atrial fibrillation. Newly diagnosed on EKG today. My suspicion is that this may have been going on for the past 2 months since he has been having a little more fatigued than usual.  Given his age and risk factors, I have recommended starting him on oral anticoagulation with Xarelto. I have also recommended an echocardiogram to reevaluate his LV function, arrange for a 48-hour Holter monitor to assess his average heart rate, and check for any thyroid issues or electrolyte disturbance with a full set of blood work.     Asymptomatic coronary artery disease. Patient has moderate 2 vessel disease last assessed by cardiac catheterization in 2009, involving nonobstructive lesions in the proximal left circumflex in the right-sided PDA. He does have a history of a non-ST elevation myocardial infarction back in 2009, for which he was briefly treated with Plavix. He is now maintained on aspirin and a statin, and he has been intolerant to beta blockers because of bradycardia. He last underwent a negative pharmacologic nuclear stress test in November 2019. No new anginal type symptoms. For now the patient can continue his aspirin, but can stop this medication if he notices significant bleeding or bruising after he is started on Xarelto.     Bradycardia. This has been present in the past.  He appears to be asymptomatic. I ordered a Holter monitor primarily to assess his atrial fibrillation rates.     Essential hypertension. Patient blood pressure remains well controlled on his current regimen, all of which I would continue.     Dyslipidemia: Patient is now on Crestor 20 mg daily, Niaspan 1000 mg daily, Fish oil, and Zetia. A repeat fasting lipid panel will be checked with his next blood draw.     Carotid artery disease. Patient has moderate bilateral carotid artery disease which was last assessed on carotid duplex  in August 2020. This is followed by vascular surgery.     Infrarenal AAA.   This was last evaluated by an ultrasound in August 2020. This remains small in size and being followed regular by vascular surgery.

## 2021-05-28 NOTE — PROGRESS NOTES
Per your last note\" Atrial fibrillation. Newly diagnosed on EKG today. My suspicion is that this may have been going on for the past 2 months since he has been having a little more fatigued than usual.  Given his age and risk factors, I have recommended starting him on oral anticoagulation with Xarelto. I have also recommended an echocardiogram to reevaluate his LV function, arrange for a 48-hour Holter monitor to assess his average heart rate, and check for any thyroid issues or electrolyte disturbance with a full set of blood work.

## 2021-06-02 ENCOUNTER — TELEPHONE (OUTPATIENT)
Dept: CARDIOLOGY CLINIC | Age: 81
End: 2021-06-02

## 2021-06-02 NOTE — TELEPHONE ENCOUNTER
----- Message from Margot Gill MD sent at 6/1/2021  8:29 AM EDT -----  Please let the patient know that he was in and out of atrial fibrillation during his Holter monitor. He was in atrial fibrillation approximately half the time. His heart rates are well controlled. He can continue his current medical regimen.  ----- Message -----  From: Fareed Bee  Sent: 5/28/2021   2:26 PM EDT  To: Margot Gill MD    Per your last note\" Atrial fibrillation. Newly diagnosed on EKG today. My suspicion is that this may have been going on for the past 2 months since he has been having a little more fatigued than usual.  Given his age and risk factors, I have recommended starting him on oral anticoagulation with Xarelto.   I have also recommended an echocardiogram to reevaluate his LV function, arrange for a 48-hour Holter monitor to assess his average heart rate, and check for any thyroid issues or electrolyte disturbance with a full set of blood work.

## 2021-06-03 ENCOUNTER — OFFICE VISIT (OUTPATIENT)
Dept: FAMILY MEDICINE CLINIC | Age: 81
End: 2021-06-03
Payer: MEDICARE

## 2021-06-03 VITALS
BODY MASS INDEX: 26.01 KG/M2 | RESPIRATION RATE: 20 BRPM | HEART RATE: 60 BPM | OXYGEN SATURATION: 97 % | SYSTOLIC BLOOD PRESSURE: 125 MMHG | TEMPERATURE: 98.2 F | HEIGHT: 72 IN | DIASTOLIC BLOOD PRESSURE: 66 MMHG | WEIGHT: 192 LBS

## 2021-06-03 DIAGNOSIS — E78.5 HYPERLIPIDEMIA, UNSPECIFIED HYPERLIPIDEMIA TYPE: ICD-10-CM

## 2021-06-03 DIAGNOSIS — I10 ESSENTIAL HYPERTENSION: ICD-10-CM

## 2021-06-03 DIAGNOSIS — F43.21 GRIEF: ICD-10-CM

## 2021-06-03 DIAGNOSIS — R35.1 NOCTURIA: Primary | ICD-10-CM

## 2021-06-03 PROCEDURE — G8754 DIAS BP LESS 90: HCPCS | Performed by: FAMILY MEDICINE

## 2021-06-03 PROCEDURE — G8510 SCR DEP NEG, NO PLAN REQD: HCPCS | Performed by: FAMILY MEDICINE

## 2021-06-03 PROCEDURE — 1101F PT FALLS ASSESS-DOCD LE1/YR: CPT | Performed by: FAMILY MEDICINE

## 2021-06-03 PROCEDURE — G8536 NO DOC ELDER MAL SCRN: HCPCS | Performed by: FAMILY MEDICINE

## 2021-06-03 PROCEDURE — 99214 OFFICE O/P EST MOD 30 MIN: CPT | Performed by: FAMILY MEDICINE

## 2021-06-03 PROCEDURE — G8419 CALC BMI OUT NRM PARAM NOF/U: HCPCS | Performed by: FAMILY MEDICINE

## 2021-06-03 PROCEDURE — G0463 HOSPITAL OUTPT CLINIC VISIT: HCPCS | Performed by: FAMILY MEDICINE

## 2021-06-03 PROCEDURE — G8427 DOCREV CUR MEDS BY ELIG CLIN: HCPCS | Performed by: FAMILY MEDICINE

## 2021-06-03 PROCEDURE — G8752 SYS BP LESS 140: HCPCS | Performed by: FAMILY MEDICINE

## 2021-06-03 RX ORDER — TAMSULOSIN HYDROCHLORIDE 0.4 MG/1
0.4 CAPSULE ORAL DAILY
Qty: 90 CAPSULE | Refills: 4 | Status: SHIPPED
Start: 2021-06-03 | End: 2021-12-16

## 2021-06-03 NOTE — PROGRESS NOTES
Chief Complaint   Patient presents with    Hypertension    Cholesterol Problem    Labs     labs for Dr. Kenrick Fairchild noted 4/27/2021. LINDA Jenkins is a [de-identified] y.o. male presenting today for follow up of hypertension and hyperlipidemia. Patient had labs on April 27, 2021. Labs reviewed in detail with patient     New concerns today: pt reports that he feels a bit depressed at times when he is at home. He knows this is related to losing his wife. He has noticed that there are increased symptoms at home compared to when he is out with friends or family. He recognizes that his home holds many memories of his wife. There is also lots of memorabilia from over the years that reminds him of the life they had together. He feels he can put away photos of family members that have passed away. This may help some. Patient is not inclined to try new medication at this time. Patient is not interested in counseling at this time. Pt also c/o difficulty sleeping. Pt falls asleep ok but awakens to use the bathroom and has trouble getting back to sleep. He will get up 2-3 times per night. Review of Systems   Constitutional: Negative. HENT: Negative. Respiratory: Negative. Cardiovascular: Negative. Genitourinary:        Nocturia   Psychiatric/Behavioral: Positive for depression. All other systems reviewed and are negative. Physical Exam  Vitals and nursing note reviewed. Constitutional:       General: He is not in acute distress. Appearance: Normal appearance. HENT:      Head: Normocephalic and atraumatic. Right Ear: External ear normal.      Left Ear: External ear normal.      Nose: Nose normal.   Eyes:      Extraocular Movements: Extraocular movements intact. Conjunctiva/sclera: Conjunctivae normal.   Cardiovascular:      Rate and Rhythm: Normal rate and regular rhythm. Heart sounds: No murmur heard. No friction rub. No gallop.     Pulmonary:      Effort: Pulmonary effort is normal.      Breath sounds: Normal breath sounds. No wheezing, rhonchi or rales. Musculoskeletal:         General: Normal range of motion. Cervical back: Normal range of motion. No rigidity. Skin:     General: Skin is warm and dry. Neurological:      Mental Status: He is alert and oriented to person, place, and time. Coordination: Coordination normal.   Psychiatric:         Mood and Affect: Mood normal.         Behavior: Behavior normal.         Thought Content: Thought content normal.         Judgment: Judgment normal.         Diagnoses and all orders for this visit:    1. Nocturia  -    Trial: Tamsulosin (Flomax) 0.4 mg capsule; Take 1 Capsule by mouth daily. 2. Essential hypertension  Stable. Continue present treatment plan    3. Hyperlipidemia, unspecified hyperlipidemia type  Stable. Continue present treatment plan    4. Grief  Long discussion of coping strategies. Patient declines medication or referral to therapy at this point in time he did try grief counseling previously. He feels he has good social support. Patient appears motivated to make adjustments at home that might make things easier. We discussed putting some of the more important photos into a scrap book so that he can look at them whenever he would like but they are not on display at all times. Follow-up and Dispositions    · Return in about 3 months (around 9/3/2021) for high blood pressure, high cholesterol.

## 2021-06-03 NOTE — PROGRESS NOTES
Leandro Quach presents today for   Chief Complaint   Patient presents with    Hypertension    Cholesterol Problem    Labs     labs for Dr. Mariano Helms noted 4/27/2021. Leandro Quach preferred language for health care discussion is english/other. Is someone accompanying this pt? No    Is the patient using any DME equipment during OV? NO    Depression Screening:  3 most recent PHQ Screens 6/3/2021   PHQ Not Done -   Little interest or pleasure in doing things Not at all   Feeling down, depressed, irritable, or hopeless Not at all   Total Score PHQ 2 0   Trouble falling or staying asleep, or sleeping too much -   Feeling tired or having little energy -   Poor appetite, weight loss, or overeating -   Feeling bad about yourself - or that you are a failure or have let yourself or your family down -   Trouble concentrating on things such as school, work, reading, or watching TV -   Moving or speaking so slowly that other people could have noticed; or the opposite being so fidgety that others notice -   Thoughts of being better off dead, or hurting yourself in some way -   PHQ 9 Score -   How difficult have these problems made it for you to do your work, take care of your home and get along with others -       Learning Assessment:  Learning Assessment 1/16/2020   PRIMARY LEARNER Patient   HIGHEST LEVEL OF EDUCATION - PRIMARY LEARNER  GRADUATED HIGH SCHOOL OR GED   BARRIERS PRIMARY LEARNER NONE   CO-LEARNER CAREGIVER No   PRIMARY LANGUAGE ENGLISH   LEARNER PREFERENCE PRIMARY LISTENING   ANSWERED BY self   RELATIONSHIP SELF       Fall Risk  Fall Risk Assessment, last 12 mths 6/3/2021   Able to walk? Yes   Fall in past 12 months? 0   Do you feel unsteady?  0   Are you worried about falling 0   Number of falls in past 12 months -   Fall with injury? -       Travel Screening:   Travel Screening     Question   Response    In the last month, have you been in contact with someone who was confirmed or suspected to have Coronavirus / COVID-19? No / Unsure    Have you had a COVID-19 viral test in the last 14 days? No    Do you have any of the following new or worsening symptoms? None of these    Have you traveled internationally or domestically in the last month? No      Travel History   Travel since 05/03/21     No documented travel since 05/03/21          Health Maintenance reviewed and discussed and ordered per Provider. Health Maintenance Due   Topic Date Due    Shingrix Vaccine Age 50> (1 of 2) Never done   . Coordination of Care:  1. Have you been to the ER, urgent care clinic since your last visit? Hospitalized since your last visit? No    2. Have you seen or consulted any other health care providers outside of the [address] [place] [address] since your last visit? Include any pap smears or colon screening.  No

## 2021-06-24 ENCOUNTER — OFFICE VISIT (OUTPATIENT)
Dept: CARDIOLOGY CLINIC | Age: 81
End: 2021-06-24
Payer: MEDICARE

## 2021-06-24 VITALS
OXYGEN SATURATION: 98 % | SYSTOLIC BLOOD PRESSURE: 106 MMHG | BODY MASS INDEX: 26.41 KG/M2 | HEIGHT: 72 IN | WEIGHT: 195 LBS | HEART RATE: 55 BPM | DIASTOLIC BLOOD PRESSURE: 72 MMHG

## 2021-06-24 DIAGNOSIS — I25.10 ATHEROSCLEROSIS OF NATIVE CORONARY ARTERY OF NATIVE HEART WITHOUT ANGINA PECTORIS: ICD-10-CM

## 2021-06-24 DIAGNOSIS — R00.1 BRADYCARDIA: ICD-10-CM

## 2021-06-24 DIAGNOSIS — E78.00 PURE HYPERCHOLESTEROLEMIA: ICD-10-CM

## 2021-06-24 DIAGNOSIS — I10 ESSENTIAL HYPERTENSION: ICD-10-CM

## 2021-06-24 DIAGNOSIS — I48.0 PAROXYSMAL ATRIAL FIBRILLATION (HCC): Primary | ICD-10-CM

## 2021-06-24 PROCEDURE — G8752 SYS BP LESS 140: HCPCS | Performed by: INTERNAL MEDICINE

## 2021-06-24 PROCEDURE — G8432 DEP SCR NOT DOC, RNG: HCPCS | Performed by: INTERNAL MEDICINE

## 2021-06-24 PROCEDURE — 99214 OFFICE O/P EST MOD 30 MIN: CPT | Performed by: INTERNAL MEDICINE

## 2021-06-24 PROCEDURE — G8754 DIAS BP LESS 90: HCPCS | Performed by: INTERNAL MEDICINE

## 2021-06-24 PROCEDURE — G8536 NO DOC ELDER MAL SCRN: HCPCS | Performed by: INTERNAL MEDICINE

## 2021-06-24 PROCEDURE — 93000 ELECTROCARDIOGRAM COMPLETE: CPT | Performed by: INTERNAL MEDICINE

## 2021-06-24 PROCEDURE — 1101F PT FALLS ASSESS-DOCD LE1/YR: CPT | Performed by: INTERNAL MEDICINE

## 2021-06-24 PROCEDURE — G8419 CALC BMI OUT NRM PARAM NOF/U: HCPCS | Performed by: INTERNAL MEDICINE

## 2021-06-24 PROCEDURE — G8427 DOCREV CUR MEDS BY ELIG CLIN: HCPCS | Performed by: INTERNAL MEDICINE

## 2021-06-24 NOTE — PROGRESS NOTES
HISTORY OF PRESENT ILLNESS  Dillon Elaine is a [de-identified] y.o. male. Follow-up  Pertinent negatives include no chest pain, no abdominal pain, no headaches and no shortness of breath. Patient presents for a followup office visit. He has a known history of coronary artery disease, status post a non-ST elevation myocardial infarction in 2009. He underwent a cardiac catheterization at that time and was found to have moderate, but nonobstructive two-vessel coronary disease involving his left circumflex and a right-sided posterior descending artery, that were assessed by pressure wire. The patient has been maintained on medical therapy since that time. The patient last underwent a pharmacological nuclear stress test in December 2015, which was a normal study, showing no ischemia, normal left ventricle ejection fraction. EF 67%. He has a history of mild to moderate carotid artery disease,  last evaluated with a carotid duplex in August 2018 which demonstrated moderate carotid disease of the left ICA and mild plaquing of the right. He also underwent an abdominal ultrasound which showed a very small infrarenal AAA. Patient last underwent a pharmacologic nuclear stress test in November 2019 which was a normal and low risk study. He recently underwent follow-up abdominal ultrasound and carotid duplex scan in August 2020 which showed a small AAA and moderate bilateral carotid artery disease essentially unchanged compared to his previous studies. Patient was diagnosed with new onset atrial fibrillation at last office visit in April 2021. His rates were on the slow side without any rate slowing agents. He underwent an echocardiogram in May 2021 which showed preserved LV systolic function, EF 55 to 60%, mild concentric LVH, severe left atrial enlargement, diastolic dysfunction, mild tricuspid regurgitation with normal PA pressure.   He also wore a Holter monitor which showed paroxysmal atrial fibrillation approximately 50% of the time with an average heart rate of 68 bpm and no prolonged pauses or prolonged tachycardias. He was started on Xarelto for oral anticoagulation at last visit. Since last visit, he states he has been feeling well. No increased fatigue or shortness of breath. He still exercising 4 days a week. No heart palpitations, dizziness, nor syncope. No new chest pain or pressure. Past Medical History:   Diagnosis Date    AAA (abdominal aortic aneurysm) ([facility] 03/2010    noted on CT (abdomen)    Abnormal LFT's 12/95, 04/16/03    Acute meniscal tear, lateral 01/2007    s/p arthorscopic surg (Dr. Betsy Escobar)    Atypical chest pain 02/04/09    CAD (coronary artery disease), native coronary artery     Cardiac cath 04/13/2009    dLM 30%. mLAD 30%. oD2 30%. pCX 55% (FFR 0.95). mRCA 40%. RPDA 65% (FFR 0.86). EF 65%.  Cardiac echocardiogram 04/03/2007    EF 60%. No RWMA. Gr 1 DDfx. LAE. No significant valvular heart disease.  Cardiac nuclear imaging test, low risk 12/04/2015    Low risk. No ischemia or prior infarction. No WMA. EF 67%. Neg EKG on pharm stress test.    Cardiovascular aorto-iliac duplex 06/29/2015    AAA measures 3.2 x 3.1 cm Trv. (Prev 3.2 x 3.4 cm on 6/19/14). Aneurysm is infrarenal & fusiform w/complex intraluminal thrombus within. Mod bilateral common iliac stenosis.  Carotid duplex 03/16/2016    Mild < 50% BETHEL stenosis. Mod 64-09% LICA stenosis. Similar to study of 6/29/15.  Cholecystitis chronic 03/2008    s/p sue    Closed fracture of proximal end of left radius with delayed healing, subsequent encounter 3/2/2021    Depression, recurrent ([facility] [address]     Diverticulosis     Diverticulosis 9-28-15    DR. 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 90 Capsule 4    rivaroxaban (Xarelto) 20 mg tab tablet Take 1 Tab by mouth daily. 30 Tab 5    ezetimibe (Zetia) 10 mg tablet Take 1 Tab by mouth daily. 90 Tab 4    sildenafil citrate (Viagra) 100 mg tablet Take 1 Tab by mouth as needed (1 tablet by mouth once daily as needed). 18 Tab 3    rosuvastatin (CRESTOR) 20 mg tablet Take 1 Tab by mouth nightly. 90 Tab 3    diclofenac (Voltaren) 1 % gel Apply 4 g to affected area four (4) times daily. Right knee (Patient not taking: Reported on 6/3/2021) 5 Each 5    amLODIPine (NORVASC) 10 mg tablet TAKE 1 TABLET BY MOUTH DAILY 90 Tab 3    niacin ER (NIASPAN) 500 mg tablet Take 1 Tab by mouth daily. 90 Tab 4    nitroglycerin (NITROSTAT) 0.4 mg SL tablet 1 Tab by SubLINGual route every five (5) minutes as needed for Chest Pain. 3 Bottle 4    aspirin delayed-release (ADULT LOW DOSE ASPIRIN) 81 mg tablet Take 81 mg by mouth daily.  brinzolamide-brimonidine (Simbrinza) 1-0.2 % drps Apply 1 Drop to eye two (2) times a day.  coenzyme q10 (CO Q-10) 10 mg cap Take 1 Tab by mouth daily.  cholecalciferol, vitamin D3, (VITAMIN D3) 2,000 unit tab Take 1 Tab by mouth daily.  omega 3-dha-epa-fish oil 100-160-1,000 mg cap Take 1,000 mg by mouth daily. 90 Cap 3         Allergies   Allergen Reactions    Pollen Extracts Sneezing     Itchy eyes, runny nose        Social History     Tobacco Use    Smoking status: Former Smoker     Packs/day: 0.50     Years: 20.00     Pack years: 10.00     Types: Cigarettes     Quit date: 12/10/1995     Years since quittin.5    Smokeless tobacco: Never Used   Substance Use Topics    Alcohol use: Yes     Alcohol/week: 6.7 standard drinks     Types: 7 Glasses of wine, 1 Shots of liquor per week     Comment: social    Drug use: No     Types: Prescription, OTC           Review of Systems   Constitutional: Negative for chills, fever, malaise/fatigue and weight loss. HENT: Negative for nosebleeds. Eyes: Negative for blurred vision and double vision. Respiratory: Negative for cough, shortness of breath and wheezing. Cardiovascular: Negative for chest pain, palpitations, orthopnea, claudication, leg swelling and PND. Gastrointestinal: Negative for abdominal pain, heartburn, nausea and vomiting. Genitourinary: Negative for dysuria and hematuria. Musculoskeletal: Negative for falls, joint pain and myalgias. Skin: Negative for rash. Neurological: Negative for dizziness, focal weakness and headaches. Endo/Heme/Allergies: Does not bruise/bleed easily. Psychiatric/Behavioral: Negative for substance abuse. Visit Vitals  /72 (BP 1 Location: Left upper arm, BP Patient Position: Sitting, BP Cuff Size: Adult)   Pulse (!) 55   Ht 6' (1.829 m)   Wt 88.5 kg (195 lb)   SpO2 98%   BMI 26.45 kg/m²      Physical Exam  Constitutional:       Appearance: He is well-developed. HENT:      Head: Normocephalic and atraumatic. Eyes:      Conjunctiva/sclera: Conjunctivae normal.   Neck:      Vascular: No carotid bruit or JVD. Cardiovascular:      Rate and Rhythm: Bradycardia present. Rhythm irregularly irregular. No extrasystoles are present. Pulses: Normal pulses.       Heart sounds: S1 normal and S2 normal. No murmur heard. No gallop. No S3 sounds. Pulmonary:      Breath sounds: Normal breath sounds. No wheezing or rales. Abdominal:      General: Bowel sounds are normal.      Palpations: Abdomen is soft. Tenderness: There is no abdominal tenderness. Musculoskeletal:      Cervical back: Neck supple. Skin:     General: Skin is warm and dry. Neurological:      Mental Status: He is alert and oriented to person, place, and time. EKG: Atrial fibrillation with controlled ventricular rate in the 50s, leftward axis, no ST or T wave abnormalities concerning for ischemia. Compared to the previous EKG, heart rate has increased slightly. ASSESSMENT and PLAN    Paroxysmal atrial fibrillation. Initially diagnosed by EKG in April 2021. He now appears to be relatively asymptomatic to his arrhythmia. He is likely going in and out of the arrhythmia as witnessed on a Holter monitor in May 2020 when he appeared to be in atrial fibrillation a little more than half the time with an average heart rate 68 bpm.  He had no prolonged pauses or prolonged tachycardias. He is tolerating Xarelto for oral anticoagulation. He will likely need to continue this indefinitely. I did discuss that in the future if he develops symptomatic bradycardia he may ultimately require a pacemaker. Asymptomatic coronary artery disease. Patient has moderate 2 vessel disease last assessed by cardiac catheterization in 2009, involving nonobstructive lesions in the proximal left circumflex in the right-sided PDA. He does have a history of a non-ST elevation myocardial infarction back in 2009, for which he was briefly treated with Plavix. He is now maintained on aspirin and a statin, and he has been intolerant to beta blockers because of bradycardia. He last underwent a negative pharmacologic nuclear stress test in November 2019. No new anginal type symptoms. He remains on aspirin and a potent statin. Essential hypertension. Patient blood pressure remains well controlled on his current regimen, all of which I would continue. Dyslipidemia: Patient is now on Crestor 20 mg daily, Niaspan 1000 mg daily, Fish oil, and Zetia. His most recent lipid profile from April 2021 was excellent: Total cholesterol 107, triglycerides 117, HDL 39, LDL 45. He can continue this regimen. Carotid artery disease. Patient has moderate bilateral carotid artery disease which was last assessed on carotid duplex  in August 2020. This is followed by vascular surgery. Infrarenal AAA. This was last evaluated by an ultrasound in August 2020. This remains small in size and being followed regular by vascular surgery. Followup in 6 months, sooner if needed.

## 2021-07-14 NOTE — PATIENT INSTRUCTIONS
Can hold aspirin 7 days prior to procedure.
Quality 130: Documentation Of Current Medications In The Medical Record: Current Medications Documented
Detail Level: Generalized

## 2021-08-17 DIAGNOSIS — E78.5 HYPERLIPIDEMIA, UNSPECIFIED HYPERLIPIDEMIA TYPE: ICD-10-CM

## 2021-08-17 DIAGNOSIS — I10 ESSENTIAL HYPERTENSION: ICD-10-CM

## 2021-08-17 NOTE — TELEPHONE ENCOUNTER
Patient need a medication refill. Please advise     Requested Prescriptions     Pending Prescriptions Disp Refills    amLODIPine (NORVASC) 10 mg tablet 90 Tablet 3     Sig: TAKE 1 TABLET BY MOUTH DAILY    niacin ER (NIASPAN) 500 mg tablet 90 Tablet 4     Sig: Take 1 Tablet by mouth daily.

## 2021-08-18 RX ORDER — NIACIN 500 MG/1
500 TABLET, EXTENDED RELEASE ORAL DAILY
Qty: 90 TABLET | Refills: 4 | Status: SHIPPED | OUTPATIENT
Start: 2021-08-18 | End: 2022-03-01 | Stop reason: ALTCHOICE

## 2021-08-18 RX ORDER — AMLODIPINE BESYLATE 10 MG/1
TABLET ORAL
Qty: 90 TABLET | Refills: 3 | Status: SHIPPED | OUTPATIENT
Start: 2021-08-18 | End: 2022-09-07 | Stop reason: SDUPTHER

## 2021-09-10 ENCOUNTER — OFFICE VISIT (OUTPATIENT)
Dept: VASCULAR SURGERY | Age: 81
End: 2021-09-10
Payer: MEDICARE

## 2021-09-10 VITALS
BODY MASS INDEX: 26.41 KG/M2 | HEART RATE: 61 BPM | DIASTOLIC BLOOD PRESSURE: 70 MMHG | WEIGHT: 195 LBS | SYSTOLIC BLOOD PRESSURE: 110 MMHG | OXYGEN SATURATION: 97 % | HEIGHT: 72 IN | RESPIRATION RATE: 17 BRPM

## 2021-09-10 DIAGNOSIS — I77.9 BILATERAL CAROTID ARTERY DISEASE, UNSPECIFIED TYPE (HCC): Primary | ICD-10-CM

## 2021-09-10 PROCEDURE — 99213 OFFICE O/P EST LOW 20 MIN: CPT | Performed by: NURSE PRACTITIONER

## 2021-09-10 NOTE — PROGRESS NOTES
[address]    Chief Complaint   Patient presents with    Carotid Artery Stenosis       History and Physical    Mr Db Guevara is here to follow up on his AAA and carotid stenosis. He had requested baseline screening a number of years ago after his wife had a AAA. Unfortunately she passed away early 2019 from an aortic rupture. He is doing well without complaints . He denies sam abdominal and back pain. His health is stable no changes in meds. He denies claudication or rest pain. Health is stable, no changes in meds      His carotid duplex indicates 50 to 69% stenosis. He  denies lateralizing signs and symptoms, including unilateral weakness, paresthesias, vision changes and aphasia. His 8/26/2021 aortic PVL indicates 3.24 x 3.24. Previously 3.44 cm AP x 3.41 cm in 2019     Past Medical History:   Diagnosis Date    AAA (abdominal aortic aneurysm) ([facility] [address] 03/2010    noted on CT (abdomen)    Abnormal LFT's 12/95, 04/16/03    Acute meniscal tear, lateral 01/2007    s/p arthorscopic surg (Dr. Elinor Marin)    Atypical chest pain 02/04/09    CAD (coronary artery disease), native coronary artery     Cardiac cath 04/13/2009    dLM 30%. mLAD 30%. oD2 30%. pCX 55% (FFR 0.95). mRCA 40%. RPDA 65% (FFR 0.86). EF 65%.  Cardiac echocardiogram 04/03/2007    EF 60%. No RWMA. Gr 1 DDfx. LAE. No significant valvular heart disease.  Cardiac nuclear imaging test, low risk 12/04/2015    Low risk. No ischemia or prior infarction. No WMA. EF 67%. Neg EKG on pharm stress test.    Cardiovascular aorto-iliac duplex 06/29/2015    AAA measures 3.2 x 3.1 cm Trv. (Prev 3.2 x 3.4 cm on 6/19/14). Aneurysm is infrarenal & fusiform w/complex intraluminal thrombus within. Mod bilateral common iliac stenosis.  Carotid duplex 03/16/2016    Mild < 50% BETHEL stenosis. Mod 47-23% LICA stenosis. Similar to study of 6/29/15.     Cholecystitis chronic 03/2008    s/p sue    Closed fracture of proximal end of left radius with delayed healing, subsequent encounter 3/2/2021    Depression, recurrent ([address]     Diverticulosis     Diverticulosis 9-28-15    DR. 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    Plantar fasciitis     Rhinitis     Right knee pain     Right shoulder pain     Solar purpura (HCC)     Tinea versicolor 7/23/2012    2.8 x 2.9cm infra-renal    Tubular adenoma 9-28-15    Vitamin D deficiency 4/13/2011     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 ([facility] I25.119    AAA (abdominal aortic aneurysm) ([facility] [address] I71.4    Right knee pain M25.561    Abnormal LFTs (liver function tests) R94.5    Vitamin D deficiency E55.9    CLARKE (dyspnea on exertion) R06.00    Essential hypertension I10    Hyperglycemia R73.9    Tinea versicolor B36.0    S/P total knee replacement Z96.659    Fatty liver K76.0    PVC's (premature ventricular contractions) I49.3    Carotid artery disease (HCC) I77.9    Right shoulder pain M25.511    Acute back pain M54.9    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.816    Lumbar neuritis M54.16    DDD (degenerative disc disease), lumbar M51.36    Radiculopathy, lumbar region M54.16    Degeneration of intervertebral disc of lumbar region M51.36    Mixed hyperlipidemia E78.2    ACP (advance care planning) Z71.89  Bruising T14. 8XXA    Primary osteoarthritis of right knee M17.11    Closed fracture of upper end of left radius with delayed healing S52.102G    Rotator cuff syndrome, left M75.102     Past Surgical History:   Procedure Laterality Date    ENDOSCOPY, COLON, DIAGNOSTIC      normal per patient;     HX ARTHROPLASTY  04/05/10    [place]-left knee    HX CATARACT REMOVAL Bilateral     November 2014    HX CHOLECYSTECTOMY  03/2008    chronic cholecystitis    HX COLONOSCOPY  9-28-15    HX HERNIA REPAIR  1995    L inguinal    HX KNEE ARTHROSCOPY  01/24/07    left knee    HX KNEE ARTHROSCOPY Left 4/16/2014    peripatellar debridement    HX KNEE REPLACEMENT Left 01/29/2013    [place]    HX MOHS PROCEDURES  11/2008    HX RHINOPLASTY  1983    HX TONSILLECTOMY      HX VASECTOMY  1977    HX WISDOM TEETH EXTRACTION      x4    [place] ANESTH,SURGERY OF SHOULDER       Current Outpatient Medications   Medication Sig Dispense Refill    amLODIPine (NORVASC) 10 mg tablet TAKE 1 TABLET BY MOUTH DAILY 90 Tablet 3    niacin ER (NIASPAN) 500 mg tablet Take 1 Tablet by mouth daily. 90 Tablet 4    rivaroxaban (Xarelto) 20 mg tab tablet Take 1 Tablet by mouth daily. 90 Tablet 3    ezetimibe (Zetia) 10 mg tablet Take 1 Tab by mouth daily. 90 Tab 4    sildenafil citrate (Viagra) 100 mg tablet Take 1 Tab by mouth as needed (1 tablet by mouth once daily as needed). 18 Tab 3    rosuvastatin (CRESTOR) 20 mg tablet Take 1 Tab by mouth nightly. 90 Tab 3    nitroglycerin (NITROSTAT) 0.4 mg SL tablet 1 Tab by SubLINGual route every five (5) minutes as needed for Chest Pain. 3 Bottle 4    aspirin delayed-release (ADULT LOW DOSE ASPIRIN) 81 mg tablet Take 81 mg by mouth daily.  brinzolamide-brimonidine (Simbrinza) 1-0.2 % drps Apply 1 Drop to eye two (2) times a day.  coenzyme q10 (CO Q-10) 10 mg cap Take 1 Tab by mouth daily.       cholecalciferol, vitamin D3, (VITAMIN D3) 2,000 unit tab Take 1 Tab by mouth daily.  omega 3-dha-epa-fish oil 100-160-1,000 mg cap Take 1,000 mg by mouth daily. 90 Cap 3    tamsulosin (Flomax) 0.4 mg capsule Take 1 Capsule by mouth daily. (Patient not taking: Reported on 9/10/2021) 90 Capsule 4    diclofenac (Voltaren) 1 % gel Apply 4 g to affected area four (4) times daily. Right knee (Patient not taking: Reported on 6/3/2021) 5 Each 5     Allergies   Allergen Reactions    Pollen Extracts Sneezing     Itchy eyes, runny nose     Physical   Visit Vitals  /70 (BP 1 Location: Left upper arm, BP Patient Position: Sitting)   Pulse 61   Resp 17   Ht 6' (1.829 m)   Wt 195 lb (88.5 kg)   SpO2 97%   BMI 26.45 kg/m²     General:   Alert, cooperative, no distress.     Head:   Normocephalic, without obvious abnormality, atraumatic.     Eyes:  Conjunctivae/corneas clear. Pupils equal, round, reactive to light. Extraocular movements intact.     Neck:    No JVD   Lungs:    No increased respiratory effort   Extremities:    No significant ankle edema   Pulses:    Pulses are palpable of the lower extremities bilaterally   Skin:    No skin changes or ulcerations     Vascular studies:  Abdominal Aorta     Limited visualization due to bowel gas. A fusiform infrarenal aneurysm was visualized with dimensions 3.44 cm AP x 3.41 cm TR. There is a thrombus present. Aneurysmal dilatation of 2.60 x 2.62 cm Trv noted in the juxtarenal level of the abdominal aorta. Continued evidence of elevated velocities within the bilateral common iliac arteries. Calcific plaquing noted. Renal     Limited visualization due to bowel gas. Bilateral renal arteries patent distally. Mesenteric     Limited visualization due to bowel gas. Celiac artery is not visualized. Inferior mesenteric artery is not visualized. Patent mid SMA. Right Carotid     The right CCA is patent. There is moderate stenosis in the right ICA (50-69%). The right ICA has heterogeneous and diffuse plaque.  The right ECA is patent. The right vertebral is antegrade. The right subclavian is normal with triphasic waveforms. Left Carotid     The left CCA is patent. There is moderate stenosis in the left ICA (50-69%). The left ICA has heterogeneous and diffuse plaque. The left ECA is patent. The left vertebral is antegrade. The left subclavian is normal with triphasic waveforms     The exam was compared to the study performed on 11/18/2019. There has been an increase in severity on the right and no significant changes on the left. Impression/Plan:     ICD-10-CM ICD-9-CM    1. Bilateral carotid artery disease, unspecified type (HCC)  I77.9 447.9 DUPLEX CAROTID BILATERAL     No orders of the defined types were placed in this encounter. RTO in 1 year for carotid duplex. RTO in 2 years for AAA duplex-per SVS guidelines. Andrey Schafer NP    Portions of this note have been entered using voice recognition software.

## 2021-09-10 NOTE — PROGRESS NOTES
1. Have you been to an emergency room or urgent care clinic since your last visit? NO    Hospitalized since your last visit? If yes, where, when, and reason for visit? NO  2. Have you seen or consulted any other health care providers outside of the [facility] since your last visit including any procedures, health maintenance items. If yes, where, when and reason for visit?  NO

## 2021-09-13 ENCOUNTER — OFFICE VISIT (OUTPATIENT)
Dept: FAMILY MEDICINE CLINIC | Age: 81
End: 2021-09-13
Payer: MEDICARE

## 2021-09-13 VITALS
WEIGHT: 198 LBS | DIASTOLIC BLOOD PRESSURE: 62 MMHG | HEART RATE: 47 BPM | HEIGHT: 72 IN | RESPIRATION RATE: 16 BRPM | TEMPERATURE: 97.3 F | OXYGEN SATURATION: 97 % | SYSTOLIC BLOOD PRESSURE: 122 MMHG | BODY MASS INDEX: 26.82 KG/M2

## 2021-09-13 DIAGNOSIS — Z12.5 SCREENING FOR MALIGNANT NEOPLASM OF PROSTATE: ICD-10-CM

## 2021-09-13 DIAGNOSIS — I10 ESSENTIAL HYPERTENSION: Primary | ICD-10-CM

## 2021-09-13 DIAGNOSIS — E78.5 HYPERLIPIDEMIA, UNSPECIFIED HYPERLIPIDEMIA TYPE: ICD-10-CM

## 2021-09-13 PROCEDURE — G8754 DIAS BP LESS 90: HCPCS | Performed by: FAMILY MEDICINE

## 2021-09-13 PROCEDURE — G8752 SYS BP LESS 140: HCPCS | Performed by: FAMILY MEDICINE

## 2021-09-13 PROCEDURE — 1101F PT FALLS ASSESS-DOCD LE1/YR: CPT | Performed by: FAMILY MEDICINE

## 2021-09-13 PROCEDURE — G8536 NO DOC ELDER MAL SCRN: HCPCS | Performed by: FAMILY MEDICINE

## 2021-09-13 PROCEDURE — G8432 DEP SCR NOT DOC, RNG: HCPCS | Performed by: FAMILY MEDICINE

## 2021-09-13 PROCEDURE — G0463 HOSPITAL OUTPT CLINIC VISIT: HCPCS | Performed by: FAMILY MEDICINE

## 2021-09-13 PROCEDURE — G8419 CALC BMI OUT NRM PARAM NOF/U: HCPCS | Performed by: FAMILY MEDICINE

## 2021-09-13 PROCEDURE — G8427 DOCREV CUR MEDS BY ELIG CLIN: HCPCS | Performed by: FAMILY MEDICINE

## 2021-09-13 PROCEDURE — 99214 OFFICE O/P EST MOD 30 MIN: CPT | Performed by: FAMILY MEDICINE

## 2021-09-13 RX ORDER — PREGABALIN 75 MG/1
75 CAPSULE ORAL 2 TIMES DAILY
COMMUNITY
Start: 2021-09-02 | End: 2021-12-16

## 2021-09-13 NOTE — PROGRESS NOTES
Chief Complaint   Patient presents with    Hypertension    Cholesterol Problem         HPI    Joseluis Acosta is a [de-identified] y.o. male presenting today for 3 months  follow up of htn, hld, nocturia. Pt did try the tamsulosin but did not notice much change with it so he stopped the medication. No recent labs. Patient does not need medication refills today. New concerns today: none      Review of Systems   Constitutional: Negative. HENT: Negative. Respiratory: Negative. Cardiovascular: Negative. All other systems reviewed and are negative. Physical Exam  Vitals and nursing note reviewed. Constitutional:       General: He is not in acute distress. Appearance: Normal appearance. HENT:      Head: Normocephalic and atraumatic. Right Ear: External ear normal.      Left Ear: External ear normal.      Nose: Nose normal.   Eyes:      Extraocular Movements: Extraocular movements intact. Conjunctiva/sclera: Conjunctivae normal.   Cardiovascular:      Rate and Rhythm: Normal rate and regular rhythm. Heart sounds: No murmur heard. No friction rub. No gallop. Pulmonary:      Effort: Pulmonary effort is normal.      Breath sounds: Normal breath sounds. No wheezing, rhonchi or rales. Musculoskeletal:         General: Normal range of motion. Cervical back: Normal range of motion. No rigidity. Skin:     General: Skin is warm and dry. Neurological:      Mental Status: He is alert and oriented to person, place, and time. Coordination: Coordination normal.   Psychiatric:         Mood and Affect: Mood normal.         Behavior: Behavior normal.         Thought Content: Thought content normal.         Judgment: Judgment normal.         Diagnoses and all orders for this visit:    1. Essential hypertension  -     METABOLIC PANEL, COMPREHENSIVE; Future  -     LIPID PANEL; Future  Stable, cont pres tx plan.      2. Hyperlipidemia, unspecified hyperlipidemia type  - METABOLIC PANEL, COMPREHENSIVE; Future  -     LIPID PANEL; Future    3. Screening for malignant neoplasm of prostate  -     PSA SCREENING (SCREENING); Future      Follow-up and Dispositions    · Return in about 3 months (around 12/13/2021) for high blood pressure, high cholesterol, lab review.

## 2021-09-13 NOTE — PROGRESS NOTES
Eliazar Sauceda presents today for   Chief Complaint   Patient presents with    Hypertension    Cholesterol Problem       Alison Gonzales preferred language for health care discussion is english/other. Is someone accompanying this pt? No    Is the patient using any DME equipment during OV? No    Depression Screening:  3 most recent PHQ Screens 6/3/2021   PHQ Not Done -   Little interest or pleasure in doing things Not at all   Feeling down, depressed, irritable, or hopeless Not at all   Total Score PHQ 2 0   Trouble falling or staying asleep, or sleeping too much -   Feeling tired or having little energy -   Poor appetite, weight loss, or overeating -   Feeling bad about yourself - or that you are a failure or have let yourself or your family down -   Trouble concentrating on things such as school, work, reading, or watching TV -   Moving or speaking so slowly that other people could have noticed; or the opposite being so fidgety that others notice -   Thoughts of being better off dead, or hurting yourself in some way -   PHQ 9 Score -   How difficult have these problems made it for you to do your work, take care of your home and get along with others -       Learning Assessment:  Learning Assessment 9/10/2021   PRIMARY LEARNER Patient   HIGHEST LEVEL OF EDUCATION - PRIMARY LEARNER  -   BARRIERS PRIMARY LEARNER -   CO-LEARNER CAREGIVER -   PRIMARY LANGUAGE ENGLISH   LEARNER PREFERENCE PRIMARY LISTENING   ANSWERED BY dasha ABARCA SELF       Fall Risk  Fall Risk Assessment, last 12 mths 9/10/2021   Able to walk? Yes   Fall in past 12 months? 0   Do you feel unsteady? 0   Are you worried about falling 0   Number of falls in past 12 months -   Fall with injury? -       Travel Screening:   Travel Screening     Question   Response    In the last month, have you been in contact with someone who was confirmed or suspected to have Coronavirus / COVID-19?   No / Unsure    Have you had a COVID-19 viral test in the last 14 days? No    Do you have any of the following new or worsening symptoms? None of these    Have you traveled internationally or domestically in the last month? No      Travel History   Travel since 08/13/21     No documented travel since 08/13/21          Health Maintenance reviewed and discussed and ordered per Provider. Health Maintenance Due   Topic Date Due    Shingrix Vaccine Age 49> (1 of 2) Never done    Flu Vaccine (1) 09/01/2021   . Coordination of Care:  1. Have you been to the ER, urgent care clinic since your last visit? Hospitalized since your last visit? No    2. Have you seen or consulted any other health care providers outside of the [address] [place] [address] since your last visit? Include any pap smears or colon screening. Pt states he has [facility] up.

## 2021-10-08 ENCOUNTER — OFFICE VISIT (OUTPATIENT)
Dept: ORTHOPEDIC SURGERY | Age: 81
End: 2021-10-08
Payer: MEDICARE

## 2021-10-08 VITALS
WEIGHT: 204 LBS | BODY MASS INDEX: 27.63 KG/M2 | TEMPERATURE: 98.2 F | HEART RATE: 95 BPM | OXYGEN SATURATION: 98 % | HEIGHT: 72 IN

## 2021-10-08 DIAGNOSIS — G89.29 CHRONIC PAIN OF RIGHT KNEE: Primary | ICD-10-CM

## 2021-10-08 DIAGNOSIS — M25.561 CHRONIC PAIN OF RIGHT KNEE: Primary | ICD-10-CM

## 2021-10-08 DIAGNOSIS — M70.50 PES ANSERINE BURSITIS: ICD-10-CM

## 2021-10-08 PROCEDURE — G8427 DOCREV CUR MEDS BY ELIG CLIN: HCPCS | Performed by: SPECIALIST

## 2021-10-08 PROCEDURE — 20610 DRAIN/INJ JOINT/BURSA W/O US: CPT | Performed by: SPECIALIST

## 2021-10-08 PROCEDURE — G8756 NO BP MEASURE DOC: HCPCS | Performed by: SPECIALIST

## 2021-10-08 PROCEDURE — 99213 OFFICE O/P EST LOW 20 MIN: CPT | Performed by: SPECIALIST

## 2021-10-08 PROCEDURE — G8536 NO DOC ELDER MAL SCRN: HCPCS | Performed by: SPECIALIST

## 2021-10-08 PROCEDURE — 1101F PT FALLS ASSESS-DOCD LE1/YR: CPT | Performed by: SPECIALIST

## 2021-10-08 PROCEDURE — G8432 DEP SCR NOT DOC, RNG: HCPCS | Performed by: SPECIALIST

## 2021-10-08 PROCEDURE — G8419 CALC BMI OUT NRM PARAM NOF/U: HCPCS | Performed by: SPECIALIST

## 2021-10-08 PROCEDURE — 73560 X-RAY EXAM OF KNEE 1 OR 2: CPT | Performed by: SPECIALIST

## 2021-10-08 RX ORDER — BETAMETHASONE SODIUM PHOSPHATE AND BETAMETHASONE ACETATE 3; 3 MG/ML; MG/ML
3 INJECTION, SUSPENSION INTRA-ARTICULAR; INTRALESIONAL; INTRAMUSCULAR; SOFT TISSUE ONCE
Status: COMPLETED | OUTPATIENT
Start: 2021-10-08 | End: 2021-10-08

## 2021-10-08 RX ADMIN — BETAMETHASONE SODIUM PHOSPHATE AND BETAMETHASONE ACETATE 3 MG: 3; 3 INJECTION, SUSPENSION INTRA-ARTICULAR; INTRALESIONAL; INTRAMUSCULAR; SOFT TISSUE at 13:57

## 2021-10-08 NOTE — PROGRESS NOTES
Patient: Melchor Ross                MRN: [id number]       SSN: [id number]  YOB: 1940        AGE: [de-identified] y.o. SEX: male     PCP: Ke Llanes MD  10/08/21    . Chief Complaint   Patient presents with    Knee Pain     right knee     HISTORY:  Melchor Ross is a [de-identified] y.o. male who is seen for right knee pain. He is s/p right TKR on 11/20/18 by [facility]. He has to push himself up with his arms due to his knee pain and stiffness. He felt some medial discomfort and stiffness after he drove to [place] recently. 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 He is S/P [place] [facility] to left TKR 1/29/13. He was previously seen for left shoulder pain. He has been feeling increased stiffness and pain in his left shoulder recently. He reports significant relief from previous left shoulder injections. He experiences cracking, popping pain when lifting his shoulder overhead. He has been seen at the [address] [place] [address] in the past.    Pain Assessment  10/8/2021   Location of Pain Knee   Pain Location Comment -   Location Modifiers Right   Severity of Pain 8   Quality of Pain Aching   Quality of Pain Comment -   Duration of Pain -   Frequency of Pain Constant   Date Pain First Started (No Data)   Date Pain First Started Comment a week ago   Aggravating Factors Walking   Aggravating Factors Comment -   Limiting Behavior Some   Relieving Factors Rest   Relieving Factors Comment -   Result of Injury -   Work-Related Injury -   Type of Injury -   Type of Injury Comment -     Occupation, etc:  Mr. Mariann Upton is retired. He was previously the  of [place] and an avid member of the [employer]. His wife passed away January 2019 from breast cancer. He lost his mother, mother-in-law, sister-in-law, and brother all this year. He has no longer been able to exercise on the track.  He likes to travel [place], [place] but he has been unable to travel due to the Coronavirus pandemic. His niece is a cardiologist  in [place]. He notes that he used to live in [facility] when he was in the [employer] [place]. He goes to the  to do yoga and works out with an . He grew up in [place]. He recently lost 11 pounds. His current weight is 194 lbs. He will receive his first Covid-19 vaccine on 2/18/21. Last 3 Recorded Weights in this Encounter    10/08/21 1323   Weight: 204 lb (92.5 kg)     Body mass index is 27.67 kg/m². 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 ([facility] I25.119    AAA (abdominal aortic aneurysm) ([facility] I71.4    Right knee pain M25.561    Abnormal LFTs (liver function tests) R94.5    Vitamin D deficiency E55.9    CLARKE (dyspnea on exertion) R06.00    Essential hypertension I10    Hyperglycemia R73.9    Tinea versicolor B36.0    S/P total knee replacement Z96.659    Fatty liver K76.0    PVC's (premature ventricular contractions) I49.3    Carotid artery disease (HCC) I77.9    Right shoulder pain M25.511    Acute back pain M54.9    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 8XXA    Primary osteoarthritis of right knee M17.11    Closed fracture of upper end of left radius with delayed healing S52.102G    Rotator cuff syndrome, left M75.102     REVIEW OF SYSTEMS: All Below are Negative except: See HPI   Constitutional: negative for fever, chills, and weight loss.    Cardiovascular: negative for chest pain, claudication, leg swelling, SOB, CLARKE   Gastrointestinal: Negative for pain, N/V/C/D, Blood in stool or urine, dysuria, hematuria, incontinence, pelvic pain. Musculoskeletal: See HPI   Neurological: Negative for dizziness and weakness. Negative for headaches, Visual changes, confusion, seizures   Phychiatric/Behavioral: Negative for depression, memory loss, substance  abuse. Extremities: Negative for hair changes, rash, or skin lesion changes. Hematologic: Negative for bleeding problems, bruising, pallor or swollen lymph  nodes   Peripheral Vascular: No calf pain, no circulation deficits. Social History     Socioeconomic History    Marital status:      Spouse name: Not on file    Number of children: Not on file    Years of education: Not on file    Highest education level: Not on file   Occupational History    Not on file   Tobacco Use    Smoking status: Former Smoker     Packs/day: 0.50     Years: 20.00     Pack years: 10.00     Types: Cigarettes     Quit date: 12/10/1995     Years since quittin.8    Smokeless tobacco: Never Used   Substance and Sexual Activity    Alcohol use: Yes     Alcohol/week: 6.7 standard drinks     Types: 7 Glasses of wine, 1 Shots of liquor per week     Comment: social    Drug use: No     Types: Prescription, OTC    Sexual activity: Not on file   Other Topics Concern    Not on file   Social History Narrative    Not on file     Social Determinants of Health     Financial Resource Strain:     Difficulty of Paying Living Expenses:    Food Insecurity:     Worried About Running Out of Food in the Last Year:     [address] in the Last Year:    Transportation Needs:     Lack of Transportation (Medical):      Lack of Transportation (Non-Medical):    Physical Activity:     Days of Exercise per Week:     Minutes of Exercise per Session:    Stress:     Feeling of Stress :    Social Connections:     Frequency of Communication with Friends and Family:     Frequency of Social Gatherings with Friends and Family:     Attends Protestant Services:     Active Member of [address] [place] [address] or Organizations:     Attends Club or Organization Meetings:     Marital Status:    Intimate Partner Violence:     Fear of Current or Ex-Partner:     Emotionally Abused:     Physically Abused:     Sexually Abused: Allergies   Allergen Reactions    Pollen Extracts Sneezing     Itchy eyes, runny nose     Current Outpatient Medications   Medication Sig    pregabalin (LYRICA) 75 mg capsule Take 75 mg by mouth two (2) times a day.  amLODIPine (NORVASC) 10 mg tablet TAKE 1 TABLET BY MOUTH DAILY    niacin ER (NIASPAN) 500 mg tablet Take 1 Tablet by mouth daily.  rivaroxaban (Xarelto) 20 mg tab tablet Take 1 Tablet by mouth daily.  ezetimibe (Zetia) 10 mg tablet Take 1 Tab by mouth daily.  sildenafil citrate (Viagra) 100 mg tablet Take 1 Tab by mouth as needed (1 tablet by mouth once daily as needed).  rosuvastatin (CRESTOR) 20 mg tablet Take 1 Tab by mouth nightly.  diclofenac (Voltaren) 1 % gel Apply 4 g to affected area four (4) times daily. Right knee    aspirin delayed-release (ADULT LOW DOSE ASPIRIN) 81 mg tablet Take 81 mg by mouth daily.  brinzolamide-brimonidine (Simbrinza) 1-0.2 % drps Apply 1 Drop to eye two (2) times a day.  coenzyme q10 (CO Q-10) 10 mg cap Take 1 Tab by mouth daily.  cholecalciferol, vitamin D3, (VITAMIN D3) 2,000 unit tab Take 1 Tab by mouth daily.  omega 3-dha-epa-fish oil 100-160-1,000 mg cap Take 1,000 mg by mouth daily.  tamsulosin (Flomax) 0.4 mg capsule Take 1 Capsule by mouth daily. (Patient not taking: Reported on 10/8/2021)    nitroglycerin (NITROSTAT) 0.4 mg SL tablet 1 Tab by SubLINGual route every five (5) minutes as needed for Chest Pain. (Patient not taking: Reported on 9/13/2021)     No current facility-administered medications for this visit.      ORTHO EXAMINATION:      Examination Right knee Left knee   Skin well healed incision site well healed incision site   Range of motion 115-0 110-0   Effusion - -   Medial joint line tenderness + pes anserine -   Lateral joint line tenderness - -   Popliteal tenderness - -   Osteophytes palpable - -   Missaels - -   Patella crepitus - -   Anterior drawer - -   Lateral laxity - -   Medial laxity - -   Varus deformity - -   Valgus deformity - -   Pretibial edema - -   Calf tenderness - -        TIME OUT:  Chart reviewed for the following:   Joycelyn Cantu MD, have reviewed the History, Physical and updated the Allergic reactions for [address] [place] [address],[address] performed immediately prior to start of procedure:  Joycelyn Cantu MD, have performed the following reviews on [address] prior to the start of the procedure:          * Patient was identified by name and date of birth   * Agreement on procedure being performed was verified  * Risks and Benefits explained to the patient  * Procedure site verified and marked as necessary  * Patient was positioned for comfort  * Consent was obtained     Time: 1:46 PM     Date of procedure: 10/8/2021  Procedure performed by:  Nahomy Patrick MD  Mr. Kiana Bhakta tolerated the procedure well with no complications. MRI LEFT SHOULDER W CONT 04/27/2018  IMPRESSION:  1. Supraspinatus moderate tendinosis and small focal full-thickness tear  anterior insertion. No muscle atrophy or tendon retraction. 2. Moderate subscapularis tendinosis and partial-thickness tears. Intra-articular biceps tendinosis. 3. Superior labral tear extending posteriorly/SLAP tear type II. Posterior  inferior labral degeneration and tear. 4. Moderate acromioclavicular osteoarthrosis with inflammation. Small  subacromial spur. Subacromial subdeltoid bursitis. RADIOLOGY:  XR RIGHT TKR 10/8/21 [facility]  IMPRESSION:  Two views - No fracture, well-fixed prosthetic components in satisfactory position and alignment. XR RIGHT TKR 8/14/20 [facility]  IMPRESSION:  Two views - No fracture, well-fixed prosthetic components in satisfactory position and alignment.      XR RIGHT KNEE PREOP 10/22/18 [facility]  IMPRESSION:  Three views - No fractures, no effusion, severe/complete medial joint space narrowing, + osteophytes present. IKDC Grade D. Femoral valgus angle 6.1 degrees. Chondrocalcinosis of lateral mensicus. Well fixed left knee implants    XR LEFT SHOULDER 4/12/18 [facility]  IMPRESSION:  Three views - No fractures, no acromioclavicular narrowing, mild glenohumeral narrowing, no calcific densities. XR KNEES [facility] 10/19/17  IMPRESSION:  Moderately severe medial joint space narrowing right knee, well fixed L TKR components    XR LEFT SHOULDER 5/5/16  IMPRESSION:  No fractures, mild acromioclavicular narrowing, no glenohumeral narrowing, no calcific densities. IMPRESSION:      ICD-10-CM ICD-9-CM    1. Chronic pain of right knee  M25.561 719.46 AMB POC XRAY, KNEE; 1/2 VIEWS    G89.29 338.29 betamethasone (CELESTONE) injection 3 mg      DC DRAIN/INJECT LARGE JOINT/BURSA   2. Pes anserine bursitis  M70.50 726.61 betamethasone (CELESTONE) injection 3 mg      DC DRAIN/INJECT LARGE JOINT/BURSA     PLAN: After discussing treatment options, patient's right knee pes anserine bursa region was injected with 4 cc Marcaine and 1/2 cc Celestone. There is no need for surgery at this time. He will follow up as needed.      Scribed by David Allen  ([address]) as dictated by Porsha Hall MD

## 2021-11-16 ENCOUNTER — HOSPITAL ENCOUNTER (OUTPATIENT)
Dept: LAB | Age: 81
Discharge: HOME | End: 2021-11-16
Payer: MEDICARE

## 2021-11-16 DIAGNOSIS — Z12.5 SCREENING FOR MALIGNANT NEOPLASM OF PROSTATE: ICD-10-CM

## 2021-11-16 DIAGNOSIS — E78.5 HYPERLIPIDEMIA, UNSPECIFIED HYPERLIPIDEMIA TYPE: ICD-10-CM

## 2021-11-16 DIAGNOSIS — I10 ESSENTIAL HYPERTENSION: ICD-10-CM

## 2021-11-16 LAB
ALBUMIN SERPL-MCNC: 3.7 G/DL (ref 3.4–5)
ALBUMIN/GLOB SERPL: 1.3 {RATIO} (ref 0.8–1.7)
ALP SERPL-CCNC: 110 U/L (ref 45–117)
ALT SERPL-CCNC: 33 U/L (ref 16–61)
ANION GAP SERPL CALC-SCNC: 9 MMOL/L (ref 3–18)
AST SERPL-CCNC: 29 U/L (ref 10–38)
BILIRUB SERPL-MCNC: 1.9 MG/DL (ref 0.2–1)
BUN SERPL-MCNC: 14 MG/DL (ref 7–18)
BUN/CREAT SERPL: 13 (ref 12–20)
CALCIUM SERPL-MCNC: 9.3 MG/DL (ref 8.5–10.1)
CHLORIDE SERPL-SCNC: 110 MMOL/L (ref 100–111)
CHOLEST SERPL-MCNC: 91 MG/DL
CO2 SERPL-SCNC: 24 MMOL/L (ref 21–32)
CREAT SERPL-MCNC: 1.12 MG/DL (ref 0.6–1.3)
GLOBULIN SER CALC-MCNC: 2.8 G/DL (ref 2–4)
GLUCOSE SERPL-MCNC: 94 MG/DL (ref 74–99)
HDLC SERPL-MCNC: 34 MG/DL (ref 40–60)
HDLC SERPL: 2.7 {RATIO} (ref 0–5)
LDLC SERPL CALC-MCNC: 40.2 MG/DL (ref 0–100)
LIPID PROFILE,FLP: ABNORMAL
POTASSIUM SERPL-SCNC: 3.8 MMOL/L (ref 3.5–5.5)
PROT SERPL-MCNC: 6.5 G/DL (ref 6.4–8.2)
PSA SERPL-MCNC: 4.6 NG/ML (ref 0–4)
SODIUM SERPL-SCNC: 143 MMOL/L (ref 136–145)
TRIGL SERPL-MCNC: 84 MG/DL (ref ?–150)
VLDLC SERPL CALC-MCNC: 16.8 MG/DL

## 2021-11-16 PROCEDURE — 80061 LIPID PANEL: CPT

## 2021-11-16 PROCEDURE — 36415 COLL VENOUS BLD VENIPUNCTURE: CPT

## 2021-11-16 PROCEDURE — 84153 ASSAY OF PSA TOTAL: CPT

## 2021-11-16 PROCEDURE — 80053 COMPREHEN METABOLIC PANEL: CPT

## 2021-12-01 ENCOUNTER — OFFICE VISIT (OUTPATIENT)
Dept: FAMILY MEDICINE CLINIC | Age: 81
End: 2021-12-01
Payer: MEDICARE

## 2021-12-01 VITALS
RESPIRATION RATE: 16 BRPM | WEIGHT: 194.8 LBS | OXYGEN SATURATION: 99 % | TEMPERATURE: 97.9 F | DIASTOLIC BLOOD PRESSURE: 76 MMHG | HEART RATE: 73 BPM | SYSTOLIC BLOOD PRESSURE: 151 MMHG | HEIGHT: 72 IN | BODY MASS INDEX: 26.38 KG/M2

## 2021-12-01 DIAGNOSIS — R97.20 ELEVATED PSA: ICD-10-CM

## 2021-12-01 DIAGNOSIS — I10 ESSENTIAL HYPERTENSION: Primary | ICD-10-CM

## 2021-12-01 DIAGNOSIS — E78.5 HYPERLIPIDEMIA, UNSPECIFIED HYPERLIPIDEMIA TYPE: ICD-10-CM

## 2021-12-01 DIAGNOSIS — M25.849 CYST OF JOINT OF HAND, UNSPECIFIED LATERALITY: ICD-10-CM

## 2021-12-01 DIAGNOSIS — E80.6 HYPERBILIRUBINEMIA: ICD-10-CM

## 2021-12-01 PROCEDURE — G8427 DOCREV CUR MEDS BY ELIG CLIN: HCPCS | Performed by: FAMILY MEDICINE

## 2021-12-01 PROCEDURE — G8510 SCR DEP NEG, NO PLAN REQD: HCPCS | Performed by: FAMILY MEDICINE

## 2021-12-01 PROCEDURE — 1101F PT FALLS ASSESS-DOCD LE1/YR: CPT | Performed by: FAMILY MEDICINE

## 2021-12-01 PROCEDURE — G8536 NO DOC ELDER MAL SCRN: HCPCS | Performed by: FAMILY MEDICINE

## 2021-12-01 PROCEDURE — G0463 HOSPITAL OUTPT CLINIC VISIT: HCPCS | Performed by: FAMILY MEDICINE

## 2021-12-01 PROCEDURE — G8753 SYS BP > OR = 140: HCPCS | Performed by: FAMILY MEDICINE

## 2021-12-01 PROCEDURE — G8419 CALC BMI OUT NRM PARAM NOF/U: HCPCS | Performed by: FAMILY MEDICINE

## 2021-12-01 PROCEDURE — G8754 DIAS BP LESS 90: HCPCS | Performed by: FAMILY MEDICINE

## 2021-12-01 PROCEDURE — 99214 OFFICE O/P EST MOD 30 MIN: CPT | Performed by: FAMILY MEDICINE

## 2021-12-01 RX ORDER — ROSUVASTATIN CALCIUM 20 MG/1
20 TABLET, COATED ORAL
Qty: 90 TABLET | Refills: 3 | Status: SHIPPED | OUTPATIENT
Start: 2021-12-01

## 2021-12-01 NOTE — PROGRESS NOTES
Melchor Ross presents today for   Chief Complaint   Patient presents with    Hypertension    Cholesterol Problem     high chol    Results     discuss lab results       Is someone accompanying this pt? no    Is the patient using any DME equipment during [address]? no    Depression Screening:  3 most recent PHQ Screens 12/1/2021   PHQ Not Done -   Little interest or pleasure in doing things Not at all   Feeling down, depressed, irritable, or hopeless Not at all   Total Score PHQ 2 0   Trouble falling or staying asleep, or sleeping too much -   Feeling tired or having little energy -   Poor appetite, weight loss, or overeating -   Feeling bad about yourself - or that you are a failure or have let yourself or your family down -   Trouble concentrating on things such as school, work, reading, or watching TV -   Moving or speaking so slowly that other people could have noticed; or the opposite being so fidgety that others notice -   Thoughts of being better off dead, or hurting yourself in some way -   PHQ 9 Score -   How difficult have these problems made it for you to do your work, take care of your home and get along with others -       Learning Assessment:  Learning Assessment 9/10/2021   PRIMARY LEARNER Patient   HIGHEST LEVEL OF EDUCATION - PRIMARY LEARNER  -   BARRIERS PRIMARY LEARNER -   CO-LEARNER CAREGIVER -   PRIMARY LANGUAGE ENGLISH   LEARNER PREFERENCE PRIMARY LISTENING   ANSWERED BY dasha   RELATIONSHIP SELF       Abuse Screening:  Abuse Screening Questionnaire 9/13/2021   Do you ever feel afraid of your partner? N   Are you in a relationship with someone who physically or mentally threatens you? N   Is it safe for you to go home? Y       Fall Screening  Fall Risk Assessment, last 12 mths 10/8/2021   Able to walk? Yes   Fall in past 12 months? 0   Do you feel unsteady? 0   Are you worried about falling 0   Number of falls in past 12 months -   Fall with injury?  -       Generalized Anxiety  No flowsheet data found. Health Maintenance Due   Topic Date Due    Shingrix Vaccine Age 49> (1 of 2) Never done    Flu Vaccine (1) 09/01/2021    COVID-19 Vaccine (3 - Booster for Moderna series) 10/01/2021    Medicare Yearly Exam  12/10/2021   . Health Maintenance reviewed and discussed and ordered per Provider. Coordination of Care  1. Have you been to the ER, urgent care clinic since your last visit? Hospitalized since your last visit? no    2. Have you seen or consulted any other health care providers outside of the [address] [place] [address] since your last visit? Include any pap smears or colon screening.  no

## 2021-12-01 NOTE — PROGRESS NOTES
Chief Complaint   Patient presents with    Hypertension    Cholesterol Problem     high chol    Results     discuss lab results         HPI    Mirna Reyes is a [de-identified] y.o. male presenting today for 3 months  follow up of htn, hld. Pt has been doing well overall. Patient had labs on 11/16/21. Labs reviewed in detail with patient     Patient does need medication refills today. New concerns today: pt c/o growth on his hand at the base of one of his fingers. It is not painful at all. Review of Systems   Constitutional: Negative. HENT: Negative. Respiratory: Negative. Cardiovascular: Negative. All other systems reviewed and are negative. Physical Exam  Vitals and nursing note reviewed. Constitutional:       General: He is not in acute distress. Appearance: Normal appearance. HENT:      Head: Normocephalic and atraumatic. Right Ear: External ear normal.      Left Ear: External ear normal.      Nose: Nose normal.   Eyes:      Extraocular Movements: Extraocular movements intact. Conjunctiva/sclera: Conjunctivae normal.   Cardiovascular:      Rate and Rhythm: Normal rate and regular rhythm. Heart sounds: No murmur heard. No friction rub. No gallop. Pulmonary:      Effort: Pulmonary effort is normal.      Breath sounds: Normal breath sounds. No wheezing, rhonchi or rales. Musculoskeletal:         General: Normal range of motion. Cervical back: Normal range of motion. No rigidity. Skin:     General: Skin is warm and dry. Neurological:      Mental Status: He is alert and oriented to person, place, and time. Coordination: Coordination normal.   Psychiatric:         Mood and Affect: Mood normal.         Behavior: Behavior normal.         Thought Content: Thought content normal.         Judgment: Judgment normal.         Diagnoses and all orders for this visit:    1. Essential hypertension  Mildly elevated today. Cont to monitor closely.      2. Hyperlipidemia, unspecified hyperlipidemia type  -     rosuvastatin (CRESTOR) 20 mg tablet; Take 1 Tablet by mouth nightly. 3. Elevated PSA  -     REFERRAL TO UROLOGY    4. Hyperbilirubinemia  Will fax results to pt's gi doc for review; f/u as indicated.      5. Cyst of joint of hand, unspecified laterality  -     REFERRAL TO HAND SURGERY      Follow-up and Dispositions    · Return in about 3 months (around 3/1/2022) for high blood pressure, high cholesterol, specialist eval.

## 2021-12-16 ENCOUNTER — OFFICE VISIT (OUTPATIENT)
Dept: CARDIOLOGY CLINIC | Age: 81
End: 2021-12-16
Payer: MEDICARE

## 2021-12-16 VITALS
WEIGHT: 197 LBS | OXYGEN SATURATION: 98 % | HEART RATE: 52 BPM | DIASTOLIC BLOOD PRESSURE: 76 MMHG | BODY MASS INDEX: 26.68 KG/M2 | HEIGHT: 72 IN | SYSTOLIC BLOOD PRESSURE: 128 MMHG

## 2021-12-16 DIAGNOSIS — I71.40 ABDOMINAL AORTIC ANEURYSM (AAA) WITHOUT RUPTURE: ICD-10-CM

## 2021-12-16 DIAGNOSIS — I10 ESSENTIAL HYPERTENSION: ICD-10-CM

## 2021-12-16 DIAGNOSIS — E78.00 PURE HYPERCHOLESTEROLEMIA: ICD-10-CM

## 2021-12-16 DIAGNOSIS — R00.1 BRADYCARDIA: ICD-10-CM

## 2021-12-16 DIAGNOSIS — I25.119 ATHEROSCLEROSIS OF NATIVE CORONARY ARTERY WITH ANGINA PECTORIS, UNSPECIFIED WHETHER NATIVE OR TRANSPLANTED HEART (HCC): ICD-10-CM

## 2021-12-16 DIAGNOSIS — I48.0 PAROXYSMAL ATRIAL FIBRILLATION (HCC): Primary | ICD-10-CM

## 2021-12-16 PROCEDURE — G8427 DOCREV CUR MEDS BY ELIG CLIN: HCPCS | Performed by: INTERNAL MEDICINE

## 2021-12-16 PROCEDURE — 93000 ELECTROCARDIOGRAM COMPLETE: CPT | Performed by: INTERNAL MEDICINE

## 2021-12-16 PROCEDURE — 1101F PT FALLS ASSESS-DOCD LE1/YR: CPT | Performed by: INTERNAL MEDICINE

## 2021-12-16 PROCEDURE — G8752 SYS BP LESS 140: HCPCS | Performed by: INTERNAL MEDICINE

## 2021-12-16 PROCEDURE — G8510 SCR DEP NEG, NO PLAN REQD: HCPCS | Performed by: INTERNAL MEDICINE

## 2021-12-16 PROCEDURE — G8419 CALC BMI OUT NRM PARAM NOF/U: HCPCS | Performed by: INTERNAL MEDICINE

## 2021-12-16 PROCEDURE — 99214 OFFICE O/P EST MOD 30 MIN: CPT | Performed by: INTERNAL MEDICINE

## 2021-12-16 PROCEDURE — G8754 DIAS BP LESS 90: HCPCS | Performed by: INTERNAL MEDICINE

## 2021-12-16 PROCEDURE — G8536 NO DOC ELDER MAL SCRN: HCPCS | Performed by: INTERNAL MEDICINE

## 2021-12-16 RX ORDER — NITROGLYCERIN 0.4 MG/1
0.4 TABLET SUBLINGUAL
Qty: 25 TABLET | Refills: 3 | Status: SHIPPED | OUTPATIENT
Start: 2021-12-16 | End: 2022-05-02 | Stop reason: SDUPTHER

## 2021-12-16 NOTE — PROGRESS NOTES
Jasmina Yousif presents today for   Chief Complaint   Patient presents with    Follow-up     6 month follow up       Jasmina Yousif preferred language for health care discussion is english/other. Is someone accompanying this pt? no    Is the patient using any DME equipment during [address]? no    Depression Screening:  3 most recent PHQ Screens 12/16/2021   PHQ Not Done -   Little interest or pleasure in doing things Not at all   Feeling down, depressed, irritable, or hopeless Not at all   Total Score PHQ 2 0   Trouble falling or staying asleep, or sleeping too much -   Feeling tired or having little energy -   Poor appetite, weight loss, or overeating -   Feeling bad about yourself - or that you are a failure or have let yourself or your family down -   Trouble concentrating on things such as school, work, reading, or watching TV -   Moving or speaking so slowly that other people could have noticed; or the opposite being so fidgety that others notice -   Thoughts of being better off dead, or hurting yourself in some way -   PHQ 9 Score -   How difficult have these problems made it for you to do your work, take care of your home and get along with others -       Learning Assessment:  Learning Assessment 12/16/2021   PRIMARY LEARNER Patient   HIGHEST LEVEL OF EDUCATION - PRIMARY LEARNER  -   BARRIERS PRIMARY LEARNER -   CO-LEARNER CAREGIVER -   PRIMARY LANGUAGE ENGLISH   LEARNER PREFERENCE PRIMARY DEMONSTRATION   ANSWERED BY patient   RELATIONSHIP SELF       Abuse Screening:  Abuse Screening Questionnaire 12/16/2021   Do you ever feel afraid of your partner? N   Are you in a relationship with someone who physically or mentally threatens you? N   Is it safe for you to go home? Y       Fall Risk  Fall Risk Assessment, last 12 mths 12/16/2021   Able to walk? Yes   Fall in past 12 months? 0   Do you feel unsteady? 0   Are you worried about falling 0   Number of falls in past 12 months -   Fall with injury?  - Pt currently taking Anticoagulant therapy? Xarelto 20 mg once a day    Pt currently taking Antiplatelet therapy ? ASA 81 mg once a day      Coordination of Care:  1. Have you been to the ER, urgent care clinic since your last visit? Hospitalized since your last visit? no    2. Have you seen or consulted any other health care providers outside of the [address] [place] [address] since your last visit? Include any pap smears or colon screening.  no

## 2021-12-16 NOTE — PROGRESS NOTES
HISTORY OF PRESENT ILLNESS  Jasmina Yousif is a [de-identified] y.o. male. Follow-up  Pertinent negatives include no chest pain, no abdominal pain, no headaches and no shortness of breath. Patient presents for a followup office visit. He has a known history of coronary artery disease, status post a non-ST elevation myocardial infarction in 2009. He underwent a cardiac catheterization at that time and was found to have moderate, but nonobstructive two-vessel coronary disease involving his left circumflex and a right-sided posterior descending artery, that were assessed by pressure wire. The patient has been maintained on medical therapy since that time. The patient last underwent a pharmacological nuclear stress test in December 2015, which was a normal study, showing no ischemia, normal left ventricle ejection fraction. EF 67%. He has a history of mild to moderate carotid artery disease,  last evaluated with a carotid duplex in August 2018 which demonstrated moderate carotid disease of the left ICA and mild plaquing of the right. He also underwent an abdominal ultrasound which showed a very small infrarenal AAA. Patient last underwent a pharmacologic nuclear stress test in November 2019 which was a normal and low risk study. He recently underwent follow-up abdominal ultrasound and carotid duplex scan in August 2020 which showed a small AAA and moderate bilateral carotid artery disease essentially unchanged compared to his previous studies. Patient was diagnosed with new onset atrial fibrillation at last office visit in April 2021. His rates were on the slow side without any rate slowing agents. He underwent an echocardiogram in May 2021 which showed preserved LV systolic function, EF 55 to 60%, mild concentric LVH, severe left atrial enlargement, diastolic dysfunction, mild tricuspid regurgitation with normal PA pressure.   He also wore a Holter monitor which showed paroxysmal atrial fibrillation approximately 50% of the time with an average heart rate of 68 bpm and no prolonged pauses or prolonged tachycardias. He was started on Xarelto for oral anticoagulation. He was last seen in our office 6 months ago. Since last visit he has been feeling quite well. No prolonged heart palpitations, dizziness or syncope. No chest pain at rest or with exertion, no shortness of breath, leg swelling or claudication. He underwent follow-up vascular studies in August 2021 including a carotid duplex scan which showed moderate bilateral ICA disease and an abdominal aorta duplex which again showed a small infrarenal AAA measuring 3.2 x 3.2 cm. Past Medical History:   Diagnosis Date    AAA (abdominal aortic aneurysm) ([address] 03/2010    noted on CT (abdomen)    Abnormal LFT's 12/95, 04/16/03    Acute meniscal tear, lateral 01/2007    s/p arthorscopic surg (Dr. Jamin Beth)    Atypical chest pain 02/04/09    CAD (coronary artery disease), native coronary artery     Cardiac cath 04/13/2009    dLM 30%. mLAD 30%. oD2 30%. pCX 55% (FFR 0.95). mRCA 40%. RPDA 65% (FFR 0.86). EF 65%.  Cardiac echocardiogram 04/03/2007    EF 60%. No RWMA. Gr 1 DDfx. LAE. No significant valvular heart disease.  Cardiac nuclear imaging test, low risk 12/04/2015    Low risk. No ischemia or prior infarction. No WMA. EF 67%. Neg EKG on pharm stress test.    Cardiovascular aorto-iliac duplex 06/29/2015    AAA measures 3.2 x 3.1 cm Trv. (Prev 3.2 x 3.4 cm on 6/19/14). Aneurysm is infrarenal & fusiform w/complex intraluminal thrombus within. Mod bilateral common iliac stenosis.  Carotid duplex 03/16/2016    Mild < 50% BETHEL stenosis. Mod 35-62% LICA stenosis. Similar to study of 6/29/15.     Cholecystitis chronic 03/2008    s/p sue    Closed fracture of proximal end of left radius with delayed healing, subsequent encounter 3/2/2021    Depression, recurrent ([address]     Diverticulosis     Diverticulosis 9-28-15     DJD (degenerative joint disease) of lumbar spine 12/18/01    CLARKE (Dyspnea on Exertion) 03/2007    echo and stress WNL    ED (erectile dysfunction) 11/11/04    Fatty liver 4/2012    noted on CT (abdomen)    Fibrosis of knee joint March 2014    peripatellar fibrosis, left knee replacement    Glaucoma     Left eye    H/O: rotator cuff tear 09/25/08    History of colon polyps     Hypercholesterolemia     Hypertension 2009    Non-STEMI (non-ST elevated myocardial infarction) [address] 04/13/2009    peripatellar fibrosis, left knee replacement    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 25 Tablet 3    rosuvastatin (CRESTOR) 20 mg tablet Take 1 Tablet by mouth nightly. 90 Tablet 3    amLODIPine (NORVASC) 10 mg tablet TAKE 1 TABLET BY MOUTH DAILY 90 Tablet 3    niacin ER (NIASPAN) 500 mg tablet Take 1 Tablet by mouth daily. 90 Tablet 4    rivaroxaban (Xarelto) 20 mg tab tablet Take 1 Tablet by mouth daily. 90 Tablet 3    ezetimibe (Zetia) 10 mg tablet Take 1 Tab by mouth daily. 90 Tab 4    sildenafil citrate (Viagra) 100 mg tablet Take 1 Tab by mouth as needed (1 tablet by mouth once daily as needed). 18 Tab 3    aspirin delayed-release (ADULT LOW DOSE ASPIRIN) 81 mg tablet Take 81 mg by mouth daily.  brinzolamide-brimonidine (Simbrinza) 1-0.2 % drps Apply 1 Drop to eye two (2) times a day.  coenzyme q10 (CO Q-10) 10 mg cap Take 1 Tab by mouth daily.  cholecalciferol, vitamin D3, (VITAMIN D3) 2,000 unit tab Take 1 Tab by mouth daily.       omega 3-dha-epa-fish oil 100-160-1,000 mg cap Take 1,000 mg by mouth daily. 90 Cap 3         Allergies   Allergen Reactions    Pollen Extracts Sneezing     Itchy eyes, runny nose        Social History     Tobacco Use    Smoking status: Former Smoker     Packs/day: 0.50     Years: 20.00     Pack years: 10.00     Types: Cigarettes     Quit date: 12/10/1995     Years since quittin.0    Smokeless tobacco: Never Used   Substance Use Topics    Alcohol use: Yes     Alcohol/week: 6.7 standard drinks     Types: 7 Glasses of wine, 1 Shots of liquor per week     Comment: social    Drug use: No     Types: Prescription, OTC       Family History   Problem Relation Age of Onset    Heart Disease Mother     Stroke Father     No Known Problems Sister     No Known Problems Maternal Grandmother     No Known Problems Maternal Grandfather     Dementia Paternal Grandmother     No Known Problems Paternal Grandfather     Cancer Brother         lung CA         Review of Systems   Constitutional: Negative for chills, fever, malaise/fatigue and weight loss. HENT: Negative for nosebleeds. Eyes: Negative for blurred vision and double vision. Respiratory: Negative for cough, shortness of breath and wheezing. Cardiovascular: Negative for chest pain, palpitations, orthopnea, claudication, leg swelling and PND. Gastrointestinal: Negative for abdominal pain, heartburn, nausea and vomiting. Genitourinary: Negative for dysuria and hematuria. Musculoskeletal: Negative for falls, joint pain and myalgias. Skin: Negative for rash. Neurological: Negative for dizziness, focal weakness and headaches. Endo/Heme/Allergies: Does not bruise/bleed easily. Psychiatric/Behavioral: Negative for substance abuse.      Visit Vitals  /76 (BP 1 Location: Right upper arm, BP Patient Position: Sitting, BP Cuff Size: Small adult)   Pulse (!) 52   Ht 6' (1.829 m)   Wt 89.4 kg (197 lb)   SpO2 98%   BMI 26.72 kg/m²      Physical Exam  Constitutional: Appearance: He is well-developed. HENT:      Head: Normocephalic and atraumatic. Eyes:      Conjunctiva/sclera: Conjunctivae normal.   Neck:      Vascular: No carotid bruit or JVD. Cardiovascular:      Rate and Rhythm: Bradycardia present. Rhythm irregularly irregular. No extrasystoles are present. Pulses: Normal pulses. Heart sounds: S1 normal and S2 normal. No murmur heard. No gallop. No S3 sounds. Pulmonary:      Breath sounds: Normal breath sounds. No wheezing or rales. Abdominal:      General: Bowel sounds are normal.      Palpations: Abdomen is soft. Tenderness: There is no abdominal tenderness. Musculoskeletal:      Cervical back: Neck supple. Skin:     General: Skin is warm and dry. Neurological:      Mental Status: He is alert and oriented to person, place, and time. EKG: Atrial fibrillation with controlled ventricular rate in the 50s, leftward axis, no ST or T wave abnormalities concerning for ischemia. Compared to the previous EKG, no significant will change. ASSESSMENT and PLAN    Paroxysmal atrial fibrillation. Initially diagnosed by EKG in April 2021. He now appears to be relatively asymptomatic to his arrhythmia. He is likely going in and out of the arrhythmia as witnessed on a Holter monitor in May 2020 when he appeared to be in atrial fibrillation a little more than half the time with an average heart rate 68 bpm.  He had no prolonged pauses or prolonged tachycardias. He is tolerating Xarelto for oral anticoagulation. He will likely need to continue this indefinitely. I did discuss that in the future if he develops symptomatic bradycardia he may ultimately require a pacemaker. Asymptomatic coronary artery disease. Patient has moderate 2 vessel disease last assessed by cardiac catheterization in 2009, involving nonobstructive lesions in the proximal left circumflex in the right-sided PDA.   He does have a history of a non-ST elevation myocardial infarction back in 2009, for which he was briefly treated with Plavix. He is now maintained on aspirin and rosuvastatin, and he has been intolerant to beta blockers because of bradycardia. He last underwent a negative pharmacologic nuclear stress test in November 2019. No new anginal type symptoms. I would continue his current medical regimen. Essential hypertension. Patient blood pressure remains well controlled on his current regimen, all of which I would continue. Dyslipidemia: Patient is now on Crestor 20 mg daily, Niaspan 500 mg daily, Fish oil, and Zetia. His most recent lipid profile from November 2021 was excellent: Total cholesterol 91, triglycerides 84, HDL 34, LDL 40. He can continue this regimen. If anything he can drop the Niaspan from his regimen. Carotid artery disease. Patient has moderate bilateral carotid artery disease which was last assessed on carotid duplex  in August 2021. This is followed by vascular surgery. Infrarenal AAA. This was last evaluated by an ultrasound in August 2021. This remains small in size and being followed regular by vascular surgery. Followup in 6 months, sooner if needed.

## 2022-01-13 ENCOUNTER — OFFICE VISIT (OUTPATIENT)
Dept: ORTHOPEDIC SURGERY | Age: 82
End: 2022-01-13
Payer: MEDICARE

## 2022-01-13 VITALS
WEIGHT: 198 LBS | TEMPERATURE: 97.4 F | BODY MASS INDEX: 26.82 KG/M2 | HEIGHT: 72 IN | HEART RATE: 74 BPM | OXYGEN SATURATION: 99 %

## 2022-01-13 DIAGNOSIS — M79.641 RIGHT HAND PAIN: ICD-10-CM

## 2022-01-13 DIAGNOSIS — M72.0 DUPUYTREN'S DISEASE OF PALM OF RIGHT HAND: Primary | ICD-10-CM

## 2022-01-13 PROCEDURE — G8427 DOCREV CUR MEDS BY ELIG CLIN: HCPCS | Performed by: ORTHOPAEDIC SURGERY

## 2022-01-13 PROCEDURE — 1101F PT FALLS ASSESS-DOCD LE1/YR: CPT | Performed by: ORTHOPAEDIC SURGERY

## 2022-01-13 PROCEDURE — G8419 CALC BMI OUT NRM PARAM NOF/U: HCPCS | Performed by: ORTHOPAEDIC SURGERY

## 2022-01-13 PROCEDURE — 73130 X-RAY EXAM OF HAND: CPT | Performed by: ORTHOPAEDIC SURGERY

## 2022-01-13 PROCEDURE — G8756 NO BP MEASURE DOC: HCPCS | Performed by: ORTHOPAEDIC SURGERY

## 2022-01-13 PROCEDURE — 99214 OFFICE O/P EST MOD 30 MIN: CPT | Performed by: ORTHOPAEDIC SURGERY

## 2022-01-13 PROCEDURE — G8432 DEP SCR NOT DOC, RNG: HCPCS | Performed by: ORTHOPAEDIC SURGERY

## 2022-01-13 PROCEDURE — G8536 NO DOC ELDER MAL SCRN: HCPCS | Performed by: ORTHOPAEDIC SURGERY

## 2022-01-13 NOTE — PROGRESS NOTES
Jennifer Spain is a [de-identified] y.o. male right handed retiree. Worker's Compensation and legal considerations: none filed. Vitals:    01/13/22 1450   Pulse: 74   Temp: 97.4 °F (36.3 °C)   TempSrc: Temporal   SpO2: 99%   Weight: 198 lb (89.8 kg)   Height: 6' (1.829 m)   PainSc:   0 - No pain   PainLoc: Hand           Chief Complaint   Patient presents with    Hand Pain     right hand cyst         HPI: Patient presents today due to a bump in his hand that is leading to his right middle finger. Chronic    Date of onset: Chronic    Injury: No    Prior Treatment:  No    Numbness/ Tingling: No      ROS: Review of Systems - General ROS: negative  Psychological ROS: negative  ENT ROS: negative  Allergy and Immunology ROS: negative  Hematological and Lymphatic ROS: negative  Respiratory ROS: no cough, shortness of breath, or wheezing  Cardiovascular ROS: no chest pain or dyspnea on exertion  Gastrointestinal ROS: no abdominal pain, change in bowel habits, or black or bloody stools  Musculoskeletal ROS: negative  Neurological ROS: negative  Dermatological ROS: negative    Past Medical History:   Diagnosis Date    AAA (abdominal aortic aneurysm) ([facility] [facility] 03/2010    noted on CT (abdomen)    Abnormal LFT's 12/95, 04/16/03    Acute meniscal tear, lateral 01/2007    s/p arthorscopic surg (Dr. Mariel Gomez)    Atypical chest pain 02/04/09    CAD (coronary artery disease), native coronary artery     Cardiac cath 04/13/2009    dLM 30%. mLAD 30%. oD2 30%. pCX 55% (FFR 0.95). mRCA 40%. RPDA 65% (FFR 0.86). EF 65%.  Cardiac echocardiogram 04/03/2007    EF 60%. No RWMA. Gr 1 DDfx. LAE. No significant valvular heart disease.  Cardiac nuclear imaging test, low risk 12/04/2015    Low risk. No ischemia or prior infarction. No WMA. EF 67%. Neg EKG on pharm stress test.    Cardiovascular aorto-iliac duplex 06/29/2015    AAA measures 3.2 x 3.1 cm Trv. (Prev 3.2 x 3.4 cm on 6/19/14).   Aneurysm is infrarenal & fusiform w/complex intraluminal thrombus within. Mod bilateral common iliac stenosis.  Carotid duplex 03/16/2016    Mild < 50% BETHEL stenosis. Mod 44-50% LICA stenosis. Similar to study of 6/29/15.  Cholecystitis chronic 03/2008    s/p sue    Closed fracture of proximal end of left radius with delayed healing, subsequent encounter 3/2/2021    Depression, recurrent ([address]     Diverticulosis     Diverticulosis 9-28-15    DR. 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    Plantar fasciitis     Rhinitis     Right knee pain     Right shoulder pain     Solar purpura ([address]     Tinea versicolor 7/23/2012    2.8 x 2.9cm infra-renal    Tubular adenoma 9-28-15    Vitamin D deficiency 4/13/2011       Past Surgical History:   Procedure Laterality Date    ENDOSCOPY, COLON, DIAGNOSTIC      normal per patient;     HX ARTHROPLASTY  04/05/10    [place]-left knee    HX CATARACT REMOVAL Bilateral     November 2014    HX CHOLECYSTECTOMY  03/2008    chronic cholecystitis    HX COLONOSCOPY  9-28-15    HX HERNIA REPAIR  1995    L inguinal    HX KNEE ARTHROSCOPY  01/24/07    left knee    HX KNEE ARTHROSCOPY Left 4/16/2014    peripatellar debridement    HX KNEE REPLACEMENT Left 01/29/2013    [place]    HX MOHS PROCEDURES  11/2008    HX RHINOPLASTY  1983    HX TONSILLECTOMY      HX VASECTOMY  1977    HX WISDOM TEETH EXTRACTION      x4    [place] ANESTH,SURGERY OF SHOULDER         Current Outpatient Medications   Medication Sig Dispense Refill    nitroglycerin (NITROSTAT) 0.4 mg SL tablet 1 Tablet by SubLINGual route every five (5) minutes as needed for Chest Pain. 25 Tablet 3    rosuvastatin (CRESTOR) 20 mg tablet Take 1 Tablet by mouth nightly. 90 Tablet 3    amLODIPine (NORVASC) 10 mg tablet TAKE 1 TABLET BY MOUTH DAILY 90 Tablet 3    niacin ER (NIASPAN) 500 mg tablet Take 1 Tablet by mouth daily. 90 Tablet 4    rivaroxaban (Xarelto) 20 mg tab tablet Take 1 Tablet by mouth daily. 90 Tablet 3    ezetimibe (Zetia) 10 mg tablet Take 1 Tab by mouth daily. 90 Tab 4    sildenafil citrate (Viagra) 100 mg tablet Take 1 Tab by mouth as needed (1 tablet by mouth once daily as needed). 18 Tab 3    aspirin delayed-release (ADULT LOW DOSE ASPIRIN) 81 mg tablet Take 81 mg by mouth daily.  brinzolamide-brimonidine (Simbrinza) 1-0.2 % drps Apply 1 Drop to eye two (2) times a day.  coenzyme q10 (CO Q-10) 10 mg cap Take 1 Tab by mouth daily.  cholecalciferol, vitamin D3, (VITAMIN D3) 2,000 unit tab Take 1 Tab by mouth daily.  omega 3-dha-epa-fish oil 100-160-1,000 mg cap Take 1,000 mg by mouth daily. 90 Cap 3       Allergies   Allergen Reactions    Pollen Extracts Sneezing     Itchy eyes, runny nose           PE:     Physical Exam  Vitals and nursing note reviewed. Constitutional:       General: He is not in acute distress. Appearance: Normal appearance. He is not ill-appearing. Cardiovascular:      Pulses: Normal pulses. Pulmonary:      Effort: Pulmonary effort is normal. No respiratory distress. Musculoskeletal:         General: No swelling, tenderness, deformity or signs of injury. Normal range of motion. Cervical back: Normal range of motion and neck supple. Right lower leg: No edema. Left lower leg: No edema. Skin:     General: Skin is warm and dry.       Capillary Refill: Capillary refill takes less than 2 seconds. Findings: No bruising or erythema. Neurological:      General: No focal deficit present. Mental Status: He is alert and oriented to person, place, and time. Psychiatric:         Mood and Affect: Mood normal.         Behavior: Behavior normal.            Hand:    Examination L Digit(s) R Digit(s)   1st CMC Tenderness -  -    1st CMC Grind -  -    Anat Nodes -  -    [place] Nodes -  -    A1 Pulley Tenderness -  -    Triggering -  -    UCL Instability -  -    RCL Instability -  -    Lateral Stress Pain -  -    Palmar Cords -  + LF MP 0   Tabletop test -  -    Garrod's Pads -  -     Strength       Pinch Strength         ROM: Full        Imagin2022 3 views of right hand does not show any fracture dislocation or any other osseous abnormalities. ICD-10-CM ICD-9-CM    1. Dupuytren's disease of palm of right hand  M72.0 728.6    2. Right hand pain  M79.641 729.5 AMB POC XRAY, HAND; 3+ VIEWS         Plan:     Discussed tabletop test to be done monthly. At this point we will observe the cord in his right middle finger. Discussed possibility of collagenase injection in the future if it worsens. Follow-up and Dispositions    · Return if symptoms worsen or fail to improve.           Plan was reviewed with patient, who verbalized agreement and understanding of the plan

## 2022-02-28 DIAGNOSIS — E78.5 HYPERLIPIDEMIA, UNSPECIFIED HYPERLIPIDEMIA TYPE: ICD-10-CM

## 2022-02-28 NOTE — TELEPHONE ENCOUNTER
Patient need a medication refill. Please advise     Requested Prescriptions     Pending Prescriptions Disp Refills    ezetimibe (Zetia) 10 mg tablet 90 Tablet 4     Sig: Take 1 Tablet by mouth daily.

## 2022-03-01 ENCOUNTER — OFFICE VISIT (OUTPATIENT)
Dept: FAMILY MEDICINE CLINIC | Age: 82
End: 2022-03-01
Payer: MEDICARE

## 2022-03-01 VITALS
SYSTOLIC BLOOD PRESSURE: 124 MMHG | HEART RATE: 79 BPM | TEMPERATURE: 97.8 F | WEIGHT: 197 LBS | DIASTOLIC BLOOD PRESSURE: 73 MMHG | BODY MASS INDEX: 26.72 KG/M2

## 2022-03-01 DIAGNOSIS — I71.40 ABDOMINAL AORTIC ANEURYSM (AAA) WITHOUT RUPTURE: ICD-10-CM

## 2022-03-01 DIAGNOSIS — E78.5 HYPERLIPIDEMIA, UNSPECIFIED HYPERLIPIDEMIA TYPE: ICD-10-CM

## 2022-03-01 DIAGNOSIS — I48.0 PAROXYSMAL ATRIAL FIBRILLATION (HCC): ICD-10-CM

## 2022-03-01 DIAGNOSIS — I10 ESSENTIAL HYPERTENSION: Primary | ICD-10-CM

## 2022-03-01 DIAGNOSIS — I25.119 ATHEROSCLEROSIS OF NATIVE CORONARY ARTERY WITH ANGINA PECTORIS, UNSPECIFIED WHETHER NATIVE OR TRANSPLANTED HEART (HCC): ICD-10-CM

## 2022-03-01 PROCEDURE — G8536 NO DOC ELDER MAL SCRN: HCPCS | Performed by: FAMILY MEDICINE

## 2022-03-01 PROCEDURE — 99214 OFFICE O/P EST MOD 30 MIN: CPT | Performed by: FAMILY MEDICINE

## 2022-03-01 PROCEDURE — G8510 SCR DEP NEG, NO PLAN REQD: HCPCS | Performed by: FAMILY MEDICINE

## 2022-03-01 PROCEDURE — 1101F PT FALLS ASSESS-DOCD LE1/YR: CPT | Performed by: FAMILY MEDICINE

## 2022-03-01 PROCEDURE — G8419 CALC BMI OUT NRM PARAM NOF/U: HCPCS | Performed by: FAMILY MEDICINE

## 2022-03-01 PROCEDURE — G8756 NO BP MEASURE DOC: HCPCS | Performed by: FAMILY MEDICINE

## 2022-03-01 PROCEDURE — G8427 DOCREV CUR MEDS BY ELIG CLIN: HCPCS | Performed by: FAMILY MEDICINE

## 2022-03-01 RX ORDER — EZETIMIBE 10 MG/1
10 TABLET ORAL DAILY
Qty: 90 TABLET | Refills: 4 | Status: SHIPPED | OUTPATIENT
Start: 2022-03-01

## 2022-03-01 NOTE — PROGRESS NOTES
Linden Abreu presents today for No chief complaint on file. Linden Abreu preferred language for health care discussion is english/other. Is someone accompanying this pt? No     Is the patient using any DME equipment during OV? No     Depression Screening:  3 most recent PHQ Screens 3/1/2022   PHQ Not Done -   Little interest or pleasure in doing things Not at all   Feeling down, depressed, irritable, or hopeless Not at all   Total Score PHQ 2 0   Trouble falling or staying asleep, or sleeping too much -   Feeling tired or having little energy -   Poor appetite, weight loss, or overeating -   Feeling bad about yourself - or that you are a failure or have let yourself or your family down -   Trouble concentrating on things such as school, work, reading, or watching TV -   Moving or speaking so slowly that other people could have noticed; or the opposite being so fidgety that others notice -   Thoughts of being better off dead, or hurting yourself in some way -   PHQ 9 Score -   How difficult have these problems made it for you to do your work, take care of your home and get along with others -       Learning Assessment:  Learning Assessment 12/16/2021   PRIMARY LEARNER Patient   HIGHEST LEVEL OF EDUCATION - PRIMARY LEARNER  -   BARRIERS PRIMARY LEARNER -   CO-LEARNER CAREGIVER -   PRIMARY LANGUAGE ENGLISH   LEARNER PREFERENCE PRIMARY DEMONSTRATION   ANSWERED BY patient   RELATIONSHIP SELF       Abuse Screening:  Abuse Screening Questionnaire 3/1/2022   Do you ever feel afraid of your partner? N   Are you in a relationship with someone who physically or mentally threatens you? N   Is it safe for you to go home? Y       Generalized Anxiety  No flowsheet data found. Health Maintenance Due   Topic Date Due    Shingrix Vaccine Age 49> (1 of 2) Never done    Medicare Yearly Exam  12/10/2021   . Health Maintenance reviewed and discussed and ordered per Provider.   VACCINES DUE   SCREENINGS DUE Kendra Cebalols is updated on all HM    1. \"Have you been to the ER, urgent care clinic since your last visit? Hospitalized since your last visit? \" No    2. \"Have you seen or consulted any other health care providers outside of the [address] [place] [address] since your last visit? \" No     3. For patients aged 39-70: Has the patient had a colonoscopy / FIT/ Cologuard?  No

## 2022-03-01 NOTE — PROGRESS NOTES
Chief Complaint   Patient presents with    Hypertension    Cholesterol Problem    Other       patient unsure if he was able to follow up with gi for Hyperbilirubinemia    Labs         HPI    Susanne Palmer is a [de-identified] y.o. male presenting today for 3 months  follow up of htn, hld. Pt has been doing well overall. He cont to exercise regularly. Pt had eval with hand surg. He is to do an exercise for his hand to maintain extension of the hand. Pt had f/u with cards. Patient does not need medication refills today. New concerns today: none      Review of Systems   Constitutional: Negative. HENT: Negative. Respiratory: Negative. Cardiovascular: Negative. All other systems reviewed and are negative. Physical Exam  Vitals and nursing note reviewed. Constitutional:       General: He is not in acute distress. Appearance: Normal appearance. HENT:      Head: Normocephalic and atraumatic. Right Ear: External ear normal.      Left Ear: External ear normal.      Nose: Nose normal.   Eyes:      Extraocular Movements: Extraocular movements intact. Conjunctiva/sclera: Conjunctivae normal.   Cardiovascular:      Rate and Rhythm: Normal rate and regular rhythm. Heart sounds: No murmur heard. No friction rub. No gallop. Pulmonary:      Effort: Pulmonary effort is normal.      Breath sounds: Normal breath sounds. No wheezing, rhonchi or rales. Musculoskeletal:         General: Normal range of motion. Cervical back: Normal range of motion. No rigidity. Skin:     General: Skin is warm and dry. Neurological:      Mental Status: He is alert and oriented to person, place, and time. Coordination: Coordination normal.   Psychiatric:         Mood and Affect: Mood normal.         Behavior: Behavior normal.         Thought Content: Thought content normal.         Judgment: Judgment normal.         Diagnoses and all orders for this visit:    1.  Essential hypertension  -     METABOLIC PANEL, COMPREHENSIVE; Future  -     LIPID PANEL; Future  Stable, cont pres tx plan. 2. Hyperlipidemia, unspecified hyperlipidemia type  -     METABOLIC PANEL, COMPREHENSIVE; Future  -     LIPID PANEL; Future  Ok to stop niaspan as per cards. 3. Paroxysmal atrial fibrillation (HCC)  Assessment & Plan:   monitored by specialist. No acute findings meriting change in the plan      4. Abdominal aortic aneurysm (AAA) without rupture ([address]  Assessment & Plan:   monitored by specialist. No acute findings meriting change in the plan      5. 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, unspecified whether native or transplanted heart [facility])  Assessment & Plan:   monitored by specialist. No acute findings meriting change in the plan      Follow-up and Dispositions    · Return in about 3 months (around 6/1/2022) for high blood pressure, high cholesterol, lab review.

## 2022-03-15 ENCOUNTER — VIRTUAL VISIT (OUTPATIENT)
Dept: FAMILY MEDICINE CLINIC | Age: 82
End: 2022-03-15
Payer: MEDICARE

## 2022-03-15 DIAGNOSIS — N52.9 ERECTILE DYSFUNCTION, UNSPECIFIED ERECTILE DYSFUNCTION TYPE: ICD-10-CM

## 2022-03-15 DIAGNOSIS — Z71.89 ACP (ADVANCE CARE PLANNING): ICD-10-CM

## 2022-03-15 DIAGNOSIS — Z00.00 MEDICARE ANNUAL WELLNESS VISIT, SUBSEQUENT: Primary | ICD-10-CM

## 2022-03-15 PROCEDURE — G8756 NO BP MEASURE DOC: HCPCS | Performed by: FAMILY MEDICINE

## 2022-03-15 PROCEDURE — G0439 PPPS, SUBSEQ VISIT: HCPCS | Performed by: FAMILY MEDICINE

## 2022-03-15 PROCEDURE — G8427 DOCREV CUR MEDS BY ELIG CLIN: HCPCS | Performed by: FAMILY MEDICINE

## 2022-03-15 PROCEDURE — G8432 DEP SCR NOT DOC, RNG: HCPCS | Performed by: FAMILY MEDICINE

## 2022-03-15 PROCEDURE — 1101F PT FALLS ASSESS-DOCD LE1/YR: CPT | Performed by: FAMILY MEDICINE

## 2022-03-15 PROCEDURE — 99497 ADVNCD CARE PLAN 30 MIN: CPT | Performed by: FAMILY MEDICINE

## 2022-03-15 RX ORDER — SILDENAFIL 100 MG/1
100 TABLET, FILM COATED ORAL AS NEEDED
Qty: 18 TABLET | Refills: 3 | Status: SHIPPED | OUTPATIENT
Start: 2022-03-15

## 2022-03-15 NOTE — Clinical Note
Please request immunization record from [facility] [address]. Pt thinks he had shingles vaccinations there.

## 2022-03-15 NOTE — PROGRESS NOTES
Advance Care Planning     Advance Care Planning (ACP) Physician/NP/PA Conversation      Date of Conversation: 3/15/2022  Conducted with: Patient with Decision Making Capacity    Healthcare Decision Maker:     Primary Decision Maker: Gonzales Olson - Aniyah - [phone]    Secondary Decision Maker: Natalie Cervantes - Son - [phone]  Click here to complete [place] including selection of the Healthcare Decision Maker Relationship (ie \"Primary\")      Today we documented Decision Maker(s) consistent with Legal Next of Kin hierarchy. Care Preferences:    Hospitalization: \"If your health worsens and it becomes clear that your chance of recovery is unlikely, what would be your preference regarding hospitalization? \"  The patient is unsure. Ventilation: \"If you were unable to breathe on your own and your chance of recovery was unlikely, what would be your preference about the use of a ventilator (breathing machine) if it was available to you? \"   The patient is unsure. Resuscitation: \"In the event your heart stopped as a result of an underlying serious health condition, would you want attempts to be made to restart your heart, or would you prefer a natural death? \"   The patient is unsure.       Conversation Outcomes / Follow-Up Plan:   ACP in process - information provided, considering goals and options      Length of Voluntary ACP Conversation in minutes:  16 minutes    Alecia Morrow MD

## 2022-03-15 NOTE — PATIENT INSTRUCTIONS
Medicare Wellness Visit, Male    The best way to live healthy is to have a lifestyle where you eat a well-balanced diet, exercise regularly, limit alcohol use, and quit all forms of tobacco/nicotine, if applicable. Regular preventive services are another way to keep healthy. Preventive services (vaccines, screening tests, monitoring & exams) can help personalize your care plan, which helps you manage your own care. Screening tests can find health problems at the earliest stages, when they are easiest to treat. [facility] follows the current, evidence-based guidelines published by the [facility] ([facility]) when recommending preventive services for our patients. Because we follow these guidelines, sometimes recommendations change over time as research supports it. (For example, a prostate screening blood test is no longer routinely recommended for men with no symptoms). Of course, you and your doctor may decide to screen more often for some diseases, based on your risk and co-morbidities (chronic disease you are already diagnosed with). Preventive services for you include:  - Medicare offers their members a free annual wellness visit, which is time for you and your primary care provider to discuss and plan for your preventive service needs. Take advantage of this benefit every year!  -All adults over age 72 should receive the recommended pneumonia vaccines. Current USPSTF guidelines recommend a series of two vaccines for the best pneumonia protection.   -All adults should have a flu vaccine yearly and tetanus vaccine every 10 years.  -All adults age 48 and older should receive the shingles vaccines (series of two vaccines).        -All adults age 38-68 who are overweight should have a diabetes screening test once every three years.   -Other screening tests & preventive services for persons with diabetes include: an eye exam to screen for diabetic retinopathy, a kidney function test, a foot exam, and stricter control over your cholesterol.   -Cardiovascular screening for adults with routine risk involves an electrocardiogram (ECG) at intervals determined by the provider.   -Colorectal cancer screening should be done for adults age 54-65 with no increased risk factors for colorectal cancer. There are a number of acceptable methods of screening for this type of cancer. Each test has its own benefits and drawbacks. Discuss with your provider what is most appropriate for you during your annual wellness visit. The different tests include: colonoscopy (considered the best screening method), a fecal occult blood test, a fecal DNA test, and sigmoidoscopy.  -All adults born between [facility] should be screened once for Hepatitis C.  -An Abdominal Aortic Aneurysm (AAA) Screening is recommended for men age 73-68 who has ever smoked in their lifetime.      Here is a list of your current Health Maintenance items (your personalized list of preventive services) with a due date:  Health Maintenance Due   Topic Date Due    Shingles Vaccine (1 of 2) Never done    Annual Well Visit  12/10/2021

## 2022-03-15 NOTE — PROGRESS NOTES
This is the Subsequent Medicare Annual Wellness Exam, performed 12 months or more after the Initial AWV or the last Subsequent AWV    I have reviewed the patient's medical history in detail and updated the computerized patient record. Assessment/Plan   Education and counseling provided:  End-of-Life planning (with patient's consent)  shingles vaccination completed    1. Medicare annual wellness visit, subsequent  2. ACP (advance care planning)       Depression Risk Factor Screening:     3 most recent PHQ Screens 3/1/2022   PHQ Not Done -   Little interest or pleasure in doing things Not at all   Feeling down, depressed, irritable, or hopeless Not at all   Total Score PHQ 2 0   Trouble falling or staying asleep, or sleeping too much -   Feeling tired or having little energy -   Poor appetite, weight loss, or overeating -   Feeling bad about yourself - or that you are a failure or have let yourself or your family down -   Trouble concentrating on things such as school, work, reading, or watching TV -   Moving or speaking so slowly that other people could have noticed; or the opposite being so fidgety that others notice -   Thoughts of being better off dead, or hurting yourself in some way -   PHQ 9 Score -   How difficult have these problems made it for you to do your work, take care of your home and get along with others -       Alcohol & Drug Abuse Risk Screen    Do you average more than 1 drink per night or more than 7 drinks a week:yes 1-2 drinks per day but not ever day     In the past three months have you have had more than 4 drinks containing alcohol on one occasion: no           Functional Ability and Level of Safety:    Hearing: The patient needs further evaluation. - admits to needing to turn the tv up louder       Activities of Daily Living: The home contains: no safety equipment.   Patient does total self care      Ambulation: with no difficulty     Fall Risk:  Fall Risk Assessment, last 12 mths 3/1/2022   Able to walk? Yes   Fall in past 12 months? 0   Do you feel unsteady? 0   Are you worried about falling 0   Number of falls in past 12 months -   Fall with injury? -      Abuse Screen:  Patient is not abused       Cognitive Screening    Has your family/caregiver stated any concerns about your memory: no    Cognitive Screening: . Health Maintenance Due     Health Maintenance Due   Topic Date Due    Shingrix Vaccine Age 49> (1 of 2) Never done    Medicare Yearly Exam  12/10/2021       Patient Care Team   Patient Care Team:  Esteban Harrington MD as PCP - General (Family Medicine)  Esteban Harrington MD as PCP - [facility] Empaneled Provider  Pilar Diaz MD (Vascular Surgery)  Ceazr Cormier MD (Cardiology)  Concepción Arvizu (Vascular Surgery)  Polly Pozo MD (Orthopedic Surgery)  Guyann Lesches, MD as Consulting Provider (Neurology)    History     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 ([facility] I25.119    AAA (abdominal aortic aneurysm) ([facility] I71.4    Right knee pain M25.561    Abnormal LFTs (liver function tests) R94.5    Vitamin D deficiency E55.9    CLARKE (dyspnea on exertion) R06.00    Essential hypertension I10    Hyperglycemia R73.9    Tinea versicolor B36.0    S/P total knee replacement Z96.659    Fatty liver K76.0    PVC's (premature ventricular contractions) I49.3    Carotid artery disease (HCC) I77.9    Right shoulder pain M25.511    Acute back pain M54.9    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 8XXA    Primary osteoarthritis of right knee M17.11    Closed fracture of upper end of left radius with delayed healing S52.102G    Rotator cuff syndrome, left M75.102    Paroxysmal atrial fibrillation (HCC) I48.0     Past Medical History:   Diagnosis Date    AAA (abdominal aortic aneurysm) ([facility] 03/2010    noted on CT (abdomen)    Abnormal LFT's 12/95, 04/16/03    Acute meniscal tear, lateral 01/2007    s/p arthorscopic surg (Dr. Adrián Monson)    Atypical chest pain 02/04/09    CAD (coronary artery disease), native coronary artery     Cardiac cath 04/13/2009    dLM 30%. mLAD 30%. oD2 30%. pCX 55% (FFR 0.95). mRCA 40%. RPDA 65% (FFR 0.86). EF 65%.  Cardiac echocardiogram 04/03/2007    EF 60%. No RWMA. Gr 1 DDfx. LAE. No significant valvular heart disease.  Cardiac nuclear imaging test, low risk 12/04/2015    Low risk. No ischemia or prior infarction. No WMA. EF 67%. Neg EKG on pharm stress test.    Cardiovascular aorto-iliac duplex 06/29/2015    AAA measures 3.2 x 3.1 cm Trv. (Prev 3.2 x 3.4 cm on 6/19/14). Aneurysm is infrarenal & fusiform w/complex intraluminal thrombus within. Mod bilateral common iliac stenosis.  Carotid duplex 03/16/2016    Mild < 50% BETHEL stenosis. Mod 75-62% LICA stenosis. Similar to study of 6/29/15.  Cholecystitis chronic 03/2008    s/p sue    Closed fracture of proximal end of left radius with delayed healing, subsequent encounter 3/2/2021    Depression, recurrent ([address]     Diverticulosis     Diverticulosis 9-28-15    DR. 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    Plantar fasciitis     Rhinitis     Right knee pain     Right shoulder pain     Solar purpura (HCC)     Tinea versicolor 7/23/2012    2.8 x 2.9cm infra-renal    Tubular adenoma 9-28-15    Vitamin D deficiency 4/13/2011      Past Surgical History:   Procedure Laterality Date    ENDOSCOPY, COLON, DIAGNOSTIC      normal per patient;     HX ARTHROPLASTY  04/05/10    [place]-left knee    HX CATARACT REMOVAL Bilateral     November 2014    HX CHOLECYSTECTOMY  03/2008    chronic cholecystitis    HX COLONOSCOPY  9-28-15    HX HERNIA REPAIR  1995    L inguinal    HX KNEE ARTHROSCOPY  01/24/07    left knee    HX KNEE ARTHROSCOPY Left 4/16/2014    peripatellar debridement    HX KNEE REPLACEMENT Left 01/29/2013    [place]    HX MOHS PROCEDURES  11/2008    HX RHINOPLASTY  1983    HX TONSILLECTOMY      HX VASECTOMY  1977    HX WISDOM TEETH EXTRACTION      x4    ID ANESTH,SURGERY OF SHOULDER       Current Outpatient Medications   Medication Sig Dispense Refill    ezetimibe (Zetia) 10 mg tablet Take 1 Tablet by mouth daily. 90 Tablet 4    nitroglycerin (NITROSTAT) 0.4 mg SL tablet 1 Tablet by SubLINGual route every five (5) minutes as needed for Chest Pain. 25 Tablet 3    rosuvastatin (CRESTOR) 20 mg tablet Take 1 Tablet by mouth nightly. 90 Tablet 3    amLODIPine (NORVASC) 10 mg tablet TAKE 1 TABLET BY MOUTH DAILY 90 Tablet 3    rivaroxaban (Xarelto) 20 mg tab tablet Take 1 Tablet by mouth daily. 90 Tablet 3    sildenafil citrate (Viagra) 100 mg tablet Take 1 Tab by mouth as needed (1 tablet by mouth once daily as needed). 18 Tab 3    aspirin delayed-release (ADULT LOW DOSE ASPIRIN) 81 mg tablet Take 81 mg by mouth daily.  brinzolamide-brimonidine (Simbrinza) 1-0.2 % drps Apply 1 Drop to eye two (2) times a day.  coenzyme q10 (CO Q-10) 10 mg cap Take 1 Tab by mouth daily.       cholecalciferol, vitamin D3, (VITAMIN D3) 2,000 unit tab Take 1 Tab by mouth daily.  omega 3-dha-epa-fish oil 100-160-1,000 mg cap Take 1,000 mg by mouth daily. 90 Cap 3     Allergies   Allergen Reactions    Pollen Extracts Sneezing     Itchy eyes, runny nose       Family History   Problem Relation Age of Onset    Heart Disease Mother     Stroke Father     No Known Problems Sister     No Known Problems Maternal Grandmother     No Known Problems Maternal Grandfather     Dementia Paternal Grandmother     No Known Problems Paternal Grandfather     Cancer Brother         lung CA     Social History     Tobacco Use    Smoking status: Former Smoker     Packs/day: 0.50     Years: 20.00     Pack years: 10.00     Types: Cigarettes     Quit date: 12/10/1995     Years since quittin.2    Smokeless tobacco: Never Used   Substance Use Topics    Alcohol use: Yes     Alcohol/week: 6.7 standard drinks     Types: 7 Glasses of wine, 1 Shots of liquor per week     Comment: social       Dee Lob, was evaluated through a synchronous (real-time) audio-video encounter. The patient (or guardian if applicable) is aware that this is a billable service, which includes applicable co-pays. Verbal consent to proceed has been obtained. The visit was conducted pursuant to the emergency declaration under the [address] [place] [address], [address] [place] [address] authority and the [employer] General Act. Patient identification was verified, and a caregiver was present when appropriate. The patient was located at home in a state where the provider was licensed to provide care.        Signed By: Marco Orozco MD     March 15, 2022

## 2022-03-15 NOTE — PROGRESS NOTES
Patient said that he told you the wrong location for the vaccine he said that he will get it himself and bring it in.

## 2022-03-18 PROBLEM — M75.102 ROTATOR CUFF SYNDROME, LEFT: Status: ACTIVE | Noted: 2021-03-02

## 2022-03-18 PROBLEM — M17.11 PRIMARY OSTEOARTHRITIS OF RIGHT KNEE: Status: ACTIVE | Noted: 2018-11-20

## 2022-03-19 PROBLEM — T14.8XXA BRUISING: Status: ACTIVE | Noted: 2018-08-27

## 2022-03-19 PROBLEM — I48.0 PAROXYSMAL ATRIAL FIBRILLATION (HCC): Status: ACTIVE | Noted: 2022-03-01

## 2022-03-19 PROBLEM — S52.102G: Status: ACTIVE | Noted: 2021-03-02

## 2022-03-19 PROBLEM — Z71.89 ACP (ADVANCE CARE PLANNING): Status: ACTIVE | Noted: 2018-01-04

## 2022-05-02 ENCOUNTER — OFFICE VISIT (OUTPATIENT)
Dept: CARDIOLOGY CLINIC | Age: 82
End: 2022-05-02
Payer: MEDICARE

## 2022-05-02 VITALS
OXYGEN SATURATION: 98 % | SYSTOLIC BLOOD PRESSURE: 122 MMHG | BODY MASS INDEX: 27.5 KG/M2 | DIASTOLIC BLOOD PRESSURE: 80 MMHG | HEART RATE: 61 BPM | WEIGHT: 203 LBS | HEIGHT: 72 IN

## 2022-05-02 DIAGNOSIS — R00.1 SYMPTOMATIC BRADYCARDIA: Primary | ICD-10-CM

## 2022-05-02 DIAGNOSIS — I71.40 ABDOMINAL AORTIC ANEURYSM (AAA) WITHOUT RUPTURE: ICD-10-CM

## 2022-05-02 DIAGNOSIS — I25.119 ATHEROSCLEROSIS OF NATIVE CORONARY ARTERY WITH ANGINA PECTORIS, UNSPECIFIED WHETHER NATIVE OR TRANSPLANTED HEART (HCC): ICD-10-CM

## 2022-05-02 DIAGNOSIS — N52.9 ERECTILE DYSFUNCTION, UNSPECIFIED ERECTILE DYSFUNCTION TYPE: ICD-10-CM

## 2022-05-02 DIAGNOSIS — I48.0 PAROXYSMAL ATRIAL FIBRILLATION (HCC): ICD-10-CM

## 2022-05-02 DIAGNOSIS — I10 ESSENTIAL HYPERTENSION: ICD-10-CM

## 2022-05-02 DIAGNOSIS — E78.00 PURE HYPERCHOLESTEROLEMIA: ICD-10-CM

## 2022-05-02 DIAGNOSIS — Z01.818 PRE-OP TESTING: ICD-10-CM

## 2022-05-02 PROCEDURE — 99215 OFFICE O/P EST HI 40 MIN: CPT | Performed by: INTERNAL MEDICINE

## 2022-05-02 PROCEDURE — G8754 DIAS BP LESS 90: HCPCS | Performed by: INTERNAL MEDICINE

## 2022-05-02 PROCEDURE — 1101F PT FALLS ASSESS-DOCD LE1/YR: CPT | Performed by: INTERNAL MEDICINE

## 2022-05-02 PROCEDURE — 93000 ELECTROCARDIOGRAM COMPLETE: CPT | Performed by: INTERNAL MEDICINE

## 2022-05-02 PROCEDURE — G8752 SYS BP LESS 140: HCPCS | Performed by: INTERNAL MEDICINE

## 2022-05-02 PROCEDURE — G8536 NO DOC ELDER MAL SCRN: HCPCS | Performed by: INTERNAL MEDICINE

## 2022-05-02 PROCEDURE — G8427 DOCREV CUR MEDS BY ELIG CLIN: HCPCS | Performed by: INTERNAL MEDICINE

## 2022-05-02 PROCEDURE — G8419 CALC BMI OUT NRM PARAM NOF/U: HCPCS | Performed by: INTERNAL MEDICINE

## 2022-05-02 PROCEDURE — G8510 SCR DEP NEG, NO PLAN REQD: HCPCS | Performed by: INTERNAL MEDICINE

## 2022-05-02 RX ORDER — NITROGLYCERIN 0.4 MG/1
0.4 TABLET SUBLINGUAL
Qty: 25 TABLET | Refills: 3 | Status: SHIPPED | OUTPATIENT
Start: 2022-05-02

## 2022-05-02 NOTE — LETTER
5/2/2022 9:56 AM  Fernando Coto  [id number]  1940        Insurance:  Medicare                  Auth # _____________________      Proc Date: Wed 5/11                Proc Time:  8:00      Performing MD : Dr. Pattie Martinez                      Procedure:Dual Pacemaker                                         Scheduled with:  Pineda Bermudez                PCP:[facility]                                               Date:5/2/2022          HP: 5/2      EKG: ______    Labs:______  CXR: _______  Orders:  5/2          Special Instructions:  _____________________________________________________  ______________________________________________________________________  ______________________________________________________________________          The materials enclosed with this facsimile transmission are private and confidential and are the property of the sender. If you are not the intended recipient, be advised that any unauthorized use, disclosure, copying, distribution, or the taking of any action in reliance on the contents of this telecopied information is strictly prohibited. If you have received this in error, please immediately notify the sender via telephone to arrange for return of the forwarded documentation.

## 2022-05-02 NOTE — PROGRESS NOTES
HISTORY OF PRESENT ILLNESS  Aurora Handley is a 81 y.o. male. Follow-up  Associated symptoms include shortness of breath. Pertinent negatives include no chest pain, no abdominal pain and no headaches. Shortness of Breath  Pertinent negatives include no fever, no headaches, no cough, no wheezing, no PND, no orthopnea, no chest pain, no vomiting, no abdominal pain, no rash, no leg swelling and no claudication. Dizziness  Associated symptoms include shortness of breath. Pertinent negatives include no chest pain, no abdominal pain and no headaches. Patient presents for an add-on office visit. He has a known history of coronary artery disease, status post a non-ST elevation myocardial infarction in 2009. He underwent a cardiac catheterization at that time and was found to have moderate, but nonobstructive two-vessel coronary disease involving his left circumflex and a right-sided posterior descending artery, that were assessed by pressure wire. The patient has been maintained on medical therapy since that time. The patient last underwent a pharmacological nuclear stress test in December 2015, which was a normal study, showing no ischemia, normal left ventricle ejection fraction. EF 67%. He has a history of mild to moderate carotid artery disease,  last evaluated with a carotid duplex in August 2018 which demonstrated moderate carotid disease of the left ICA and mild plaquing of the right. He also underwent an abdominal ultrasound which showed a very small infrarenal AAA. Patient last underwent a pharmacologic nuclear stress test in November 2019 which was a normal and low risk study. He recently underwent follow-up abdominal ultrasound and carotid duplex scan in August 2020 which showed a small AAA and moderate bilateral carotid artery disease essentially unchanged compared to his previous studies. Patient was diagnosed with new onset atrial fibrillation at last office visit in April 2021. His rates were on the slow side without any rate slowing agents. He underwent an echocardiogram in May 2021 which showed preserved LV systolic function, EF 55 to 60%, mild concentric LVH, severe left atrial enlargement, diastolic dysfunction, mild tricuspid regurgitation with normal PA pressure. He was started on Xarelto for oral anticoagulation. He underwent follow-up vascular studies in August 2021 including a carotid duplex scan which showed moderate bilateral ICA disease and an abdominal aorta duplex which again showed a small infrarenal AAA measuring 3.2 x 3.2 cm. He was last seen in our office approximately 4 months ago. He returns today complaining of increased fatigue throughout the day along with difficulty exercising. He monitors his heart rate frequently and often times it is in the 40s and 50s at rest and with exertion and will only increase up to 80 bpm.  He has never seen an increase further than this. He denies any sam syncope or near syncope. No new chest pain or pressure. He does complain of shortness of breath primarily with physical activity. He also has dizziness when he is laying down and turning his head from side to side. Past Medical History:   Diagnosis Date    AAA (abdominal aortic aneurysm) ([address] 03/2010    noted on CT (abdomen)    Abnormal LFT's 12/95, 04/16/03    Acute meniscal tear, lateral 01/2007    s/p arthorscopic surg (Dr. Jovanni Lombardo)    Atypical chest pain 02/04/09    CAD (coronary artery disease), native coronary artery     Cardiac cath 04/13/2009    dLM 30%. mLAD 30%. oD2 30%. pCX 55% (FFR 0.95). mRCA 40%. RPDA 65% (FFR 0.86). EF 65%.  Cardiac echocardiogram 04/03/2007    EF 60%. No RWMA. Gr 1 DDfx. LAE. No significant valvular heart disease.  Cardiac nuclear imaging test, low risk 12/04/2015    Low risk. No ischemia or prior infarction. No WMA. EF 67%.   Neg EKG on pharm stress test.    Cardiovascular aorto-iliac duplex 06/29/2015 AAA measures 3.2 x 3.1 cm Trv. (Prev 3.2 x 3.4 cm on 6/19/14). Aneurysm is infrarenal & fusiform w/complex intraluminal thrombus within. Mod bilateral common iliac stenosis.  Carotid duplex 03/16/2016    Mild < 50% BETHEL stenosis. Mod 06-56% LICA stenosis. Similar to study of 6/29/15.  Cholecystitis chronic 03/2008    s/p sue    Closed fracture of proximal end of left radius with delayed healing, subsequent encounter 3/2/2021    Depression, recurrent ([address]     Diverticulosis     Diverticulosis 9-28-15    DR. 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 25 Tablet 3    sildenafil citrate (Viagra) 100 mg tablet Take 1 Tablet by mouth as needed (1 tablet by mouth once daily as needed). 18 Tablet 3    ezetimibe (Zetia) 10 mg tablet Take 1 Tablet by mouth daily. 90 Tablet 4    rosuvastatin (CRESTOR) 20 mg tablet Take 1 Tablet by mouth nightly.  90 Tablet 3    amLODIPine (NORVASC) 10 mg tablet TAKE 1 TABLET BY MOUTH DAILY 90 Tablet 3    rivaroxaban (Xarelto) 20 mg tab tablet Take 1 Tablet by mouth daily. 90 Tablet 3    aspirin delayed-release (ADULT LOW DOSE ASPIRIN) 81 mg tablet Take 81 mg by mouth daily.  brinzolamide-brimonidine (Simbrinza) 1-0.2 % drps Apply 1 Drop to eye two (2) times a day.  coenzyme q10 (CO Q-10) 10 mg cap Take 1 Tab by mouth daily.  cholecalciferol, vitamin D3, (VITAMIN D3) 2,000 unit tab Take 1 Tab by mouth daily.  omega 3-dha-epa-fish oil 100-160-1,000 mg cap Take 1,000 mg by mouth daily. 90 Cap 3         Allergies   Allergen Reactions    Pollen Extracts Sneezing     Itchy eyes, runny nose        Social History     Tobacco Use    Smoking status: Former Smoker     Packs/day: 0.50     Years: 20.00     Pack years: 10.00     Types: Cigarettes     Quit date: 12/10/1995     Years since quittin.4    Smokeless tobacco: Never Used   Vaping Use    Vaping Use: Never used   Substance Use Topics    Alcohol use: Yes     Alcohol/week: 6.7 standard drinks     Types: 7 Glasses of wine, 1 Shots of liquor per week     Comment: social    Drug use: No     Types: Prescription, OTC       Family History   Problem Relation Age of Onset    Heart Disease Mother     Stroke Father     No Known Problems Sister     No Known Problems Maternal Grandmother     No Known Problems Maternal Grandfather     Dementia Paternal Grandmother     No Known Problems Paternal Grandfather     Cancer Brother         lung CA         Review of Systems   Constitutional: Positive for malaise/fatigue. Negative for chills, fever and weight loss. HENT: Negative for nosebleeds. Eyes: Negative for blurred vision and double vision. Respiratory: Positive for shortness of breath. Negative for cough and wheezing. Cardiovascular: Negative for chest pain, palpitations, orthopnea, claudication, leg swelling and PND. Gastrointestinal: Negative for abdominal pain, heartburn, nausea and vomiting.    Genitourinary: Negative for dysuria and hematuria. Musculoskeletal: Negative for falls, joint pain and myalgias. Skin: Negative for rash. Neurological: Positive for dizziness. Negative for focal weakness and headaches. Endo/Heme/Allergies: Does not bruise/bleed easily. Psychiatric/Behavioral: Negative for substance abuse. Visit Vitals  /80 (BP 1 Location: Left upper arm, BP Patient Position: Sitting, BP Cuff Size: Adult)   Pulse 61   Ht 6' (1.829 m)   Wt 92.1 kg (203 lb)   SpO2 98%   BMI 27.53 kg/m²      Physical Exam  Constitutional:       Appearance: He is well-developed. HENT:      Head: Normocephalic and atraumatic. Eyes:      Conjunctiva/sclera: Conjunctivae normal.   Neck:      Vascular: No carotid bruit or JVD. Cardiovascular:      Rate and Rhythm: Normal rate. Rhythm irregularly irregular. No extrasystoles are present. Pulses: Normal pulses. Heart sounds: S1 normal and S2 normal. No murmur heard. No gallop. No S3 sounds. Pulmonary:      Breath sounds: Normal breath sounds. No wheezing or rales. Abdominal:      General: Bowel sounds are normal.      Palpations: Abdomen is soft. Tenderness: There is no abdominal tenderness. Musculoskeletal:      Cervical back: Neck supple. Skin:     General: Skin is warm and dry. Neurological:      Mental Status: He is alert and oriented to person, place, and time. EKG: Atrial fibrillation with controlled ventricular rate around 60,, leftward axis, no ST or T wave abnormalities concerning for ischemia. Borderline low voltage. Occasional PVC versus aberrantly conducted beat. Compared to the previous EKG, PVC is now present. ASSESSMENT and PLAN    Symptomatic bradycardia. Patient's resting heart rate is typically in the 50s, but he admits that his heart rate with exercise does not increase above 80 bpm.  He is currently on no rate slowing agents, so he appears to have irreversible symptomatic bradycardia.   I have recommended implanting a dual-chamber permanent pacemaker at his convenience. This will be scheduled in the future. He will need to stop his Xarelto for 48 hours prior to the procedure. Paroxysmal atrial fibrillation. Initially diagnosed by EKG in April 2021. He is only minimally symptomatic to the arrhythmia. He is likely going in and out of the arrhythmia as witnessed on a Holter monitor in May 2020 when he appeared to be in atrial fibrillation a little more than half the time with an average heart rate 68 bpm.  He is tolerating Xarelto for oral anticoagulation. He will likely need to continue this indefinitely. He does not require any rate slowing agents and in fact I suspect has significant conduction disease as described above. Asymptomatic coronary artery disease. Patient has moderate 2 vessel disease last assessed by cardiac catheterization in 2009, involving nonobstructive lesions in the proximal left circumflex in the right-sided PDA. He does have a history of a non-ST elevation myocardial infarction back in 2009, for which he was briefly treated with Plavix. He is now maintained on aspirin and rosuvastatin, and he has been intolerant to beta blockers because of bradycardia. He last underwent a negative pharmacologic nuclear stress test in November 2019. No new anginal type symptoms. I would continue his current medical regimen. Essential hypertension. Patient blood pressure remains well controlled on his current regimen, all of which I would continue. Dyslipidemia: Patient is now on Crestor 20 mg daily, Niaspan 500 mg daily, Fish oil, and Zetia. His most recent lipid profile from November 2021 was excellent: Total cholesterol 91, triglycerides 84, HDL 34, LDL 40. He can continue this regimen. If anything he can drop the Niaspan from his regimen. Carotid artery disease. Patient has moderate bilateral carotid artery disease which was last assessed on carotid duplex  in August 2021.   This is followed by vascular surgery. Infrarenal AAA. This was last evaluated by an ultrasound in August 2021. This remains small in size and being followed regular by vascular surgery. Followup to be scheduled following his pacemaker implantation.

## 2022-05-02 NOTE — PATIENT INSTRUCTIONS
[facility]   Patient  EP Instructions  [address] [place] [address], [address]                1. You are scheduled to have a Medtronic dual chamber pacemaker implanted on  May 11th , at 08:00am.     Please check in at 06:30am.     2. Please go to [facility] and park in the outpatient parking lot that is located around to the back of the hospital and enter through the [facility] Once you enter through the [facility] check in with the  there. The  will either give you directions or assist you in getting to the cath holding area. 3.  You are not to eat or drink anything after midnight the night before your procedure. 4. Please continue to take your medications with a small sip of water on the morning of the procedure with the following exceptions: Hold Xarelto for 48 hours prior to procedure. 5. If you are diabetic, do not take your insulin/sugar pill the morning of the procedure. 6. We encourage families to wait in the waiting room on the first floor while the procedure is being done. The Doctor will come out and talk with you as soon as the procedure is over. You will not be able to drive yourself home after your procedure is done. 7. There is the possibility that you may spend the night in the hospital, depending on the results of the procedure. This will be determined after the procedure is done. 8.   If you or your family have any questions, please call our office Monday-Friday 8:30 am - 4:30 pm , at [phone], and ask to speak to one of the nurses. Post Implant Instructions for Pacemakers,AICD, and BIV devices. ·  You may remove the big bulky bandage from the incision site after 24-48 hours. Keep the incision clean and dry for another 2 days. After that, it is ok to get the incision wet but do not soak or scrub the incision. Pat the area dry with a clean towel.   The small steri-strips will come off in 10-14 days - if after 14 days, they have not come off, then please go ahead and remove them. · No swimming, soaking in bath tub or hot tubs for 2 weeks    · You will be scheduled an appointment in our office in 7-10 days for a wound check. This visit will be with one of the nurses. · If you develop a fever over 100.5 F , note drainage from the incision or if you develop increased pain or swelling at the incision site or pain or swelling in the arm, PLEASE CALL the office at [phone] and speak with one of the nurses. · For the next two weeks, please do not raise your arm (on the side the device was placed) above your shoulder and do not lift/carry more than 5 pounds with that arm. If is ok to use your arm otherwise. · You should not play golf, tennis, swim using an overhead stroke, or engage in any activity that will involve repetitive movement of your arm for 6 weeks. Weight lifting with that arm (chest fly, chest presses, etc.) can increase the risk of damage to one of the leads (wires). · If you did not faint (pass out) prior to the device implant, you may resume driving in 2 weeks. · IF YOU DID FAINT (PASS OUT), YOU SHOULD NOT DRIVE FOR 6 MONTHS OR UNTIL YOU ARE CLEARED BY THE CARDIOLOGIST TO DRIVE AGAIN.    · IF YOU ARE SCHEDULED FOR ANY SURGICAL PROCEDURE, PLEASE MAKE SURE THE DOCTOR IS AWARE THAT YOU HAVE A PACEMAKER/AICD. · Although infrequently, strong electrical fields can interfere with your pacemaker. Home appliances (microwaves and home shop equipment) should be ok to use if they are in good repair. High voltage equipment (welding equipment, induction furnaces, ham radio equipment) can be a problem and you should not use, or be close by, those particular devices. There is more information in the booklet provided with you pacemaker/AICD.     · Cell phones are ok to use but use them on the side opposite your pacemaker/AICD and don't put them in the shirt pocket over your pacemaker. · You will be scheduled to have a pacemaker/AICD check and a physician visit in the next 3 months. After that, you will typically return to the office for a device ck yearly. These visits may or may not involve a physician visit as well. · Please do not hesitate to call the office at [phone] if you have any questions or concerns. · Unless you were instructed otherwise, please continue all of the medications you were on prior to the procedure.

## 2022-05-02 NOTE — PROGRESS NOTES
Willian Baljinder presents today for   Chief Complaint   Patient presents with    Follow-up     add on: concerned of slow heart rate       Willian Gale preferred language for health care discussion is english/other. Is someone accompanying this pt? no    Is the patient using any DME equipment during [address]? no    Depression Screening:  3 most recent PHQ Screens 5/2/2022   PHQ Not Done -   Little interest or pleasure in doing things Not at all   Feeling down, depressed, irritable, or hopeless Not at all   Total Score PHQ 2 0   Trouble falling or staying asleep, or sleeping too much -   Feeling tired or having little energy -   Poor appetite, weight loss, or overeating -   Feeling bad about yourself - or that you are a failure or have let yourself or your family down -   Trouble concentrating on things such as school, work, reading, or watching TV -   Moving or speaking so slowly that other people could have noticed; or the opposite being so fidgety that others notice -   Thoughts of being better off dead, or hurting yourself in some way -   PHQ 9 Score -   How difficult have these problems made it for you to do your work, take care of your home and get along with others -       Learning Assessment:  Learning Assessment 5/2/2022   PRIMARY LEARNER Patient   HIGHEST LEVEL OF EDUCATION - PRIMARY LEARNER  -   BARRIERS PRIMARY LEARNER -   CO-LEARNER CAREGIVER -   PRIMARY LANGUAGE ENGLISH   LEARNER PREFERENCE PRIMARY DEMONSTRATION   ANSWERED BY patient   RELATIONSHIP SELF       Abuse Screening:  Abuse Screening Questionnaire 5/2/2022   Do you ever feel afraid of your partner? N   Are you in a relationship with someone who physically or mentally threatens you? N   Is it safe for you to go home? Y       Fall Risk  Fall Risk Assessment, last 12 mths 5/2/2022   Able to walk? Yes   Fall in past 12 months? 0   Do you feel unsteady? 0   Are you worried about falling 0   Number of falls in past 12 months -   Fall with injury?  - Pt currently taking Anticoagulant therapy? xarelto 20 mg daily    Pt currently taking Antiplatelet therapy ? Aspirin 81 mg daily      Coordination of Care:  1. Have you been to the ER, urgent care clinic since your last visit? Hospitalized since your last visit? no    2. Have you seen or consulted any other health care providers outside of the [address] [place] [address] since your last visit? Include any pap smears or colon screening.  no

## 2022-05-05 ENCOUNTER — HOSPITAL ENCOUNTER (OUTPATIENT)
Dept: LAB | Age: 82
Discharge: HOME | End: 2022-05-05
Payer: MEDICARE

## 2022-05-05 DIAGNOSIS — E78.5 HYPERLIPIDEMIA, UNSPECIFIED HYPERLIPIDEMIA TYPE: ICD-10-CM

## 2022-05-05 DIAGNOSIS — Z01.818 PRE-OP TESTING: ICD-10-CM

## 2022-05-05 DIAGNOSIS — R00.1 SYMPTOMATIC BRADYCARDIA: ICD-10-CM

## 2022-05-05 DIAGNOSIS — I10 ESSENTIAL HYPERTENSION: ICD-10-CM

## 2022-05-05 LAB
ALBUMIN SERPL-MCNC: 4.1 G/DL (ref 3.4–5)
ALBUMIN/GLOB SERPL: 1.6 {RATIO} (ref 0.8–1.7)
ALP SERPL-CCNC: 100 U/L (ref 45–117)
ALT SERPL-CCNC: 25 U/L (ref 16–61)
ANION GAP SERPL CALC-SCNC: 5 MMOL/L (ref 3–18)
ANION GAP SERPL CALC-SCNC: 6 MMOL/L (ref 3–18)
AST SERPL-CCNC: 17 U/L (ref 10–38)
BASOPHILS # BLD: 0.1 K/UL (ref 0–0.1)
BASOPHILS NFR BLD: 1 % (ref 0–2)
BILIRUB SERPL-MCNC: 2.6 MG/DL (ref 0.2–1)
BUN SERPL-MCNC: 11 MG/DL (ref 7–18)
BUN SERPL-MCNC: 11 MG/DL (ref 7–18)
BUN/CREAT SERPL: 10 (ref 12–20)
BUN/CREAT SERPL: 9 (ref 12–20)
CALCIUM SERPL-MCNC: 8.9 MG/DL (ref 8.5–10.1)
CALCIUM SERPL-MCNC: 9 MG/DL (ref 8.5–10.1)
CHLORIDE SERPL-SCNC: 112 MMOL/L (ref 100–111)
CHLORIDE SERPL-SCNC: 112 MMOL/L (ref 100–111)
CHOLEST SERPL-MCNC: 84 MG/DL
CO2 SERPL-SCNC: 25 MMOL/L (ref 21–32)
CO2 SERPL-SCNC: 26 MMOL/L (ref 21–32)
CREAT SERPL-MCNC: 1.15 MG/DL (ref 0.6–1.3)
CREAT SERPL-MCNC: 1.16 MG/DL (ref 0.6–1.3)
DIFFERENTIAL METHOD BLD: ABNORMAL
EOSINOPHIL # BLD: 0.3 K/UL (ref 0–0.4)
EOSINOPHIL NFR BLD: 5 % (ref 0–5)
ERYTHROCYTE [DISTWIDTH] IN BLOOD BY AUTOMATED COUNT: 13.2 % (ref 11.6–14.5)
GLOBULIN SER CALC-MCNC: 2.5 G/DL (ref 2–4)
GLUCOSE SERPL-MCNC: 102 MG/DL (ref 74–99)
GLUCOSE SERPL-MCNC: 103 MG/DL (ref 74–99)
HCT VFR BLD AUTO: 48.7 % (ref 36–48)
HDLC SERPL-MCNC: 33 MG/DL (ref 40–60)
HDLC SERPL: 2.5 {RATIO} (ref 0–5)
HGB BLD-MCNC: 16.6 G/DL (ref 13–16)
IMM GRANULOCYTES # BLD AUTO: 0 K/UL (ref 0–0.04)
IMM GRANULOCYTES NFR BLD AUTO: 0 % (ref 0–0.5)
INR PPP: 1.9 (ref 0.8–1.2)
LDLC SERPL CALC-MCNC: 37.6 MG/DL (ref 0–100)
LIPID PROFILE,FLP: ABNORMAL
LYMPHOCYTES # BLD: 1.9 K/UL (ref 0.9–3.6)
LYMPHOCYTES NFR BLD: 28 % (ref 21–52)
MCH RBC QN AUTO: 32.5 PG (ref 24–34)
MCHC RBC AUTO-ENTMCNC: 34.1 G/DL (ref 31–37)
MCV RBC AUTO: 95.5 FL (ref 78–100)
MONOCYTES # BLD: 0.5 K/UL (ref 0.05–1.2)
MONOCYTES NFR BLD: 8 % (ref 3–10)
NEUTS SEG # BLD: 4 K/UL (ref 1.8–8)
NEUTS SEG NFR BLD: 58 % (ref 40–73)
NRBC # BLD: 0 K/UL (ref 0–0.01)
NRBC BLD-RTO: 0 PER 100 WBC
PLATELET # BLD AUTO: 269 K/UL (ref 135–420)
PMV BLD AUTO: 9.6 FL (ref 9.2–11.8)
POTASSIUM SERPL-SCNC: 4.2 MMOL/L (ref 3.5–5.5)
POTASSIUM SERPL-SCNC: 4.3 MMOL/L (ref 3.5–5.5)
PROT SERPL-MCNC: 6.6 G/DL (ref 6.4–8.2)
PROTHROMBIN TIME: 22.4 SEC (ref 11.5–15.2)
RBC # BLD AUTO: 5.1 M/UL (ref 4.35–5.65)
SODIUM SERPL-SCNC: 143 MMOL/L (ref 136–145)
SODIUM SERPL-SCNC: 143 MMOL/L (ref 136–145)
TRIGL SERPL-MCNC: 67 MG/DL (ref ?–150)
VLDLC SERPL CALC-MCNC: 13.4 MG/DL
WBC # BLD AUTO: 6.9 K/UL (ref 4.6–13.2)

## 2022-05-05 PROCEDURE — 80053 COMPREHEN METABOLIC PANEL: CPT

## 2022-05-05 PROCEDURE — 36415 COLL VENOUS BLD VENIPUNCTURE: CPT

## 2022-05-05 PROCEDURE — 80061 LIPID PANEL: CPT

## 2022-05-05 PROCEDURE — 85025 COMPLETE CBC W/AUTO DIFF WBC: CPT

## 2022-05-05 PROCEDURE — 85610 PROTHROMBIN TIME: CPT

## 2022-05-11 ENCOUNTER — APPOINTMENT (OUTPATIENT)
Dept: GENERAL RADIOLOGY | Age: 82
End: 2022-05-11
Attending: INTERNAL MEDICINE
Payer: MEDICARE

## 2022-05-11 ENCOUNTER — HOSPITAL ENCOUNTER (OUTPATIENT)
Age: 82
Setting detail: OUTPATIENT SURGERY
Discharge: HOME | End: 2022-05-11
Attending: INTERNAL MEDICINE | Admitting: INTERNAL MEDICINE
Payer: MEDICARE

## 2022-05-11 VITALS
BODY MASS INDEX: 26.82 KG/M2 | HEART RATE: 68 BPM | OXYGEN SATURATION: 93 % | WEIGHT: 198 LBS | SYSTOLIC BLOOD PRESSURE: 140 MMHG | DIASTOLIC BLOOD PRESSURE: 73 MMHG | RESPIRATION RATE: 16 BRPM | HEIGHT: 72 IN

## 2022-05-11 DIAGNOSIS — R00.1 SYMPTOMATIC BRADYCARDIA: ICD-10-CM

## 2022-05-11 DIAGNOSIS — Z95.0 PACEMAKER: Primary | ICD-10-CM

## 2022-05-11 PROCEDURE — C1769 GUIDE WIRE: HCPCS | Performed by: INTERNAL MEDICINE

## 2022-05-11 PROCEDURE — 33208 INSRT HEART PM ATRIAL & VENT: CPT | Performed by: INTERNAL MEDICINE

## 2022-05-11 PROCEDURE — 77030002933 HC SUT MCRYL J&J -A: Performed by: INTERNAL MEDICINE

## 2022-05-11 PROCEDURE — 71045 X-RAY EXAM CHEST 1 VIEW: CPT

## 2022-05-11 PROCEDURE — 99024 POSTOP FOLLOW-UP VISIT: CPT | Performed by: INTERNAL MEDICINE

## 2022-05-11 PROCEDURE — 99152 MOD SED SAME PHYS/QHP 5/>YRS: CPT | Performed by: INTERNAL MEDICINE

## 2022-05-11 PROCEDURE — 74011250636 HC RX REV CODE- 250/636: Performed by: INTERNAL MEDICINE

## 2022-05-11 PROCEDURE — 99153 MOD SED SAME PHYS/QHP EA: CPT | Performed by: INTERNAL MEDICINE

## 2022-05-11 PROCEDURE — 77030028698 HC BLD TISS PLSM MEDT -D: Performed by: INTERNAL MEDICINE

## 2022-05-11 PROCEDURE — 77030040375: Performed by: INTERNAL MEDICINE

## 2022-05-11 PROCEDURE — 74011000636 HC RX REV CODE- 636: Performed by: INTERNAL MEDICINE

## 2022-05-11 PROCEDURE — 77030031139 HC SUT VCRL2 J&J -A: Performed by: INTERNAL MEDICINE

## 2022-05-11 PROCEDURE — C1898 LEAD, PMKR, OTHER THAN TRANS: HCPCS | Performed by: INTERNAL MEDICINE

## 2022-05-11 PROCEDURE — C1893 INTRO/SHEATH, FIXED,NON-PEEL: HCPCS | Performed by: INTERNAL MEDICINE

## 2022-05-11 PROCEDURE — C1785 PMKR, DUAL, RATE-RESP: HCPCS | Performed by: INTERNAL MEDICINE

## 2022-05-11 PROCEDURE — 77030019580 HC CBL PACE MEDT -B: Performed by: INTERNAL MEDICINE

## 2022-05-11 PROCEDURE — 77030022017 HC DRSG HEMO QCLOT ZMED -A: Performed by: INTERNAL MEDICINE

## 2022-05-11 PROCEDURE — 77030018729 HC ELECTRD DEFIB PAD CARD -B: Performed by: INTERNAL MEDICINE

## 2022-05-11 DEVICE — LEAD 407652 CAPSUREFIX NOVUS US MRI
Type: IMPLANTABLE DEVICE | Status: FUNCTIONAL
Brand: CAPSUREFIX NOVUS MRI™ SURESCAN™

## 2022-05-11 DEVICE — IPG W1DR01 AZURE XT DR MRI USA
Type: IMPLANTABLE DEVICE | Status: FUNCTIONAL
Brand: AZURE™ XT DR MRI SURESCAN™

## 2022-05-11 DEVICE — LEAD 407658 CAPSUREFIX NOVUS US MRI
Type: IMPLANTABLE DEVICE | Status: FUNCTIONAL
Brand: CAPSUREFIX NOVUS MRI™ SURESCAN™

## 2022-05-11 RX ORDER — ACETAMINOPHEN 325 MG/1
650 TABLET ORAL
Status: DISCONTINUED | OUTPATIENT
Start: 2022-05-11 | End: 2022-05-11 | Stop reason: HOSPADM

## 2022-05-11 RX ORDER — FENTANYL CITRATE 50 UG/ML
INJECTION, SOLUTION INTRAMUSCULAR; INTRAVENOUS AS NEEDED
Status: DISCONTINUED | OUTPATIENT
Start: 2022-05-11 | End: 2022-05-11 | Stop reason: HOSPADM

## 2022-05-11 RX ORDER — MIDAZOLAM HYDROCHLORIDE 1 MG/ML
INJECTION, SOLUTION INTRAMUSCULAR; INTRAVENOUS AS NEEDED
Status: DISCONTINUED | OUTPATIENT
Start: 2022-05-11 | End: 2022-05-11 | Stop reason: HOSPADM

## 2022-05-11 RX ORDER — HYDROCODONE BITARTRATE AND ACETAMINOPHEN 5; 325 MG/1; MG/1
1 TABLET ORAL
Status: DISCONTINUED | OUTPATIENT
Start: 2022-05-11 | End: 2022-05-11 | Stop reason: HOSPADM

## 2022-05-11 RX ORDER — HYDROCODONE BITARTRATE AND ACETAMINOPHEN 5; 325 MG/1; MG/1
1 TABLET ORAL
Qty: 12 TABLET | Refills: 0 | Status: SHIPPED | OUTPATIENT
Start: 2022-05-11 | End: 2022-05-14

## 2022-05-11 RX ORDER — LIDOCAINE HYDROCHLORIDE 10 MG/ML
INJECTION, SOLUTION EPIDURAL; INFILTRATION; INTRACAUDAL; PERINEURAL AS NEEDED
Status: DISCONTINUED | OUTPATIENT
Start: 2022-05-11 | End: 2022-05-11 | Stop reason: HOSPADM

## 2022-05-11 RX ORDER — CEFAZOLIN SODIUM 1 G/3ML
INJECTION, POWDER, FOR SOLUTION INTRAMUSCULAR; INTRAVENOUS AS NEEDED
Status: DISCONTINUED | OUTPATIENT
Start: 2022-05-11 | End: 2022-05-11 | Stop reason: HOSPADM

## 2022-05-11 RX ORDER — HEPARIN SODIUM 200 [USP'U]/100ML
INJECTION, SOLUTION INTRAVENOUS
Status: COMPLETED | OUTPATIENT
Start: 2022-05-11 | End: 2022-05-11

## 2022-05-11 NOTE — DISCHARGE INSTRUCTIONS
DISCHARGE SUMMARY from Nurse    PATIENT INSTRUCTIONS:    After general anesthesia or intravenous sedation, for 24 hours or while taking prescription Narcotics:  · Limit your activities  · Do not drive and operate hazardous machinery  · Do not make important personal or business decisions  · Do  not drink alcoholic beverages  · If you have not urinated within 8 hours after discharge, please contact your surgeon on call. Report the following to your surgeon:  · Excessive pain, swelling, redness or odor of or around the surgical area  · Temperature over 100.5  · Nausea and vomiting lasting longer than 4 hours or if unable to take medications  · Any signs of decreased circulation or nerve impairment to extremity: change in color, persistent  numbness, tingling, coldness or increase pain  · Any questions    What to do at Home:  Recommended activity: No lifting, Driving, or Strenuous exercise for one week. If you experience any of the following symptoms bleeding,swelling,acute pain or numbness, fever, please follow up with Dr. Waleska Sosa. Stephania Swartz MD.    *  Please give a list of your current medications to your Primary Care Provider. *  Please update this list whenever your medications are discontinued, doses are      changed, or new medications (including over-the-counter products) are added. *  Please carry medication information at all times in case of emergency situations. These are general instructions for a healthy lifestyle:    No smoking/ No tobacco products/ Avoid exposure to second hand smoke  Surgeon General's Warning:  Quitting smoking now greatly reduces serious risk to your health.     Obesity, smoking, and sedentary lifestyle greatly increases your risk for illness    A healthy diet, regular physical exercise & weight monitoring are important for maintaining a healthy lifestyle    You may be retaining fluid if you have a history of heart failure or if you experience any of the following symptoms: Weight gain of 3 pounds or more overnight or 5 pounds in a week, increased swelling in our hands or feet or shortness of breath while lying flat in bed. Please call your doctor as soon as you notice any of these symptoms; do not wait until your next office visit. The discharge information has been reviewed with the patient. The patient verbalized understanding. Discharge medications reviewed with the patient and appropriate educational materials and side effects teaching were provided. ___________________________________________________________________________________________________________________________________    [facility] on Saturday, May 14. PACEMAKER INSTRUCTIONS SHEET  · Keep your incision dry for 10 days. DO NOT put salves, ointments, and/or lotions on the incision. Only take a tub bath during this time; NO showers. · The pieces of tape on the incision will come off by themselves when you begin washing the site. Please do not pull or tear them off. · You may use your pacemaker arm; but DO NOT raise the arm higher than your shoulder for the first two weeks to prevent the pacemaker lead from moving. DO NOT immobilize your pacemaker arm. · Call us IMMEDIATELY if you develop fever, pain, redness, and/or drainage at the pacemaker arm. · Do not lift more than 10 pounds for 2 weeks and 20 pounds for 1 month. · Microwaves WILL NOT harm your pacemaker. Warning does not apply to you. · Avoid activities which can reprogram your pacemaker such as arc welding, ham radios, and tanning booths. At airports, always show your pacemaker identification card. You may walk through the metal detector, but do not allow the hand held wand near your pacemaker. Do not have a MRI or NMR scan without talking to your cardiologist.  · Your pacemakers function will be evaluated over the telephone. Within 3 weeks you should receive a schedule.  If you do not receive a schedule, or if you have questions, you may call Dr. Jamarcus Borden office at [phone]. · Remove the battery from the transmitter after each use. Always keep a spare 9 volt battery. · The pacemaker battery is tested by the heart rate with the magnet applied. This test is done each time you transmit your ECG so it is very important that you follow your schedule. · Carry your pacemaker identification card at all times. Within 6 weeks, you should receive your permanent card. Keep your temporary card as a spare. Call Dr. Jamarcus Borden office at [phone] if you do not receive your card or if you lose your card. · Always show the doctor or dentist your pacemaker identification card. · If you have questions about your pacemaker or office appointment, please call Dr. Jamarcus Borden office at [phone]. · Following the pacemaker implant, you will need to return to the clinic to see your doctor within 1 week. If you did not receive an appointment, please call Dr. Jamarcus Borden office at [phone]. DISCHARGE SUMMARY from Nurse    PATIENT INSTRUCTIONS:    After general anesthesia or intravenous sedation, for 24 hours or while taking prescription Narcotics:  · Limit your activities  · Do not drive and operate hazardous machinery  · Do not make important personal or business decisions  · Do  not drink alcoholic beverages  · If you have not urinated within 8 hours after discharge, please contact your surgeon on call. Report the following to your surgeon:  · Excessive pain, swelling, redness or odor of or around the surgical area  · Temperature over 100.5  · Nausea and vomiting lasting longer than 4 hours or if unable to take medications  · Any signs of decreased circulation or nerve impairment to extremity: change in color, persistent  numbness, tingling, coldness or increase pain  · Any questions    What to do at Home:    *  Please give a list of your current medications to your Primary Care Provider.     *  Please update this list whenever your medications are discontinued, doses are      changed, or new medications (including over-the-counter products) are added. *  Please carry medication information at all times in case of emergency situations. These are general instructions for a healthy lifestyle:    No smoking/ No tobacco products/ Avoid exposure to second hand smoke  Surgeon General's Warning:  Quitting smoking now greatly reduces serious risk to your health. Obesity, smoking, and sedentary lifestyle greatly increases your risk for illness    A healthy diet, regular physical exercise & weight monitoring are important for maintaining a healthy lifestyle    You may be retaining fluid if you have a history of heart failure or if you experience any of the following symptoms:  Weight gain of 3 pounds or more overnight or 5 pounds in a week, increased swelling in our hands or feet or shortness of breath while lying flat in bed. Please call your doctor as soon as you notice any of these symptoms; do not wait until your next office visit. The discharge information has been reviewed with the patient. The patient verbalized understanding. Discharge medications reviewed with the patient and appropriate educational materials and side effects teaching were provided. SolveBoard Activation    Thank you for requesting access to SolveBoard. Please follow the instructions below to securely access and download your online medical record. SolveBoard allows you to send messages to your doctor, view your test results, renew your prescriptions, schedule appointments, and more. How Do I Sign Up? 1. In your internet browser, go to [email or web address]. 2. Click on the First Time User? Click Here link in the Sign In box. You will see the New Member Sign Up page. 3. Enter your SolveBoard Access Code exactly as it appears below. You will not need to use this code after youve completed the sign-up process.  If you do not sign up before the expiration date, you must request a new code. InstantMarketing Access Code: Activation code not generated  Current InstantMarketing Status: Active (This is the date your InstantMarketing access code will )    4. Enter the last four digits of your Social Security Number (xxxx) and Date of Birth (mm/dd/yyyy) as indicated and click Submit. You will be taken to the next sign-up page. 5. Create a Impactt ID. This will be your InstantMarketing login ID and cannot be changed, so think of one that is secure and easy to remember. 6. Create a InstantMarketing password. You can change your password at any time. 7. Enter your Password Reset Question and Answer. This can be used at a later time if you forget your password. 8. Enter your e-mail address. You will receive e-mail notification when new information is available in [address] 9. Click Sign Up. You can now view and download portions of your medical record. 10. Click the Download Summary menu link to download a portable copy of your medical information. Additional Information    If you have questions, please visit the Frequently Asked Questions section of the Instant[employer] website at [email or web address] Remember, InstantMarketing is NOT to be used for urgent needs. For medical emergencies, dial 911.     Patient armband removed and shredded___________________________________________________________________________________________________________________________________

## 2022-05-11 NOTE — Clinical Note
TRANSFER - IN REPORT:     Verbal report received from: YESI ELIAS) [facility]. Report consisted of patient's Situation, Background, Assessment and   Recommendations(SBAR). Opportunity for questions and clarification was provided. Assessment completed upon patient's arrival to unit and care assumed. Patient transported with a Cardiac Cath Tech / Patient Care Tech.

## 2022-05-11 NOTE — Clinical Note
TRANSFER - OUT REPORT:     Verbal report given to: Siri Turner RN. Report consisted of patient's Situation, Background, Assessment and   Recommendations(SBAR). Opportunity for questions and clarification was provided. Patient transported with a Cardiac Cath Tech / Patient Care Tech. Patient transported to: holding area.

## 2022-05-11 NOTE — H&P
HISTORY OF PRESENT ILLNESS  Juan Branch is a 81 y.o. male.     Follow-up  Associated symptoms include shortness of breath. Pertinent negatives include no chest pain, no abdominal pain and no headaches. Shortness of Breath  Pertinent negatives include no fever, no headaches, no cough, no wheezing, no PND, no orthopnea, no chest pain, no vomiting, no abdominal pain, no rash, no leg swelling and no claudication. Dizziness  Associated symptoms include shortness of breath. Pertinent negatives include no chest pain, no abdominal pain and no headaches.      Patient presents for an add-on office visit. He has a known history of coronary artery disease, status post a non-ST elevation myocardial infarction in 2009. He underwent a cardiac catheterization at that time and was found to have moderate, but nonobstructive two-vessel coronary disease involving his left circumflex and a right-sided posterior descending artery, that were assessed by pressure wire. The patient has been maintained on medical therapy since that time. The patient last underwent a pharmacological nuclear stress test in December 2015, which was a normal study, showing no ischemia, normal left ventricle ejection fraction. EF 67%. He has a history of mild to moderate carotid artery disease,  last evaluated with a carotid duplex in August 2018 which demonstrated moderate carotid disease of the left ICA and mild plaquing of the right. He also underwent an abdominal ultrasound which showed a very small infrarenal AAA.       Patient last underwent a pharmacologic nuclear stress test in November 2019 which was a normal and low risk study.   He recently underwent follow-up abdominal ultrasound and carotid duplex scan in August 2020 which showed a small AAA and moderate bilateral carotid artery disease essentially unchanged compared to his previous studies.       Patient was diagnosed with new onset atrial fibrillation at last office visit in April 2021.  His rates were on the slow side without any rate slowing agents. He underwent an echocardiogram in May 2021 which showed preserved LV systolic function, EF 55 to 60%, mild concentric LVH, severe left atrial enlargement, diastolic dysfunction, mild tricuspid regurgitation with normal PA pressure. He was started on Xarelto for oral anticoagulation.     He underwent follow-up vascular studies in August 2021 including a carotid duplex scan which showed moderate bilateral ICA disease and an abdominal aorta duplex which again showed a small infrarenal AAA measuring 3.2 x 3.2 cm.     He was last seen in our office approximately 4 months ago. He returns today complaining of increased fatigue throughout the day along with difficulty exercising. He monitors his heart rate frequently and often times it is in the 40s and 50s at rest and with exertion and will only increase up to 80 bpm.  He has never seen an increase further than this. He denies any sam syncope or near syncope. No new chest pain or pressure. He does complain of shortness of breath primarily with physical activity. He also has dizziness when he is laying down and turning his head from side to side.          Past Medical History:   Diagnosis Date    AAA (abdominal aortic aneurysm) ([facility] [address] 03/2010     noted on CT (abdomen)    Abnormal LFT's 12/95, 04/16/03    Acute meniscal tear, lateral 01/2007     s/p arthorscopic surg (Dr. Jorge Nunez)    Atypical chest pain 02/04/09    CAD (coronary artery disease), native coronary artery      Cardiac cath 04/13/2009     dLM 30%. mLAD 30%. oD2 30%. pCX 55% (FFR 0.95). mRCA 40%. RPDA 65% (FFR 0.86). EF 65%.  Cardiac echocardiogram 04/03/2007     EF 60%. No RWMA. Gr 1 DDfx. LAE. No significant valvular heart disease.  Cardiac nuclear imaging test, low risk 12/04/2015     Low risk. No ischemia or prior infarction. No WMA. EF 67%.   Neg EKG on pharm stress test.    Cardiovascular aorto-iliac duplex 06/29/2015     AAA measures 3.2 x 3.1 cm Trv. (Prev 3.2 x 3.4 cm on 6/19/14). Aneurysm is infrarenal & fusiform w/complex intraluminal thrombus within. Mod bilateral common iliac stenosis.  Carotid duplex 03/16/2016     Mild < 50% BETHEL stenosis. Mod 59-78% LICA stenosis. Similar to study of 6/29/15.  Cholecystitis chronic 03/2008     s/p sue    Closed fracture of proximal end of left radius with delayed healing, subsequent encounter 3/2/2021    Depression, recurrent ([facility] [address]      Diverticulosis      Diverticulosis 9-28-15     DR. 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    CLARKE (Dyspnea on Exertion) 03/2007     echo and stress WNL    ED (erectile dysfunction) 11/11/04    Fatty liver 4/2012     noted on CT (abdomen)    Fibrosis of knee joint March 2014     peripatellar fibrosis, left knee replacement    Glaucoma       Left eye    H/O: rotator cuff tear 09/25/08    History of colon polyps      Hypercholesterolemia      Hypertension 2009    Non-STEMI (non-ST elevated myocardial infarction) ([facility] [address] 04/13/2009     peripatellar fibrosis, left knee replacement    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 25 Tablet 3    sildenafil citrate (Viagra) 100 mg tablet Take 1 Tablet by mouth as needed (1 tablet by mouth once daily as needed). 18 Tablet 3    ezetimibe (Zetia) 10 mg tablet Take 1 Tablet by mouth daily. 90 Tablet 4    rosuvastatin (CRESTOR) 20 mg tablet Take 1 Tablet by mouth nightly.  90 Tablet 3    amLODIPine (NORVASC) 10 mg tablet TAKE 1 TABLET BY MOUTH DAILY 90 Tablet 3    rivaroxaban (Xarelto) 20 mg tab tablet Take 1 Tablet by mouth daily. 90 Tablet 3    aspirin delayed-release (ADULT LOW DOSE ASPIRIN) 81 mg tablet Take 81 mg by mouth daily.        brinzolamide-brimonidine (Simbrinza) 1-0.2 % drps Apply 1 Drop to eye two (2) times a day.        coenzyme q10 (CO Q-10) 10 mg cap Take 1 Tab by mouth daily.        cholecalciferol, vitamin D3, (VITAMIN D3) 2,000 unit tab Take 1 Tab by mouth daily.        omega 3-dha-epa-fish oil 100-160-1,000 mg cap Take 1,000 mg by mouth daily. 90 Cap 3               Allergies   Allergen Reactions    Pollen Extracts Sneezing       Itchy eyes, runny nose         Social History            Tobacco Use    Smoking status: Former Smoker       Packs/day: 0.50       Years: 20.00       Pack years: 10.00       Types: Cigarettes       Quit date: 12/10/1995       Years since quittin.4    Smokeless tobacco: Never Used   Vaping Use    Vaping Use: Never used   Substance Use Topics    Alcohol use: Yes       Alcohol/week: 6.7 standard drinks       Types: 7 Glasses of wine, 1 Shots of liquor per week       Comment: social    Drug use: No       Types: Prescription, OTC             Family History   Problem Relation Age of Onset    Heart Disease Mother      Stroke Father      No Known Problems Sister      No Known Problems Maternal Grandmother      No Known Problems Maternal Grandfather      Dementia Paternal Grandmother      No Known Problems Paternal Grandfather      Cancer Brother           lung CA            Review of Systems   Constitutional: Positive for malaise/fatigue. Negative for chills, fever and weight loss. HENT: Negative for nosebleeds. Eyes: Negative for blurred vision and double vision. Respiratory: Positive for shortness of breath. Negative for cough and wheezing. Cardiovascular: Negative for chest pain, palpitations, orthopnea, claudication, leg swelling and PND. Gastrointestinal: Negative for abdominal pain, heartburn, nausea and vomiting. Genitourinary: Negative for dysuria and hematuria. Musculoskeletal: Negative for falls, joint pain and myalgias. Skin: Negative for rash. Neurological: Positive for dizziness. Negative for focal weakness and headaches. Endo/Heme/Allergies: Does not bruise/bleed easily. Psychiatric/Behavioral: Negative for substance abuse.      Visit Vitals  /80 (BP 1 Location: Left upper arm, BP Patient Position: Sitting, BP Cuff Size: Adult)   Pulse 61   Ht 6' (1.829 m)   Wt 92.1 kg (203 lb)   SpO2 98%   BMI 27.53 kg/m²      Physical Exam  Constitutional:       Appearance: He is well-developed. HENT:      Head: Normocephalic and atraumatic. Eyes:      Conjunctiva/sclera: Conjunctivae normal.   Neck:      Vascular: No carotid bruit or JVD. Cardiovascular:      Rate and Rhythm: Normal rate. Rhythm irregularly irregular. No extrasystoles are present. Pulses: Normal pulses. Heart sounds: S1 normal and S2 normal. No murmur heard. No gallop. No S3 sounds. Pulmonary:      Breath sounds: Normal breath sounds. No wheezing or rales. Abdominal:      General: Bowel sounds are normal.      Palpations: Abdomen is soft. Tenderness: There is no abdominal tenderness. Musculoskeletal:      Cervical back: Neck supple. Skin:     General: Skin is warm and dry. Neurological:      Mental Status: He is alert and oriented to person, place, and time.       EKG: Atrial fibrillation with controlled ventricular rate around 60,, leftward axis, no ST or T wave abnormalities concerning for ischemia. Borderline low voltage. Occasional PVC versus aberrantly conducted beat. Compared to the previous EKG, PVC is now present.     ASSESSMENT and PLAN     Symptomatic bradycardia.   Patient's resting heart rate is typically in the 50s, but he admits that his heart rate with exercise does not increase above 80 bpm.  He is currently on no rate slowing agents, so he appears to have irreversible symptomatic bradycardia. I have recommended implanting a dual-chamber permanent pacemaker at his convenience. Shared decision making achieved. He has stopped his Xarelto for 48 hours prior to the procedure.     Paroxysmal atrial fibrillation. Initially diagnosed by EKG in April 2021. He is only minimally symptomatic to the arrhythmia. He is likely going in and out of the arrhythmia as witnessed on a Holter monitor in May 2020 when he appeared to be in atrial fibrillation a little more than half the time with an average heart rate 68 bpm.  He is tolerating Xarelto for oral anticoagulation. He will likely need to continue this indefinitely. He does not require any rate slowing agents and in fact I suspect has significant conduction disease as described above.     Asymptomatic coronary artery disease. Patient has moderate 2 vessel disease last assessed by cardiac catheterization in 2009, involving nonobstructive lesions in the proximal left circumflex in the right-sided PDA. He does have a history of a non-ST elevation myocardial infarction back in 2009, for which he was briefly treated with Plavix. He is now maintained on aspirin and rosuvastatin, and he has been intolerant to beta blockers because of bradycardia. He last underwent a negative pharmacologic nuclear stress test in November 2019. No new anginal type symptoms. I would continue his current medical regimen.     Essential hypertension. Patient blood pressure remains well controlled on his current regimen, all of which I would continue.     Dyslipidemia: Patient is now on Crestor 20 mg daily, Niaspan 500 mg daily, Fish oil, and Zetia. His most recent lipid profile from November 2021 was excellent: Total cholesterol 91, triglycerides 84, HDL 34, LDL 40. He can continue this regimen. If anything he can drop the Niaspan from his regimen.     Carotid artery disease.   Patient has moderate bilateral carotid artery disease which was last assessed on carotid duplex  in August 2021. This is followed by vascular surgery.     Infrarenal AAA. This was last evaluated by an ultrasound in August 2021. This remains small in size and being followed regular by vascular surgery    Will proceed with procedure today as scheduled.

## 2022-05-11 NOTE — Clinical Note
Contrast Dose Calculator:   Patient's age: 81.   Patient's sex: Male. Patient weight (kg) = 89.8. Creatinine level (mg/dL) = 1.15. Creatinine clearance (mL/min): 63.99. Max Contrast dose per Creatinine Cl calculator = 143.97 mL.

## 2022-05-11 NOTE — ROUTINE PROCESS
A+Ox4, ambulatory without difficulty, denied any complaints. Discharge information reviewed. Sling applied. Escorted to car, tot his wife for transport home. AVS Discharge instructions reviewed with patient and copy given to patient. All questions answered. Patient verbalized understanding to all discharge instructions. PIV removed. Procedural site within normal limits. No hematoma or bleeding noted from procedural and PIV site. No pain noted at discharge. Patient discharged with support person in stable condition. Escorted out to vehicle for transport home.

## 2022-05-11 NOTE — ROUTINE PROCESS
Patient ambulated from waiting area without difficulty, placed on monitor 2. ID, NPO status, allergies, home meds verified. PIV x2 inserted. Chest prep completed, consent ready for signature.

## 2022-05-19 ENCOUNTER — CLINICAL SUPPORT (OUTPATIENT)
Dept: CARDIOLOGY CLINIC | Age: 82
End: 2022-05-19
Payer: MEDICARE

## 2022-05-19 DIAGNOSIS — R00.1 SYMPTOMATIC BRADYCARDIA: Primary | ICD-10-CM

## 2022-05-19 DIAGNOSIS — Z95.0 PACEMAKER: ICD-10-CM

## 2022-05-19 PROCEDURE — 93288 INTERROG EVL PM/LDLS PM IP: CPT | Performed by: INTERNAL MEDICINE

## 2022-05-19 NOTE — PROGRESS NOTES
Post pacemaker implant wound check. Dressing removed. No redness, no drainage, no open areas, no swelling. Reminded to continue with activity restrictions. No questions or concerns at present. AVS given for follow up appt. Reminder.

## 2022-05-23 NOTE — PROGRESS NOTES
dual Pacemaker. 12 years left on battery. Lead impedance and threshold WNL. A sensed 100 %, V paced 80 %. 0 events.  In persistent afib and on Xarelto

## 2022-05-31 DIAGNOSIS — U07.1 COVID-19: Primary | ICD-10-CM

## 2022-06-07 ENCOUNTER — OFFICE VISIT (OUTPATIENT)
Dept: FAMILY MEDICINE CLINIC | Age: 82
End: 2022-06-07
Payer: MEDICARE

## 2022-06-07 VITALS
OXYGEN SATURATION: 98 % | TEMPERATURE: 98 F | DIASTOLIC BLOOD PRESSURE: 84 MMHG | HEART RATE: 64 BPM | SYSTOLIC BLOOD PRESSURE: 140 MMHG | RESPIRATION RATE: 18 BRPM | WEIGHT: 196.1 LBS | BODY MASS INDEX: 26.6 KG/M2

## 2022-06-07 DIAGNOSIS — E78.5 HYPERLIPIDEMIA, UNSPECIFIED HYPERLIPIDEMIA TYPE: ICD-10-CM

## 2022-06-07 DIAGNOSIS — Z95.0 S/P PLACEMENT OF CARDIAC PACEMAKER: ICD-10-CM

## 2022-06-07 DIAGNOSIS — R00.1 SYMPTOMATIC BRADYCARDIA: ICD-10-CM

## 2022-06-07 DIAGNOSIS — U07.1 COVID-19: ICD-10-CM

## 2022-06-07 DIAGNOSIS — I10 ESSENTIAL HYPERTENSION: Primary | ICD-10-CM

## 2022-06-07 PROCEDURE — G8427 DOCREV CUR MEDS BY ELIG CLIN: HCPCS | Performed by: FAMILY MEDICINE

## 2022-06-07 PROCEDURE — 99214 OFFICE O/P EST MOD 30 MIN: CPT | Performed by: FAMILY MEDICINE

## 2022-06-07 PROCEDURE — G8510 SCR DEP NEG, NO PLAN REQD: HCPCS | Performed by: FAMILY MEDICINE

## 2022-06-07 PROCEDURE — G8754 DIAS BP LESS 90: HCPCS | Performed by: FAMILY MEDICINE

## 2022-06-07 PROCEDURE — G8417 CALC BMI ABV UP PARAM F/U: HCPCS | Performed by: FAMILY MEDICINE

## 2022-06-07 PROCEDURE — G8536 NO DOC ELDER MAL SCRN: HCPCS | Performed by: FAMILY MEDICINE

## 2022-06-07 PROCEDURE — G8753 SYS BP > OR = 140: HCPCS | Performed by: FAMILY MEDICINE

## 2022-06-07 PROCEDURE — 1101F PT FALLS ASSESS-DOCD LE1/YR: CPT | Performed by: FAMILY MEDICINE

## 2022-06-07 PROCEDURE — 1123F ACP DISCUSS/DSCN MKR DOCD: CPT | Performed by: FAMILY MEDICINE

## 2022-06-07 NOTE — PROGRESS NOTES
Chief Complaint   Patient presents with    Hypertension    Cholesterol Problem    Labs    Other      patient had pacemaker implant 5/11          HPI    Jeffrey Chavez is a 81 y.o. male presenting today for 3 months  follow up of htn, hld. Home bp readings have been good. Pt had a pacemaker implanted on 5/11/22 for symptomatic bradycardia. Pt is feeling well. He will f/u again in a week or so. Pt reports that he has recovered from his covid infection. He tolerated the molnupiravir without difficulties and noted improvement after the first 24 hours of treatment. He did finish the entire course of medication. Patient had labs on 5/5/22. Labs reviewed in detail with patient     Patient does not need medication refills today. New concerns today: none      Review of Systems   Constitutional: Negative. HENT: Negative. Respiratory: Negative. Cardiovascular: Negative. All other systems reviewed and are negative. Physical Exam  Vitals and nursing note reviewed. Constitutional:       General: He is not in acute distress. Appearance: Normal appearance. HENT:      Head: Normocephalic and atraumatic. Right Ear: External ear normal.      Left Ear: External ear normal.      Nose: Nose normal.   Eyes:      Extraocular Movements: Extraocular movements intact. Conjunctiva/sclera: Conjunctivae normal.   Cardiovascular:      Rate and Rhythm: Normal rate and regular rhythm. Heart sounds: No murmur heard. No friction rub. No gallop. Pulmonary:      Effort: Pulmonary effort is normal.      Breath sounds: Normal breath sounds. No wheezing, rhonchi or rales. Musculoskeletal:         General: Normal range of motion. Cervical back: Normal range of motion. No rigidity. Skin:     General: Skin is warm and dry. Neurological:      Mental Status: He is alert and oriented to person, place, and time.       Coordination: Coordination normal.   Psychiatric: Mood and Affect: Mood normal.         Behavior: Behavior normal.         Thought Content: Thought content normal.         Judgment: Judgment normal.         Diagnoses and all orders for this visit:    1. Essential hypertension  Stable, cont pres tx plan. 2. Hyperlipidemia, unspecified hyperlipidemia type  Stable, cont pres tx plan. 3. COVID-19  Resolved. 4. Symptomatic bradycardia  Resolved with pacemaker    5. S/P placement of cardiac pacemaker  Care as per cards    Follow-up and Dispositions    · Return in about 3 months (around 9/7/2022) for high blood pressure.

## 2022-06-07 NOTE — PROGRESS NOTES
James Kenny presents today for   Chief Complaint   Patient presents with    Hypertension    Cholesterol Problem    Labs    Other      patient had pacemaker implant 5/11        Is someone accompanying this pt? No     Is the patient using any DME equipment during OV? No     Depression Screening:  3 most recent PHQ Screens 5/2/2022   PHQ Not Done -   Little interest or pleasure in doing things Not at all   Feeling down, depressed, irritable, or hopeless Not at all   Total Score PHQ 2 0   Trouble falling or staying asleep, or sleeping too much -   Feeling tired or having little energy -   Poor appetite, weight loss, or overeating -   Feeling bad about yourself - or that you are a failure or have let yourself or your family down -   Trouble concentrating on things such as school, work, reading, or watching TV -   Moving or speaking so slowly that other people could have noticed; or the opposite being so fidgety that others notice -   Thoughts of being better off dead, or hurting yourself in some way -   PHQ 9 Score -   How difficult have these problems made it for you to do your work, take care of your home and get along with others -       Learning Assessment:  Learning Assessment 5/2/2022   PRIMARY LEARNER Patient   HIGHEST LEVEL OF EDUCATION - PRIMARY LEARNER  -   BARRIERS PRIMARY LEARNER -   CO-LEARNER CAREGIVER -   PRIMARY LANGUAGE ENGLISH   LEARNER PREFERENCE PRIMARY DEMONSTRATION   ANSWERED BY patient   RELATIONSHIP SELF       Abuse Screening:  Abuse Screening Questionnaire 5/2/2022   Do you ever feel afraid of your partner? N   Are you in a relationship with someone who physically or mentally threatens you? N   Is it safe for you to go home? Y       Fall Screening  Fall Risk Assessment, last 12 mths 5/2/2022   Able to walk? Yes   Fall in past 12 months? 0   Do you feel unsteady? 0   Are you worried about falling 0   Number of falls in past 12 months -   Fall with injury?  -       Generalized Anxiety  No flowsheet data found. There are no preventive care reminders to display for this patient. .      Health Maintenance reviewed and discussed and ordered per Provider. Vaccines Due   Screenings Due       Rosio Solorio is updated on all HM    1. \"Have you been to the ER, urgent care clinic since your last visit? Hospitalized since your last visit? \" Yes When: 5/11/22  [facility]  Reason for visit: symptomatic bradycardia , pacemaker implant     2. \"Have you seen or consulted any other health care providers outside of the [address] [place] [address] since your last visit? \" No     3. For patients aged 39-70: Has the patient had a colonoscopy / FIT/ Cologuard?  Yes - no Care Gap present

## 2022-08-22 ENCOUNTER — OFFICE VISIT (OUTPATIENT)
Dept: CARDIOLOGY CLINIC | Age: 82
End: 2022-08-22
Payer: MEDICARE

## 2022-08-22 VITALS
HEART RATE: 70 BPM | WEIGHT: 201 LBS | BODY MASS INDEX: 27.22 KG/M2 | DIASTOLIC BLOOD PRESSURE: 72 MMHG | OXYGEN SATURATION: 96 % | SYSTOLIC BLOOD PRESSURE: 120 MMHG | HEIGHT: 72 IN

## 2022-08-22 DIAGNOSIS — I25.119 ATHEROSCLEROSIS OF NATIVE CORONARY ARTERY WITH ANGINA PECTORIS, UNSPECIFIED WHETHER NATIVE OR TRANSPLANTED HEART (HCC): ICD-10-CM

## 2022-08-22 DIAGNOSIS — I71.40 ABDOMINAL AORTIC ANEURYSM (AAA) WITHOUT RUPTURE: ICD-10-CM

## 2022-08-22 DIAGNOSIS — R00.1 SYMPTOMATIC BRADYCARDIA: ICD-10-CM

## 2022-08-22 DIAGNOSIS — I10 ESSENTIAL HYPERTENSION: ICD-10-CM

## 2022-08-22 DIAGNOSIS — Z95.0 PACEMAKER: ICD-10-CM

## 2022-08-22 DIAGNOSIS — E78.00 PURE HYPERCHOLESTEROLEMIA: ICD-10-CM

## 2022-08-22 DIAGNOSIS — I48.0 PAROXYSMAL ATRIAL FIBRILLATION (HCC): Primary | ICD-10-CM

## 2022-08-22 PROCEDURE — 1123F ACP DISCUSS/DSCN MKR DOCD: CPT | Performed by: INTERNAL MEDICINE

## 2022-08-22 PROCEDURE — 93280 PM DEVICE PROGR EVAL DUAL: CPT | Performed by: INTERNAL MEDICINE

## 2022-08-22 PROCEDURE — 99214 OFFICE O/P EST MOD 30 MIN: CPT | Performed by: INTERNAL MEDICINE

## 2022-08-22 PROCEDURE — 93000 ELECTROCARDIOGRAM COMPLETE: CPT | Performed by: INTERNAL MEDICINE

## 2022-08-22 PROCEDURE — G8754 DIAS BP LESS 90: HCPCS | Performed by: INTERNAL MEDICINE

## 2022-08-22 PROCEDURE — 1101F PT FALLS ASSESS-DOCD LE1/YR: CPT | Performed by: INTERNAL MEDICINE

## 2022-08-22 PROCEDURE — G8752 SYS BP LESS 140: HCPCS | Performed by: INTERNAL MEDICINE

## 2022-08-22 PROCEDURE — G8536 NO DOC ELDER MAL SCRN: HCPCS | Performed by: INTERNAL MEDICINE

## 2022-08-22 PROCEDURE — G8417 CALC BMI ABV UP PARAM F/U: HCPCS | Performed by: INTERNAL MEDICINE

## 2022-08-22 PROCEDURE — G8510 SCR DEP NEG, NO PLAN REQD: HCPCS | Performed by: INTERNAL MEDICINE

## 2022-08-22 PROCEDURE — G8427 DOCREV CUR MEDS BY ELIG CLIN: HCPCS | Performed by: INTERNAL MEDICINE

## 2022-08-22 NOTE — PROGRESS NOTES
Kailey Radha presents today for   Chief Complaint   Patient presents with    Follow-up     3 month    Pacemaker Check       Kailey Garcia preferred language for health care discussion is english/other. Is someone accompanying this pt? no    Is the patient using any DME equipment during [address]? no    Depression Screening:  3 most recent PHQ Screens 8/22/2022   PHQ Not Done -   Little interest or pleasure in doing things Not at all   Feeling down, depressed, irritable, or hopeless Not at all   Total Score PHQ 2 0   Trouble falling or staying asleep, or sleeping too much -   Feeling tired or having little energy -   Poor appetite, weight loss, or overeating -   Feeling bad about yourself - or that you are a failure or have let yourself or your family down -   Trouble concentrating on things such as school, work, reading, or watching TV -   Moving or speaking so slowly that other people could have noticed; or the opposite being so fidgety that others notice -   Thoughts of being better off dead, or hurting yourself in some way -   PHQ 9 Score -   How difficult have these problems made it for you to do your work, take care of your home and get along with others -       Learning Assessment:  Learning Assessment 8/22/2022   PRIMARY LEARNER Patient   HIGHEST LEVEL OF EDUCATION - PRIMARY LEARNER  -   BARRIERS PRIMARY LEARNER -   CO-LEARNER CAREGIVER -   PRIMARY LANGUAGE ENGLISH   LEARNER PREFERENCE PRIMARY DEMONSTRATION   ANSWERED BY patient   RELATIONSHIP SELF       Abuse Screening:  Abuse Screening Questionnaire 8/22/2022   Do you ever feel afraid of your partner? N   Are you in a relationship with someone who physically or mentally threatens you? N   Is it safe for you to go home? Y       Fall Risk  Fall Risk Assessment, last 12 mths 8/22/2022   Able to walk? Yes   Fall in past 12 months? 0   Do you feel unsteady? 0   Are you worried about falling 0   Number of falls in past 12 months -   Fall with injury?  - Pt currently taking Anticoagulant therapy? Xarelto 20 mg daily    Pt currently taking Antiplatelet therapy ? Asprin 81 mg daily      Coordination of Care:  1. Have you been to the ER, urgent care clinic since your last visit? Hospitalized since your last visit? no    2. Have you seen or consulted any other health care providers outside of the [address] [place] [address] since your last visit? Include any pap smears or colon screening.  no

## 2022-08-22 NOTE — PROGRESS NOTES
HISTORY OF PRESENT ILLNESS  Linda Byers is a 81 y.o. male. Follow-up  Associated symptoms include shortness of breath. Pertinent negatives include no chest pain, no abdominal pain and no headaches. Shortness of Breath  Pertinent negatives include no fever, no headaches, no cough, no wheezing, no PND, no orthopnea, no chest pain, no vomiting, no abdominal pain, no rash, no leg swelling and no claudication. Pacemaker Check  Associated symptoms include shortness of breath. Pertinent negatives include no chest pain, no abdominal pain and no headaches. Patient presents for a follow-up office visit. He has a known history of coronary artery disease, status post a non-ST elevation myocardial infarction in 2009. He underwent a cardiac catheterization at that time and was found to have moderate, but nonobstructive two-vessel coronary disease involving his left circumflex and a right-sided posterior descending artery, that were assessed by pressure wire. The patient has been maintained on medical therapy since that time. The patient last underwent a pharmacological nuclear stress test in December 2015, which was a normal study, showing no ischemia, normal left ventricle ejection fraction. EF 67%. He has a history of mild to moderate carotid artery disease,  last evaluated with a carotid duplex in August 2018 which demonstrated moderate carotid disease of the left ICA and mild plaquing of the right. He also underwent an abdominal ultrasound which showed a very small infrarenal AAA. Patient last underwent a pharmacologic nuclear stress test in November 2019 which was a normal and low risk study. He recently underwent follow-up abdominal ultrasound and carotid duplex scan in August 2020 which showed a small AAA and moderate bilateral carotid artery disease essentially unchanged compared to his previous studies.       Patient was diagnosed with new onset atrial fibrillation at last office visit in April 2021.  His rates were on the slow side without any rate slowing agents. He underwent an echocardiogram in May 2021 which showed preserved LV systolic function, EF 55 to 60%, mild concentric LVH, severe left atrial enlargement, diastolic dysfunction, mild tricuspid regurgitation with normal PA pressure. He was started on Xarelto for oral anticoagulation. He underwent follow-up vascular studies in August 2021 including a carotid duplex scan which showed moderate bilateral ICA disease and an abdominal aorta duplex which again showed a small infrarenal AAA measuring 3.2 x 3.2 cm. He was diagnosed with symptomatic bradycardia earlier this year and underwent implantation of a dual-chamber permanent pacemaker in May 2022. Since his device was placed, he has been feeling better. He has more energy, shortness of breath has improved. His dizziness has resolved. He has not noted any prolonged heart palpitations. No leg swelling, no orthopnea, no PND. Past Medical History:   Diagnosis Date    AAA (abdominal aortic aneurysm) ([facility] [place] [address] 03/2010    noted on CT (abdomen)    Abnormal LFT's 12/95, 04/16/03    Acute meniscal tear, lateral 01/2007    s/p arthorscopic surg (Dr. Luis Manuel Winter)    Atypical chest pain 02/04/09    CAD (coronary artery disease), native coronary artery     Cardiac cath 04/13/2009    dLM 30%. mLAD 30%. oD2 30%. pCX 55% (FFR 0.95). mRCA 40%. RPDA 65% (FFR 0.86). EF 65%. Cardiac echocardiogram 04/03/2007    EF 60%. No RWMA. Gr 1 DDfx. LAE. No significant valvular heart disease. Cardiac nuclear imaging test, low risk 12/04/2015    Low risk. No ischemia or prior infarction. No WMA. EF 67%. Neg EKG on pharm stress test.    Cardiovascular aorto-iliac duplex 06/29/2015    AAA measures 3.2 x 3.1 cm Trv. (Prev 3.2 x 3.4 cm on 6/19/14). Aneurysm is infrarenal & fusiform w/complex intraluminal thrombus within. Mod bilateral common iliac stenosis.     Carotid duplex 03/16/2016    Mild < 50% BETHEL stenosis. Mod 61-79% LICA stenosis. Similar to study of 6/29/15. Cholecystitis chronic 03/2008    s/p sue    Closed fracture of proximal end of left radius with delayed healing, subsequent encounter 3/2/2021    COVID-19 5/31/2022    Depression, recurrent ([facility] [address]     Diverticulosis     Diverticulosis 9-28-15    DR. BENEDR    DJD (degenerative joint disease) of lumbar spine 12/18/01    CLARKE (Dyspnea on Exertion) 03/2007    echo and stress WNL    ED (erectile dysfunction) 11/11/04    Fatty liver 4/2012    noted on CT (abdomen)    Fibrosis of knee joint March 2014    peripatellar fibrosis, left knee replacement    Glaucoma     Left eye    H/O: rotator cuff tear 09/25/08    History of colon polyps     Hypercholesterolemia     Hypertension 2009    Non-STEMI (non-ST elevated myocardial infarction) ([facility] [address] 04/13/2009    peripatellar fibrosis, left knee replacement    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 25 Tablet 3    sildenafil citrate (Viagra) 100 mg tablet Take 1 Tablet by mouth as needed (1 tablet by mouth once daily as needed). 18 Tablet 3    ezetimibe (Zetia) 10 mg tablet Take 1 Tablet by mouth daily. 90 Tablet 4    rosuvastatin (CRESTOR) 20 mg tablet Take 1 Tablet by mouth nightly. 90 Tablet 3    amLODIPine (NORVASC) 10 mg tablet TAKE 1 TABLET BY MOUTH DAILY 90 Tablet 3    rivaroxaban (Xarelto) 20 mg tab tablet Take 1 Tablet by mouth daily. 90 Tablet 3    aspirin delayed-release 81 mg tablet Take 81 mg by mouth daily.       brinzolamide-brimonidine (Simbrinza) 1-0.2 % drps Apply 1 Drop to eye two (2) times a day. coenzyme q10 10 mg cap Take 1 Tab by mouth daily. cholecalciferol, vitamin D3, 50 mcg (2,000 unit) tab Take 1 Tab by mouth daily. omega 3-dha-epa-fish oil 100-160-1,000 mg cap Take 1,000 mg by mouth daily. 90 Cap 3         Allergies   Allergen Reactions    Pollen Extracts Sneezing     Itchy eyes, runny nose        Social History     Tobacco Use    Smoking status: Former     Packs/day: 0.50     Years: 20.00     Pack years: 10.00     Types: Cigarettes     Quit date: 12/10/1995     Years since quittin.7    Smokeless tobacco: Never   Vaping Use    Vaping Use: Never used   Substance Use Topics    Alcohol use: Yes     Alcohol/week: 6.7 standard drinks     Types: 7 Glasses of wine, 1 Shots of liquor per week     Comment: social    Drug use: No     Types: Prescription, OTC       Family History   Problem Relation Age of Onset    Heart Disease Mother     Stroke Father     No Known Problems Sister     No Known Problems Maternal Grandmother     No Known Problems Maternal Grandfather     Dementia Paternal Grandmother     No Known Problems Paternal Grandfather     Cancer Brother         lung CA         Review of Systems   Constitutional:  Negative for chills, fever, malaise/fatigue and weight loss. HENT:  Negative for nosebleeds. Eyes:  Negative for blurred vision and double vision. Respiratory:  Positive for shortness of breath. Negative for cough and wheezing. Cardiovascular:  Negative for chest pain, palpitations, orthopnea, claudication, leg swelling and PND. Gastrointestinal:  Negative for abdominal pain, heartburn, nausea and vomiting. Genitourinary:  Negative for dysuria and hematuria. Musculoskeletal:  Negative for falls and myalgias. Skin:  Negative for rash. Neurological:  Negative for dizziness, focal weakness and headaches. Endo/Heme/Allergies:  Does not bruise/bleed easily. Psychiatric/Behavioral:  Negative for substance abuse.     Visit Vitals  /72 (BP 1 Location: Left upper arm, BP Patient Position: Sitting, BP Cuff Size: Adult)   Pulse 70   Ht 6' (1.829 m)   Wt 91.2 kg (201 lb)   SpO2 96%   BMI 27.26 kg/m²      Physical Exam  Constitutional:       Appearance: He is well-developed. HENT:      Head: Normocephalic and atraumatic. Eyes:      Conjunctiva/sclera: Conjunctivae normal.   Neck:      Vascular: No carotid bruit or JVD. Cardiovascular:      Rate and Rhythm: Normal rate. Rhythm irregularly irregular. No extrasystoles are present. Pulses: Normal pulses. Heart sounds: S1 normal and S2 normal. No murmur heard. No gallop. No S3 sounds. Pulmonary:      Breath sounds: Normal breath sounds. No wheezing or rales. Abdominal:      General: Bowel sounds are normal.      Palpations: Abdomen is soft. Tenderness: There is no abdominal tenderness. Musculoskeletal:      Cervical back: Neck supple. Skin:     General: Skin is warm and dry. Neurological:      Mental Status: He is alert and oriented to person, place, and time. EKG: Underlying atrial fibrillation with demand ventricular pacing. Compared to the previous EKG, ventricular pacing is now present. Pacemaker interrogation: Normal functioning Medtronic dual-chamber pacemaker. Battery at beginning of life. Persistent atrial fibrillation since device placed. Pacing in the ventricle 71%. No high rate episodes. Stable impedance values, sensing and pacing. Device was reprogrammed by lowering outputs to maintain longevity. Scanned document for details. ASSESSMENT and PLAN    Paroxysmal atrial fibrillation. Initially diagnosed by EKG in April 2021. He is only minimally symptomatic to the arrhythmia. He is likely going in and out of the arrhythmia as witnessed on a Holter monitor in May 2020 when he appeared to be in atrial fibrillation a little more than half the time with an average heart rate 68 bpm.  He is tolerating Xarelto for oral anticoagulation.   This appears to be persistent since his pacemaker was placed 3 months ago. No high rate episodes. I will continue his current regimen. Symptomatic bradycardia. 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 He is pacing 71% of the time of the ventricle. Normal device function on interrogation today. His battery is at beginning of life. Coronary artery disease. Patient has moderate 2 vessel disease last assessed by cardiac catheterization in 2009, involving nonobstructive lesions in the proximal left circumflex in the right-sided PDA. He does have a history of a non-ST elevation myocardial infarction back in 2009, for which he was briefly treated with Plavix. He is now maintained on aspirin and rosuvastatin, and he has been intolerant to beta blockers because of bradycardia. He last underwent a negative pharmacologic nuclear stress test in November 2019. No new anginal type symptoms. I would continue his current medical regimen. Essential hypertension. Patient blood pressure remains well controlled on his current regimen, all of which I would continue. Dyslipidemia: Patient is now on Crestor 20 mg daily, Fish oil, and Zetia. His most recent lipid profile from May 2022 was excellent: Total cholesterol 84, HDL 33, LDL 38. Triglycerides 67. He can continue this regimen. Carotid artery disease. Patient has moderate bilateral carotid artery disease which was last assessed on carotid duplex  in August 2021. This is followed by vascular surgery. Infrarenal AAA. This was last evaluated by an ultrasound in August 2021. This remains small in size and being followed regular by vascular surgery. [facility] device check in 3 months. Follow-up in the office in 6 months, sooner if needed.

## 2022-09-07 ENCOUNTER — OFFICE VISIT (OUTPATIENT)
Dept: FAMILY MEDICINE CLINIC | Age: 82
End: 2022-09-07
Payer: MEDICARE

## 2022-09-07 VITALS
RESPIRATION RATE: 16 BRPM | HEART RATE: 76 BPM | OXYGEN SATURATION: 98 % | SYSTOLIC BLOOD PRESSURE: 123 MMHG | BODY MASS INDEX: 26.99 KG/M2 | WEIGHT: 199 LBS | DIASTOLIC BLOOD PRESSURE: 77 MMHG | TEMPERATURE: 97.9 F

## 2022-09-07 DIAGNOSIS — I10 ESSENTIAL HYPERTENSION: Primary | ICD-10-CM

## 2022-09-07 DIAGNOSIS — Z12.5 SCREENING FOR MALIGNANT NEOPLASM OF PROSTATE: ICD-10-CM

## 2022-09-07 DIAGNOSIS — Z23 ENCOUNTER FOR IMMUNIZATION: ICD-10-CM

## 2022-09-07 DIAGNOSIS — E78.5 HYPERLIPIDEMIA, UNSPECIFIED HYPERLIPIDEMIA TYPE: ICD-10-CM

## 2022-09-07 PROCEDURE — G8752 SYS BP LESS 140: HCPCS | Performed by: FAMILY MEDICINE

## 2022-09-07 PROCEDURE — 1101F PT FALLS ASSESS-DOCD LE1/YR: CPT | Performed by: FAMILY MEDICINE

## 2022-09-07 PROCEDURE — G8432 DEP SCR NOT DOC, RNG: HCPCS | Performed by: FAMILY MEDICINE

## 2022-09-07 PROCEDURE — G8536 NO DOC ELDER MAL SCRN: HCPCS | Performed by: FAMILY MEDICINE

## 2022-09-07 PROCEDURE — 99214 OFFICE O/P EST MOD 30 MIN: CPT | Performed by: FAMILY MEDICINE

## 2022-09-07 PROCEDURE — G8427 DOCREV CUR MEDS BY ELIG CLIN: HCPCS | Performed by: FAMILY MEDICINE

## 2022-09-07 PROCEDURE — G8417 CALC BMI ABV UP PARAM F/U: HCPCS | Performed by: FAMILY MEDICINE

## 2022-09-07 PROCEDURE — G8754 DIAS BP LESS 90: HCPCS | Performed by: FAMILY MEDICINE

## 2022-09-07 PROCEDURE — 90694 VACC AIIV4 NO PRSRV 0.5ML IM: CPT | Performed by: FAMILY MEDICINE

## 2022-09-07 PROCEDURE — G0008 ADMIN INFLUENZA VIRUS VAC: HCPCS | Performed by: FAMILY MEDICINE

## 2022-09-07 PROCEDURE — 1123F ACP DISCUSS/DSCN MKR DOCD: CPT | Performed by: FAMILY MEDICINE

## 2022-09-07 RX ORDER — AMLODIPINE BESYLATE 10 MG/1
TABLET ORAL
Qty: 90 TABLET | Refills: 3 | Status: SHIPPED | OUTPATIENT
Start: 2022-09-07

## 2022-09-07 NOTE — PROGRESS NOTES
Chief Complaint   Patient presents with    Hypertension         HPI    Sonia Hernadez is a 81 y.o. male presenting today for 3 months  follow up of htn, hld. Pt has been doing well overall. No recent labs. Patient does need medication refills today. New concerns today: Pt would like to have a flu shot today. Review of Systems   Constitutional: Negative. HENT: Negative. Respiratory: Negative. Cardiovascular: Negative. All other systems reviewed and are negative. Physical Exam  Vitals and nursing note reviewed. Constitutional:       General: He is not in acute distress. Appearance: Normal appearance. HENT:      Head: Normocephalic and atraumatic. Right Ear: External ear normal.      Left Ear: External ear normal.      Nose: Nose normal.   Eyes:      Extraocular Movements: Extraocular movements intact. Conjunctiva/sclera: Conjunctivae normal.   Cardiovascular:      Rate and Rhythm: Normal rate. Rhythm irregularly irregular. Heart sounds: No murmur heard. No friction rub. No gallop. Pulmonary:      Effort: Pulmonary effort is normal.      Breath sounds: Normal breath sounds. No wheezing, rhonchi or rales. Musculoskeletal:         General: Normal range of motion. Cervical back: Normal range of motion. No rigidity. Skin:     General: Skin is warm and dry. Neurological:      Mental Status: He is alert and oriented to person, place, and time. Coordination: Coordination normal.   Psychiatric:         Mood and Affect: Mood normal.         Behavior: Behavior normal.         Thought Content: Thought content normal.         Judgment: Judgment normal.       Diagnoses and all orders for this visit:    1. Essential hypertension  -     amLODIPine (NORVASC) 10 mg tablet; TAKE 1 TABLET BY MOUTH DAILY  -     METABOLIC PANEL, COMPREHENSIVE; Future  -     LIPID PANEL; Future  Stable, cont pres tx plan.      2. Hyperlipidemia, unspecified hyperlipidemia type  -     METABOLIC PANEL, COMPREHENSIVE; Future  -     LIPID PANEL; Future    3. Screening for malignant neoplasm of prostate  -     PSA SCREENING (SCREENING); Future    4. Encounter for immunization  -     INFLUENZA, FLUAD, (AGE 72 Y+), IM, PF, 0.5 ML    Follow-up and Dispositions    Return in about 3 months (around 12/7/2022) for high blood pressure, high cholesterol, lab review.

## 2022-09-07 NOTE — PROGRESS NOTES
Rosalina Nickerson presents today for   Chief Complaint   Patient presents with    Hypertension       Is someone accompanying this pt? No     Is the patient using any DME equipment during OV? No     Depression Screening:  3 most recent PHQ Screens 8/22/2022   PHQ Not Done -   Little interest or pleasure in doing things Not at all   Feeling down, depressed, irritable, or hopeless Not at all   Total Score PHQ 2 0   Trouble falling or staying asleep, or sleeping too much -   Feeling tired or having little energy -   Poor appetite, weight loss, or overeating -   Feeling bad about yourself - or that you are a failure or have let yourself or your family down -   Trouble concentrating on things such as school, work, reading, or watching TV -   Moving or speaking so slowly that other people could have noticed; or the opposite being so fidgety that others notice -   Thoughts of being better off dead, or hurting yourself in some way -   PHQ 9 Score -   How difficult have these problems made it for you to do your work, take care of your home and get along with others -       Learning Assessment:  Learning Assessment 8/22/2022   PRIMARY LEARNER Patient   HIGHEST LEVEL OF EDUCATION - PRIMARY LEARNER  -   BARRIERS PRIMARY LEARNER -   CO-LEARNER CAREGIVER -   PRIMARY LANGUAGE ENGLISH   LEARNER PREFERENCE PRIMARY DEMONSTRATION   ANSWERED BY patient   RELATIONSHIP SELF       Abuse Screening:  Abuse Screening Questionnaire 8/22/2022   Do you ever feel afraid of your partner? N   Are you in a relationship with someone who physically or mentally threatens you? N   Is it safe for you to go home? Y       Fall Screening  Fall Risk Assessment, last 12 mths 8/22/2022   Able to walk? Yes   Fall in past 12 months? 0   Do you feel unsteady? 0   Are you worried about falling 0   Number of falls in past 12 months -   Fall with injury? -       Generalized Anxiety  No flowsheet data found.      Health Maintenance Due   Topic Date Due    COVID-19 Vaccine (4 - Booster for Moderna series) 02/28/2022    Flu Vaccine (1) 09/01/2022   . Health Maintenance reviewed and discussed and ordered per Provider. Vaccines Due   Screenings Due       Lender [employer] is updated on all HM    1. \"Have you been to the ER, urgent care clinic since your last visit? Hospitalized since your last visit? \" No    2. \"Have you seen or consulted any other health care providers outside of the [address] [place] [address] since your last visit? \" No     3. For patients aged 39-70: Has the patient had a colonoscopy / FIT/ Cologuard?  Yes - no Care Gap present

## 2022-09-10 DIAGNOSIS — I77.9 BILATERAL CAROTID ARTERY DISEASE, UNSPECIFIED TYPE (HCC): ICD-10-CM

## 2022-09-12 ENCOUNTER — OFFICE VISIT (OUTPATIENT)
Dept: VASCULAR SURGERY | Age: 82
End: 2022-09-12

## 2022-09-12 VITALS
OXYGEN SATURATION: 96 % | HEART RATE: 63 BPM | DIASTOLIC BLOOD PRESSURE: 70 MMHG | BODY MASS INDEX: 26.68 KG/M2 | HEIGHT: 72 IN | SYSTOLIC BLOOD PRESSURE: 108 MMHG | WEIGHT: 197 LBS

## 2022-09-12 DIAGNOSIS — I71.40 ABDOMINAL AORTIC ANEURYSM (AAA) WITHOUT RUPTURE: Primary | ICD-10-CM

## 2022-09-12 PROCEDURE — 1123F ACP DISCUSS/DSCN MKR DOCD: CPT | Performed by: STUDENT IN AN ORGANIZED HEALTH CARE EDUCATION/TRAINING PROGRAM

## 2022-09-12 PROCEDURE — G8752 SYS BP LESS 140: HCPCS | Performed by: STUDENT IN AN ORGANIZED HEALTH CARE EDUCATION/TRAINING PROGRAM

## 2022-09-12 PROCEDURE — G8417 CALC BMI ABV UP PARAM F/U: HCPCS | Performed by: STUDENT IN AN ORGANIZED HEALTH CARE EDUCATION/TRAINING PROGRAM

## 2022-09-12 PROCEDURE — G8432 DEP SCR NOT DOC, RNG: HCPCS | Performed by: STUDENT IN AN ORGANIZED HEALTH CARE EDUCATION/TRAINING PROGRAM

## 2022-09-12 PROCEDURE — G8536 NO DOC ELDER MAL SCRN: HCPCS | Performed by: STUDENT IN AN ORGANIZED HEALTH CARE EDUCATION/TRAINING PROGRAM

## 2022-09-12 PROCEDURE — 1101F PT FALLS ASSESS-DOCD LE1/YR: CPT | Performed by: STUDENT IN AN ORGANIZED HEALTH CARE EDUCATION/TRAINING PROGRAM

## 2022-09-12 PROCEDURE — G8754 DIAS BP LESS 90: HCPCS | Performed by: STUDENT IN AN ORGANIZED HEALTH CARE EDUCATION/TRAINING PROGRAM

## 2022-09-12 PROCEDURE — 99215 OFFICE O/P EST HI 40 MIN: CPT | Performed by: STUDENT IN AN ORGANIZED HEALTH CARE EDUCATION/TRAINING PROGRAM

## 2022-09-12 PROCEDURE — G8427 DOCREV CUR MEDS BY ELIG CLIN: HCPCS | Performed by: STUDENT IN AN ORGANIZED HEALTH CARE EDUCATION/TRAINING PROGRAM

## 2022-09-12 NOTE — PROGRESS NOTES
09/12/22        Vickie Bernard        History and Physical    Vickie Bernard is a 81 y.o. male here for follow up for bilateral carotid stenosis and AAA. Patient remains asymptomatic with no active medical issues currently. ROS is wnl  Exam is normal    Carotid duplex reviewed which shows mild to moderate progression of left carotid stenosis with moderate on the right. Will continue on ASA and statin for medical optimization. Will obtain AAA US which was not performed this visit then will get back on a yearly carotid and AAA surveillance. Physical Exam:    Visit Vitals  /70   Pulse 63   Ht 6' (1.829 m)   Wt 197 lb (89.4 kg)   SpO2 96%   BMI 26.72 kg/m²      HEENT-PERRLA exactly movements intact, no scleral icterus, mucous membranes pink and moist  Neck-no JVD, no carotid bruits  Heart-regular rate and rhythm no murmurs, rubs or gallops  Chest-clear to auscultation bilaterally no wheezes, rhonchi or rubs  Abdomen-soft, nontender, no palpable organomegaly or masses, no palpable pulsatile masses  Extremities-no clubbing, cyanosis or edema. No wounds or gangrenous changes to the toes or feet. Skin is intact. Feet are pink warm and well-perfused bilaterally  Pulses-carotid, radial, brachial, femoral 2+ bilaterally. Bilateral doppler signals dorsalis pedis, posterior tibial       Past Medical History:   Diagnosis Date    AAA (abdominal aortic aneurysm) ([facility] [address] 03/2010    noted on CT (abdomen)    Abnormal LFT's 12/95, 04/16/03    Acute meniscal tear, lateral 01/2007    s/p arthorscopic surg (Dr. Chari Velasquez)    Atypical chest pain 02/04/09    CAD (coronary artery disease), native coronary artery     Cardiac cath 04/13/2009    dLM 30%. mLAD 30%. oD2 30%. pCX 55% (FFR 0.95). mRCA 40%. RPDA 65% (FFR 0.86). EF 65%. Cardiac echocardiogram 04/03/2007    EF 60%. No RWMA. Gr 1 DDfx. LAE. No significant valvular heart disease. Cardiac nuclear imaging test, low risk 12/04/2015    Low risk.   No ischemia or prior infarction. No WMA. EF 67%. Neg EKG on pharm stress test.    Cardiovascular aorto-iliac duplex 06/29/2015    AAA measures 3.2 x 3.1 cm Trv. (Prev 3.2 x 3.4 cm on 6/19/14). Aneurysm is infrarenal & fusiform w/complex intraluminal thrombus within. Mod bilateral common iliac stenosis. Carotid duplex 03/16/2016    Mild < 50% BETHEL stenosis. Mod 55-20% LICA stenosis. Similar to study of 6/29/15. Cholecystitis chronic 03/2008    s/p sue    Closed fracture of proximal end of left radius with delayed healing, subsequent encounter 3/2/2021    COVID-19 5/31/2022    Depression, recurrent ([address]     Diverticulosis     Diverticulosis 9-28-15    DR. 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    Plantar fasciitis     Rhinitis     Right knee pain     Right shoulder pain     Solar purpura ([address]     Tinea versicolor 7/23/2012    2.8 x 2.9cm infra-renal    Tubular adenoma 9-28-15    Vitamin D deficiency 4/13/2011     Past Surgical History:   Procedure Laterality Date    ENDOSCOPY, COLON, DIAGNOSTIC      normal per patient; [facility]    HX ARTHROPLASTY  04/05/10    [place]-left knee    HX CATARACT REMOVAL Bilateral     November 2014    HX CHOLECYSTECTOMY  03/2008    chronic cholecystitis    HX COLONOSCOPY  9-28-15    HX HERNIA REPAIR  1995    L inguinal    HX KNEE ARTHROSCOPY  01/24/07    left knee    HX KNEE ARTHROSCOPY Left 4/16/2014    peripatellar debridement HX KNEE REPLACEMENT Left 01/29/2013    [facility]    HX MOHS PROCEDURES  11/2008    HX RHINOPLASTY  1983    HX TONSILLECTOMY      HX VASECTOMY  1977    HX WISDOM TEETH EXTRACTION      x4    [place] ANESTH,SURGERY OF SHOULDER       Patient Active Problem List   Diagnosis Code    Hyperlipidemia E78.5    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 ([facility] [address] I25.119    AAA (abdominal aortic aneurysm) ([facility] [address] I71.4    Right knee pain M25.561    Abnormal LFTs (liver function tests) R79.89    Vitamin D deficiency E55.9    CLARKE (dyspnea on exertion) R06.00    Essential hypertension I10    Hyperglycemia R73.9    Tinea versicolor B36.0    S/P total knee replacement Z96.659    Fatty liver K76.0    PVC's (premature ventricular contractions) I49.3    Carotid artery disease (HCC) I77.9    Right shoulder pain M25.511    Acute back pain M54.9    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.816    Lumbar neuritis M54.16    DDD (degenerative disc disease), lumbar M51.36    Radiculopathy, lumbar region M54.16    Degeneration of intervertebral disc of lumbar region M51.36    Mixed hyperlipidemia E78.2    ACP (advance care planning) Z71.89    Bruising T14. 8XXA    Primary osteoarthritis of right knee M17.11    Closed fracture of upper end of left radius with delayed healing S52.102G    Rotator cuff syndrome, left M75.102    Paroxysmal atrial fibrillation (HCC) I48.0    Symptomatic bradycardia R00.1    COVID-19 U07.1    Pacemaker Z95.0     Current Outpatient Medications   Medication Sig Dispense Refill    amLODIPine (NORVASC) 10 mg tablet TAKE 1 TABLET BY MOUTH DAILY 90 Tablet 3    nitroglycerin (NITROSTAT) 0.4 mg SL tablet 1 Tablet by SubLINGual route every five (5) minutes as needed for Chest Pain. 25 Tablet 3    sildenafil citrate (Viagra) 100 mg tablet Take 1 Tablet by mouth as needed (1 tablet by mouth once daily as needed). 18 Tablet 3    ezetimibe (Zetia) 10 mg tablet Take 1 Tablet by mouth daily.  80 Tablet 4    rosuvastatin (CRESTOR) 20 mg tablet Take 1 Tablet by mouth nightly. 90 Tablet 3    rivaroxaban (Xarelto) 20 mg tab tablet Take 1 Tablet by mouth daily. 90 Tablet 3    aspirin delayed-release 81 mg tablet Take 81 mg by mouth daily. brinzolamide-brimonidine (Simbrinza) 1-0.2 % drps Apply 1 Drop to eye two (2) times a day. coenzyme q10 10 mg cap Take 1 Tab by mouth daily. cholecalciferol, vitamin D3, 50 mcg (2,000 unit) tab Take 1 Tab by mouth daily. omega 3-dha-epa-fish oil 100-160-1,000 mg cap Take 1,000 mg by mouth daily. 90 Cap 3     Allergies   Allergen Reactions    Pollen Extracts Sneezing     Itchy eyes, runny nose     Social History     Socioeconomic History    Marital status:      Spouse name: Not on file    Number of children: Not on file    Years of education: Not on file    Highest education level: Not on file   Occupational History    Not on file   Tobacco Use    Smoking status: Former     Packs/day: 0.50     Years: 20.00     Pack years: 10.00     Types: Cigarettes     Quit date: 12/10/1995     Years since quittin.7    Smokeless tobacco: Never   Vaping Use    Vaping Use: Never used   Substance and Sexual Activity    Alcohol use:  Yes     Alcohol/week: 6.7 standard drinks     Types: 7 Glasses of wine, 1 Shots of liquor per week     Comment: social    Drug use: No     Types: Prescription, OTC    Sexual activity: Not on file   Other Topics Concern    Not on file   Social History Narrative    Not on file     Social Determinants of Health     Financial Resource Strain: Not on file   Food Insecurity: Not on file   Transportation Needs: Not on file   Physical Activity: Not on file   Stress: Not on file   Social Connections: Not on file   Intimate Partner Violence: Not on file   Housing Stability: Not on file     Family History   Problem Relation Age of Onset    Heart Disease Mother     Stroke Father     No Known Problems Sister     No Known Problems Maternal Grandmother No Known Problems Maternal Grandfather     Dementia Paternal Grandmother     No Known Problems Paternal Grandfather     Cancer Brother         lung CA       We reviewed the plan with the patient and the patient understands.         Kailey Abdi MD

## 2022-09-12 NOTE — PROGRESS NOTES
1. Have you been to the ER, urgent care clinic since your last visit? No Hospitalized since your last visit? No    2. Have you seen or consulted any other health care providers outside of the [address] [place] [address] since your last visit? Include any pap smears or colon screening.  No

## 2022-09-13 LAB
LEFT CCA DIST DIAS: 11.7 CM/S
LEFT CCA DIST SYS: 50.6 CM/S
LEFT CCA PROX DIAS: 14.2 CM/S
LEFT CCA PROX SYS: 74.6 CM/S
LEFT ECA DIAS: 3.3 CM/S
LEFT ECA SYS: 74.3 CM/S
LEFT ICA DIST DIAS: 16.9 CM/S
LEFT ICA DIST SYS: 102.7 CM/S
LEFT ICA MID DIAS: 22.2 CM/S
LEFT ICA MID SYS: 127.4 CM/S
LEFT ICA PROX DIAS: 34.9 CM/S
LEFT ICA PROX SYS: 153.4 CM/S
LEFT ICA/CCA SYS: 3 NO UNITS
LEFT VERTEBRAL DIAS: 10.1 CM/S
LEFT VERTEBRAL SYS: 30.7 CM/S
RIGHT CCA DIST DIAS: 7 CM/S
RIGHT CCA DIST SYS: 33.7 CM/S
RIGHT CCA PROX DIAS: 6.5 CM/S
RIGHT CCA PROX SYS: 65.8 CM/S
RIGHT ECA DIAS: 12.2 CM/S
RIGHT ECA SYS: 72.6 CM/S
RIGHT ICA DIST DIAS: 16.6 CM/S
RIGHT ICA DIST SYS: 65.7 CM/S
RIGHT ICA MID DIAS: 26.7 CM/S
RIGHT ICA MID SYS: 158.2 CM/S
RIGHT ICA PROX DIAS: 43.2 CM/S
RIGHT ICA PROX SYS: 210.6 CM/S
RIGHT ICA/CCA SYS: 3.23 NO UNITS
RIGHT VERTEBRAL DIAS: 9.9 CM/S
RIGHT VERTEBRAL SYS: 36.2 CM/S
VAS LEFT SUBCLAVIAN PROX EDV: 6.6 CM/S
VAS LEFT SUBCLAVIAN PROX PSV: 89.5 CM/S
VAS RIGHT SUBCLAVIAN PROX EDV: 9.5 CM/S
VAS RIGHT SUBCLAVIAN PROX PSV: 119.1 CM/S

## 2022-09-21 ENCOUNTER — OFFICE VISIT (OUTPATIENT)
Dept: VASCULAR SURGERY | Age: 82
End: 2022-09-21
Payer: MEDICARE

## 2022-09-21 DIAGNOSIS — I71.40 AAA (ABDOMINAL AORTIC ANEURYSM) WITHOUT RUPTURE: Primary | ICD-10-CM

## 2022-09-21 PROCEDURE — 1101F PT FALLS ASSESS-DOCD LE1/YR: CPT | Performed by: STUDENT IN AN ORGANIZED HEALTH CARE EDUCATION/TRAINING PROGRAM

## 2022-09-21 PROCEDURE — G8417 CALC BMI ABV UP PARAM F/U: HCPCS | Performed by: STUDENT IN AN ORGANIZED HEALTH CARE EDUCATION/TRAINING PROGRAM

## 2022-09-21 PROCEDURE — 99212 OFFICE O/P EST SF 10 MIN: CPT | Performed by: STUDENT IN AN ORGANIZED HEALTH CARE EDUCATION/TRAINING PROGRAM

## 2022-09-21 PROCEDURE — G8536 NO DOC ELDER MAL SCRN: HCPCS | Performed by: STUDENT IN AN ORGANIZED HEALTH CARE EDUCATION/TRAINING PROGRAM

## 2022-09-21 PROCEDURE — 1123F ACP DISCUSS/DSCN MKR DOCD: CPT | Performed by: STUDENT IN AN ORGANIZED HEALTH CARE EDUCATION/TRAINING PROGRAM

## 2022-09-21 PROCEDURE — G8756 NO BP MEASURE DOC: HCPCS | Performed by: STUDENT IN AN ORGANIZED HEALTH CARE EDUCATION/TRAINING PROGRAM

## 2022-09-21 PROCEDURE — G8427 DOCREV CUR MEDS BY ELIG CLIN: HCPCS | Performed by: STUDENT IN AN ORGANIZED HEALTH CARE EDUCATION/TRAINING PROGRAM

## 2022-09-21 PROCEDURE — G8432 DEP SCR NOT DOC, RNG: HCPCS | Performed by: STUDENT IN AN ORGANIZED HEALTH CARE EDUCATION/TRAINING PROGRAM

## 2022-09-21 NOTE — PROGRESS NOTES
09/21/22        Emanuel Platt      Telephone encounter - 15 minutes      History and Physical    Emanuel Platt is a 81 y.o. male here for follow up for bilateral carotid stenosis and AAA. Patient remains asymptomatic with no active medical issues currently. ROS is wnl  Exam is normal     Carotid duplex reviewed which shows mild to moderate progression of left carotid stenosis with moderate on the right. Will continue on ASA and statin for medical optimization. AAA US shows saccular aneurysmal change at the infrarenal abdominal aorta. I discussed at length that we need further imaging to categorize this change and proceed further with monitoring vs endovascular repair. Patient understands and will plan for CTA and RTC in clinic for follow up       Past Medical History:   Diagnosis Date    AAA (abdominal aortic aneurysm) ([facility] [address] 03/2010    noted on CT (abdomen)    Abnormal LFT's 12/95, 04/16/03    Acute meniscal tear, lateral 01/2007    s/p arthorscopic surg (Dr. Khloe Valerio)    Atypical chest pain 02/04/09    CAD (coronary artery disease), native coronary artery     Cardiac cath 04/13/2009    dLM 30%. mLAD 30%. oD2 30%. pCX 55% (FFR 0.95). mRCA 40%. RPDA 65% (FFR 0.86). EF 65%. Cardiac echocardiogram 04/03/2007    EF 60%. No RWMA. Gr 1 DDfx. LAE. No significant valvular heart disease. Cardiac nuclear imaging test, low risk 12/04/2015    Low risk. No ischemia or prior infarction. No WMA. EF 67%. Neg EKG on pharm stress test.    Cardiovascular aorto-iliac duplex 06/29/2015    AAA measures 3.2 x 3.1 cm Trv. (Prev 3.2 x 3.4 cm on 6/19/14). Aneurysm is infrarenal & fusiform w/complex intraluminal thrombus within. Mod bilateral common iliac stenosis. Carotid duplex 03/16/2016    Mild < 50% BETHEL stenosis. Mod 17-17% LICA stenosis. Similar to study of 6/29/15.     Cholecystitis chronic 03/2008    s/p sue    Closed fracture of proximal end of left radius with delayed healing, subsequent encounter 3/2/2021    COVID-19 5/31/2022    Depression, recurrent ([address]     Diverticulosis     Diverticulosis 9-28-15    DR. 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    Plantar fasciitis     Rhinitis     Right knee pain     Right shoulder pain     Solar purpura ([address]     Tinea versicolor 7/23/2012    2.8 x 2.9cm infra-renal    Tubular adenoma 9-28-15    Vitamin D deficiency 4/13/2011     Past Surgical History:   Procedure Laterality Date    ENDOSCOPY, COLON, DIAGNOSTIC      normal per patient;     HX ARTHROPLASTY  04/05/10    [place]-left knee    HX CATARACT REMOVAL Bilateral     November 2014    HX CHOLECYSTECTOMY  03/2008    chronic cholecystitis    HX COLONOSCOPY  9-28-15    HX HERNIA REPAIR  1995    L inguinal    HX KNEE ARTHROSCOPY  01/24/07    left knee    HX KNEE ARTHROSCOPY Left 4/16/2014    peripatellar debridement    HX KNEE REPLACEMENT Left 01/29/2013    [facility]    HX MOHS PROCEDURES  11/2008    HX RHINOPLASTY  1983    HX TONSILLECTOMY      HX VASECTOMY  1977    HX WISDOM TEETH EXTRACTION      x4    [place] ANESTH,SURGERY OF SHOULDER       Patient Active Problem List   Diagnosis Code    Hyperlipidemia E78.5    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 ([address] I25.119    AAA (abdominal aortic aneurysm) ([address] I71.4    Right knee pain M25.561    Abnormal LFTs (liver function tests) R79.89    Vitamin D deficiency E55.9 CLARKE (dyspnea on exertion) R06.00    Essential hypertension I10    Hyperglycemia R73.9    Tinea versicolor B36.0    S/P total knee replacement Z96.659    Fatty liver K76.0    PVC's (premature ventricular contractions) I49.3    Carotid artery disease (HCC) I77.9    Right shoulder pain M25.511    Acute back pain M54.9    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.816    Lumbar neuritis M54.16    DDD (degenerative disc disease), lumbar M51.36    Radiculopathy, lumbar region M54.16    Degeneration of intervertebral disc of lumbar region M51.36    Mixed hyperlipidemia E78.2    ACP (advance care planning) Z71.89    Bruising T14. 8XXA    Primary osteoarthritis of right knee M17.11    Closed fracture of upper end of left radius with delayed healing S52.102G    Rotator cuff syndrome, left M75.102    Paroxysmal atrial fibrillation (HCC) I48.0    Symptomatic bradycardia R00.1    COVID-19 U07.1    Pacemaker Z95.0     Current Outpatient Medications   Medication Sig Dispense Refill    amLODIPine (NORVASC) 10 mg tablet TAKE 1 TABLET BY MOUTH DAILY 90 Tablet 3    nitroglycerin (NITROSTAT) 0.4 mg SL tablet 1 Tablet by SubLINGual route every five (5) minutes as needed for Chest Pain. 25 Tablet 3    sildenafil citrate (Viagra) 100 mg tablet Take 1 Tablet by mouth as needed (1 tablet by mouth once daily as needed). 18 Tablet 3    ezetimibe (Zetia) 10 mg tablet Take 1 Tablet by mouth daily. 90 Tablet 4    rosuvastatin (CRESTOR) 20 mg tablet Take 1 Tablet by mouth nightly. 90 Tablet 3    rivaroxaban (Xarelto) 20 mg tab tablet Take 1 Tablet by mouth daily. 90 Tablet 3    aspirin delayed-release 81 mg tablet Take 81 mg by mouth daily. brinzolamide-brimonidine (Simbrinza) 1-0.2 % drps Apply 1 Drop to eye two (2) times a day. coenzyme q10 10 mg cap Take 1 Tab by mouth daily. cholecalciferol, vitamin D3, 50 mcg (2,000 unit) tab Take 1 Tab by mouth daily.       omega 3-dha-epa-fish oil 100-160-1,000 mg cap Take 1,000 mg by mouth daily. 90 Cap 3     Allergies   Allergen Reactions    Pollen Extracts Sneezing     Itchy eyes, runny nose     Social History     Socioeconomic History    Marital status:      Spouse name: Not on file    Number of children: Not on file    Years of education: Not on file    Highest education level: Not on file   Occupational History    Not on file   Tobacco Use    Smoking status: Former     Packs/day: 0.50     Years: 20.00     Pack years: 10.00     Types: Cigarettes     Quit date: 12/10/1995     Years since quittin.8    Smokeless tobacco: Never   Vaping Use    Vaping Use: Never used   Substance and Sexual Activity    Alcohol use: Yes     Alcohol/week: 6.7 standard drinks     Types: 7 Glasses of wine, 1 Shots of liquor per week     Comment: social    Drug use: No     Types: Prescription, OTC    Sexual activity: Not on file   Other Topics Concern    Not on file   Social History Narrative    Not on file     Social Determinants of Health     Financial Resource Strain: Not on file   Food Insecurity: Not on file   Transportation Needs: Not on file   Physical Activity: Not on file   Stress: Not on file   Social Connections: Not on file   Intimate Partner Violence: Not on file   Housing Stability: Not on file     Family History   Problem Relation Age of Onset    Heart Disease Mother     Stroke Father     No Known Problems Sister     No Known Problems Maternal Grandmother     No Known Problems Maternal Grandfather     Dementia Paternal Grandmother     No Known Problems Paternal Grandfather     Cancer Brother         lung CA       We reviewed the plan with the patient and the patient understands.         Pam Carr MD

## 2022-09-22 DIAGNOSIS — I71.40 AAA (ABDOMINAL AORTIC ANEURYSM) WITHOUT RUPTURE: ICD-10-CM

## 2022-10-10 ENCOUNTER — HOSPITAL ENCOUNTER (OUTPATIENT)
Dept: CT IMAGING | Age: 82
Discharge: HOME | End: 2022-10-10
Attending: STUDENT IN AN ORGANIZED HEALTH CARE EDUCATION/TRAINING PROGRAM
Payer: MEDICARE

## 2022-10-10 PROCEDURE — 75635 CT ANGIO ABDOMINAL ARTERIES: CPT

## 2022-10-10 PROCEDURE — 74011000636 HC RX REV CODE- 636: Performed by: STUDENT IN AN ORGANIZED HEALTH CARE EDUCATION/TRAINING PROGRAM

## 2022-10-10 PROCEDURE — 82565 ASSAY OF CREATININE: CPT

## 2022-10-10 RX ADMIN — IOPAMIDOL 125 ML: 755 INJECTION, SOLUTION INTRAVENOUS at 11:49

## 2022-10-11 LAB — CREAT UR-MCNC: 1 MG/DL (ref 0.6–1.3)

## 2022-10-12 ENCOUNTER — OFFICE VISIT (OUTPATIENT)
Dept: VASCULAR SURGERY | Age: 82
End: 2022-10-12
Payer: MEDICARE

## 2022-10-12 VITALS
HEIGHT: 72 IN | SYSTOLIC BLOOD PRESSURE: 110 MMHG | WEIGHT: 197.09 LBS | BODY MASS INDEX: 26.7 KG/M2 | OXYGEN SATURATION: 97 % | DIASTOLIC BLOOD PRESSURE: 62 MMHG | HEART RATE: 86 BPM

## 2022-10-12 DIAGNOSIS — I71.40 ABDOMINAL AORTIC ANEURYSM (AAA) WITHOUT RUPTURE, UNSPECIFIED PART: Primary | ICD-10-CM

## 2022-10-12 PROCEDURE — G8510 SCR DEP NEG, NO PLAN REQD: HCPCS | Performed by: STUDENT IN AN ORGANIZED HEALTH CARE EDUCATION/TRAINING PROGRAM

## 2022-10-12 PROCEDURE — G8752 SYS BP LESS 140: HCPCS | Performed by: STUDENT IN AN ORGANIZED HEALTH CARE EDUCATION/TRAINING PROGRAM

## 2022-10-12 PROCEDURE — 99215 OFFICE O/P EST HI 40 MIN: CPT | Performed by: STUDENT IN AN ORGANIZED HEALTH CARE EDUCATION/TRAINING PROGRAM

## 2022-10-12 PROCEDURE — G8536 NO DOC ELDER MAL SCRN: HCPCS | Performed by: STUDENT IN AN ORGANIZED HEALTH CARE EDUCATION/TRAINING PROGRAM

## 2022-10-12 PROCEDURE — G8754 DIAS BP LESS 90: HCPCS | Performed by: STUDENT IN AN ORGANIZED HEALTH CARE EDUCATION/TRAINING PROGRAM

## 2022-10-12 PROCEDURE — 1123F ACP DISCUSS/DSCN MKR DOCD: CPT | Performed by: STUDENT IN AN ORGANIZED HEALTH CARE EDUCATION/TRAINING PROGRAM

## 2022-10-12 PROCEDURE — G8417 CALC BMI ABV UP PARAM F/U: HCPCS | Performed by: STUDENT IN AN ORGANIZED HEALTH CARE EDUCATION/TRAINING PROGRAM

## 2022-10-12 PROCEDURE — 1101F PT FALLS ASSESS-DOCD LE1/YR: CPT | Performed by: STUDENT IN AN ORGANIZED HEALTH CARE EDUCATION/TRAINING PROGRAM

## 2022-10-12 PROCEDURE — G8427 DOCREV CUR MEDS BY ELIG CLIN: HCPCS | Performed by: STUDENT IN AN ORGANIZED HEALTH CARE EDUCATION/TRAINING PROGRAM

## 2022-10-12 NOTE — PROGRESS NOTES
10/12/22        Alexi Dee        History and Physical    Alexi Dee is a 81 y.o. male here for follow up for bilateral carotid stenosis and AAA. Patient remains asymptomatic with no active medical issues currently. ROS is wnl  Exam is normal     Carotid duplex reviewed which shows mild to moderate progression of left carotid stenosis with moderate on the right. Will continue on ASA and statin for medical optimization. AAA US shows saccular aneurysmal change at the infrarenal abdominal aorta. I discussed at length that we need further imaging to categorize this change and proceed further with monitoring vs endovascular repair. Patient had the CTA done this week and the aorta has multiple areas of aneurysmal changes however I do not think there is a saccular component and that it is fusiform. The maximum aortic diameter is 3.1cm. I recommend continued surveillance and rtc in 3 months with repeay aortic US. Physical Exam:    Visit Vitals  /62 (BP 1 Location: Right arm, BP Patient Position: Sitting)   Pulse 86   Ht 6' (1.829 m)   Wt 197 lb 1.5 oz (89.4 kg)   SpO2 97%   BMI 26.73 kg/m²      HEENT-PERRLA exactly movements intact, no scleral icterus, mucous membranes pink and moist  Neck-no JVD, no carotid bruits  Heart-regular rate and rhythm no murmurs, rubs or gallops  Chest-clear to auscultation bilaterally no wheezes, rhonchi or rubs  Abdomen-soft, nontender, no palpable organomegaly or masses, no palpable pulsatile masses  Extremities-no clubbing, cyanosis or edema. No wounds or gangrenous changes to the toes or feet. Skin is intact. Feet are pink warm and well-perfused bilaterally  Pulses-carotid, radial, brachial, femoral 2+ bilaterally.  Bilateral doppler signals dorsalis pedis, posterior tibial       Past Medical History:   Diagnosis Date    AAA (abdominal aortic aneurysm) 03/2010    noted on CT (abdomen)    Abnormal LFT's 12/95, 04/16/03    Acute meniscal tear, lateral 01/2007    s/p arthorscopic surg (Dr. JESE BARROS [facility])    Atypical chest pain 02/04/09    CAD (coronary artery disease), native coronary artery     Cardiac cath 04/13/2009    dLM 30%. mLAD 30%. oD2 30%. pCX 55% (FFR 0.95). mRCA 40%. RPDA 65% (FFR 0.86). EF 65%. Cardiac echocardiogram 04/03/2007    EF 60%. No RWMA. Gr 1 DDfx. LAE. No significant valvular heart disease. Cardiac nuclear imaging test, low risk 12/04/2015    Low risk. No ischemia or prior infarction. No WMA. EF 67%. Neg EKG on pharm stress test.    Cardiovascular aorto-iliac duplex 06/29/2015    AAA measures 3.2 x 3.1 cm Trv. (Prev 3.2 x 3.4 cm on 6/19/14). Aneurysm is infrarenal & fusiform w/complex intraluminal thrombus within. Mod bilateral common iliac stenosis. Carotid duplex 03/16/2016    Mild < 50% BETHEL stenosis. Mod 94-33% LICA stenosis. Similar to study of 6/29/15. Cholecystitis chronic 03/2008    s/p sue    Closed fracture of proximal end of left radius with delayed healing, subsequent encounter 3/2/2021    COVID-19 5/31/2022    Depression, recurrent ([address]     Diverticulosis     Diverticulosis 9-28-15    DR. 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    Plantar fasciitis     Rhinitis     Right knee pain     Right shoulder pain     Solar purpura (HCC)     Tinea versicolor 7/23/2012    2.8 x 2.9cm infra-renal    Tubular adenoma 9-28-15    Vitamin D deficiency 4/13/2011     Past Surgical History:   Procedure Laterality Date    ENDOSCOPY, COLON, DIAGNOSTIC      normal per patient;     HX ARTHROPLASTY  04/05/10    Bruner-left knee    HX CATARACT REMOVAL Bilateral     November 2014    HX CHOLECYSTECTOMY  03/2008    chronic cholecystitis    HX COLONOSCOPY  9-28-15    HX HERNIA REPAIR  1995    L inguinal    HX KNEE ARTHROSCOPY  01/24/07    left knee    HX KNEE ARTHROSCOPY Left 4/16/2014    peripatellar debridement    HX KNEE REPLACEMENT Left 01/29/2013    [facility]    HX MOHS PROCEDURES  11/2008    HX RHINOPLASTY  1983    HX TONSILLECTOMY      HX VASECTOMY  1977    HX WISDOM TEETH EXTRACTION      x4    MO ANESTH,SURGERY OF SHOULDER       Patient Active Problem List   Diagnosis Code    Hyperlipidemia E78.5    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 ([facility] I25.119    AAA (abdominal aortic aneurysm) I71.40    Right knee pain M25.561    Abnormal LFTs (liver function tests) R79.89    Vitamin D deficiency E55.9    CLARKE (dyspnea on exertion) R06.09    Essential hypertension I10    Hyperglycemia R73.9    Tinea versicolor B36.0    S/P total knee replacement Z96.659    Fatty liver K76.0    PVC's (premature ventricular contractions) I49.3    Carotid artery disease (HCC) I77.9    Right shoulder pain M25.511    Acute back pain M54.9    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.816    Lumbar neuritis M54.16    DDD (degenerative disc disease), lumbar M51.36    Radiculopathy, lumbar region M54.16    Degeneration of intervertebral disc of lumbar region M51.36    Mixed hyperlipidemia E78.2    ACP (advance care planning) Z71.89    Bruising T14. 8XXA    Primary osteoarthritis of right knee M17.11    Closed fracture of upper end of left radius with delayed healing S52.102G    Rotator cuff syndrome, left M75.102    Paroxysmal atrial fibrillation (HCC) I48.0    Symptomatic bradycardia R00.1    COVID-19 U07.1    Pacemaker Z95.0     Current Outpatient Medications   Medication Sig Dispense Refill    amLODIPine (NORVASC) 10 mg tablet TAKE 1 TABLET BY MOUTH DAILY 90 Tablet 3    nitroglycerin (NITROSTAT) 0.4 mg SL tablet 1 Tablet by SubLINGual route every five (5) minutes as needed for Chest Pain. 25 Tablet 3    sildenafil citrate (Viagra) 100 mg tablet Take 1 Tablet by mouth as needed (1 tablet by mouth once daily as needed). 18 Tablet 3    ezetimibe (Zetia) 10 mg tablet Take 1 Tablet by mouth daily. 90 Tablet 4    rosuvastatin (CRESTOR) 20 mg tablet Take 1 Tablet by mouth nightly. 90 Tablet 3    rivaroxaban (Xarelto) 20 mg tab tablet Take 1 Tablet by mouth daily. 90 Tablet 3    aspirin delayed-release 81 mg tablet Take 81 mg by mouth daily. brinzolamide-brimonidine (Simbrinza) 1-0.2 % drps Apply 1 Drop to eye two (2) times a day. coenzyme q10 10 mg cap Take 1 Tab by mouth daily. cholecalciferol, vitamin D3, 50 mcg (2,000 unit) tab Take 1 Tab by mouth daily. omega 3-dha-epa-fish oil 100-160-1,000 mg cap Take 1,000 mg by mouth daily. 90 Cap 3     Allergies   Allergen Reactions    Pollen Extracts Sneezing     Itchy eyes, runny nose     Social History     Socioeconomic History    Marital status:      Spouse name: Not on file    Number of children: Not on file    Years of education: Not on file    Highest education level: Not on file   Occupational History    Not on file   Tobacco Use    Smoking status: Former     Packs/day: 0.50     Years: 20.00     Pack years: 10.00     Types: Cigarettes     Quit date: 12/10/1995     Years since quittin.8    Smokeless tobacco: Never   Vaping Use    Vaping Use: Never used   Substance and Sexual Activity    Alcohol use:  Yes     Alcohol/week: 6.7 standard drinks     Types: 7 Glasses of wine, 1 Shots of liquor per week     Comment: social    Drug use: No     Types: Prescription, OTC    Sexual activity: Not on file   Other Topics Concern    Not on file   Social History Narrative    Not on file     Social Determinants of Health     Financial Resource Strain: Not on file   Food Insecurity: Not on file   Transportation Needs: Not on file   Physical Activity: Not on file   Stress: Not on file   Social Connections: Not on file   Intimate Partner Violence: Not on file   Housing Stability: Not on file     Family History   Problem Relation Age of Onset    Heart Disease Mother     Stroke Father     No Known Problems Sister     No Known Problems Maternal Grandmother     No Known Problems Maternal Grandfather     Dementia Paternal Grandmother     No Known Problems Paternal Grandfather     Cancer Brother         lung CA       We reviewed the plan with the patient and the patient understands.         Dani Somers MD

## 2022-10-12 NOTE — PROGRESS NOTES
1. Have you been to the ER, urgent care clinic since your last visit? No      Hospitalized since your last visit? No     2. Have you seen or consulted any other health care providers outside of the [address] [place] [address] since your last visit? Include any pap smears or colon screening.   No

## 2022-10-14 ENCOUNTER — TELEPHONE (OUTPATIENT)
Dept: CARDIOLOGY CLINIC | Age: 82
End: 2022-10-14

## 2022-10-14 NOTE — TELEPHONE ENCOUNTER
Patient presented in the office stating he had an adverse reaction to his medication Sildenafil 100mg. States when he took this medication last night he started feeling flushed and dizzy. Patient states he his been taking this medication for some time and this has never happens. Patient not having any symptoms today. Discussed with Dr. Zach Cope. Verbal order and read back per Elvin Silverman MD  - inform patient this is a common side effect of this medication. This medication may make you have these feelings. - if this happens again, try to reduce dose by half  - if this continues to happen, medication may have to be discontinued, as body may not be tolerating. This has been fully explained to the patient, who indicates understanding.

## 2022-10-15 NOTE — PROGRESS NOTES
Amelia Herrmann is a 67 y.o. male new patient in today to discuss balance problems as referred by Amilcar Rodriges MD.    Patient reports stumbling and staggering. Learning assessment previously completed 5/6/2019; primary language is [place]. [place])

## 2022-11-16 ENCOUNTER — HOSPITAL ENCOUNTER (OUTPATIENT)
Dept: LAB | Age: 82
Discharge: HOME | End: 2022-11-16
Payer: MEDICARE

## 2022-11-16 DIAGNOSIS — I10 ESSENTIAL HYPERTENSION: ICD-10-CM

## 2022-11-16 DIAGNOSIS — Z12.5 SCREENING FOR MALIGNANT NEOPLASM OF PROSTATE: ICD-10-CM

## 2022-11-16 DIAGNOSIS — E78.5 HYPERLIPIDEMIA, UNSPECIFIED HYPERLIPIDEMIA TYPE: ICD-10-CM

## 2022-11-16 LAB
ALBUMIN SERPL-MCNC: 4.1 G/DL (ref 3.4–5)
ALBUMIN/GLOB SERPL: 1.3 {RATIO} (ref 0.8–1.7)
ALP SERPL-CCNC: 109 U/L (ref 45–117)
ALT SERPL-CCNC: 27 U/L (ref 16–61)
ANION GAP SERPL CALC-SCNC: 4 MMOL/L (ref 3–18)
AST SERPL-CCNC: 25 U/L (ref 10–38)
BILIRUB SERPL-MCNC: 2.4 MG/DL (ref 0.2–1)
BUN SERPL-MCNC: 15 MG/DL (ref 7–18)
BUN/CREAT SERPL: 13 (ref 12–20)
CALCIUM SERPL-MCNC: 9.4 MG/DL (ref 8.5–10.1)
CHLORIDE SERPL-SCNC: 108 MMOL/L (ref 100–111)
CHOLEST SERPL-MCNC: 109 MG/DL
CO2 SERPL-SCNC: 29 MMOL/L (ref 21–32)
CREAT SERPL-MCNC: 1.13 MG/DL (ref 0.6–1.3)
GLOBULIN SER CALC-MCNC: 3.1 G/DL (ref 2–4)
GLUCOSE SERPL-MCNC: 101 MG/DL (ref 74–99)
HDLC SERPL-MCNC: 42 MG/DL (ref 40–60)
HDLC SERPL: 2.6 {RATIO} (ref 0–5)
LDLC SERPL CALC-MCNC: 43.8 MG/DL (ref 0–100)
LIPID PROFILE,FLP: NORMAL
POTASSIUM SERPL-SCNC: 4.4 MMOL/L (ref 3.5–5.5)
PROT SERPL-MCNC: 7.2 G/DL (ref 6.4–8.2)
PSA SERPL-MCNC: 5.6 NG/ML (ref 0–4)
SODIUM SERPL-SCNC: 141 MMOL/L (ref 136–145)
TRIGL SERPL-MCNC: 116 MG/DL (ref ?–150)
VLDLC SERPL CALC-MCNC: 23.2 MG/DL

## 2022-11-16 PROCEDURE — 84153 ASSAY OF PSA TOTAL: CPT

## 2022-11-16 PROCEDURE — 80053 COMPREHEN METABOLIC PANEL: CPT

## 2022-11-16 PROCEDURE — 80061 LIPID PANEL: CPT

## 2022-11-16 PROCEDURE — 36415 COLL VENOUS BLD VENIPUNCTURE: CPT

## 2022-11-30 ENCOUNTER — OFFICE VISIT (OUTPATIENT)
Dept: CARDIOLOGY CLINIC | Age: 82
End: 2022-11-30
Payer: MEDICARE

## 2022-11-30 DIAGNOSIS — Z95.0 PACEMAKER: ICD-10-CM

## 2022-11-30 DIAGNOSIS — R00.1 SYMPTOMATIC BRADYCARDIA: Primary | ICD-10-CM

## 2022-11-30 PROCEDURE — 93296 REM INTERROG EVL PM/IDS: CPT | Performed by: INTERNAL MEDICINE

## 2022-11-30 PROCEDURE — 93294 REM INTERROG EVL PM/LDLS PM: CPT | Performed by: INTERNAL MEDICINE

## 2022-12-07 ENCOUNTER — OFFICE VISIT (OUTPATIENT)
Dept: FAMILY MEDICINE CLINIC | Age: 82
End: 2022-12-07
Payer: MEDICARE

## 2022-12-07 VITALS
BODY MASS INDEX: 26.37 KG/M2 | DIASTOLIC BLOOD PRESSURE: 80 MMHG | HEIGHT: 73 IN | TEMPERATURE: 98 F | SYSTOLIC BLOOD PRESSURE: 136 MMHG | RESPIRATION RATE: 17 BRPM | WEIGHT: 199 LBS | HEART RATE: 81 BPM | OXYGEN SATURATION: 98 %

## 2022-12-07 DIAGNOSIS — R97.20 ELEVATED PSA: ICD-10-CM

## 2022-12-07 DIAGNOSIS — K59.00 CONSTIPATION, UNSPECIFIED CONSTIPATION TYPE: ICD-10-CM

## 2022-12-07 DIAGNOSIS — I65.23 BILATERAL CAROTID ARTERY STENOSIS: ICD-10-CM

## 2022-12-07 DIAGNOSIS — H91.93 BILATERAL HEARING LOSS, UNSPECIFIED HEARING LOSS TYPE: ICD-10-CM

## 2022-12-07 DIAGNOSIS — J30.9 ALLERGIC RHINITIS, UNSPECIFIED SEASONALITY, UNSPECIFIED TRIGGER: ICD-10-CM

## 2022-12-07 DIAGNOSIS — I71.40 ABDOMINAL AORTIC ANEURYSM (AAA) WITHOUT RUPTURE, UNSPECIFIED PART: ICD-10-CM

## 2022-12-07 DIAGNOSIS — I10 ESSENTIAL HYPERTENSION: Primary | ICD-10-CM

## 2022-12-07 DIAGNOSIS — E78.5 HYPERLIPIDEMIA, UNSPECIFIED HYPERLIPIDEMIA TYPE: ICD-10-CM

## 2022-12-07 PROCEDURE — G8752 SYS BP LESS 140: HCPCS | Performed by: FAMILY MEDICINE

## 2022-12-07 PROCEDURE — G8754 DIAS BP LESS 90: HCPCS | Performed by: FAMILY MEDICINE

## 2022-12-07 PROCEDURE — 1123F ACP DISCUSS/DSCN MKR DOCD: CPT | Performed by: FAMILY MEDICINE

## 2022-12-07 PROCEDURE — G8536 NO DOC ELDER MAL SCRN: HCPCS | Performed by: FAMILY MEDICINE

## 2022-12-07 PROCEDURE — 3074F SYST BP LT 130 MM HG: CPT | Performed by: FAMILY MEDICINE

## 2022-12-07 PROCEDURE — 99215 OFFICE O/P EST HI 40 MIN: CPT | Performed by: FAMILY MEDICINE

## 2022-12-07 PROCEDURE — G8417 CALC BMI ABV UP PARAM F/U: HCPCS | Performed by: FAMILY MEDICINE

## 2022-12-07 PROCEDURE — 3078F DIAST BP <80 MM HG: CPT | Performed by: FAMILY MEDICINE

## 2022-12-07 PROCEDURE — 1101F PT FALLS ASSESS-DOCD LE1/YR: CPT | Performed by: FAMILY MEDICINE

## 2022-12-07 PROCEDURE — G8427 DOCREV CUR MEDS BY ELIG CLIN: HCPCS | Performed by: FAMILY MEDICINE

## 2022-12-07 PROCEDURE — G8510 SCR DEP NEG, NO PLAN REQD: HCPCS | Performed by: FAMILY MEDICINE

## 2022-12-07 NOTE — PROGRESS NOTES
Chief Complaint   Patient presents with    Cholesterol Problem    Hypertension    Labs    Ear Fullness     Patient states that both ears have been feeling clogged for the past couple of months. Patient denies any pain. HPI    Susanne Palmer is a 81 y.o. male presenting today for    3 months  follow up of htn, hld. Pt has been doing well overall. Pt cont to f/up with vasc surg for AAA and b carotid stenosis. He will have repeat aortic us in 3 mo. Patient had labs on 11/16/22. Labs reviewed in detail with patient. Patient does not need medication refills today. New concerns today: pt reports that he used to move his bowels every 2 days; now he is only going every 3 days. He will sometimes now get to 4 days; at that point he will take a laxative. He will go within 24 hours. No blood in the stool. No dark tarry stools. Pt admits he could drink more water. He does try to get fiber in his diet. Pt reports b ears feel clogged. He would like to have them cleaned. He notes he is having a little trouble with hearing. Review of Systems   Constitutional: Negative. HENT:  Positive for hearing loss. Respiratory: Negative. Cardiovascular: Negative. Gastrointestinal:  Positive for constipation. All other systems reviewed and are negative. Physical Exam  Vitals and nursing note reviewed. Constitutional:       General: He is not in acute distress. Appearance: Normal appearance. HENT:      Head: Normocephalic and atraumatic. Right Ear: Ear canal and external ear normal. A middle ear effusion is present. There is no impacted cerumen. Tympanic membrane is not erythematous. Left Ear: Ear canal and external ear normal. A middle ear effusion is present. There is no impacted cerumen. Tympanic membrane is not erythematous. Nose: Nose normal.   Eyes:      Extraocular Movements: Extraocular movements intact.       Conjunctiva/sclera: Conjunctivae normal. Cardiovascular:      Rate and Rhythm: Normal rate and regular rhythm. Heart sounds: No murmur heard. No friction rub. No gallop. Pulmonary:      Effort: Pulmonary effort is normal.      Breath sounds: Normal breath sounds. No wheezing, rhonchi or rales. Musculoskeletal:         General: Normal range of motion. Cervical back: Normal range of motion. No rigidity. Skin:     General: Skin is warm and dry. Neurological:      Mental Status: He is alert and oriented to person, place, and time. Coordination: Coordination normal.   Psychiatric:         Mood and Affect: Mood normal.         Behavior: Behavior normal.         Thought Content: Thought content normal.         Judgment: Judgment normal.       Diagnoses and all orders for this visit:    1. Essential hypertension  Stable, cont pres tx plan. 2. Hyperlipidemia, unspecified hyperlipidemia type  Stable, cont pres tx plan. 3. Elevated PSA  -     REFERRAL TO UROLOGY    4. Abdominal aortic aneurysm (AAA) without rupture, unspecified part  5. Bilateral carotid artery stenosis  Care as per vasc surg    6. Constipation, unspecified constipation type  Increase water intake. Start daily fiber supplement. Add miralax daily prn    7. Allergic rhinitis, unspecified seasonality, unspecified trigger  Recommend trial of claritin. If sensitive to otc decongestants, ok to try vicks vaporub and menthol cough drops for decongestant. 8. Bilateral hearing loss, unspecified hearing loss type  Hopefully will improve with attention to AR causing mid ear effusion. If not, pt to sched eval with ent    Follow-up and Dispositions    Return in about 3 months (around 3/7/2023) for high blood pressure, high cholesterol, constipation.

## 2022-12-07 NOTE — PROGRESS NOTES
Alexi Dee presents today for   Chief Complaint   Patient presents with    Cholesterol Problem    Hypertension    Labs    Ear Fullness     Patient states that both ears have been feeling clogged for the past couple of months. Patient denies any pain. Is someone accompanying this pt? no    Is the patient using any DME equipment during [address]? no    Depression Screening:  3 most recent PHQ Screens 12/7/2022   PHQ Not Done -   Little interest or pleasure in doing things Not at all   Feeling down, depressed, irritable, or hopeless Not at all   Total Score PHQ 2 0   Trouble falling or staying asleep, or sleeping too much -   Feeling tired or having little energy -   Poor appetite, weight loss, or overeating -   Feeling bad about yourself - or that you are a failure or have let yourself or your family down -   Trouble concentrating on things such as school, work, reading, or watching TV -   Moving or speaking so slowly that other people could have noticed; or the opposite being so fidgety that others notice -   Thoughts of being better off dead, or hurting yourself in some way -   PHQ 9 Score -   How difficult have these problems made it for you to do your work, take care of your home and get along with others -       Learning Assessment:  Learning Assessment 8/22/2022   PRIMARY LEARNER Patient   HIGHEST LEVEL OF EDUCATION - PRIMARY LEARNER  -   BARRIERS PRIMARY LEARNER -   CO-LEARNER CAREGIVER -   PRIMARY LANGUAGE ENGLISH   LEARNER PREFERENCE PRIMARY DEMONSTRATION   ANSWERED BY patient   RELATIONSHIP SELF       Fall Risk  Fall Risk Assessment, last 12 mths 10/12/2022   Able to walk? Yes   Fall in past 12 months? 0   Do you feel unsteady?  0   Are you worried about falling 0   Number of falls in past 12 months -   Fall with injury? -       ADL  ADL Assessment 3/1/2022   Feeding yourself No Help Needed   Getting from bed to chair No Help Needed   Getting dressed No Help Needed   Bathing or showering No Help Needed Walk across the room (includes cane/walker) No Help Needed   Using the telphone No Help Needed   Taking your medications No Help Needed   Preparing meals No Help Needed   Managing money (expenses/bills) No Help Needed   Moderately strenuous housework (laundry) No Help Needed   Shopping for personal items (toiletries/medicines) No Help Needed   Shopping for groceries No Help Needed   Driving No Help Needed   Climbing a flight of stairs No Help Needed   Getting to places beyond walking distances No Help Needed       Travel Screening:    Travel Screening       Question Response    In the last 10 days, have you been in contact with someone who was confirmed or suspected to have Coronavirus/COVID-19? No / Unsure    Have you had a COVID-19 viral test in the last 10 days? No    Do you have any of the following new or worsening symptoms? None of these    Have you traveled internationally or domestically in the last month? No          Travel History   Travel since 11/07/22    No documented travel since 11/07/22         Health Maintenance reviewed and discussed and ordered per Provider. Health Maintenance Due   Topic Date Due    COVID-19 Vaccine (4 - Booster for Moderna series) 12/26/2021   . Coordination of Care:  1. Have you been to the ER, urgent care clinic since your last visit? Hospitalized since your last visit? no    2. Have you seen or consulted any other health care providers outside of the [address] [place] [address] since your last visit? Include any pap smears or colon screening.  no

## 2023-01-03 NOTE — PROGRESS NOTES
Remote monitoring interrogation of the dual chamber pacemaker was reviewed. AutoThresholds, impedances, and sensing all remain stable.      Battery longevity is esttimated at 13.0 years.    AP <0.1 %,  < 77.7 %. No V. High rates were recorded.  Patient is in AT/% of the time.    Patient takes Xarelto

## 2023-01-11 DIAGNOSIS — E78.5 HYPERLIPIDEMIA, UNSPECIFIED HYPERLIPIDEMIA TYPE: ICD-10-CM

## 2023-01-11 RX ORDER — ROSUVASTATIN CALCIUM 20 MG/1
20 TABLET, COATED ORAL
Qty: 90 TABLET | Refills: 3 | Status: SHIPPED | OUTPATIENT
Start: 2023-01-11

## 2023-01-11 NOTE — TELEPHONE ENCOUNTER
Requested Prescriptions     Pending Prescriptions Disp Refills    rosuvastatin (CRESTOR) 20 mg tablet 90 Tablet 3     Sig: Take 1 Tablet by mouth nightly.        Last seen 12/7/22   Next OV 3/7/23   Last fasting labs 11/16/22

## 2023-01-11 NOTE — TELEPHONE ENCOUNTER
Patient called and needs a medication refill. Please advise   Requested Prescriptions     Pending Prescriptions Disp Refills    rosuvastatin (CRESTOR) 20 mg tablet 90 Tablet 3     Sig: Take 1 Tablet by mouth nightly.

## 2023-01-25 ENCOUNTER — OFFICE VISIT (OUTPATIENT)
Dept: VASCULAR SURGERY | Age: 83
End: 2023-01-25
Payer: MEDICARE

## 2023-01-25 DIAGNOSIS — I77.9 BILATERAL CAROTID ARTERY DISEASE, UNSPECIFIED TYPE (HCC): ICD-10-CM

## 2023-01-25 DIAGNOSIS — I71.43 INFRARENAL ABDOMINAL AORTIC ANEURYSM (AAA) WITHOUT RUPTURE: Primary | ICD-10-CM

## 2023-01-25 PROCEDURE — 1101F PT FALLS ASSESS-DOCD LE1/YR: CPT | Performed by: STUDENT IN AN ORGANIZED HEALTH CARE EDUCATION/TRAINING PROGRAM

## 2023-01-25 PROCEDURE — G8417 CALC BMI ABV UP PARAM F/U: HCPCS | Performed by: STUDENT IN AN ORGANIZED HEALTH CARE EDUCATION/TRAINING PROGRAM

## 2023-01-25 PROCEDURE — 99212 OFFICE O/P EST SF 10 MIN: CPT | Performed by: STUDENT IN AN ORGANIZED HEALTH CARE EDUCATION/TRAINING PROGRAM

## 2023-01-25 PROCEDURE — G8536 NO DOC ELDER MAL SCRN: HCPCS | Performed by: STUDENT IN AN ORGANIZED HEALTH CARE EDUCATION/TRAINING PROGRAM

## 2023-01-25 PROCEDURE — 1123F ACP DISCUSS/DSCN MKR DOCD: CPT | Performed by: STUDENT IN AN ORGANIZED HEALTH CARE EDUCATION/TRAINING PROGRAM

## 2023-01-25 PROCEDURE — G8432 DEP SCR NOT DOC, RNG: HCPCS | Performed by: STUDENT IN AN ORGANIZED HEALTH CARE EDUCATION/TRAINING PROGRAM

## 2023-01-25 PROCEDURE — G8427 DOCREV CUR MEDS BY ELIG CLIN: HCPCS | Performed by: STUDENT IN AN ORGANIZED HEALTH CARE EDUCATION/TRAINING PROGRAM

## 2023-01-25 NOTE — PROGRESS NOTES
Telephone Encounter - 10 minutes    Tru Gracia is a 81 y.o. male for follow up for bilateral carotid stenosis and AAA. Patient remains asymptomatic with no active medical issues currently. Carotid duplex reviewed which shows mild to moderate progression of left carotid stenosis with moderate on the right. Will continue on ASA and statin for medical optimization. AAA US in September, 2022 showed  concern for saccular aneurysmal change at the infrarenal abdominal aorta. Patient had the CTA done and the aorta has multiple areas of aneurysmal changes however I do not think there is a saccular component and that it is fusiform. The maximum aortic diameter is 3.1cm on CTA. Today at 3 months, patient AAA maximal diameter has remained unchanged. Infrarenal fusiform abdominal aortic aneurysm measuring 3.2cm x 3.2cm trv. There is dilatation of the mid aorta at infrarenal level measuring approximately 2.4cm in the sagittal view. Cannot exclude a saccular component depending on view/angle of aorta. Patent Celiac, SMA, proximal renal arteries and the BESSY without stenosis. Moderate 50-75% stenosis in the common iliac arteries bilaterally with calcified plaquing and shadowing. I recommend continued surveillance and rtc in 6 months with repeat aortic US.

## 2023-02-13 DIAGNOSIS — R97.20 ELEVATED PSA: Primary | ICD-10-CM

## 2023-03-06 ASSESSMENT — ENCOUNTER SYMPTOMS: RESPIRATORY NEGATIVE: 1

## 2023-03-07 ENCOUNTER — OFFICE VISIT (OUTPATIENT)
Age: 83
End: 2023-03-07
Payer: MEDICARE

## 2023-03-07 VITALS
BODY MASS INDEX: 26.86 KG/M2 | HEART RATE: 81 BPM | WEIGHT: 203.6 LBS | DIASTOLIC BLOOD PRESSURE: 85 MMHG | SYSTOLIC BLOOD PRESSURE: 139 MMHG | OXYGEN SATURATION: 98 % | TEMPERATURE: 98.3 F

## 2023-03-07 DIAGNOSIS — K59.00 CONSTIPATION, UNSPECIFIED CONSTIPATION TYPE: ICD-10-CM

## 2023-03-07 DIAGNOSIS — M54.50 ACUTE LEFT-SIDED LOW BACK PAIN WITHOUT SCIATICA: ICD-10-CM

## 2023-03-07 DIAGNOSIS — I10 ESSENTIAL (PRIMARY) HYPERTENSION: Primary | ICD-10-CM

## 2023-03-07 DIAGNOSIS — I71.43 INFRARENAL ABDOMINAL AORTIC ANEURYSM (AAA) WITHOUT RUPTURE: ICD-10-CM

## 2023-03-07 DIAGNOSIS — E78.5 HYPERLIPIDEMIA, UNSPECIFIED HYPERLIPIDEMIA TYPE: ICD-10-CM

## 2023-03-07 DIAGNOSIS — I48.0 PAROXYSMAL ATRIAL FIBRILLATION (HCC): ICD-10-CM

## 2023-03-07 DIAGNOSIS — R97.20 ELEVATED PSA: ICD-10-CM

## 2023-03-07 DIAGNOSIS — I25.119 ATHEROSCLEROSIS OF NATIVE CORONARY ARTERY OF NATIVE HEART WITH ANGINA PECTORIS (HCC): ICD-10-CM

## 2023-03-07 PROCEDURE — G8484 FLU IMMUNIZE NO ADMIN: HCPCS | Performed by: FAMILY MEDICINE

## 2023-03-07 PROCEDURE — 1123F ACP DISCUSS/DSCN MKR DOCD: CPT | Performed by: FAMILY MEDICINE

## 2023-03-07 PROCEDURE — G8417 CALC BMI ABV UP PARAM F/U: HCPCS | Performed by: FAMILY MEDICINE

## 2023-03-07 PROCEDURE — 1036F TOBACCO NON-USER: CPT | Performed by: FAMILY MEDICINE

## 2023-03-07 PROCEDURE — 3075F SYST BP GE 130 - 139MM HG: CPT | Performed by: FAMILY MEDICINE

## 2023-03-07 PROCEDURE — 3079F DIAST BP 80-89 MM HG: CPT | Performed by: FAMILY MEDICINE

## 2023-03-07 PROCEDURE — G8427 DOCREV CUR MEDS BY ELIG CLIN: HCPCS | Performed by: FAMILY MEDICINE

## 2023-03-07 PROCEDURE — 99214 OFFICE O/P EST MOD 30 MIN: CPT | Performed by: FAMILY MEDICINE

## 2023-03-07 SDOH — ECONOMIC STABILITY: FOOD INSECURITY: WITHIN THE PAST 12 MONTHS, THE FOOD YOU BOUGHT JUST DIDN'T LAST AND YOU DIDN'T HAVE MONEY TO GET MORE.: NEVER TRUE

## 2023-03-07 SDOH — ECONOMIC STABILITY: INCOME INSECURITY: HOW HARD IS IT FOR YOU TO PAY FOR THE VERY BASICS LIKE FOOD, HOUSING, MEDICAL CARE, AND HEATING?: NOT HARD AT ALL

## 2023-03-07 SDOH — ECONOMIC STABILITY: FOOD INSECURITY: WITHIN THE PAST 12 MONTHS, YOU WORRIED THAT YOUR FOOD WOULD RUN OUT BEFORE YOU GOT MONEY TO BUY MORE.: NEVER TRUE

## 2023-03-07 SDOH — ECONOMIC STABILITY: HOUSING INSECURITY
IN THE LAST 12 MONTHS, WAS THERE A TIME WHEN YOU DID NOT HAVE A STEADY PLACE TO SLEEP OR SLEPT IN A SHELTER (INCLUDING NOW)?: NO

## 2023-03-07 ASSESSMENT — PATIENT HEALTH QUESTIONNAIRE - PHQ9
1. LITTLE INTEREST OR PLEASURE IN DOING THINGS: 0
SUM OF ALL RESPONSES TO PHQ QUESTIONS 1-9: 0
SUM OF ALL RESPONSES TO PHQ9 QUESTIONS 1 & 2: 0
SUM OF ALL RESPONSES TO PHQ QUESTIONS 1-9: 0
2. FEELING DOWN, DEPRESSED OR HOPELESS: 0

## 2023-03-07 NOTE — PROGRESS NOTES
Pt here today for 3 mo routine f/u for HTN and high cholesterol. Pt reports continuous low back pain today. 1. \"Have you been to the ER, urgent care clinic since your last visit? Hospitalized since your last visit? \" No    2. \"Have you seen or consulted any other health care providers outside of the [address] [place] [address] since your last visit? \" No     3. For patients aged 39-70: Has the patient had a colonoscopy / FIT/ Cologuard? NA - based on age      If the patient is female:    4. For patients aged 41-77: Has the patient had a mammogram within the past 2 years? NA - based on age or sex      11. For patients aged 21-65: Has the patient had a pap smear?  NA - based on age or sex

## 2023-03-07 NOTE — PROGRESS NOTES
ASSESSMENT/PLAN:  1. Essential (primary) hypertension  -     Lipid Panel; Future  -     Comprehensive Metabolic Panel; Future  Stable, cont pres tx plan. 2. Hyperlipidemia, unspecified hyperlipidemia type  -     Lipid Panel; Future  -     Comprehensive Metabolic Panel; Future    3. Acute left-sided low back pain without sciatica  Pt will see his chiropractor. Cont yoga. Call if no improvement. 4. Constipation, unspecified constipation type  Stable, cont pres tx plan. 5. Paroxysmal atrial fibrillation (HCC)  Assessment & Plan:   Monitored by specialist- no acute findings meriting change in the plan    6. Atherosclerosis of native coronary artery of native heart with angina pectoris [facility])  Assessment & Plan:   Monitored by specialist- no acute findings meriting change in the plan    7. Infrarenal abdominal aortic aneurysm (AAA) without rupture  Assessment & Plan:   Monitored by specialist- no acute findings meriting change in the plan    8. Elevated PSA  Eval in progress      Return in about 3 months (around 6/7/2023) for HTN, HLD, lab review, specialist eval.    MWV asap    SUBJECTIVE/OBJECTIVE:    Chief Complaint   Patient presents with    Hypertension    Cholesterol Problem         HPI    Michelle Coronado is a 81 y.o. male presenting today for 3 months follow up of htn, hld, constipation. Patient has established care with urology for evaluation of his elevated PSA. Together they have decided to start with an MRI of the prostate prior to making a decision on biopsy versus surveillance. Patient had routine follow-up with his vascular surgeon. His most recent studies were reviewed. They will continue surveillance and patient will return for repeat aortic ultrasound in 6 months from his January appointment. Patient will follow-up with his cardiologist next month. New concerns today: pt c/o low back pain. His sciatica was aggravated by a long car ride.   He thinks he will see his chiropractor. Pt c/o ringing in his ears. He has had an eval with an ent in the past.        Pt reports that his stooling is regular if he takes a fiber supplement qhs. Review of Systems   Constitutional: Negative. HENT: Negative. Respiratory: Negative. Cardiovascular: Negative. All other systems reviewed and are negative. Physical Exam  Vitals and nursing note reviewed. Constitutional:       General: He is not in acute distress. Appearance: Normal appearance. HENT:      Head: Normocephalic and atraumatic. Right Ear: External ear normal.      Left Ear: External ear normal.      Nose: Nose normal.      Mouth/Throat:      Mouth: Mucous membranes are moist.   Eyes:      Extraocular Movements: Extraocular movements intact. Conjunctiva/sclera: Conjunctivae normal.   Cardiovascular:      Rate and Rhythm: Normal rate and regular rhythm. Heart sounds: No murmur heard. No friction rub. No gallop. Pulmonary:      Effort: Pulmonary effort is normal.      Breath sounds: Normal breath sounds. No wheezing, rhonchi or rales. Musculoskeletal:         General: Normal range of motion. Cervical back: Normal range of motion. Skin:     General: Skin is warm and dry. Neurological:      Mental Status: He is alert and oriented to person, place, and time. Coordination: Coordination normal.   Psychiatric:         Mood and Affect: Mood normal.         Behavior: Behavior normal.         Thought Content: Thought content normal.         Judgment: Judgment normal.                   An electronic signature was used to authenticate this note.     --Kassy Neal MD

## 2023-03-28 ENCOUNTER — TELEMEDICINE (OUTPATIENT)
Facility: CLINIC | Age: 83
End: 2023-03-28
Payer: MEDICARE

## 2023-03-28 DIAGNOSIS — Z00.00 MEDICARE ANNUAL WELLNESS VISIT, SUBSEQUENT: Primary | ICD-10-CM

## 2023-03-28 DIAGNOSIS — Z71.89 ACP (ADVANCE CARE PLANNING): ICD-10-CM

## 2023-03-28 PROCEDURE — 99497 ADVNCD CARE PLAN 30 MIN: CPT | Performed by: FAMILY MEDICINE

## 2023-03-28 PROCEDURE — G8484 FLU IMMUNIZE NO ADMIN: HCPCS | Performed by: FAMILY MEDICINE

## 2023-03-28 PROCEDURE — G0439 PPPS, SUBSEQ VISIT: HCPCS | Performed by: FAMILY MEDICINE

## 2023-03-28 PROCEDURE — 1123F ACP DISCUSS/DSCN MKR DOCD: CPT | Performed by: FAMILY MEDICINE

## 2023-03-28 SDOH — HEALTH STABILITY: PHYSICAL HEALTH: ON AVERAGE, HOW MANY MINUTES DO YOU ENGAGE IN EXERCISE AT THIS LEVEL?: 60 MIN

## 2023-03-28 SDOH — HEALTH STABILITY: PHYSICAL HEALTH: ON AVERAGE, HOW MANY DAYS PER WEEK DO YOU ENGAGE IN MODERATE TO STRENUOUS EXERCISE (LIKE A BRISK WALK)?: 4 DAYS

## 2023-03-28 ASSESSMENT — LIFESTYLE VARIABLES
HOW OFTEN DO YOU HAVE A DRINK CONTAINING ALCOHOL: 4
HOW OFTEN DURING THE LAST YEAR HAVE YOU BEEN UNABLE TO REMEMBER WHAT HAPPENED THE NIGHT BEFORE BECAUSE YOU HAD BEEN DRINKING: 0
HAVE YOU OR SOMEONE ELSE BEEN INJURED AS A RESULT OF YOUR DRINKING: 0
HOW OFTEN DURING THE LAST YEAR HAVE YOU FOUND THAT YOU WERE NOT ABLE TO STOP DRINKING ONCE YOU HAD STARTED: NEVER
HOW OFTEN DURING THE LAST YEAR HAVE YOU FAILED TO DO WHAT WAS NORMALLY EXPECTED FROM YOU BECAUSE OF DRINKING: 0
HOW OFTEN DURING THE LAST YEAR HAVE YOU BEEN UNABLE TO REMEMBER WHAT HAPPENED THE NIGHT BEFORE BECAUSE YOU HAD BEEN DRINKING: NEVER
HOW OFTEN DURING THE LAST YEAR HAVE YOU FOUND THAT YOU WERE NOT ABLE TO STOP DRINKING ONCE YOU HAD STARTED: 0
HOW OFTEN DURING THE LAST YEAR HAVE YOU NEEDED AN ALCOHOLIC DRINK FIRST THING IN THE MORNING TO GET YOURSELF GOING AFTER A NIGHT OF HEAVY DRINKING: 0
HOW OFTEN DURING THE LAST YEAR HAVE YOU NEEDED AN ALCOHOLIC DRINK FIRST THING IN THE MORNING TO GET YOURSELF GOING AFTER A NIGHT OF HEAVY DRINKING: NEVER
HOW OFTEN DO YOU HAVE SIX OR MORE DRINKS ON ONE OCCASION: 2
HOW OFTEN DURING THE LAST YEAR HAVE YOU HAD A FEELING OF GUILT OR REMORSE AFTER DRINKING: NEVER
HOW MANY STANDARD DRINKS CONTAINING ALCOHOL DO YOU HAVE ON A TYPICAL DAY: 1 OR 2
HAS A RELATIVE, FRIEND, DOCTOR, OR ANOTHER HEALTH PROFESSIONAL EXPRESSED CONCERN ABOUT YOUR DRINKING OR SUGGESTED YOU CUT DOWN: NO
HOW OFTEN DURING THE LAST YEAR HAVE YOU FAILED TO DO WHAT WAS NORMALLY EXPECTED FROM YOU BECAUSE OF DRINKING: NEVER
HAVE YOU OR SOMEONE ELSE BEEN INJURED AS A RESULT OF YOUR DRINKING: NO
HOW OFTEN DURING THE LAST YEAR HAVE YOU HAD A FEELING OF GUILT OR REMORSE AFTER DRINKING: 0
HAS A RELATIVE, FRIEND, DOCTOR, OR ANOTHER HEALTH PROFESSIONAL EXPRESSED CONCERN ABOUT YOUR DRINKING OR SUGGESTED YOU CUT DOWN: 0
HOW MANY STANDARD DRINKS CONTAINING ALCOHOL DO YOU HAVE ON A TYPICAL DAY: 1
HOW OFTEN DO YOU HAVE A DRINK CONTAINING ALCOHOL: 2-3 TIMES A WEEK

## 2023-03-28 ASSESSMENT — PATIENT HEALTH QUESTIONNAIRE - PHQ9
SUM OF ALL RESPONSES TO PHQ QUESTIONS 1-9: 0
SUM OF ALL RESPONSES TO PHQ9 QUESTIONS 1 & 2: 0
SUM OF ALL RESPONSES TO PHQ QUESTIONS 1-9: 0
SUM OF ALL RESPONSES TO PHQ QUESTIONS 1-9: 0
1. LITTLE INTEREST OR PLEASURE IN DOING THINGS: 0
2. FEELING DOWN, DEPRESSED OR HOPELESS: 0
SUM OF ALL RESPONSES TO PHQ QUESTIONS 1-9: 0

## 2023-03-28 NOTE — PROGRESS NOTES
Medicare Annual Wellness Visit    Raine Dalton is here for Medicare AWV    Assessment & Plan   Medicare annual wellness visit, subsequent  ACP (advance care planning)    Recommendations for Preventive Services Due: see orders and patient instructions/AVS.  Recommended screening schedule for the next 5-10 years is provided to the patient in written form: see Patient Instructions/AVS.     No follow-ups on file. Subjective       Patient's complete Health Risk Assessment and screening values have been reviewed and are found in Flowsheets. The following problems were reviewed today and where indicated follow up appointments were made and/or referrals ordered. Positive Risk Factor Screenings with Interventions:         Alcohol Screening:  Alcohol Use: Heavy Drinker    Frequency of Alcohol Consumption: 2-3 times a week    Average Number of Drinks: 1 or 2    Frequency of Binge Drinking: Less than monthly      AUDIT-C Score: 4   AUDIT Total Score: 4    Interpretation of AUDIT-C score:   3-7 indicates potential alcohol risk. 8 or more is associated with harmful or hazardous drinking. 15 or more in women, and 15 or more in men, is likely to indicate alcohol dependence. Interventions:  Patient comments: pt occasionally drinks a glass of wine with dinner. Hearing Screen:  Do you or your family notice any trouble with your hearing that hasn't been managed with hearing aids?: (!) Yes    Interventions:  Patient comments: pt has a hearing eval sched for 4/26/23. Objective      Patient-Reported Vitals  No data recorded          No Known Allergies  Prior to Visit Medications    Medication Sig Taking?  Authorizing Provider   amLODIPine (NORVASC) 10 MG tablet Take 10 mg by mouth daily Yes Historical Provider, MD   aspirin 81 MG EC tablet Take 81 mg by mouth daily Yes Historical Provider, MD   brinzolamide-brimonidine (SIMBRINZA) 1-0.2 % SUSP in the morning and at bedtime Yes

## 2023-03-28 NOTE — PROGRESS NOTES
Jimmy Pink presents today for virtual visit  Chief Complaint   Patient presents with    Medicare AWV     Depression Screening:  PHQ-9 Questionaire 3/28/2023 3/7/2023 12/7/2022 10/12/2022 8/22/2022 6/7/2022 5/2/2022   Little interest or pleasure in doing things 0 0 0 0 0 0 0   Feeling down, depressed, or hopeless 0 0 0 0 0 0 0   PHQ-9 Total Score 0 0 0 0 0 0 0        NAVARRO 7-Anxiety   No flowsheet data found. Learning Assessment:  No question data found. Fall Risk  No flowsheet data found. Travel Screening:    Travel Screening     No screening recorded since 03/27/23 0000       Travel History   Travel since 02/28/23        Location Start Date End Date     [place] ([place] 02/07/23 (defaulted) 03/05/23             Health Maintenance reviewed and discussed and ordered per Provider. Social Determinants of Health     Tobacco Use: Medium Risk    Smoking Tobacco Use: Former    Smokeless Tobacco Use: Never    Passive Exposure: Not on file   Alcohol Use: Heavy Drinker    Frequency of Alcohol Consumption: 2-3 times a week    Average Number of Drinks: 1 or 2    Frequency of Binge Drinking: Less than monthly   Financial Resource Strain: Low Risk     Difficulty of Paying Living Expenses: Not hard at all   Food Insecurity: No Food Insecurity    Worried About Running Out of Food in the Last Year: Never true    Ran Out of Food in the Last Year: Never true   Transportation Needs: Unknown    Lack of Transportation (Medical): Not on file    Lack of Transportation (Non-Medical):  No   Physical Activity: Sufficiently Active    Days of Exercise per Week: 4 days    Minutes of Exercise per Session: 60 min   Stress: Not on file   Social Connections: Not on file   Intimate Partner Violence: Not on file   Depression: Not at risk    PHQ-2 Score: 0   Housing Stability: Unknown    Unable to Pay for Housing in the Last Year: Not on file    Number of Places Lived in the Last Year: Not on file    Unstable

## 2023-03-28 NOTE — PROGRESS NOTES
Advance Care Planning     Advance Care Planning (ACP) Physician/NP/PA Conversation    Date of Conversation: 3/28/2023  Conducted with: Patient with Decision Making Capacity    Healthcare Decision Maker:    Primary Decision Maker: Loreto Crowell - Child - [phone]    Secondary Decision Maker: Aura Travis - Child - [phone]  Click here to complete Healthcare Decision Makers including selection of the Healthcare Decision Maker Relationship (ie \"Primary\"). Care Preferences:    Hospitalization: \"If your health worsens and it becomes clear that your chance of recovery is unlikely, what would be your preference regarding hospitalization? \"  The patient is unsure. Ventilation: \"If you were unable to breath on your own and your chance of recovery was unlikely, what would be your preference about the use of a ventilator (breathing machine) if it was available to you? \"  The patient is unsure. Resuscitation: \"In the event your heart stopped as a result of an underlying serious health condition, would you want attempts made to restart your heart, or would you prefer a natural death? \"  No, do NOT attempt to resuscitate. Conversation Outcomes / Follow-Up Plan:  Pt has a will; he will check to see if he has completed a living will as well. If so, he will provide a copy.   Reviewed DNR/DNI and patient elects Full Code (Attempt Resuscitation)    Length of Voluntary ACP Conversation in minutes:  16 minutes    Lorean Brittle, MD

## 2023-03-28 NOTE — PATIENT INSTRUCTIONS
[employer] online. You need 3197-9651 mg of calcium and 7603-9136 IU of vitamin D per day. It is possible to meet your calcium requirement with diet alone, but a vitamin D supplement is usually necessary to meet this goal.  When exposed to the sun, use a sunscreen that protects against both UVA and UVB radiation with an SPF of 30 or greater. Reapply every 2 to 3 hours or after sweating, drying off with a towel, or swimming. Always wear a seat belt when traveling in a car. Always wear a helmet when riding a bicycle or motorcycle.

## 2023-03-30 ENCOUNTER — TELEPHONE (OUTPATIENT)
Facility: CLINIC | Age: 83
End: 2023-03-30

## 2023-04-04 ENCOUNTER — OFFICE VISIT (OUTPATIENT)
Age: 83
End: 2023-04-04
Payer: MEDICARE

## 2023-04-04 VITALS
BODY MASS INDEX: 26.51 KG/M2 | HEIGHT: 73 IN | WEIGHT: 200 LBS | OXYGEN SATURATION: 96 % | HEART RATE: 75 BPM | SYSTOLIC BLOOD PRESSURE: 122 MMHG | DIASTOLIC BLOOD PRESSURE: 64 MMHG

## 2023-04-04 DIAGNOSIS — E78.00 PURE HYPERCHOLESTEROLEMIA, UNSPECIFIED: ICD-10-CM

## 2023-04-04 DIAGNOSIS — R00.1 SYMPTOMATIC BRADYCARDIA: ICD-10-CM

## 2023-04-04 DIAGNOSIS — I48.19 PERSISTENT ATRIAL FIBRILLATION (HCC): Primary | ICD-10-CM

## 2023-04-04 DIAGNOSIS — Z95.0 PRESENCE OF CARDIAC PACEMAKER: ICD-10-CM

## 2023-04-04 DIAGNOSIS — N52.9 ERECTILE DYSFUNCTION, UNSPECIFIED ERECTILE DYSFUNCTION TYPE: ICD-10-CM

## 2023-04-04 DIAGNOSIS — I25.10 CORONARY ARTERY DISEASE INVOLVING NATIVE CORONARY ARTERY OF NATIVE HEART WITHOUT ANGINA PECTORIS: ICD-10-CM

## 2023-04-04 DIAGNOSIS — I10 ESSENTIAL (PRIMARY) HYPERTENSION: ICD-10-CM

## 2023-04-04 PROBLEM — L90.9 HYPERTROPHIC AND ATROPHIC CONDITION OF SKIN: Status: ACTIVE | Noted: 2023-04-04

## 2023-04-04 PROBLEM — L91.9 HYPERTROPHIC AND ATROPHIC CONDITION OF SKIN: Status: ACTIVE | Noted: 2023-04-04

## 2023-04-04 PROBLEM — I48.0 PAROXYSMAL ATRIAL FIBRILLATION (HCC): Status: ACTIVE | Noted: 2022-03-01

## 2023-04-04 PROBLEM — S52.102G: Status: ACTIVE | Noted: 2021-03-02

## 2023-04-04 PROCEDURE — 1036F TOBACCO NON-USER: CPT | Performed by: INTERNAL MEDICINE

## 2023-04-04 PROCEDURE — 3074F SYST BP LT 130 MM HG: CPT | Performed by: INTERNAL MEDICINE

## 2023-04-04 PROCEDURE — G8417 CALC BMI ABV UP PARAM F/U: HCPCS | Performed by: INTERNAL MEDICINE

## 2023-04-04 PROCEDURE — 93000 ELECTROCARDIOGRAM COMPLETE: CPT | Performed by: INTERNAL MEDICINE

## 2023-04-04 PROCEDURE — 3078F DIAST BP <80 MM HG: CPT | Performed by: INTERNAL MEDICINE

## 2023-04-04 PROCEDURE — G8427 DOCREV CUR MEDS BY ELIG CLIN: HCPCS | Performed by: INTERNAL MEDICINE

## 2023-04-04 PROCEDURE — 99214 OFFICE O/P EST MOD 30 MIN: CPT | Performed by: INTERNAL MEDICINE

## 2023-04-04 PROCEDURE — 1123F ACP DISCUSS/DSCN MKR DOCD: CPT | Performed by: INTERNAL MEDICINE

## 2023-04-04 RX ORDER — CHLORAL HYDRATE 500 MG
1000 CAPSULE ORAL DAILY
COMMUNITY

## 2023-04-04 RX ORDER — SILDENAFIL 100 MG/1
100 TABLET, FILM COATED ORAL PRN
COMMUNITY
End: 2023-04-04 | Stop reason: SDUPTHER

## 2023-04-04 RX ORDER — RIVAROXABAN 10 MG/1
10 TABLET, FILM COATED ORAL
COMMUNITY

## 2023-04-04 RX ORDER — ROSUVASTATIN CALCIUM 20 MG/1
20 TABLET, COATED ORAL DAILY
COMMUNITY

## 2023-04-04 RX ORDER — SILDENAFIL 100 MG/1
100 TABLET, FILM COATED ORAL PRN
Qty: 45 TABLET | Refills: 3 | Status: SHIPPED | OUTPATIENT
Start: 2023-04-04

## 2023-04-04 ASSESSMENT — ANXIETY QUESTIONNAIRES
6. BECOMING EASILY ANNOYED OR IRRITABLE: 0
GAD7 TOTAL SCORE: 0
1. FEELING NERVOUS, ANXIOUS, OR ON EDGE: 0
5. BEING SO RESTLESS THAT IT IS HARD TO SIT STILL: 0
2. NOT BEING ABLE TO STOP OR CONTROL WORRYING: 0
3. WORRYING TOO MUCH ABOUT DIFFERENT THINGS: 0
7. FEELING AFRAID AS IF SOMETHING AWFUL MIGHT HAPPEN: 0
4. TROUBLE RELAXING: 0

## 2023-04-04 ASSESSMENT — ENCOUNTER SYMPTOMS
COUGH: 0
ABDOMINAL DISTENTION: 0
SHORTNESS OF BREATH: 0
SORE THROAT: 0
ABDOMINAL PAIN: 0
NAUSEA: 0
VOMITING: 0

## 2023-04-04 ASSESSMENT — PATIENT HEALTH QUESTIONNAIRE - PHQ9
1. LITTLE INTEREST OR PLEASURE IN DOING THINGS: 0
SUM OF ALL RESPONSES TO PHQ QUESTIONS 1-9: 0
2. FEELING DOWN, DEPRESSED OR HOPELESS: 0
SUM OF ALL RESPONSES TO PHQ QUESTIONS 1-9: 0
SUM OF ALL RESPONSES TO PHQ9 QUESTIONS 1 & 2: 0

## 2023-04-04 NOTE — PROGRESS NOTES
04/04/23     Juliana Juarez  is a 81 y.o. male     Chief Complaint   Patient presents with    Follow-up     6 month    Device Check    Dizziness     Standing up too fast       Dizziness  Pertinent negatives include no abdominal pain, arthralgias, chest pain, chills, congestion, coughing, fatigue, fever, headaches, nausea, rash, sore throat, vomiting or weakness. Patient presents for a follow-up office visit. He has a known history of coronary artery disease, status post a non-ST elevation myocardial infarction in 2009. He underwent a cardiac catheterization at that time and was found to have moderate, but nonobstructive two-vessel coronary disease involving his left circumflex and a right-sided posterior descending artery, that were assessed by pressure wire. The patient has been maintained on medical therapy since that time. The patient last underwent a pharmacological nuclear stress test in December 2015, which was a normal study, showing no ischemia, normal left ventricle ejection fraction. EF 67%. He has a history of mild to moderate carotid artery disease,  last evaluated with a carotid duplex in August 2018 which demonstrated moderate carotid disease of the left ICA and mild plaquing of the right. He also underwent an abdominal ultrasound which showed a very small infrarenal AAA. Patient last underwent a pharmacologic nuclear stress test in November 2019 which was a normal and low risk study. He recently underwent follow-up abdominal ultrasound and carotid duplex scan in August 2020 which showed a small AAA and moderate bilateral carotid artery disease essentially unchanged compared to his previous studies. Patient was diagnosed with new onset atrial fibrillation at last office visit in April 2021. His rates were on the slow side without any rate slowing agents.   He underwent an echocardiogram in May 2021 which showed preserved LV systolic function, EF 55 to 60%, mild concentric LVH,

## 2023-04-04 NOTE — PROGRESS NOTES
Elsa Lincoln presents today for   Chief Complaint   Patient presents with    Follow-up     6 month    Device Check    Dizziness     Standing up too fast       Elsa Lincoln preferred language for health care discussion is english/other. Is someone accompanying this pt? no    Is the patient using any DME equipment during OV? no    Depression Screening:  Depression: Not at risk    PHQ-2 Score: 0        Learning Assessment:  Who is the primary learner? Patient    What is the preferred language for health care of the primary learner? ENGLISH    How does the primary learner prefer to learn new concepts? DEMONSTRATION    Answered By patient    Relationship to Learner SELF           Pt currently taking Anticoagulant therapy? Xarelto 10 mg daily    Pt currently taking Antiplatelet therapy ? Aspirin 81 mg daily      Coordination of Care:  1. Have you been to the ER, urgent care clinic since your last visit? Hospitalized since your last visit? no    2. Have you seen or consulted any other health care providers outside of the [address] [place] [address] since your last visit? Include any pap smears or colon screening.  no

## 2023-05-19 RX ORDER — EZETIMIBE 10 MG/1
10 TABLET ORAL DAILY
Qty: 30 TABLET | Refills: 0 | Status: SHIPPED | OUTPATIENT
Start: 2023-05-19

## 2023-05-19 NOTE — TELEPHONE ENCOUNTER
Medication Refill Request    Jerome Santiago is requesting a refill of the following medication(s): He would like a hand written prescription he is having trouble getting his medication refilled. ezetimibe (Zetia) 10 mg tablet Take 1 Tablet by mouth daily.

## 2023-06-05 ENCOUNTER — HOSPITAL ENCOUNTER (OUTPATIENT)
Facility: HOSPITAL | Age: 83
Discharge: HOME | End: 2023-06-08
Payer: MEDICARE

## 2023-06-05 LAB
ALBUMIN SERPL-MCNC: 3.9 G/DL (ref 3.4–5)
ALBUMIN/GLOB SERPL: 1.4 (ref 0.8–1.7)
ALP SERPL-CCNC: 126 U/L (ref 45–117)
ALT SERPL-CCNC: 34 U/L (ref 16–61)
ANION GAP SERPL CALC-SCNC: 6 MMOL/L (ref 3–18)
AST SERPL-CCNC: 23 U/L (ref 10–38)
BILIRUB SERPL-MCNC: 2.1 MG/DL (ref 0.2–1)
BUN SERPL-MCNC: 11 MG/DL (ref 7–18)
BUN/CREAT SERPL: 10 (ref 12–20)
CALCIUM SERPL-MCNC: 9 MG/DL (ref 8.5–10.1)
CHLORIDE SERPL-SCNC: 109 MMOL/L (ref 100–111)
CHOLEST SERPL-MCNC: 102 MG/DL
CO2 SERPL-SCNC: 26 MMOL/L (ref 21–32)
CREAT SERPL-MCNC: 1.09 MG/DL (ref 0.6–1.3)
GLOBULIN SER CALC-MCNC: 2.8 G/DL (ref 2–4)
GLUCOSE SERPL-MCNC: 104 MG/DL (ref 74–99)
HDLC SERPL-MCNC: 37 MG/DL (ref 40–60)
HDLC SERPL: 2.8 (ref 0–5)
LDLC SERPL CALC-MCNC: 48.8 MG/DL (ref 0–100)
LIPID PANEL: ABNORMAL
POTASSIUM SERPL-SCNC: 4.2 MMOL/L (ref 3.5–5.5)
PROT SERPL-MCNC: 6.7 G/DL (ref 6.4–8.2)
SODIUM SERPL-SCNC: 141 MMOL/L (ref 136–145)
TRIGL SERPL-MCNC: 81 MG/DL
VLDLC SERPL CALC-MCNC: 16.2 MG/DL

## 2023-06-05 PROCEDURE — 36415 COLL VENOUS BLD VENIPUNCTURE: CPT

## 2023-06-05 PROCEDURE — 80061 LIPID PANEL: CPT

## 2023-06-05 PROCEDURE — 80053 COMPREHEN METABOLIC PANEL: CPT

## 2023-06-08 ENCOUNTER — OFFICE VISIT (OUTPATIENT)
Facility: CLINIC | Age: 83
End: 2023-06-08
Payer: MEDICARE

## 2023-06-08 VITALS
SYSTOLIC BLOOD PRESSURE: 137 MMHG | TEMPERATURE: 98 F | BODY MASS INDEX: 25.81 KG/M2 | WEIGHT: 195.6 LBS | OXYGEN SATURATION: 97 % | DIASTOLIC BLOOD PRESSURE: 84 MMHG | RESPIRATION RATE: 16 BRPM | HEART RATE: 86 BPM

## 2023-06-08 DIAGNOSIS — I10 ESSENTIAL (PRIMARY) HYPERTENSION: Primary | ICD-10-CM

## 2023-06-08 DIAGNOSIS — N52.9 ERECTILE DYSFUNCTION, UNSPECIFIED ERECTILE DYSFUNCTION TYPE: ICD-10-CM

## 2023-06-08 DIAGNOSIS — E78.5 HYPERLIPIDEMIA, UNSPECIFIED HYPERLIPIDEMIA TYPE: ICD-10-CM

## 2023-06-08 PROCEDURE — 99214 OFFICE O/P EST MOD 30 MIN: CPT | Performed by: FAMILY MEDICINE

## 2023-06-08 PROCEDURE — G8427 DOCREV CUR MEDS BY ELIG CLIN: HCPCS | Performed by: FAMILY MEDICINE

## 2023-06-08 PROCEDURE — 1036F TOBACCO NON-USER: CPT | Performed by: FAMILY MEDICINE

## 2023-06-08 PROCEDURE — 1123F ACP DISCUSS/DSCN MKR DOCD: CPT | Performed by: FAMILY MEDICINE

## 2023-06-08 PROCEDURE — 3074F SYST BP LT 130 MM HG: CPT | Performed by: FAMILY MEDICINE

## 2023-06-08 PROCEDURE — G8417 CALC BMI ABV UP PARAM F/U: HCPCS | Performed by: FAMILY MEDICINE

## 2023-06-08 PROCEDURE — 3078F DIAST BP <80 MM HG: CPT | Performed by: FAMILY MEDICINE

## 2023-06-08 RX ORDER — SILDENAFIL 100 MG/1
100 TABLET, FILM COATED ORAL DAILY PRN
Qty: 30 TABLET | Refills: 12 | Status: SHIPPED | OUTPATIENT
Start: 2023-06-08

## 2023-06-08 ASSESSMENT — ENCOUNTER SYMPTOMS: RESPIRATORY NEGATIVE: 1

## 2023-06-08 NOTE — PROGRESS NOTES
Chief Complaint   Patient presents with    Hypertension    Discuss Labs        Vitals:    06/08/23 0826   BP: 137/84   Pulse: 86   Resp: 16   Temp: 98 °F (36.7 °C)   SpO2: 97%        Depression: Not at risk    PHQ-2 Score: 0        No flowsheet data found. Turpin Hidden \"Have you been to the ER, urgent care clinic since your last visit? Hospitalized since your last visit? \"  No     2. \"Have you seen or consulted any other health care providers outside of the [address] [place] [address] since your last visit? \"  Yes audiology with [facility]     3. For patients aged 39-70: Has the patient had a colonoscopy / FIT/ Cologuard? Yes     If the patient is female:    4. For patients aged 41-77: Has the patient had a mammogram within the past 2 years? Yes    5. For patients aged 21-65: Has the patient had a pap smear?  Yes
person, place, and time. Coordination: Coordination normal.   Psychiatric:         Mood and Affect: Mood normal.         Behavior: Behavior normal.         Thought Content: Thought content normal.         Judgment: Judgment normal.                   An electronic signature was used to authenticate this note.     --Patric Gustafson MD

## 2023-07-05 ENCOUNTER — OFFICE VISIT (OUTPATIENT)
Age: 83
End: 2023-07-05
Payer: MEDICARE

## 2023-07-05 DIAGNOSIS — I48.19 PERSISTENT ATRIAL FIBRILLATION (HCC): Primary | ICD-10-CM

## 2023-07-05 DIAGNOSIS — R00.1 SYMPTOMATIC BRADYCARDIA: ICD-10-CM

## 2023-07-05 DIAGNOSIS — Z95.0 PRESENCE OF CARDIAC PACEMAKER: ICD-10-CM

## 2023-07-13 PROCEDURE — 93294 REM INTERROG EVL PM/LDLS PM: CPT | Performed by: INTERNAL MEDICINE

## 2023-07-13 PROCEDURE — 93296 REM INTERROG EVL PM/IDS: CPT | Performed by: INTERNAL MEDICINE

## 2023-09-06 ENCOUNTER — OFFICE VISIT (OUTPATIENT)
Facility: CLINIC | Age: 83
End: 2023-09-06
Payer: MEDICARE

## 2023-09-06 VITALS
HEART RATE: 65 BPM | BODY MASS INDEX: 25.23 KG/M2 | DIASTOLIC BLOOD PRESSURE: 81 MMHG | WEIGHT: 191.2 LBS | SYSTOLIC BLOOD PRESSURE: 130 MMHG | RESPIRATION RATE: 16 BRPM | OXYGEN SATURATION: 96 %

## 2023-09-06 DIAGNOSIS — E78.5 HYPERLIPIDEMIA, UNSPECIFIED HYPERLIPIDEMIA TYPE: ICD-10-CM

## 2023-09-06 DIAGNOSIS — R97.20 ELEVATED PSA: ICD-10-CM

## 2023-09-06 DIAGNOSIS — I25.119 ATHEROSCLEROSIS OF NATIVE CORONARY ARTERY OF NATIVE HEART WITH ANGINA PECTORIS (HCC): ICD-10-CM

## 2023-09-06 DIAGNOSIS — K59.00 CONSTIPATION, UNSPECIFIED CONSTIPATION TYPE: ICD-10-CM

## 2023-09-06 DIAGNOSIS — Z12.5 ENCOUNTER FOR SCREENING FOR MALIGNANT NEOPLASM OF PROSTATE: ICD-10-CM

## 2023-09-06 DIAGNOSIS — Z23 NEEDS FLU SHOT: ICD-10-CM

## 2023-09-06 DIAGNOSIS — I10 ESSENTIAL (PRIMARY) HYPERTENSION: Primary | ICD-10-CM

## 2023-09-06 PROCEDURE — G0008 ADMIN INFLUENZA VIRUS VAC: HCPCS | Performed by: FAMILY MEDICINE

## 2023-09-06 PROCEDURE — G8427 DOCREV CUR MEDS BY ELIG CLIN: HCPCS | Performed by: FAMILY MEDICINE

## 2023-09-06 PROCEDURE — 90694 VACC AIIV4 NO PRSRV 0.5ML IM: CPT | Performed by: FAMILY MEDICINE

## 2023-09-06 PROCEDURE — G8417 CALC BMI ABV UP PARAM F/U: HCPCS | Performed by: FAMILY MEDICINE

## 2023-09-06 PROCEDURE — 1036F TOBACCO NON-USER: CPT | Performed by: FAMILY MEDICINE

## 2023-09-06 PROCEDURE — 99214 OFFICE O/P EST MOD 30 MIN: CPT | Performed by: FAMILY MEDICINE

## 2023-09-06 PROCEDURE — 3075F SYST BP GE 130 - 139MM HG: CPT | Performed by: FAMILY MEDICINE

## 2023-09-06 PROCEDURE — 1123F ACP DISCUSS/DSCN MKR DOCD: CPT | Performed by: FAMILY MEDICINE

## 2023-09-06 PROCEDURE — 3079F DIAST BP 80-89 MM HG: CPT | Performed by: FAMILY MEDICINE

## 2023-09-06 RX ORDER — POLYETHYLENE GLYCOL 3350 17 G/17G
17 POWDER, FOR SOLUTION ORAL DAILY
Qty: 510 G | Refills: 12 | Status: SHIPPED | OUTPATIENT
Start: 2023-09-06

## 2023-09-06 RX ORDER — NITROGLYCERIN 0.4 MG/1
0.4 TABLET SUBLINGUAL EVERY 5 MIN PRN
Qty: 25 TABLET | Refills: 5 | Status: SHIPPED | OUTPATIENT
Start: 2023-09-06 | End: 2024-10-12

## 2023-09-06 ASSESSMENT — ENCOUNTER SYMPTOMS: RESPIRATORY NEGATIVE: 1

## 2023-09-06 NOTE — PROGRESS NOTES
Obtained consent from patient. Per verbal order from Dr. Osmar Dalton Injection of Fluad administered on left deltoid. Verified by me and Serafin Owens that this is the correct immunization/injection. Patient observed for 15 minutes with no adverse reaction.

## 2023-09-06 NOTE — PROGRESS NOTES
ASSESSMENT/PLAN:  1. Essential (primary) hypertension  -     Lipid Panel; Future  -     Comprehensive Metabolic Panel; Future  Stable, cont pres tx plan. 2. Hyperlipidemia, unspecified hyperlipidemia type  -     Lipid Panel; Future  -     Comprehensive Metabolic Panel; Future    3. Constipation, unspecified constipation type  -    trial:  polyethylene glycol (GLYCOLAX) 17 GM/SCOOP powder; Take 17 g by mouth daily, Disp-510 g, R-12Print  Pt to contact [facility] for eval of stool change. 4. Elevated PSA  -     PSA Screening; Future    5. Atherosclerosis of native coronary artery of native heart with angina pectoris (HCC)  -     nitroGLYCERIN (NITROSTAT) 0.4 MG SL tablet; Place 1 tablet under the tongue every 5 minutes as needed for Chest pain, Disp-25 tablet, R-5Print    6. Encounter for screening for malignant neoplasm of prostate  -     PSA Screening; Future    7. Needs flu shot  -     Influenza, FLUAD, (age 72 y+), IM, Preservative Free, 0.5 mL        Return in about 3 months (around 12/6/2023) for HTN, HLD, constipation, lab review. SUBJECTIVE/OBJECTIVE:    Chief Complaint   Patient presents with    Hypertension    Cholesterol Problem    Constipation     A couple of months , pt has tried Miralax high fiber diet   other OTCs  otcs cause diarrhea , pt strains to go . Has BM every 5-6 days stool is normal color when he does go no blood in stool or on toilet paper noted           HPI    Leonardo Cormier is a 81 y.o. male presenting today for 3 months  follow up of htn, hld. Pt has been feeling well overall. His back has been bothering him. He will f/u with his chiropractor. Pt has not yet had the prostate mri that was ordered earlier this year for eval of elevated psa . He does not have a f/u appt with uro. Patient does need medication refills today. New concerns today: pt c/o increased constipation with stooling.   He has been taking a fiber supplement and stool softener but may end up with

## 2023-09-06 NOTE — PROGRESS NOTES
Chief Complaint   Patient presents with    Hypertension    Cholesterol Problem    Constipation     A couple of months , pt has tried Miralax high fiber diet   other OTCs  otcs cause diarrhea , pt strains to go . Has BM every 5-6 days stool is normal color when he does go no blood in stool or on toilet paper noted          Vitals:    09/06/23 0856   BP: 130/81   Pulse: 65   Resp: 16   SpO2: 96%        Depression: Not at risk    PHQ-2 Score: 0        No flowsheet data found. . Patient is due for the following exams and vaccines  flu vaccine - pt consents and will receive today   Covid vaccine - recent vaccine recommended pt advised to ask pharmacy when   Tetanus - not done in last 10 yrs . - not eligible for vaccine in office due to medicare     . \"Have you been to the ER, urgent care clinic since your last visit? Hospitalized since your last visit? \" No     2. \"Have you seen or consulted any other health care providers outside of the [address] [place] [address] since your last visit? \" No      3. For patients aged 43-73: Has the patient had a colonoscopy / FIT/ Cologuard?  N/A

## 2023-09-08 ENCOUNTER — TELEPHONE (OUTPATIENT)
Facility: CLINIC | Age: 83
End: 2023-09-08

## 2023-09-08 NOTE — TELEPHONE ENCOUNTER
Called over to [facility] and spoke w/ Lupe Kwon to inquire status of MRI of prostate being scheduled. Lupe Kwon stated if he has not received a call then he is to contact [facility] central scheduling. Pt to call [phone] to schedule. Attempted to contact home phone but no answer and vm full    I contacted pt on cell and left detailed message including phone number for him to contact central scheduling and instructed him to contact [facility] once scheduled so they can schedule for results. Advised pt to call back with any questions.

## 2023-09-12 DIAGNOSIS — I71.43 INFRARENAL ABDOMINAL AORTIC ANEURYSM, WITHOUT RUPTURE (HCC): Primary | ICD-10-CM

## 2023-09-12 DIAGNOSIS — I77.9 CAROTID ARTERY DISEASE, UNSPECIFIED LATERALITY, UNSPECIFIED TYPE (HCC): ICD-10-CM

## 2023-09-13 ENCOUNTER — HOSPITAL ENCOUNTER (OUTPATIENT)
Facility: HOSPITAL | Age: 83
Discharge: HOME | End: 2023-09-16
Attending: STUDENT IN AN ORGANIZED HEALTH CARE EDUCATION/TRAINING PROGRAM
Payer: MEDICARE

## 2023-09-13 DIAGNOSIS — I77.9 CAROTID ARTERY DISEASE, UNSPECIFIED LATERALITY, UNSPECIFIED TYPE (HCC): Primary | ICD-10-CM

## 2023-09-13 DIAGNOSIS — I77.9 CAROTID ARTERY DISEASE, UNSPECIFIED LATERALITY, UNSPECIFIED TYPE (HCC): ICD-10-CM

## 2023-09-13 PROCEDURE — 82565 ASSAY OF CREATININE: CPT

## 2023-09-13 PROCEDURE — 70496 CT ANGIOGRAPHY HEAD: CPT

## 2023-09-13 PROCEDURE — 6360000004 HC RX CONTRAST MEDICATION: Performed by: STUDENT IN AN ORGANIZED HEALTH CARE EDUCATION/TRAINING PROGRAM

## 2023-09-13 RX ADMIN — IOPAMIDOL 80 ML: 755 INJECTION, SOLUTION INTRAVENOUS at 15:26

## 2023-09-13 NOTE — PROGRESS NOTES
Order for CTA of head and neck stat placed per verbal order from DR. Manley   Patient had study done this am , significant changed noted , discussed with provider and pt needs to have CTA stat before apptointment next week with Dr. Alaina Grace . Called the patient and made him aware of the plan and pt agrees with the plan and advised that Ursula Apple will call with appt.

## 2023-09-14 LAB — CREAT UR-MCNC: 1 MG/DL (ref 0.6–1.3)

## 2023-09-21 ENCOUNTER — OFFICE VISIT (OUTPATIENT)
Age: 83
End: 2023-09-21

## 2023-09-21 VITALS
SYSTOLIC BLOOD PRESSURE: 132 MMHG | OXYGEN SATURATION: 98 % | WEIGHT: 191.14 LBS | HEART RATE: 80 BPM | DIASTOLIC BLOOD PRESSURE: 82 MMHG | HEIGHT: 73 IN | BODY MASS INDEX: 25.33 KG/M2

## 2023-09-21 DIAGNOSIS — I77.9 CAROTID ARTERY DISEASE, UNSPECIFIED LATERALITY, UNSPECIFIED TYPE (HCC): Primary | ICD-10-CM

## 2023-09-21 ASSESSMENT — PATIENT HEALTH QUESTIONNAIRE - PHQ9
1. LITTLE INTEREST OR PLEASURE IN DOING THINGS: 0
SUM OF ALL RESPONSES TO PHQ QUESTIONS 1-9: 0
SUM OF ALL RESPONSES TO PHQ9 QUESTIONS 1 & 2: 0
2. FEELING DOWN, DEPRESSED OR HOPELESS: 0
SUM OF ALL RESPONSES TO PHQ QUESTIONS 1-9: 0

## 2023-10-23 ENCOUNTER — OFFICE VISIT (OUTPATIENT)
Age: 83
End: 2023-10-23
Payer: MEDICARE

## 2023-10-23 VITALS
DIASTOLIC BLOOD PRESSURE: 80 MMHG | HEIGHT: 73 IN | SYSTOLIC BLOOD PRESSURE: 130 MMHG | HEART RATE: 66 BPM | WEIGHT: 194 LBS | OXYGEN SATURATION: 95 % | BODY MASS INDEX: 25.71 KG/M2

## 2023-10-23 DIAGNOSIS — E78.00 PURE HYPERCHOLESTEROLEMIA, UNSPECIFIED: ICD-10-CM

## 2023-10-23 DIAGNOSIS — I10 ESSENTIAL (PRIMARY) HYPERTENSION: ICD-10-CM

## 2023-10-23 DIAGNOSIS — I48.19 PERSISTENT ATRIAL FIBRILLATION (HCC): Primary | ICD-10-CM

## 2023-10-23 DIAGNOSIS — R00.1 SYMPTOMATIC BRADYCARDIA: ICD-10-CM

## 2023-10-23 DIAGNOSIS — Z95.0 PRESENCE OF CARDIAC PACEMAKER: ICD-10-CM

## 2023-10-23 DIAGNOSIS — I25.10 CORONARY ARTERY DISEASE INVOLVING NATIVE CORONARY ARTERY OF NATIVE HEART WITHOUT ANGINA PECTORIS: ICD-10-CM

## 2023-10-23 DIAGNOSIS — N52.9 ERECTILE DYSFUNCTION, UNSPECIFIED ERECTILE DYSFUNCTION TYPE: ICD-10-CM

## 2023-10-23 PROCEDURE — G8417 CALC BMI ABV UP PARAM F/U: HCPCS | Performed by: INTERNAL MEDICINE

## 2023-10-23 PROCEDURE — 3075F SYST BP GE 130 - 139MM HG: CPT | Performed by: INTERNAL MEDICINE

## 2023-10-23 PROCEDURE — 1123F ACP DISCUSS/DSCN MKR DOCD: CPT | Performed by: INTERNAL MEDICINE

## 2023-10-23 PROCEDURE — G8484 FLU IMMUNIZE NO ADMIN: HCPCS | Performed by: INTERNAL MEDICINE

## 2023-10-23 PROCEDURE — 93288 INTERROG EVL PM/LDLS PM IP: CPT | Performed by: INTERNAL MEDICINE

## 2023-10-23 PROCEDURE — G8427 DOCREV CUR MEDS BY ELIG CLIN: HCPCS | Performed by: INTERNAL MEDICINE

## 2023-10-23 PROCEDURE — 3079F DIAST BP 80-89 MM HG: CPT | Performed by: INTERNAL MEDICINE

## 2023-10-23 PROCEDURE — 99214 OFFICE O/P EST MOD 30 MIN: CPT | Performed by: INTERNAL MEDICINE

## 2023-10-23 PROCEDURE — 93000 ELECTROCARDIOGRAM COMPLETE: CPT | Performed by: INTERNAL MEDICINE

## 2023-10-23 PROCEDURE — 1036F TOBACCO NON-USER: CPT | Performed by: INTERNAL MEDICINE

## 2023-10-23 RX ORDER — AMLODIPINE BESYLATE 10 MG/1
10 TABLET ORAL DAILY
COMMUNITY

## 2023-10-23 ASSESSMENT — PATIENT HEALTH QUESTIONNAIRE - PHQ9
SUM OF ALL RESPONSES TO PHQ9 QUESTIONS 1 & 2: 0
SUM OF ALL RESPONSES TO PHQ QUESTIONS 1-9: 0
2. FEELING DOWN, DEPRESSED OR HOPELESS: 0
1. LITTLE INTEREST OR PLEASURE IN DOING THINGS: 0
SUM OF ALL RESPONSES TO PHQ QUESTIONS 1-9: 0

## 2023-10-23 ASSESSMENT — ANXIETY QUESTIONNAIRES
5. BEING SO RESTLESS THAT IT IS HARD TO SIT STILL: 0
7. FEELING AFRAID AS IF SOMETHING AWFUL MIGHT HAPPEN: 0
GAD7 TOTAL SCORE: 0
2. NOT BEING ABLE TO STOP OR CONTROL WORRYING: 0
1. FEELING NERVOUS, ANXIOUS, OR ON EDGE: 0
6. BECOMING EASILY ANNOYED OR IRRITABLE: 0
4. TROUBLE RELAXING: 0
3. WORRYING TOO MUCH ABOUT DIFFERENT THINGS: 0

## 2023-10-23 ASSESSMENT — ENCOUNTER SYMPTOMS
ABDOMINAL PAIN: 0
COUGH: 0
SORE THROAT: 0
VOMITING: 0
SHORTNESS OF BREATH: 0
ABDOMINAL DISTENTION: 0
NAUSEA: 0

## 2023-10-23 NOTE — PROGRESS NOTES
Priscillaadan Avina presents today for   Chief Complaint   Patient presents with    Follow-up     6 month       Priscilla Avina preferred language for health care discussion is english/other. Is someone accompanying this pt? no    Is the patient using any DME equipment during OV? no    Depression Screening:  Depression: Not at risk (10/23/2023)    PHQ-2     PHQ-2 Score: 0        Learning Assessment:  Who is the primary learner? Patient    What is the preferred language for health care of the primary learner? ENGLISH    How does the primary learner prefer to learn new concepts? DEMONSTRATION    Answered By patient    Relationship to Learner SELF           Pt currently taking Anticoagulant therapy? Xarelto 20 mg daily    Pt currently taking Antiplatelet therapy ? Aspirin 81 mg daily      Coordination of Care:  1. Have you been to the ER, urgent care clinic since your last visit? Hospitalized since your last visit? no    2. Have you seen or consulted any other health care providers outside of the [address] [place] [address] since your last visit? Include any pap smears or colon screening.  no
Patient blood pressure remains well controlled on amlodipine as monotherapy. Dyslipidemia: Patient is now on Crestor 20 mg daily, Fish oil, and Zetia. His most recent lipid profile from June 2023 was excellent: Total cholesterol 102, HDL 37, LDL 49. Triglycerides 81. He can continue this regimen. Carotid artery disease. Patient has moderate bilateral carotid artery disease which was last assessed on carotid duplex  in September 2022. This is followed by vascular surgery. Infrarenal AAA. This was last evaluated by an ultrasound in January 2023. This remains small in size and being followed regular by vascular surgery. Erectile dysfunction. Patient continues to take sildenafil as needed. Have again cautioned not to take nitroglycerin within 24 hours of taking this medication. Low risk from cardiac standpoint to proceed with colonoscopy next month. He can stop his Xarelto 2 to 3 days prior to the procedure if necessary. [facility] device check in 3 months. Follow-up in the office in 6 months, sooner if needed.         Audrey Kwan MD

## 2023-11-02 ENCOUNTER — HOSPITAL ENCOUNTER (OUTPATIENT)
Facility: HOSPITAL | Age: 83
Discharge: HOME | End: 2023-11-02
Payer: MEDICARE

## 2023-11-02 ENCOUNTER — TELEPHONE (OUTPATIENT)
Facility: CLINIC | Age: 83
End: 2023-11-02

## 2023-11-02 DIAGNOSIS — R97.20 ELEVATED PSA: ICD-10-CM

## 2023-11-02 DIAGNOSIS — E78.5 HYPERLIPIDEMIA, UNSPECIFIED HYPERLIPIDEMIA TYPE: ICD-10-CM

## 2023-11-02 DIAGNOSIS — Z12.5 ENCOUNTER FOR SCREENING FOR MALIGNANT NEOPLASM OF PROSTATE: ICD-10-CM

## 2023-11-02 DIAGNOSIS — I10 ESSENTIAL (PRIMARY) HYPERTENSION: ICD-10-CM

## 2023-11-02 LAB
ALBUMIN SERPL-MCNC: 4.1 G/DL (ref 3.4–5)
ALBUMIN/GLOB SERPL: 1.4 (ref 0.8–1.7)
ALP SERPL-CCNC: 138 U/L (ref 45–117)
ALT SERPL-CCNC: 27 U/L (ref 16–61)
ANION GAP SERPL CALC-SCNC: 4 MMOL/L (ref 3–18)
AST SERPL-CCNC: 19 U/L (ref 10–38)
BILIRUB SERPL-MCNC: 2.1 MG/DL (ref 0.2–1)
BUN SERPL-MCNC: 11 MG/DL (ref 7–18)
BUN/CREAT SERPL: 10 (ref 12–20)
CALCIUM SERPL-MCNC: 8.9 MG/DL (ref 8.5–10.1)
CHLORIDE SERPL-SCNC: 109 MMOL/L (ref 100–111)
CHOLEST SERPL-MCNC: 147 MG/DL
CO2 SERPL-SCNC: 29 MMOL/L (ref 21–32)
CREAT SERPL-MCNC: 1.12 MG/DL (ref 0.6–1.3)
GLOBULIN SER CALC-MCNC: 2.9 G/DL (ref 2–4)
GLUCOSE SERPL-MCNC: 86 MG/DL (ref 74–99)
HDLC SERPL-MCNC: 37 MG/DL (ref 40–60)
HDLC SERPL: 4 (ref 0–5)
LDLC SERPL CALC-MCNC: 86.2 MG/DL (ref 0–100)
LIPID PANEL: ABNORMAL
POTASSIUM SERPL-SCNC: 4.4 MMOL/L (ref 3.5–5.5)
PROT SERPL-MCNC: 7 G/DL (ref 6.4–8.2)
PSA SERPL-MCNC: 6.9 NG/ML (ref 0–4)
SODIUM SERPL-SCNC: 142 MMOL/L (ref 136–145)
TRIGL SERPL-MCNC: 119 MG/DL
VLDLC SERPL CALC-MCNC: 23.8 MG/DL

## 2023-11-02 PROCEDURE — 36415 COLL VENOUS BLD VENIPUNCTURE: CPT

## 2023-11-02 PROCEDURE — 80061 LIPID PANEL: CPT

## 2023-11-02 PROCEDURE — G0103 PSA SCREENING: HCPCS

## 2023-11-02 PROCEDURE — 80053 COMPREHEN METABOLIC PANEL: CPT

## 2023-12-08 ENCOUNTER — OFFICE VISIT (OUTPATIENT)
Facility: CLINIC | Age: 83
End: 2023-12-08
Payer: MEDICARE

## 2023-12-08 VITALS
BODY MASS INDEX: 25.33 KG/M2 | OXYGEN SATURATION: 97 % | HEART RATE: 85 BPM | DIASTOLIC BLOOD PRESSURE: 77 MMHG | WEIGHT: 192 LBS | SYSTOLIC BLOOD PRESSURE: 129 MMHG | RESPIRATION RATE: 16 BRPM

## 2023-12-08 DIAGNOSIS — E78.5 HYPERLIPIDEMIA, UNSPECIFIED HYPERLIPIDEMIA TYPE: ICD-10-CM

## 2023-12-08 DIAGNOSIS — R97.20 ELEVATED PSA: ICD-10-CM

## 2023-12-08 DIAGNOSIS — Z12.11 SCREEN FOR COLON CANCER: ICD-10-CM

## 2023-12-08 DIAGNOSIS — I10 ESSENTIAL (PRIMARY) HYPERTENSION: Primary | ICD-10-CM

## 2023-12-08 PROCEDURE — 99214 OFFICE O/P EST MOD 30 MIN: CPT | Performed by: FAMILY MEDICINE

## 2023-12-08 PROCEDURE — G8427 DOCREV CUR MEDS BY ELIG CLIN: HCPCS | Performed by: FAMILY MEDICINE

## 2023-12-08 PROCEDURE — G8484 FLU IMMUNIZE NO ADMIN: HCPCS | Performed by: FAMILY MEDICINE

## 2023-12-08 PROCEDURE — 3074F SYST BP LT 130 MM HG: CPT | Performed by: FAMILY MEDICINE

## 2023-12-08 PROCEDURE — 3078F DIAST BP <80 MM HG: CPT | Performed by: FAMILY MEDICINE

## 2023-12-08 PROCEDURE — G8417 CALC BMI ABV UP PARAM F/U: HCPCS | Performed by: FAMILY MEDICINE

## 2023-12-08 PROCEDURE — 1123F ACP DISCUSS/DSCN MKR DOCD: CPT | Performed by: FAMILY MEDICINE

## 2023-12-08 PROCEDURE — 1036F TOBACCO NON-USER: CPT | Performed by: FAMILY MEDICINE

## 2023-12-08 RX ORDER — EZETIMIBE 10 MG/1
10 TABLET ORAL DAILY
Qty: 90 TABLET | Refills: 3 | Status: SHIPPED | OUTPATIENT
Start: 2023-12-08

## 2023-12-08 RX ORDER — AMLODIPINE BESYLATE 10 MG/1
10 TABLET ORAL DAILY
Qty: 90 TABLET | Refills: 3 | Status: SHIPPED | OUTPATIENT
Start: 2023-12-08

## 2023-12-08 NOTE — PROGRESS NOTES
Chief Complaint   Patient presents with    Hypertension    Cholesterol Problem    Discuss Labs     11/2/23        Vitals:    12/08/23 0916   BP: 129/77   Pulse: 85   Resp: 16   SpO2: 97%        Depression: Not at risk (10/23/2023)    PHQ-2     PHQ-2 Score: 0             No data to display                 . \"Have you been to the ER, urgent care clinic since your last visit? Hospitalized since your last visit? \" No     2. \"Have you seen or consulted any other health care providers outside of the [address] [place] [address] since your last visit? \" No      3. For patients aged 43-73: Has the patient had a colonoscopy / FIT/ Cologuard?  No
Left Ear: External ear normal.      Nose: Nose normal.      Mouth/Throat:      Mouth: Mucous membranes are moist.   Eyes:      Extraocular Movements: Extraocular movements intact. Conjunctiva/sclera: Conjunctivae normal.   Cardiovascular:      Rate and Rhythm: Normal rate and regular rhythm. Heart sounds: No murmur heard. No friction rub. No gallop. Pulmonary:      Effort: Pulmonary effort is normal.      Breath sounds: Normal breath sounds. No wheezing, rhonchi or rales. Musculoskeletal:         General: Normal range of motion. Cervical back: Normal range of motion. Skin:     General: Skin is warm and dry. Neurological:      Mental Status: He is alert and oriented to person, place, and time. Coordination: Coordination normal.   Psychiatric:         Mood and Affect: Mood normal.         Behavior: Behavior normal.         Thought Content: Thought content normal.         Judgment: Judgment normal.                     An electronic signature was used to authenticate this note.     --Matty Ralph MD

## 2023-12-13 PROBLEM — R97.20 ELEVATED PSA: Chronic | Status: ACTIVE | Noted: 2023-03-07

## 2023-12-13 ASSESSMENT — ENCOUNTER SYMPTOMS: RESPIRATORY NEGATIVE: 1

## 2023-12-19 LAB — HEMOCCULT STL QL IA: NEGATIVE

## 2024-01-02 ENCOUNTER — OFFICE VISIT (OUTPATIENT)
Age: 84
End: 2024-01-02
Payer: MEDICARE

## 2024-01-02 DIAGNOSIS — I65.23 CAROTID STENOSIS, ASYMPTOMATIC, BILATERAL: Primary | ICD-10-CM

## 2024-01-02 DIAGNOSIS — I71.43 INFRARENAL ABDOMINAL AORTIC ANEURYSM, WITHOUT RUPTURE (HCC): ICD-10-CM

## 2024-01-02 PROCEDURE — G8417 CALC BMI ABV UP PARAM F/U: HCPCS | Performed by: STUDENT IN AN ORGANIZED HEALTH CARE EDUCATION/TRAINING PROGRAM

## 2024-01-02 PROCEDURE — G8428 CUR MEDS NOT DOCUMENT: HCPCS | Performed by: STUDENT IN AN ORGANIZED HEALTH CARE EDUCATION/TRAINING PROGRAM

## 2024-01-02 PROCEDURE — 1123F ACP DISCUSS/DSCN MKR DOCD: CPT | Performed by: STUDENT IN AN ORGANIZED HEALTH CARE EDUCATION/TRAINING PROGRAM

## 2024-01-02 PROCEDURE — 99212 OFFICE O/P EST SF 10 MIN: CPT | Performed by: STUDENT IN AN ORGANIZED HEALTH CARE EDUCATION/TRAINING PROGRAM

## 2024-01-02 PROCEDURE — 1036F TOBACCO NON-USER: CPT | Performed by: STUDENT IN AN ORGANIZED HEALTH CARE EDUCATION/TRAINING PROGRAM

## 2024-01-02 PROCEDURE — G8484 FLU IMMUNIZE NO ADMIN: HCPCS | Performed by: STUDENT IN AN ORGANIZED HEALTH CARE EDUCATION/TRAINING PROGRAM

## 2024-01-02 NOTE — PROGRESS NOTES
Telephone Encounter - 15 mins    1/2/24  I called Mr. Negron. He remains asymptomatic and is doing well. Denies any symptoms of TIA, stroke, chest pain, abdominal nor any leg pain. Carotid duplex was reviewed and patient is medically optimized.     Plan for follow up in the office in 6 months with carotid duplex and abdominal US.     Carotid duplex:    Severe (>70%) stenosis in the right internal carotid artery by velocity and ratio criteria.  Calcific and eccentric plaque in the right internal carotid artery.    Moderate (50-69%) stenosis in the left internal carotid artery.  Calcific and eccentric plaque in the left internal carotid artery.    Common carotid velocities are low and may underestimate severity of disease bilaterally secondary to possible cardiac output issues.    Normal antegrade flow in the bilateral vertebral arteries.    Multiphasic subclavian arteries bilaterally.    When compared with previous exam dated 09/13/2023, there is no significant change.    9/12/23  Patient seen and evaluated for carotid stenosis and AAA infrarenal. There continues to be progression of disease with 75% on the right and 60% on the left. In terms of velocities over a year the right has progressed from 210 to 230s which is at the 70%. In terms of the plaque characteristics, no evidence of ulceration or soft plaque. I recommend repeat carotid duplex in 3 months and will continue to monitor this asymptomatic carotid stenosis of 70-75%.      10/12/22  Vahe Negron is a 82 y.o. male here for follow up for bilateral carotid stenosis and AAA. Patient remains asymptomatic with no active medical issues currently.   ROS is wnl  Exam is normal     Carotid duplex reviewed which shows mild to moderate progression of left carotid stenosis with moderate on the right. Will continue on ASA and statin for medical optimization.       AAA US shows saccular aneurysmal change at the infrarenal abdominal aorta. I discussed at length that

## 2024-01-10 ENCOUNTER — NURSE ONLY (OUTPATIENT)
Age: 84
End: 2024-01-10
Payer: MEDICARE

## 2024-01-10 DIAGNOSIS — R00.1 SYMPTOMATIC BRADYCARDIA: ICD-10-CM

## 2024-01-10 DIAGNOSIS — Z95.0 PRESENCE OF CARDIAC PACEMAKER: Primary | ICD-10-CM

## 2024-01-10 DIAGNOSIS — I48.19 PERSISTENT ATRIAL FIBRILLATION (HCC): ICD-10-CM

## 2024-01-15 PROCEDURE — 93296 REM INTERROG EVL PM/IDS: CPT | Performed by: INTERNAL MEDICINE

## 2024-01-15 PROCEDURE — 93294 REM INTERROG EVL PM/LDLS PM: CPT | Performed by: INTERNAL MEDICINE

## 2024-02-20 ENCOUNTER — TELEPHONE (OUTPATIENT)
Facility: CLINIC | Age: 84
End: 2024-02-20

## 2024-02-20 NOTE — TELEPHONE ENCOUNTER
Patient came into office requesting a refill on his   nitroGLYCERIN (NITROSTAT) 0.4 MG SL tablet [[id number] please send over to the [facility] [place], [place]

## 2024-02-21 ENCOUNTER — TELEPHONE (OUTPATIENT)
Facility: CLINIC | Age: 84
End: 2024-02-21

## 2024-02-21 NOTE — TELEPHONE ENCOUNTER
Patient need a medication refill for Nitroglycerin and he would like the prescription printed out and called when ready for  he will take it to a pharmacy. Please advise

## 2024-02-22 DIAGNOSIS — I25.119 ATHEROSCLEROSIS OF NATIVE CORONARY ARTERY OF NATIVE HEART WITH ANGINA PECTORIS (HCC): ICD-10-CM

## 2024-02-22 RX ORDER — NITROGLYCERIN 0.4 MG/1
0.4 TABLET SUBLINGUAL EVERY 5 MIN PRN
Qty: 25 TABLET | Refills: 5 | Status: SHIPPED | OUTPATIENT
Start: 2024-02-22 | End: 2025-03-30

## 2024-02-22 NOTE — TELEPHONE ENCOUNTER
Pt states he lost his 9/6/23 printed rx  for nitroglycerin   Pt is requesting printed copy once again .

## 2024-02-22 NOTE — TELEPHONE ENCOUNTER
Patient came into office requesting medication to be sent over to [facility] pharmacy listed in chart already. Patient is requesting a call back once medication has been sent over.

## 2024-03-07 PROBLEM — I25.119 ATHEROSCLEROSIS OF NATIVE CORONARY ARTERY OF NATIVE HEART WITH ANGINA PECTORIS (HCC): Status: ACTIVE | Noted: 2024-03-07

## 2024-03-08 ENCOUNTER — TELEPHONE (OUTPATIENT)
Facility: CLINIC | Age: 84
End: 2024-03-08

## 2024-03-08 ENCOUNTER — OFFICE VISIT (OUTPATIENT)
Facility: CLINIC | Age: 84
End: 2024-03-08

## 2024-03-08 VITALS
HEIGHT: 72 IN | RESPIRATION RATE: 14 BRPM | TEMPERATURE: 97.1 F | DIASTOLIC BLOOD PRESSURE: 81 MMHG | WEIGHT: 192 LBS | OXYGEN SATURATION: 99 % | BODY MASS INDEX: 26.01 KG/M2 | HEART RATE: 82 BPM | SYSTOLIC BLOOD PRESSURE: 133 MMHG

## 2024-03-08 DIAGNOSIS — E78.5 HYPERLIPIDEMIA, UNSPECIFIED HYPERLIPIDEMIA TYPE: ICD-10-CM

## 2024-03-08 DIAGNOSIS — R05.1 ACUTE COUGH: ICD-10-CM

## 2024-03-08 DIAGNOSIS — R09.81 NASAL CONGESTION: ICD-10-CM

## 2024-03-08 DIAGNOSIS — J30.9 ALLERGIC RHINITIS, UNSPECIFIED SEASONALITY, UNSPECIFIED TRIGGER: Primary | ICD-10-CM

## 2024-03-08 DIAGNOSIS — R42 DIZZINESS: ICD-10-CM

## 2024-03-08 LAB
EXP DATE SOLUTION: NORMAL
EXP DATE SWAB: NORMAL
EXPIRATION DATE: NORMAL
INFLUENZA A ANTIGEN, POC: NEGATIVE
INFLUENZA B ANTIGEN, POC: NEGATIVE
LOT NUMBER POC: NORMAL
LOT NUMBER SOLUTION: NORMAL
LOT NUMBER SWAB: NORMAL
SARS-COV-2 RNA, POC: NEGATIVE
VALID INTERNAL CONTROL, POC: 0

## 2024-03-08 RX ORDER — ROSUVASTATIN CALCIUM 20 MG/1
20 TABLET, COATED ORAL DAILY
Qty: 90 TABLET | Refills: 4 | Status: SHIPPED | OUTPATIENT
Start: 2024-03-08

## 2024-03-08 RX ORDER — BENZONATATE 200 MG/1
200 CAPSULE ORAL 3 TIMES DAILY PRN
Qty: 30 CAPSULE | Refills: 1 | OUTPATIENT
Start: 2024-03-08 | End: 2024-03-08 | Stop reason: SDUPTHER

## 2024-03-08 RX ORDER — FLUTICASONE PROPIONATE 50 MCG
2 SPRAY, SUSPENSION (ML) NASAL DAILY
Qty: 16 G | Refills: 12 | OUTPATIENT
Start: 2024-03-08 | End: 2024-03-08 | Stop reason: SDUPTHER

## 2024-03-08 RX ORDER — BENZONATATE 200 MG/1
200 CAPSULE ORAL 3 TIMES DAILY PRN
Qty: 30 CAPSULE | Refills: 1 | Status: SHIPPED | OUTPATIENT
Start: 2024-03-08

## 2024-03-08 RX ORDER — FLUTICASONE PROPIONATE 50 MCG
2 SPRAY, SUSPENSION (ML) NASAL DAILY
Qty: 16 G | Refills: 12 | Status: SHIPPED | OUTPATIENT
Start: 2024-03-08

## 2024-03-08 SDOH — ECONOMIC STABILITY: FOOD INSECURITY: WITHIN THE PAST 12 MONTHS, THE FOOD YOU BOUGHT JUST DIDN'T LAST AND YOU DIDN'T HAVE MONEY TO GET MORE.: NEVER TRUE

## 2024-03-08 SDOH — ECONOMIC STABILITY: FOOD INSECURITY: WITHIN THE PAST 12 MONTHS, YOU WORRIED THAT YOUR FOOD WOULD RUN OUT BEFORE YOU GOT MONEY TO BUY MORE.: NEVER TRUE

## 2024-03-08 SDOH — ECONOMIC STABILITY: INCOME INSECURITY: HOW HARD IS IT FOR YOU TO PAY FOR THE VERY BASICS LIKE FOOD, HOUSING, MEDICAL CARE, AND HEATING?: NOT HARD AT ALL

## 2024-03-08 ASSESSMENT — PATIENT HEALTH QUESTIONNAIRE - PHQ9
2. FEELING DOWN, DEPRESSED OR HOPELESS: 0
SUM OF ALL RESPONSES TO PHQ QUESTIONS 1-9: 0
1. LITTLE INTEREST OR PLEASURE IN DOING THINGS: 0
SUM OF ALL RESPONSES TO PHQ9 QUESTIONS 1 & 2: 0
SUM OF ALL RESPONSES TO PHQ QUESTIONS 1-9: 0

## 2024-03-08 ASSESSMENT — ENCOUNTER SYMPTOMS: COUGH: 1

## 2024-03-08 NOTE — PROGRESS NOTES
Vahe Negron presents today for   Chief Complaint   Patient presents with    Cough     2 weeks onset with clod symptoms. He states that he been feeling tired, congestion, fatigue, coughing yellowish sputum, right side of pain aches. Patient denies fever, chills, and sore throat.        Is someone accompanying this pt? no    Is the patient using any DME equipment during OV? no    Depression Screening:      3/8/2024     9:09 AM 10/23/2023     3:10 PM 9/21/2023    10:18 AM 4/4/2023     1:43 PM 3/28/2023     9:40 AM 3/7/2023     8:57 AM 12/7/2022     8:39 AM   PHQ-9 Questionaire   Little interest or pleasure in doing things 0 0 0 0 0 0 0   Feeling down, depressed, or hopeless 0 0 0 0 0 0 0   PHQ-9 Total Score 0 0 0 0 0 0 0        NAVARRO 7-Anxiety       10/23/2023     3:10 PM 4/4/2023     1:43 PM   NAVARRO-7 SCREENING   Feeling nervous, anxious, or on edge Not at all Not at all   Not being able to stop or control worrying Not at all Not at all   Worrying too much about different things Not at all Not at all   Trouble relaxing Not at all Not at all   Being so restless that it is hard to sit still Not at all Not at all   Becoming easily annoyed or irritable Not at all Not at all   Feeling afraid as if something awful might happen Not at all Not at all   NAVARRO-7 Total Score 0 0          Learning Assessment:  No question data found.     Fall Risk       No data to display                   Travel Screening:    Travel Screening       Question Response    Have you been in contact with someone who was sick? No / Unsure    Do you have any of the following new or worsening symptoms? None of these    Have you traveled internationally or domestically in the last month? No          Travel History   Travel since 02/08/24    No documented travel since 02/08/24          Health Maintenance reviewed and discussed and ordered per Provider.  Transportation Needs: Unknown (3/8/2024)    PRAPARE - Transportation     Lack of Transportation 
Appearance: Normal appearance.   HENT:      Head: Normocephalic and atraumatic.      Right Ear: Hearing, tympanic membrane, ear canal and external ear normal.      Left Ear: Hearing, tympanic membrane, ear canal and external ear normal.      Nose: Congestion present. No rhinorrhea.      Right Sinus: No maxillary sinus tenderness or frontal sinus tenderness.      Left Sinus: No maxillary sinus tenderness or frontal sinus tenderness.      Mouth/Throat:      Mouth: Mucous membranes are moist.      Pharynx: Oropharynx is clear. No pharyngeal swelling, oropharyngeal exudate or posterior oropharyngeal erythema.   Eyes:      Extraocular Movements: Extraocular movements intact.      Conjunctiva/sclera: Conjunctivae normal.   Cardiovascular:      Rate and Rhythm: Normal rate and regular rhythm.      Heart sounds: No murmur heard.     No friction rub. No gallop.   Pulmonary:      Effort: Pulmonary effort is normal.      Breath sounds: Normal breath sounds. No wheezing, rhonchi or rales.   Musculoskeletal:         General: Normal range of motion.      Cervical back: Normal range of motion.   Lymphadenopathy:      Cervical: No cervical adenopathy.   Skin:     General: Skin is warm and dry.   Neurological:      Mental Status: He is alert and oriented to person, place, and time.      Coordination: Coordination normal.   Psychiatric:         Mood and Affect: Mood normal.         Behavior: Behavior normal.         Thought Content: Thought content normal.         Judgment: Judgment normal.                  An electronic signature was used to authenticate this note.    --Loida Molina MD

## 2024-03-08 NOTE — TELEPHONE ENCOUNTER
Patient came in requesting a refill on his   rosuvastatin (CRESTOR) 20 MG tablet [[id number]] please send to the [facility] pharmacy in chart

## 2024-03-08 NOTE — TELEPHONE ENCOUNTER
Patient patient to get scheduled for a 2 week routine care follow appointment. Called both numbers left voicemail for patient to give the office a call back

## 2024-04-09 ENCOUNTER — TELEPHONE (OUTPATIENT)
Facility: CLINIC | Age: 84
End: 2024-04-09

## 2024-04-09 NOTE — TELEPHONE ENCOUNTER
Called patient twice to get scheduled no answer left voicemail for patient to give the office a call back. Will attempt to call patient a third time per protocol.

## 2024-04-11 ENCOUNTER — TELEPHONE (OUTPATIENT)
Facility: CLINIC | Age: 84
End: 2024-04-11

## 2024-04-11 NOTE — TELEPHONE ENCOUNTER
Attempt to reach patient none working number not able to reach patient. I will be generating letter to be mailed out to patient today.

## 2024-04-22 DIAGNOSIS — E78.5 HYPERLIPIDEMIA, UNSPECIFIED HYPERLIPIDEMIA TYPE: ICD-10-CM

## 2024-04-22 NOTE — TELEPHONE ENCOUNTER
Patient came in office requesting a refill on his  rosuvastatin (CRESTOR) 20 MG tablet [[id number]]  moving forward please send all medications to [facility] [place] [address]

## 2024-04-24 RX ORDER — ROSUVASTATIN CALCIUM 20 MG/1
20 TABLET, COATED ORAL DAILY
Qty: 90 TABLET | Refills: 0 | Status: SHIPPED | OUTPATIENT
Start: 2024-04-24 | End: 2024-04-25 | Stop reason: SDUPTHER

## 2024-04-24 NOTE — TELEPHONE ENCOUNTER
Last seen 3/8/2024   Last labs 11/2/23  Last filled  3/8/24 but is changing pharmacies   Next appointment 5/30/2024     Last Assessment & Plan Note   Written:Loida Molina MD12/13/2023 8:51 PM     Well-controlled, continue current medications     Lab Results   Component Value Date     11/02/2023    K 4.4 11/02/2023     11/02/2023    CO2 29 11/02/2023    BUN 11 11/02/2023    CREATININE 1.12 11/02/2023    GLUCOSE 86 11/02/2023    CALCIUM 8.9 11/02/2023    PROT 7.0 11/02/2023    BILITOT 2.1 (H) 11/02/2023    ALKPHOS 138 (H) 11/02/2023    AST 19 11/02/2023    ALT 27 11/02/2023    LABGLOM >60 11/02/2023    GFRAA >60 05/05/2022    AGRATIO 1.4 11/02/2023    GLOB 2.9 11/02/2023

## 2024-04-25 ENCOUNTER — OFFICE VISIT (OUTPATIENT)
Age: 84
End: 2024-04-25
Payer: MEDICARE

## 2024-04-25 VITALS
BODY MASS INDEX: 25.47 KG/M2 | OXYGEN SATURATION: 97 % | WEIGHT: 188 LBS | DIASTOLIC BLOOD PRESSURE: 72 MMHG | HEART RATE: 66 BPM | SYSTOLIC BLOOD PRESSURE: 126 MMHG | HEIGHT: 72 IN

## 2024-04-25 DIAGNOSIS — I10 ESSENTIAL (PRIMARY) HYPERTENSION: ICD-10-CM

## 2024-04-25 DIAGNOSIS — I25.10 CORONARY ARTERY DISEASE INVOLVING NATIVE CORONARY ARTERY OF NATIVE HEART WITHOUT ANGINA PECTORIS: ICD-10-CM

## 2024-04-25 DIAGNOSIS — N52.9 ERECTILE DYSFUNCTION, UNSPECIFIED ERECTILE DYSFUNCTION TYPE: ICD-10-CM

## 2024-04-25 DIAGNOSIS — R00.1 SYMPTOMATIC BRADYCARDIA: ICD-10-CM

## 2024-04-25 DIAGNOSIS — Z95.0 PRESENCE OF CARDIAC PACEMAKER: ICD-10-CM

## 2024-04-25 DIAGNOSIS — E78.00 PURE HYPERCHOLESTEROLEMIA, UNSPECIFIED: ICD-10-CM

## 2024-04-25 DIAGNOSIS — I48.19 PERSISTENT ATRIAL FIBRILLATION (HCC): Primary | ICD-10-CM

## 2024-04-25 PROCEDURE — G8417 CALC BMI ABV UP PARAM F/U: HCPCS | Performed by: INTERNAL MEDICINE

## 2024-04-25 PROCEDURE — 99214 OFFICE O/P EST MOD 30 MIN: CPT | Performed by: INTERNAL MEDICINE

## 2024-04-25 PROCEDURE — 3074F SYST BP LT 130 MM HG: CPT | Performed by: INTERNAL MEDICINE

## 2024-04-25 PROCEDURE — 3078F DIAST BP <80 MM HG: CPT | Performed by: INTERNAL MEDICINE

## 2024-04-25 PROCEDURE — 93000 ELECTROCARDIOGRAM COMPLETE: CPT | Performed by: INTERNAL MEDICINE

## 2024-04-25 PROCEDURE — 1036F TOBACCO NON-USER: CPT | Performed by: INTERNAL MEDICINE

## 2024-04-25 PROCEDURE — G8427 DOCREV CUR MEDS BY ELIG CLIN: HCPCS | Performed by: INTERNAL MEDICINE

## 2024-04-25 PROCEDURE — 1123F ACP DISCUSS/DSCN MKR DOCD: CPT | Performed by: INTERNAL MEDICINE

## 2024-04-25 RX ORDER — ROSUVASTATIN CALCIUM 20 MG/1
20 TABLET, COATED ORAL DAILY
Qty: 90 TABLET | Refills: 0 | Status: SHIPPED | OUTPATIENT
Start: 2024-04-25

## 2024-04-25 ASSESSMENT — PATIENT HEALTH QUESTIONNAIRE - PHQ9
SUM OF ALL RESPONSES TO PHQ QUESTIONS 1-9: 0
SUM OF ALL RESPONSES TO PHQ9 QUESTIONS 1 & 2: 0
2. FEELING DOWN, DEPRESSED OR HOPELESS: NOT AT ALL
1. LITTLE INTEREST OR PLEASURE IN DOING THINGS: NOT AT ALL

## 2024-04-25 ASSESSMENT — ENCOUNTER SYMPTOMS
VOMITING: 0
SORE THROAT: 0
NAUSEA: 0
SHORTNESS OF BREATH: 0
ABDOMINAL DISTENTION: 0
ABDOMINAL PAIN: 0
COUGH: 0

## 2024-04-25 NOTE — PROGRESS NOTES
04/25/24     Vahe Negron  is a 84 y.o. male     Chief Complaint   Patient presents with    Follow-up     6 months       HPI    Patient presents for a follow-up office visit.  He has a known history of coronary artery disease, status post a non-ST elevation myocardial infarction in 2009.  He underwent a cardiac catheterization at that time and was found to have moderate, but nonobstructive two-vessel coronary disease involving his left circumflex and a right-sided posterior descending artery, that were assessed by pressure wire.  The patient has been maintained on medical therapy since that time.  The patient last underwent a pharmacological nuclear stress test in December 2015, which was a normal study, showing no ischemia, normal left ventricle ejection fraction. EF 67%.  He has a history of mild to moderate carotid artery disease,  last evaluated with a carotid duplex in August 2018 which demonstrated moderate carotid disease of the left ICA and mild plaquing of the right.  He also underwent an abdominal ultrasound which showed a very small infrarenal AAA.      Patient last underwent a pharmacologic nuclear stress test in November 2019 which was a normal and low risk study.  He recently underwent follow-up abdominal ultrasound and carotid duplex scan in August 2020 which showed a small AAA and moderate bilateral carotid artery disease essentially unchanged compared to his previous studies.      Patient was diagnosed with new onset atrial fibrillation at last office visit in April 2021.  His rates were on the slow side without any rate slowing agents.  He underwent an echocardiogram in May 2021 which showed preserved LV systolic function, EF 55 to 60%, mild concentric LVH, severe left atrial enlargement, diastolic dysfunction, mild tricuspid regurgitation with normal PA pressure.   He was started on Xarelto for oral anticoagulation.    He was diagnosed with symptomatic bradycardia in 2022, so he underwent

## 2024-04-25 NOTE — PROGRESS NOTES
Vahe Negron presents today for   Chief Complaint   Patient presents with    Follow-up     6 months       Vahe Negron preferred language for health care discussion is english/other.    Is someone accompanying this pt? no    Is the patient using any DME equipment during OV? no    Depression Screening:  Depression: Not at risk (4/25/2024)    PHQ-2     PHQ-2 Score: 0        Learning Assessment:  Who is the primary learner? Patient    What is the preferred language for health care of the primary learner? ENGLISH    How does the primary learner prefer to learn new concepts? DEMONSTRATION    Answered By patient    Relationship to Learner SELF           Pt currently taking Anticoagulant therapy? no    Pt currently taking Antiplatelet therapy ? Aspirin  xarelto      Coordination of Care:  1. Have you been to the ER, urgent care clinic since your last visit? Hospitalized since your last visit? no    2. Have you seen or consulted any other health care providers outside of the [facility] since your last visit? Include any pap smears or colon screening. no

## 2024-05-30 ENCOUNTER — OFFICE VISIT (OUTPATIENT)
Facility: CLINIC | Age: 84
End: 2024-05-30

## 2024-05-30 VITALS
BODY MASS INDEX: 25.63 KG/M2 | TEMPERATURE: 97.6 F | HEIGHT: 72 IN | DIASTOLIC BLOOD PRESSURE: 79 MMHG | SYSTOLIC BLOOD PRESSURE: 128 MMHG | OXYGEN SATURATION: 96 % | WEIGHT: 189.2 LBS | RESPIRATION RATE: 12 BRPM | HEART RATE: 87 BPM

## 2024-05-30 DIAGNOSIS — I10 ESSENTIAL (PRIMARY) HYPERTENSION: ICD-10-CM

## 2024-05-30 DIAGNOSIS — R97.20 ELEVATED PSA: ICD-10-CM

## 2024-05-30 DIAGNOSIS — I25.119 ATHEROSCLEROSIS OF NATIVE CORONARY ARTERY OF NATIVE HEART WITH ANGINA PECTORIS (HCC): ICD-10-CM

## 2024-05-30 DIAGNOSIS — R26.89 BALANCE DISORDER: ICD-10-CM
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
## 2024-05-30 DIAGNOSIS — I48.19 PERSISTENT ATRIAL FIBRILLATION (HCC): ICD-10-CM

## 2024-05-30 DIAGNOSIS — Z12.5 ENCOUNTER FOR SCREENING FOR MALIGNANT NEOPLASM OF PROSTATE: ICD-10-CM

## 2024-05-30 DIAGNOSIS — E78.5 HYPERLIPIDEMIA, UNSPECIFIED HYPERLIPIDEMIA TYPE: Primary | ICD-10-CM

## 2024-05-30 SDOH — ECONOMIC STABILITY: INCOME INSECURITY: HOW HARD IS IT FOR YOU TO PAY FOR THE VERY BASICS LIKE FOOD, HOUSING, MEDICAL CARE, AND HEATING?: NOT HARD AT ALL

## 2024-05-30 SDOH — ECONOMIC STABILITY: FOOD INSECURITY: WITHIN THE PAST 12 MONTHS, THE FOOD YOU BOUGHT JUST DIDN'T LAST AND YOU DIDN'T HAVE MONEY TO GET MORE.: NEVER TRUE

## 2024-05-30 SDOH — ECONOMIC STABILITY: FOOD INSECURITY: WITHIN THE PAST 12 MONTHS, YOU WORRIED THAT YOUR FOOD WOULD RUN OUT BEFORE YOU GOT MONEY TO BUY MORE.: NEVER TRUE

## 2024-05-30 ASSESSMENT — PATIENT HEALTH QUESTIONNAIRE - PHQ9
SUM OF ALL RESPONSES TO PHQ QUESTIONS 1-9: 0
SUM OF ALL RESPONSES TO PHQ9 QUESTIONS 1 & 2: 0
1. LITTLE INTEREST OR PLEASURE IN DOING THINGS: NOT AT ALL
SUM OF ALL RESPONSES TO PHQ QUESTIONS 1-9: 0
2. FEELING DOWN, DEPRESSED OR HOPELESS: NOT AT ALL

## 2024-05-30 ASSESSMENT — ENCOUNTER SYMPTOMS: RESPIRATORY NEGATIVE: 1

## 2024-05-30 NOTE — PROGRESS NOTES
ASSESSMENT/PLAN:  1. Hyperlipidemia, unspecified hyperlipidemia type  -     Lipid Panel; Future  -     Comprehensive Metabolic Panel; Future  2. Essential (primary) hypertension  Assessment & Plan:   Well-controlled, continue current medications  Orders:  -     Lipid Panel; Future  -     Comprehensive Metabolic Panel; Future  3. 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 Persistent atrial fibrillation (HCC)  -     Lipid Panel; Future  -     Comprehensive Metabolic Panel; Future  5. Elevated PSA  -     PSA Screening; Future  6. Balance disorder  -     BS - [facility]  7. Fall, initial encounter  -     BS - [facility]  8. Encounter for screening for malignant neoplasm of prostate  -     PSA Screening; Future        Return in about 3 months (around 8/30/2024) for HTN, HLD, CAD, specialist eval, lab review.      SUBJECTIVE/OBJECTIVE:    Chief Complaint   Patient presents with    Cholesterol Problem    Hypertension    Other     Atherosclerosis of native coronary artery of native heart with angina pectoris    Atrial Fibrillation         HPI    Vahe Negron is a 84 y.o. male presenting today for follow up of htn, hld, elevated psa.    Pt has not had f/u with uro.    Pt had recent f/u with cards for cad, hld, pafib.    Patient does not need medication refills today.      New concerns today: pt reports a recent fall.  He feels off balance while walking frequently.  He is concerned about this.          Review of Systems   Constitutional: Negative.    HENT: Negative.     Respiratory: Negative.     Cardiovascular: Negative.    Musculoskeletal:  Positive for gait problem.   All other systems reviewed and are negative.      Physical Exam  Vitals and nursing note reviewed.   Constitutional:       General: He is not in acute 
Running Out of Food in the Last Year: Never true     Ran Out of Food in the Last Year: Never true     Financial Resource Strain: Low Risk  (5/30/2024)    Overall Financial Resource Strain (CARDIA)     Difficulty of Paying Living Expenses: Not hard at all     Housing Stability: Unknown (5/30/2024)    Housing Stability Vital Sign     Unable to Pay for Housing in the Last Year: Not on file     Number of Places Lived in the Last Year: Not on file     Unstable Housing in the Last Year: No       Did you provide resources if patient requested them? Yes patient declined       Health Maintenance Due   Topic Date Due    Respiratory Syncytial Virus (RSV) Pregnant or age 60 yrs+ (1 - 1-dose 60+ series) Never done    DTaP/Tdap/Td vaccine (1 - Tdap) 08/22/2006    COVID-19 Vaccine (4 - 2023-24 season) 09/01/2023    Annual Wellness Visit (Medicare)  03/28/2024   .      \"Have you been to the ER, urgent care clinic since your last visit?  Hospitalized since your last visit?\"    NO    “Have you seen or consulted any other health care providers outside of [facility] since your last visit?”    NO

## 2024-06-03 ENCOUNTER — HOSPITAL ENCOUNTER (OUTPATIENT)
Facility: HOSPITAL | Age: 84
Discharge: HOME | End: 2024-06-06
Payer: MEDICARE

## 2024-06-03 DIAGNOSIS — E78.5 HYPERLIPIDEMIA, UNSPECIFIED HYPERLIPIDEMIA TYPE: ICD-10-CM

## 2024-06-03 DIAGNOSIS — Z12.5 ENCOUNTER FOR SCREENING FOR MALIGNANT NEOPLASM OF PROSTATE: ICD-10-CM

## 2024-06-03 DIAGNOSIS — I10 ESSENTIAL (PRIMARY) HYPERTENSION: ICD-10-CM

## 2024-06-03 DIAGNOSIS — I25.119 ATHEROSCLEROSIS OF NATIVE CORONARY ARTERY OF NATIVE HEART WITH ANGINA PECTORIS (HCC): ICD-10-CM

## 2024-06-03 DIAGNOSIS — R97.20 ELEVATED PSA: ICD-10-CM

## 2024-06-03 DIAGNOSIS — I48.19 PERSISTENT ATRIAL FIBRILLATION (HCC): ICD-10-CM

## 2024-06-03 LAB
ALBUMIN SERPL-MCNC: 3.7 G/DL (ref 3.4–5)
ALBUMIN/GLOB SERPL: 1.3 (ref 0.8–1.7)
ALP SERPL-CCNC: 117 U/L (ref 45–117)
ALT SERPL-CCNC: 30 U/L (ref 16–61)
ANION GAP SERPL CALC-SCNC: 5 MMOL/L (ref 3–18)
AST SERPL-CCNC: 27 U/L (ref 10–38)
BILIRUB SERPL-MCNC: 2.2 MG/DL (ref 0.2–1)
BUN SERPL-MCNC: 10 MG/DL (ref 7–18)
BUN/CREAT SERPL: 10 (ref 12–20)
CALCIUM SERPL-MCNC: 9.5 MG/DL (ref 8.5–10.1)
CHLORIDE SERPL-SCNC: 109 MMOL/L (ref 100–111)
CHOLEST SERPL-MCNC: 102 MG/DL
CO2 SERPL-SCNC: 26 MMOL/L (ref 21–32)
CREAT SERPL-MCNC: 0.99 MG/DL (ref 0.6–1.3)
GLOBULIN SER CALC-MCNC: 2.9 G/DL (ref 2–4)
GLUCOSE SERPL-MCNC: 98 MG/DL (ref 74–99)
HDLC SERPL-MCNC: 36 MG/DL (ref 40–60)
HDLC SERPL: 2.8 (ref 0–5)
LDLC SERPL CALC-MCNC: 51 MG/DL (ref 0–100)
LIPID PANEL: ABNORMAL
POTASSIUM SERPL-SCNC: 4.7 MMOL/L (ref 3.5–5.5)
PROT SERPL-MCNC: 6.6 G/DL (ref 6.4–8.2)
PSA SERPL-MCNC: 9.2 NG/ML (ref 0–4)
SODIUM SERPL-SCNC: 140 MMOL/L (ref 136–145)
TRIGL SERPL-MCNC: 75 MG/DL
VLDLC SERPL CALC-MCNC: 15 MG/DL

## 2024-06-03 PROCEDURE — 36415 COLL VENOUS BLD VENIPUNCTURE: CPT

## 2024-06-03 PROCEDURE — G0103 PSA SCREENING: HCPCS

## 2024-06-03 PROCEDURE — 80061 LIPID PANEL: CPT

## 2024-06-03 PROCEDURE — 80053 COMPREHEN METABOLIC PANEL: CPT

## 2024-06-07 NOTE — RESULT ENCOUNTER NOTE
Reviewed PSA. Will discuss at upcoming appt with MRI Prostate prior    Natty Nevarez PA-C,   [facility]

## 2024-07-01 ENCOUNTER — TELEPHONE (OUTPATIENT)
Facility: CLINIC | Age: 84
End: 2024-07-01

## 2024-07-01 ENCOUNTER — HOSPITAL ENCOUNTER (OUTPATIENT)
Facility: HOSPITAL | Age: 84
Setting detail: SPECIMEN
Discharge: HOME | End: 2024-07-04
Payer: MEDICARE

## 2024-07-01 ENCOUNTER — OFFICE VISIT (OUTPATIENT)
Facility: CLINIC | Age: 84
End: 2024-07-01

## 2024-07-01 VITALS
HEIGHT: 72 IN | RESPIRATION RATE: 12 BRPM | DIASTOLIC BLOOD PRESSURE: 73 MMHG | BODY MASS INDEX: 25.19 KG/M2 | OXYGEN SATURATION: 97 % | WEIGHT: 186 LBS | TEMPERATURE: 97.8 F | HEART RATE: 84 BPM | SYSTOLIC BLOOD PRESSURE: 107 MMHG

## 2024-07-01 DIAGNOSIS — L02.91 ABSCESS: ICD-10-CM

## 2024-07-01 DIAGNOSIS — L02.91 ABSCESS: Primary | ICD-10-CM

## 2024-07-01 PROCEDURE — 87070 CULTURE OTHR SPECIMN AEROBIC: CPT

## 2024-07-01 PROCEDURE — 87077 CULTURE AEROBIC IDENTIFY: CPT

## 2024-07-01 PROCEDURE — 36415 COLL VENOUS BLD VENIPUNCTURE: CPT

## 2024-07-01 PROCEDURE — 87186 SC STD MICRODIL/AGAR DIL: CPT

## 2024-07-01 PROCEDURE — 87205 SMEAR GRAM STAIN: CPT

## 2024-07-01 RX ORDER — SULFAMETHOXAZOLE AND TRIMETHOPRIM 800; 160 MG/1; MG/1
2 TABLET ORAL 2 TIMES DAILY
Qty: 40 TABLET | Refills: 0 | Status: SHIPPED | OUTPATIENT
Start: 2024-07-01 | End: 2024-07-01 | Stop reason: SDUPTHER

## 2024-07-01 RX ORDER — SULFAMETHOXAZOLE AND TRIMETHOPRIM 800; 160 MG/1; MG/1
2 TABLET ORAL 2 TIMES DAILY
Qty: 40 TABLET | Refills: 0 | Status: SHIPPED | OUTPATIENT
Start: 2024-07-01 | End: 2024-07-11

## 2024-07-01 SDOH — ECONOMIC STABILITY: FOOD INSECURITY: WITHIN THE PAST 12 MONTHS, THE FOOD YOU BOUGHT JUST DIDN'T LAST AND YOU DIDN'T HAVE MONEY TO GET MORE.: NEVER TRUE

## 2024-07-01 SDOH — ECONOMIC STABILITY: INCOME INSECURITY: HOW HARD IS IT FOR YOU TO PAY FOR THE VERY BASICS LIKE FOOD, HOUSING, MEDICAL CARE, AND HEATING?: NOT HARD AT ALL

## 2024-07-01 SDOH — ECONOMIC STABILITY: FOOD INSECURITY: WITHIN THE PAST 12 MONTHS, YOU WORRIED THAT YOUR FOOD WOULD RUN OUT BEFORE YOU GOT MONEY TO BUY MORE.: NEVER TRUE

## 2024-07-01 NOTE — PROGRESS NOTES
Vahe Negron (:  1940) is a 84 y.o. male,Established patient, here for evaluation of the following chief complaint(s):  Mass (ONSET- 4 days ago /LOCATION- middle of back /DURATION- constant/CHARACTERISTICS:  none  /ASSOCIATED SYMPTOMS: swelling /AGGRAVATING FACTORS: sitting  /RELIEVING FACTORS: none  /TREATMENT: none )      Assessment & Plan   1. Abscess  -     Culture, Wound; Future  -     sulfamethoxazole-trimethoprim (BACTRIM DS;SEPTRA DS) 800-160 MG per tablet; Take 2 tablets by mouth 2 times daily for 10 days, Disp-40 tablet, R-0Normal  Hibiclens, warm compress.  Gen surg referral if not improving.      Return if symptoms worsen or fail to improve.       Subjective   HPI  Pt presents for eval of a tender, swollen area in the center of his upper back.  It has been present for 4 days.     Review of Systems   Constitutional: Negative.    HENT: Negative.     Respiratory: Negative.     Cardiovascular: Negative.    Skin:         Lesion on back   All other systems reviewed and are negative.         Objective   Physical Exam  Vitals and nursing note reviewed.   Constitutional:       General: He is not in acute distress.     Appearance: Normal appearance.   HENT:      Head: Normocephalic and atraumatic.      Right Ear: External ear normal.      Left Ear: External ear normal.      Nose: Nose normal.      Mouth/Throat:      Mouth: Mucous membranes are moist.   Eyes:      Extraocular Movements: Extraocular movements intact.      Conjunctiva/sclera: Conjunctivae normal.   Cardiovascular:      Rate and Rhythm: Normal rate and regular rhythm.      Heart sounds: No murmur heard.     No friction rub. No gallop.   Pulmonary:      Effort: Pulmonary effort is normal.      Breath sounds: Normal breath sounds. No wheezing, rhonchi or rales.   Musculoskeletal:         General: Normal range of motion.      Cervical back: Normal range of motion.   Skin:     General: Skin is warm and dry.      Findings: Abscess present.

## 2024-07-01 NOTE — PROGRESS NOTES
Vahe Negron presents today for   Chief Complaint   Patient presents with    Mass     ONSET- 4 days ago   LOCATION- middle of back   DURATION- constant  CHARACTERISTICS:  none    ASSOCIATED SYMPTOMS: swelling   AGGRAVATING FACTORS: sitting    RELIEVING FACTORS: none    TREATMENT: none        Is someone accompanying this pt? no    Is the patient using any DME equipment during OV? no    Depression Screenin/27/2024     8:42 AM 2024     8:30 AM 2024     8:07 AM 3/8/2024     9:09 AM 10/23/2023     3:10 PM 2023    10:18 AM 2023     1:43 PM   PHQ-9 Questionaire   Little interest or pleasure in doing things 0 0 0 0 0 0 0   Feeling down, depressed, or hopeless 0 0 0 0 0 0 0   PHQ-9 Total Score 0 0 0 0 0 0 0        NAVARRO 7-Anxiety       10/23/2023     3:10 PM 2023     1:43 PM   NAVARRO-7 SCREENING   Feeling nervous, anxious, or on edge Not at all Not at all   Not being able to stop or control worrying Not at all Not at all   Worrying too much about different things Not at all Not at all   Trouble relaxing Not at all Not at all   Being so restless that it is hard to sit still Not at all Not at all   Becoming easily annoyed or irritable Not at all Not at all   Feeling afraid as if something awful might happen Not at all Not at all   NAVARRO-7 Total Score 0 0          Learning Assessment:  No question data found.     Fall Risk       No data to display                   Travel Screening:    Travel Screening     No screening recorded since 24 0000       Travel History   Travel since 24    No documented travel since 24            Health Maintenance reviewed and discussed and ordered per Provider.  Transportation Needs: Unknown (2024)    PRAPARE - Transportation     Lack of Transportation (Medical): Not on file     Lack of Transportation (Non-Medical): No      Food Insecurity: No Food Insecurity (2024)    Hunger Vital Sign     Worried About Running Out of Food in the Last Year:  [de-identified] :  Ms. 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 Her symptoms of retching/vomiting and PO intolerance persist though she reports that they have marginally improved. She has begun taking Reglan regularly and reports being able to tolerate minimal PO, though she still is TPN dependent. She denies abdominal pain and reports normal daily bowel movements.  INTERIM 3/8/24: Ms. Harley presents to the office  for follow up visit. She has been following up with Dr. Khan. The patient reports that her nausea has been much improved, and that she rarely now has the retching that would catch her mid breath/mid sentence. She is doing well on TPN, has put on a good amount of weight, and has begun eating full liquids and some solids.

## 2024-07-04 LAB
BACTERIA SPEC CULT: ABNORMAL
GRAM STN SPEC: ABNORMAL
SERVICE CMNT-IMP: ABNORMAL

## 2024-08-07 ENCOUNTER — TELEPHONE (OUTPATIENT)
Facility: CLINIC | Age: 84
End: 2024-08-07

## 2024-08-07 NOTE — TELEPHONE ENCOUNTER
Patient came in requesting medication refill for sildenafil (VIAGRA) 100 MG tablet .  He states his refills are .

## 2024-08-08 NOTE — TELEPHONE ENCOUNTER
Last seen 7/1/2024   Last labs   Last filled    Next appointment 8/30/2024     Lab Results   Component Value Date     06/03/2024    K 4.7 06/03/2024     06/03/2024    CO2 26 06/03/2024    BUN 10 06/03/2024    CREATININE 0.99 06/03/2024    GLUCOSE 98 06/03/2024    CALCIUM 9.5 06/03/2024    BILITOT 2.2 (H) 06/03/2024    ALKPHOS 117 06/03/2024    AST 27 06/03/2024    ALT 30 06/03/2024    LABGLOM 76 06/03/2024    GFRAA >60 05/05/2022    AGRATIO 1.3 11/16/2022    GLOB 2.9 06/03/2024      Patient received this medication from Dr. Escobar. Tried to reach patient but mailbox is full.

## 2024-08-12 ENCOUNTER — TELEPHONE (OUTPATIENT)
Facility: CLINIC | Age: 84
End: 2024-08-12

## 2024-08-12 NOTE — TELEPHONE ENCOUNTER
Patient states that he been having trouble with balance for about a month. Patient states that he has not had any falls but it is close at times. He denies dizziness. Patient states that he is just having a balance issue.

## 2024-08-15 NOTE — TELEPHONE ENCOUNTER
Last seen 7/1/2024   Last labs 6/3/24  Last filled 4/4/23  Next appointment 8/30/2024     Lab Results   Component Value Date     06/03/2024    K 4.7 06/03/2024     06/03/2024    CO2 26 06/03/2024    BUN 10 06/03/2024    CREATININE 0.99 06/03/2024    GLUCOSE 98 06/03/2024    CALCIUM 9.5 06/03/2024    BILITOT 2.2 (H) 06/03/2024    ALKPHOS 117 06/03/2024    AST 27 06/03/2024    ALT 30 06/03/2024    LABGLOM 76 06/03/2024    GFRAA >60 05/05/2022    AGRATIO 1.3 11/16/2022    GLOB 2.9 06/03/2024

## 2024-08-16 RX ORDER — SILDENAFIL 100 MG/1
TABLET, FILM COATED ORAL
Qty: 45 TABLET | Refills: 3 | Status: SHIPPED | OUTPATIENT
Start: 2024-08-16

## 2024-08-21 DIAGNOSIS — I71.43 INFRARENAL ABDOMINAL AORTIC ANEURYSM, WITHOUT RUPTURE (HCC): Primary | ICD-10-CM

## 2024-08-21 DIAGNOSIS — I65.23 CAROTID STENOSIS, ASYMPTOMATIC, BILATERAL: ICD-10-CM

## 2024-08-29 ASSESSMENT — ENCOUNTER SYMPTOMS: RESPIRATORY NEGATIVE: 1

## 2024-08-29 NOTE — PROGRESS NOTES
ASSESSMENT/PLAN:  1. Essential (primary) hypertension  Assessment & Plan:   Well-controlled, continue current medications  2. Hyperlipidemia, unspecified hyperlipidemia type  -     rosuvastatin (CRESTOR) 20 MG tablet; Take 1 tablet by mouth daily, Disp-90 tablet, R-4Normal  3. Atherosclerosis of native coronary artery of native heart with angina pectoris (HCC)  Assessment & Plan:   Monitored by specialist- no acute findings meriting change in the plan  4. Balance disorder  Assessment & Plan:    patient to start physical therapy soon.  5. Memory loss  -     [facility] - Consuelo Perez MD, Neurology, [place] ([address]  6. Elevated PSA  Assessment & Plan:   He is advised to reschedule his prostate MRI with urology. He had previously canceled the MRI due to a date conflict. It is important to follow up with urology to address the elevated PSA and complete the necessary imaging.  7. Needs flu shot  -     Influenza, FLUAD Trivalent, (age 65 y+), IM, Preservative Free, 0.5mL  8. Epidermal inclusion cyst  Assessment & Plan:   improved with abx.  No need for further intervention at this time. Discussed referral for removal; pt declines for now.   9. Constipation, unspecified constipation type  Assessment & Plan:    patient previously had daily bowel movements but is now only moving his bowels every other day.  Patient is not eating very much and this may be contributing to decreased stooling.  Consider GI eval.  10. Decreased appetite  Assessment & Plan:   Pt is advised to reschedule his prostate MRI with urology. He had previously canceled the MRI due to a date conflict. It is important to follow up with urology to address the elevated PSA and complete the necessary imaging.        Return in about 3 months (around 11/30/2024) for HTN, HLD, specialist eval, constipation, (no labs).      SUBJECTIVE/OBJECTIVE:    Chief Complaint   Patient presents with    Cholesterol Problem    Coronary Artery Disease    Hypertension      Mouth/Throat:      Mouth: Mucous membranes are moist.   Eyes:      Extraocular Movements: Extraocular movements intact.      Conjunctiva/sclera: Conjunctivae normal.   Cardiovascular:      Rate and Rhythm: Normal rate and regular rhythm.      Heart sounds: No murmur heard.     No friction rub. No gallop.   Pulmonary:      Effort: Pulmonary effort is normal.      Breath sounds: Normal breath sounds. No wheezing, rhonchi or rales.   Musculoskeletal:         General: Normal range of motion.      Cervical back: Normal range of motion.   Skin:     General: Skin is warm and dry.   Neurological:      Mental Status: He is alert and oriented to person, place, and time.      Coordination: Coordination normal.   Psychiatric:         Mood and Affect: Mood normal.         Behavior: Behavior normal.         Thought Content: Thought content normal.         Judgment: Judgment normal.                   The patient (or guardian, if applicable) and other individuals in attendance with the patient were advised that Artificial Intelligence will be utilized during this visit to record and process the conversation to generate a clinical note. The patient (or guardian, if applicable) and other individuals in attendance at the appointment consented to the use of AI, including the recording.      Please note: Portions of this chart were created with Dragon medical speech to text program; unrecognized errors may exist.        An electronic signature was used to authenticate this note.    --Loida Molina MD

## 2024-08-30 ENCOUNTER — OFFICE VISIT (OUTPATIENT)
Facility: CLINIC | Age: 84
End: 2024-08-30

## 2024-08-30 VITALS
HEIGHT: 72 IN | BODY MASS INDEX: 24.24 KG/M2 | WEIGHT: 179 LBS | HEART RATE: 93 BPM | SYSTOLIC BLOOD PRESSURE: 117 MMHG | OXYGEN SATURATION: 97 % | TEMPERATURE: 98.2 F | RESPIRATION RATE: 15 BRPM | DIASTOLIC BLOOD PRESSURE: 65 MMHG

## 2024-08-30 DIAGNOSIS — K59.00 CONSTIPATION, UNSPECIFIED CONSTIPATION TYPE: ICD-10-CM

## 2024-08-30 DIAGNOSIS — R41.3 MEMORY LOSS: ICD-10-CM

## 2024-08-30 DIAGNOSIS — L72.0 EPIDERMAL INCLUSION CYST: ICD-10-CM

## 2024-08-30 DIAGNOSIS — R26.89 BALANCE DISORDER: ICD-10-CM

## 2024-08-30 DIAGNOSIS — E78.5 HYPERLIPIDEMIA, UNSPECIFIED HYPERLIPIDEMIA TYPE: ICD-10-CM

## 2024-08-30 DIAGNOSIS — I25.119 ATHEROSCLEROSIS OF NATIVE CORONARY ARTERY OF NATIVE HEART WITH ANGINA PECTORIS (HCC): ICD-10-CM

## 2024-08-30 DIAGNOSIS — I10 ESSENTIAL (PRIMARY) HYPERTENSION: Primary | Chronic | ICD-10-CM

## 2024-08-30 DIAGNOSIS — R63.0 DECREASED APPETITE: ICD-10-CM

## 2024-08-30 DIAGNOSIS — R97.20 ELEVATED PSA: Chronic | ICD-10-CM

## 2024-08-30 DIAGNOSIS — Z23 NEEDS FLU SHOT: ICD-10-CM

## 2024-08-30 RX ORDER — ROSUVASTATIN CALCIUM 20 MG/1
20 TABLET, COATED ORAL DAILY
Qty: 90 TABLET | Refills: 4 | Status: SHIPPED | OUTPATIENT
Start: 2024-08-30

## 2024-08-30 SDOH — ECONOMIC STABILITY: FOOD INSECURITY: WITHIN THE PAST 12 MONTHS, THE FOOD YOU BOUGHT JUST DIDN'T LAST AND YOU DIDN'T HAVE MONEY TO GET MORE.: NEVER TRUE

## 2024-08-30 SDOH — ECONOMIC STABILITY: FOOD INSECURITY: WITHIN THE PAST 12 MONTHS, YOU WORRIED THAT YOUR FOOD WOULD RUN OUT BEFORE YOU GOT MONEY TO BUY MORE.: NEVER TRUE

## 2024-08-30 SDOH — ECONOMIC STABILITY: INCOME INSECURITY: HOW HARD IS IT FOR YOU TO PAY FOR THE VERY BASICS LIKE FOOD, HOUSING, MEDICAL CARE, AND HEATING?: NOT HARD AT ALL

## 2024-08-30 ASSESSMENT — PATIENT HEALTH QUESTIONNAIRE - PHQ9
2. FEELING DOWN, DEPRESSED OR HOPELESS: NOT AT ALL
SUM OF ALL RESPONSES TO PHQ QUESTIONS 1-9: 0
SUM OF ALL RESPONSES TO PHQ9 QUESTIONS 1 & 2: 0
1. LITTLE INTEREST OR PLEASURE IN DOING THINGS: NOT AT ALL
SUM OF ALL RESPONSES TO PHQ QUESTIONS 1-9: 0

## 2024-08-30 NOTE — PROGRESS NOTES
Vahe Jamil presents today for   Chief Complaint   Patient presents with    Cholesterol Problem    Coronary Artery Disease    Hypertension    Discuss Labs    Other     A mole located in the center (spine area). No pain.       Is someone accompanying this pt? no    Is the patient using any DME equipment during OV? no    Depression Screenin/30/2024    10:05 AM 2024     8:42 AM 2024     8:30 AM 2024     8:07 AM 3/8/2024     9:09 AM 10/23/2023     3:10 PM 2023    10:18 AM   PHQ-9 Questionaire   Little interest or pleasure in doing things 0 0 0 0 0 0 0   Feeling down, depressed, or hopeless 0 0 0 0 0 0 0   PHQ-9 Total Score 0 0 0 0 0 0 0        NAVARRO 7-Anxiety       10/23/2023     3:10 PM 2023     1:43 PM   NAVARRO-7 SCREENING   Feeling nervous, anxious, or on edge Not at all Not at all   Not being able to stop or control worrying Not at all Not at all   Worrying too much about different things Not at all Not at all   Trouble relaxing Not at all Not at all   Being so restless that it is hard to sit still Not at all Not at all   Becoming easily annoyed or irritable Not at all Not at all   Feeling afraid as if something awful might happen Not at all Not at all   NAVARRO-7 Total Score 0 0        Travel Screening:    Travel Screening       Question Response    Have you been in contact with someone who was sick? No / Unsure    Do you have any of the following new or worsening symptoms? None of these    Have you traveled internationally or domestically in the last month? No          Travel History   Travel since 24    No documented travel since 24          Health Maintenance reviewed and discussed and ordered per Provider.  Transportation Needs: Unknown (2024)    PRAPARE - Transportation     Lack of Transportation (Medical): Not on file     Lack of Transportation (Non-Medical): No      Food Insecurity: No Food Insecurity (2024)    Hunger Vital Sign     Worried About Running Out

## 2024-08-31 PROBLEM — L72.0 EPIDERMAL INCLUSION CYST: Status: ACTIVE | Noted: 2024-08-31

## 2024-08-31 PROBLEM — R26.89 BALANCE DISORDER: Status: ACTIVE | Noted: 2024-08-31

## 2024-08-31 PROBLEM — R63.0 DECREASED APPETITE: Status: ACTIVE | Noted: 2024-08-31

## 2024-08-31 ASSESSMENT — ENCOUNTER SYMPTOMS: CONSTIPATION: 1

## 2024-09-11 ENCOUNTER — HOSPITAL ENCOUNTER (OUTPATIENT)
Facility: HOSPITAL | Age: 84
Setting detail: RECURRING SERIES
Discharge: HOME | End: 2024-09-14
Payer: MEDICARE

## 2024-09-11 PROCEDURE — 97535 SELF CARE MNGMENT TRAINING: CPT

## 2024-09-11 PROCEDURE — 97110 THERAPEUTIC EXERCISES: CPT

## 2024-09-11 PROCEDURE — 97530 THERAPEUTIC ACTIVITIES: CPT

## 2024-09-11 PROCEDURE — 97162 PT EVAL MOD COMPLEX 30 MIN: CPT

## 2024-09-13 ENCOUNTER — HOSPITAL ENCOUNTER (OUTPATIENT)
Facility: HOSPITAL | Age: 84
Setting detail: RECURRING SERIES
Discharge: HOME | End: 2024-09-16
Payer: MEDICARE

## 2024-09-13 PROCEDURE — 97112 NEUROMUSCULAR REEDUCATION: CPT

## 2024-09-16 ENCOUNTER — HOSPITAL ENCOUNTER (OUTPATIENT)
Facility: HOSPITAL | Age: 84
Setting detail: RECURRING SERIES
Discharge: HOME | End: 2024-09-19
Payer: MEDICARE

## 2024-09-16 PROCEDURE — 97110 THERAPEUTIC EXERCISES: CPT

## 2024-09-16 PROCEDURE — 97112 NEUROMUSCULAR REEDUCATION: CPT

## 2024-09-18 ENCOUNTER — HOSPITAL ENCOUNTER (OUTPATIENT)
Facility: HOSPITAL | Age: 84
Setting detail: RECURRING SERIES
Discharge: HOME | End: 2024-09-21
Payer: MEDICARE

## 2024-09-18 PROCEDURE — 97530 THERAPEUTIC ACTIVITIES: CPT

## 2024-09-18 PROCEDURE — 97116 GAIT TRAINING THERAPY: CPT

## 2024-09-18 PROCEDURE — 97112 NEUROMUSCULAR REEDUCATION: CPT

## 2024-09-23 ENCOUNTER — APPOINTMENT (OUTPATIENT)
Facility: HOSPITAL | Age: 84
End: 2024-09-23
Payer: MEDICARE

## 2024-09-25 ENCOUNTER — HOSPITAL ENCOUNTER (OUTPATIENT)
Facility: HOSPITAL | Age: 84
Setting detail: RECURRING SERIES
Discharge: HOME | End: 2024-09-28
Payer: MEDICARE

## 2024-09-25 PROCEDURE — 97116 GAIT TRAINING THERAPY: CPT

## 2024-09-25 PROCEDURE — 97530 THERAPEUTIC ACTIVITIES: CPT

## 2024-09-25 PROCEDURE — 97110 THERAPEUTIC EXERCISES: CPT

## 2024-09-27 ENCOUNTER — HOSPITAL ENCOUNTER (OUTPATIENT)
Facility: HOSPITAL | Age: 84
Setting detail: RECURRING SERIES
Discharge: HOME | End: 2024-09-30
Payer: MEDICARE

## 2024-09-27 PROCEDURE — 97530 THERAPEUTIC ACTIVITIES: CPT

## 2024-09-27 PROCEDURE — 97112 NEUROMUSCULAR REEDUCATION: CPT

## 2024-09-27 PROCEDURE — 97110 THERAPEUTIC EXERCISES: CPT

## 2024-09-27 NOTE — PROGRESS NOTES
utilized for treatment of this patient, the contents of this document represent the material reviewed with the patient via the .     Future Appointments   Date Time Provider Department Center   9/27/2024  9:40 AM [facility]   9/30/2024  9:00 AM [facility]   10/2/2024  9:00 AM [facility]   10/7/2024  9:00 AM [facility]   10/9/2024  9:00 AM [facility]   10/16/2024  9:00 AM [facility]   10/23/2024  9:00 AM [facility]   10/28/2024  8:40 AM Mingo Escobar MD [facility]   10/30/2024  9:00 AM [facility]   12/2/2024  9:30 AM Loida Molina MD [facility]

## 2024-09-30 ENCOUNTER — HOSPITAL ENCOUNTER (OUTPATIENT)
Facility: HOSPITAL | Age: 84
Setting detail: RECURRING SERIES
Discharge: HOME | End: 2024-10-03
Payer: MEDICARE

## 2024-09-30 PROCEDURE — 97116 GAIT TRAINING THERAPY: CPT

## 2024-09-30 PROCEDURE — 97530 THERAPEUTIC ACTIVITIES: CPT

## 2024-09-30 PROCEDURE — 97112 NEUROMUSCULAR REEDUCATION: CPT

## 2024-09-30 NOTE — PROGRESS NOTES
PHYSICAL / OCCUPATIONAL THERAPY - DAILY TREATMENT NOTE    Patient Name: Vahe Negron    Date: 2024    : 1940  Insurance: Payor: MEDICARE / Plan: MEDICARE PART A AND B / Product Type: *No Product type* /      Patient  verified Yes     Visit #   Current / Total 7 24   Time   In / Out 9:00 9:40   Pain   In / Out 0 0   Subjective Functional Status/Changes: \"I'm doing good - I still have the most trouble right after I stand up.\"     TREATMENT AREA =  Other abnormalities of gait and mobility [R26.89]  Balance disorder [R26.89]     OBJECTIVE         Therapeutic Procedures:    Tx Min Billable or 1:1 Min (if diff from Tx Min) Procedure, Rationale, Specifics    84030 Therapeutic Activity (timed):  use of dynamic activities replicating functional movements to increase ROM, strength, coordination, balance, and proprioception in order to improve patient's ability to progress to PLOF and address remaining functional goals.  (see flow sheet as applicable)     Details if applicable:       8  40364 Neuromuscular Re-Education (timed):  improve balance, coordination, kinesthetic sense, posture, core stability and proprioception to improve patient's ability to develop conscious control of individual muscles and awareness of position of extremities in order to progress to PLOF and address remaining functional goals. (see flow sheet as applicable)     Details if applicable:     12  22578 Gait Training (timed):    150 feet with no (assistive device) over foam surfaces with SBA level of assist. Cuing for step clearance.  To improve safety and dynamic movement with household/community ambulation.  (see flow sheet as applicable)     Details if applicable:    Over hurdles          Details if applicable:            Details if applicable:     40 40 [facility] Totals Reminder: bill using total billable min of TIMED therapeutic procedures (example: do not include dry needle or estim unattended, both untimed codes, in totals

## 2024-10-02 ENCOUNTER — HOSPITAL ENCOUNTER (OUTPATIENT)
Facility: HOSPITAL | Age: 84
Setting detail: RECURRING SERIES
Discharge: HOME | End: 2024-10-05
Payer: MEDICARE

## 2024-10-02 PROCEDURE — 97530 THERAPEUTIC ACTIVITIES: CPT

## 2024-10-02 PROCEDURE — 97112 NEUROMUSCULAR REEDUCATION: CPT

## 2024-10-02 NOTE — PROGRESS NOTES
PHYSICAL / OCCUPATIONAL THERAPY - DAILY TREATMENT NOTE    Patient Name: Vahe Negron    Date: 10/2/2024    : 1940  Insurance: Payor: MEDICARE / Plan: MEDICARE PART A AND B / Product Type: *No Product type* /      Patient  verified Yes     Visit #   Current / Total 8 14   Time   In / Out 9:03 9:48   Pain   In / Out 0 0   Subjective Functional Status/Changes: My only issue is still when I chapito stand up. It take about 30 seconds to get my balance     TREATMENT AREA =  Other abnormalities of gait and mobility [R26.89]  Balance disorder [R26.89]     OBJECTIVE      Therapeutic Procedures:    Tx Min Billable or 1:1 Min (if diff from Tx Min) Procedure, Rationale, Specifics    82788 Neuromuscular Re-Education (timed):  improve balance, coordination, kinesthetic sense, posture, core stability and proprioception to improve patient's ability to develop conscious control of individual muscles and awareness of position of extremities in order to progress to PLOF and address remaining functional goals. (see flow sheet as applicable)     Details if applicable:        Therapeutic Activity (timed):  use of dynamic activities replicating functional movements to increase ROM, strength, coordination, balance, and proprioception in order to improve patient's ability to progress to PLOF and address remaining functional goals.  (see flow sheet as applicable)     Details if applicable:            Details if applicable:            Details if applicable:            Details if applicable:     45 45 [facility] Totals Reminder: bill using total billable min of TIMED therapeutic procedures (example: do not include dry needle or estim unattended, both untimed codes, in totals to left)  8-22 min = 1 unit; 23-37 min = 2 units; 38-52 min = 3 units; 53-67 min = 4 units; 68-82 min = 5 units   Total Total     [x]  Patient Education billed concurrently with other procedures   [x] Review HEP    [] Progressed/Changed HEP, detail:

## 2024-10-07 ENCOUNTER — HOSPITAL ENCOUNTER (OUTPATIENT)
Facility: HOSPITAL | Age: 84
Setting detail: RECURRING SERIES
Discharge: HOME | End: 2024-10-10
Payer: MEDICARE

## 2024-10-07 PROCEDURE — 97112 NEUROMUSCULAR REEDUCATION: CPT

## 2024-10-07 PROCEDURE — 97530 THERAPEUTIC ACTIVITIES: CPT

## 2024-10-07 PROCEDURE — 97110 THERAPEUTIC EXERCISES: CPT

## 2024-10-07 NOTE — PROGRESS NOTES
38-52 min = 3 units; 53-67 min = 4 units; 68-82 min = 5 units   Total Total     [x]  Patient Education billed concurrently with other procedures   [x] Review HEP    [] Progressed/Changed HEP, detail:    [] Other detail:       Objective Information/Functional Measures/Assessment    Pt reports no pain prior to today's session. Continued with sit to stand variations - introducing various standing surfaces for decreased proprioception reliance. Balance training performed with eyes closed single leg balance, feet together balance on half foam, and 1 foot on half foam 1 foot on balance board with ball tosses. Hurdles performed with foam pads placed between for increased challenge. Therapist provided CGA with all activities for increased safety. Continue with POC.     Patient will continue to benefit from skilled PT / OT services to modify and progress therapeutic interventions, analyze and address functional mobility deficits, analyze and address ROM deficits, analyze and address strength deficits, analyze and address soft tissue restrictions, analyze and cue for proper movement patterns, analyze and modify for postural abnormalities, analyze and address imbalance/dizziness, and instruct in home and community integration to address functional deficits and attain remaining goals.     Progress toward goals / Updated goals:  []  See Progress Note/Recertification      Pt will report compliance of HEP to improve lower extremity strength for functional tasks and balance.   Status at last note/certification HEP given and reviewed with pt at evaluation (9/11/2024)  Current: Progressing - pt reports initial performance of HEP, requested progression of bridges, provided staggered bridge progression (09/18/24)     Long Term Goals: To be accomplished in 14 treatments   Pt will demonstrate a normal gait pattern with adequate foot clearance and forward gaze to decrease fall risk at home and in the community.  Status at last

## 2024-10-09 ENCOUNTER — HOSPITAL ENCOUNTER (OUTPATIENT)
Facility: HOSPITAL | Age: 84
Setting detail: RECURRING SERIES
Discharge: HOME | End: 2024-10-12
Payer: MEDICARE

## 2024-10-09 PROCEDURE — 97530 THERAPEUTIC ACTIVITIES: CPT

## 2024-10-09 PROCEDURE — 97110 THERAPEUTIC EXERCISES: CPT

## 2024-10-09 NOTE — PROGRESS NOTES
[facility]   [address], [place] [address]  Phone: [phone] Fax: [phone]  PROGRESS NOTE  Patient Name: Vahe Negron : 1940   Treatment/Medical Diagnosis: Other abnormalities of gait and mobility [R26.89]  Balance disorder [R26.89]   Referral Source: Loida Molina MD     Payor: Payor: MEDICARE / Plan: MEDICARE PART A AND B / Product Type: *No Product type* /            Date of Initial Visit: 24 Attended Visits: 10 Missed Visits: 0       CURRENT GOAL STATUS   Pt will report compliance of HEP to improve lower extremity strength for functional tasks and balance.   Status at last note/certification HEP given and reviewed with pt at evaluation (2024)  Current: Met - pt reports daily performance of HEP (10/09/24)     Long Term Goals: To be accomplished in 14 treatments   Pt will demonstrate a normal gait pattern with adequate foot clearance and forward gaze to decrease fall risk at home and in the community.  Status at last note/certification Abnormal gait - decreased DF bilateral (L>R) during swing, decreased step length, downward gaze, and decreased use of arms   Current: pt demonstrates forward gaze during ambulation, however lacks bilateral DF observable with \"shuffle' pattern/noise (2024)     2.  Pt will increase all hip and ankle musculature to 4+/5 or better to improve strength for functional tasks and balance requirements.   Status at last note/certification (2024)  LE Strength:    Right (/5) Left (/5)   Hip     Flexion 3+ 3+             Abduction 4 4             Adduction                 Extension 3+ 3             ER 3+ 3+             IR 3 3   Knee   Extension 5 5              Flexion 5 5   Ankle   Dorsiflexion 4 4               PF 0 full reps, 25 half reps 0 full reps, 25 half reps               Inversion 3+ 3+               Eversion 3+ 3+   Current: Progressing (10/09/24)   LE Strength: 
ambulation patterns.   Status at last note/certification: SLS: L- 2.4 seconds, R- 3.1 (9/11/2024)  Current: Progressing - left = 9 seconds, right = 11 seconds (10/09/24)    Next PN/ RC due 10/10/24  Auth due (visit number/ date) DAVIDA    PLAN  - Continue Plan of Care  - Upgrade activities as tolerated    Yanci Martinez, PT    10/9/2024    8:59 AM  If an interpreting service was utilized for treatment of this patient, the contents of this document represent the material reviewed with the patient via the .     Future Appointments   Date Time Provider Department Center   10/9/2024  9:00 AM Yanci Rob, PT [facility]   10/16/2024  9:00 AM CRISTINO AGOSTO [facility]   10/23/2024  9:00 AM [facility]   10/28/2024  8:40 AM Mingo Escobar MD [facility] BS [facility]   10/30/2024  9:00 AM [facility]   12/2/2024  9:30 AM Loida Molina MD [facility] DEP

## 2024-10-16 ENCOUNTER — HOSPITAL ENCOUNTER (OUTPATIENT)
Facility: HOSPITAL | Age: 84
Setting detail: RECURRING SERIES
Discharge: HOME | End: 2024-10-19
Payer: MEDICARE

## 2024-10-16 PROCEDURE — 97112 NEUROMUSCULAR REEDUCATION: CPT

## 2024-10-16 NOTE — PROGRESS NOTES
PHYSICAL / OCCUPATIONAL THERAPY - DAILY TREATMENT NOTE    Patient Name: Vahe Negron    Date: 10/16/2024    : 1940  Insurance: Payor: MEDICARE / Plan: MEDICARE PART A AND B / Product Type: *No Product type* /      Patient  verified Yes     Visit #   Current / Total 1 14   Time   In / Out 9:00 9:40   Pain   In / Out 0/10 0/10   Subjective Functional Status/Changes: I'm still a little off balance first thing in the morning.      TREATMENT AREA =  Other abnormalities of gait and mobility [R26.89]  Balance disorder [R26.89]     OBJECTIVE         Therapeutic Procedures:    Tx Min Billable or 1:1 Min (if diff from Tx Min) Procedure, Rationale, Specifics   40 40 20598 Neuromuscular Re-Education (timed):  improve balance, coordination, kinesthetic sense, posture, core stability and proprioception to improve patient's ability to develop conscious control of individual muscles and awareness of position of extremities in order to progress to PLOF and address remaining functional goals. (see flow sheet as applicable)     Details if applicable:                           40 40 [facility] Totals Reminder: bill using total billable min of TIMED therapeutic procedures (example: do not include dry needle or estim unattended, both untimed codes, in totals to left)  8-22 min = 1 unit; 23-37 min = 2 units; 38-52 min = 3 units; 53-67 min = 4 units; 68-82 min = 5 units   Total Total     [x]  Patient Education billed concurrently with other procedures   [x] Review HEP    [] Progressed/Changed HEP, detail:    [] Other detail:       Objective Information/Functional Measures/Assessment    Pt reports to skilled therapy session with gait abnormalities and decreased dynamic standing balance. Intermittent UE balance checks required during tandem walks, in order to maintain upright posture. Pt noted increased ease with romberg stance on foam but was effectively challenged with the addition of head turns. Pt tolerated the addition of step

## 2024-10-23 ENCOUNTER — HOSPITAL ENCOUNTER (OUTPATIENT)
Facility: HOSPITAL | Age: 84
Setting detail: RECURRING SERIES
Discharge: HOME | End: 2024-10-26
Payer: MEDICARE

## 2024-10-23 PROCEDURE — 97112 NEUROMUSCULAR REEDUCATION: CPT

## 2024-10-23 PROCEDURE — 97530 THERAPEUTIC ACTIVITIES: CPT

## 2024-10-23 NOTE — PROGRESS NOTES
PHYSICAL / OCCUPATIONAL THERAPY - DAILY TREATMENT NOTE    Patient Name: Vahe Negron    Date: 10/23/2024    : 1940  Insurance: Payor: MEDICARE / Plan: MEDICARE PART A AND B / Product Type: *No Product type* /      Patient  verified Yes     Visit #   Current / Total 2 14   Time   In / Out 9:02 9:40   Pain   In / Out 0/10 0/10   Subjective Functional Status/Changes: I'm feeling pretty good today, last session went well.      TREATMENT AREA =  Other abnormalities of gait and mobility [R26.89]  Balance disorder [R26.89]     OBJECTIVE         Therapeutic Procedures:    Tx Min Billable or 1:1 Min (if diff from Tx Min) Procedure, Rationale, Specifics    65644 Therapeutic Activity (timed):  use of dynamic activities replicating functional movements to increase ROM, strength, coordination, balance, and proprioception in order to improve patient's ability to progress to PLOF and address remaining functional goals.  (see flow sheet as applicable)     Details if applicable:       112 Neuromuscular Re-Education (timed):  improve balance, coordination, kinesthetic sense, posture, core stability and proprioception to improve patient's ability to develop conscious control of individual muscles and awareness of position of extremities in order to progress to PLOF and address remaining functional goals. (see flow sheet as applicable)     Details if applicable:                    38 38 [facility] Totals Reminder: bill using total billable min of TIMED therapeutic procedures (example: do not include dry needle or estim unattended, both untimed codes, in totals to left)  8-22 min = 1 unit; 23-37 min = 2 units; 38-52 min = 3 units; 53-67 min = 4 units; 68-82 min = 5 units   Total Total     [x]  Patient Education billed concurrently with other procedures   [x] Review HEP    [] Progressed/Changed HEP, detail:    [] Other detail:       Objective Information/Functional Measures/Assessment    Pt reports to skilled

## 2024-10-28 ENCOUNTER — OFFICE VISIT (OUTPATIENT)
Age: 84
End: 2024-10-28

## 2024-10-28 VITALS
DIASTOLIC BLOOD PRESSURE: 68 MMHG | SYSTOLIC BLOOD PRESSURE: 128 MMHG | HEART RATE: 69 BPM | OXYGEN SATURATION: 97 % | WEIGHT: 183 LBS | HEIGHT: 72 IN | BODY MASS INDEX: 24.79 KG/M2

## 2024-10-28 DIAGNOSIS — I10 ESSENTIAL (PRIMARY) HYPERTENSION: ICD-10-CM

## 2024-10-28 DIAGNOSIS — R00.1 SYMPTOMATIC BRADYCARDIA: ICD-10-CM

## 2024-10-28 DIAGNOSIS — I48.19 PERSISTENT ATRIAL FIBRILLATION (HCC): ICD-10-CM

## 2024-10-28 DIAGNOSIS — E78.00 PURE HYPERCHOLESTEROLEMIA, UNSPECIFIED: ICD-10-CM

## 2024-10-28 DIAGNOSIS — Z95.0 PRESENCE OF CARDIAC PACEMAKER: Primary | ICD-10-CM

## 2024-10-28 DIAGNOSIS — N52.9 ERECTILE DYSFUNCTION, UNSPECIFIED ERECTILE DYSFUNCTION TYPE: ICD-10-CM

## 2024-10-28 DIAGNOSIS — I25.10 CORONARY ARTERY DISEASE INVOLVING NATIVE CORONARY ARTERY OF NATIVE HEART WITHOUT ANGINA PECTORIS: ICD-10-CM

## 2024-10-28 ASSESSMENT — ANXIETY QUESTIONNAIRES
3. WORRYING TOO MUCH ABOUT DIFFERENT THINGS: NOT AT ALL
GAD7 TOTAL SCORE: 0
2. NOT BEING ABLE TO STOP OR CONTROL WORRYING: NOT AT ALL
4. TROUBLE RELAXING: NOT AT ALL
1. FEELING NERVOUS, ANXIOUS, OR ON EDGE: NOT AT ALL
5. BEING SO RESTLESS THAT IT IS HARD TO SIT STILL: NOT AT ALL
7. FEELING AFRAID AS IF SOMETHING AWFUL MIGHT HAPPEN: NOT AT ALL
6. BECOMING EASILY ANNOYED OR IRRITABLE: NOT AT ALL

## 2024-10-28 ASSESSMENT — ENCOUNTER SYMPTOMS
ABDOMINAL PAIN: 0
SORE THROAT: 0
SHORTNESS OF BREATH: 0
COUGH: 0
ABDOMINAL DISTENTION: 0
NAUSEA: 0
VOMITING: 0

## 2024-10-28 ASSESSMENT — PATIENT HEALTH QUESTIONNAIRE - PHQ9
SUM OF ALL RESPONSES TO PHQ QUESTIONS 1-9: 0
SUM OF ALL RESPONSES TO PHQ QUESTIONS 1-9: 0
1. LITTLE INTEREST OR PLEASURE IN DOING THINGS: NOT AT ALL
SUM OF ALL RESPONSES TO PHQ QUESTIONS 1-9: 0
SUM OF ALL RESPONSES TO PHQ9 QUESTIONS 1 & 2: 0
2. FEELING DOWN, DEPRESSED OR HOPELESS: NOT AT ALL
SUM OF ALL RESPONSES TO PHQ QUESTIONS 1-9: 0

## 2024-10-28 NOTE — PROGRESS NOTES
Vahe Negron presents today for   Chief Complaint   Patient presents with    Follow-up     6 month       Vahe Negron preferred language for health care discussion is english/other.    Is someone accompanying this pt? no    Is the patient using any DME equipment during OV? no    Depression Screening:  Depression: Not at risk (10/28/2024)    PHQ-2     PHQ-2 Score: 0        Learning Assessment:  Who is the primary learner? Patient    What is the preferred language for health care of the primary learner? ENGLISH    How does the primary learner prefer to learn new concepts? DEMONSTRATION    Answered By patient    Relationship to Learner SELF           Pt currently taking Anticoagulant therapy? Xarelto 20 mg daily    Pt currently taking Antiplatelet therapy ? Aspirin 81 mg daily      Coordination of Care:  1. Have you been to the ER, urgent care clinic since your last visit? Hospitalized since your last visit? no    2. Have you seen or consulted any other health care providers outside of the [facility] since your last visit? Include any pap smears or colon screening. no    
     Effort: Pulmonary effort is normal.      Breath sounds: No wheezing, rhonchi or rales.   Abdominal:      General: Bowel sounds are normal. There is no distension.      Palpations: Abdomen is soft.      Tenderness: There is no abdominal tenderness.   Musculoskeletal:         General: No swelling or deformity.   Skin:     General: Skin is warm and dry.      Findings: No rash.   Neurological:      General: No focal deficit present.      Mental Status: He is alert and oriented to person, place, and time.   Psychiatric:         Mood and Affect: Mood normal.         Behavior: Behavior normal.         EKG: Underlying atrial fibrillation with 100% ventricular paced rhythm.    Pacemaker interrogation: Normal functioning tuta.cotronic MRI compatible dual-chamber device.  Battery beginning of life with estimated longevity at 10+ years.  Stable RV sensing, pacing and impedance values.  Persistent atrial fibrillation since device implant.  No high rate episodes.  Pacing in the ventricle 83%.  Stable activity level at 4 hours/day.  See scanned document for details.    Assessment / Plan:     Persistent atrial fibrillation.  Initially diagnosed by EKG in April 2021.  This now   appears to be persistent since his pacemaker was placed.  He remains asymptomatic to the arrhythmia.  He has not required rate control due to underlying conduction disease.  He remains on Xarelto for oral anticoagulation.  I will continue his anticoagulation long-term.    Symptomatic bradycardia.  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  He is pacing 80+ % of the time of the ventricle.  Normal device function on interrogation today.  His battery is at beginning of life.  The device is now programmed VVIR.    Coronary artery disease.  Patient has moderate 2 vessel disease last assessed by cardiac catheterization in 2009, involving nonobstructive lesions in the proximal left circumflex in the right-sided PDA.   He is maintained on aspirin

## 2024-10-30 ENCOUNTER — HOSPITAL ENCOUNTER (OUTPATIENT)
Facility: HOSPITAL | Age: 84
Setting detail: RECURRING SERIES
Discharge: HOME | End: 2024-11-02
Payer: MEDICARE

## 2024-10-30 PROCEDURE — 97112 NEUROMUSCULAR REEDUCATION: CPT

## 2024-10-30 NOTE — PROGRESS NOTES
PHYSICAL / OCCUPATIONAL THERAPY - DAILY TREATMENT NOTE    Patient Name: Vahe Negron    Date: 10/30/2024    : 1940  Insurance: Payor: MEDICARE / Plan: MEDICARE PART A AND B / Product Type: *No Product type* /      Patient  verified Yes     Visit #   Current / Total 3 14   Time   In / Out 9:04 9:34   Pain   In / Out 0 0   Subjective Functional Status/Changes: Pt reports no new complaints. He states he may have to leave early today due to family matters.      TREATMENT AREA =  Other abnormalities of gait and mobility [R26.89]  Balance disorder [R26.89]     OBJECTIVE  Therapeutic Procedures:    Tx Min Billable or 1:1 Min (if diff from Tx Min) Procedure, Rationale, Specifics   30 25 26808 Neuromuscular Re-Education (timed):  improve balance, coordination, kinesthetic sense, posture, core stability and proprioception to improve patient's ability to develop conscious control of individual muscles and awareness of position of extremities in order to progress to PLOF and address remaining functional goals. (see flow sheet as applicable)     Details if applicable:     30 25 [facility] Totals Reminder: bill using total billable min of TIMED therapeutic procedures (example: do not include dry needle or estim unattended, both untimed codes, in totals to left)  8-22 min = 1 unit; 23-37 min = 2 units; 38-52 min = 3 units; 53-67 min = 4 units; 68-82 min = 5 units   Total Total     [x]  Patient Education billed concurrently with other procedures   [x] Review HEP    [] Progressed/Changed HEP, detail:    [] Other detail:       Objective Information/Functional Measures/Assessment  Reported no pain post session today. Added exercises per flow sheet. Mild sway noted with 1/2 stance on the foam. Intermittent HHA used for tandem foam to maintain balance. Fatigues with rockerboard exercise in the B ankles. Held some exercises secondary to pt needing to leave early. Continue POC as tolerated to improve balance and stability with

## 2024-11-06 ENCOUNTER — HOSPITAL ENCOUNTER (OUTPATIENT)
Facility: HOSPITAL | Age: 84
Setting detail: RECURRING SERIES
Discharge: HOME | End: 2024-11-09
Payer: MEDICARE

## 2024-11-06 PROCEDURE — 97530 THERAPEUTIC ACTIVITIES: CPT

## 2024-11-06 NOTE — PROGRESS NOTES
Physical Therapy Discharge Instructions      [facility]  [address] [place] [address] [phone] fax      Patient: Vahe Negron  : 1940      Continue Home Exercise Program once per day for 5 weeks, then decrease to 2 times per week      Continue with    [x] Ice  as needed 2 times per day     [x] Heat           Follow up with MD:     [] Upon completion of therapy     [x] As needed      Recommendations:     [x]   Return to activity with home program    []   Return to activity with the following modifications:       []Post Rehab Program    []Join Independent aquatic program     [x]Return to/join local gym        Additional Comments: good luck!!!!      Jalen Elise, LINH 2024 9:24 AM    If an interpreting service was utilized for treatment of this patient, the contents of this document represent the material reviewed with the patient via the .  
gait and mobility [R26.89]  Balance disorder [R26.89] and has made good progress thus far with therapy measures.  Objectively, Pt demonstrates improvements in functional LE strength and dynamic standing balance.  Pt's functional gains include increased stability when negotiating uneven terrain, improved gait pattern and no longer being functionally limited with ADLs.  Pt's sole functional deficit includes instability when standing, upon waking.  Pt denies any increases in symptoms within the past week.  Pt reports a self-reported improvement rating of 90% since start of care. Discharge at this time to final [facility].    Thank you for this referral!      PLAN  [x]Discontinue therapy: [x]Patient has reached or is progressing toward set goals      []Patient is non-compliant or has abdicated      []Due to lack of appreciable progress towards set goals      Jalen Elise, LINH    11/6/2024    9:02 AM    Future Appointments   Date Time Provider Department Center   11/13/2024  9:00 AM Yanci Rob, PT [facility]   12/2/2024  9:30 AM Loida Molina MD [facility]   4/29/2025 11:40 AM Mingo Escobar MD [facility] BS [facility]       If an interpreting service was utilized for treatment of this patient, the contents of this document represent the material reviewed with the patient via the .

## 2024-11-11 NOTE — PROGRESS NOTES
PT DAILY TREATMENT NOTE     Patient Name: Steven Oakes  Date:3/23/2021  : 1940  [x]  Patient  Verified     Payor: [place] / Plan: [facility] MEDICARE PART A & B / Product Type: Medicare /    In time:3:00  Out time:3:39  Total Treatment Time (min): 39  Visit #: 2 of 6-8    Medicare/BCBS Only   Total Timed Codes (min):  39 1:1 Treatment Time:  39       Treatment Area: Left shoulder pain [M25.512]    SUBJECTIVE  Pain Level (0-10 scale): 2-3/10  Any medication changes, allergies to medications, adverse drug reactions, diagnosis change, or new procedure performed?: [x] No    [] Yes (see summary sheet for update)  Subjective functional status/changes:   [] No changes reported  \"It's doing better. \"    OBJECTIVE    31 min Therapeutic Exercise:  [x] See flow sheet :   Rationale: increase ROM, increase strength and increase proprioception to improve the patients ability to perform ADL's.    8 min Manual Therapy:  Left ST/GH inf/post mobs grade III. STM/DTM Left UT, lev scap, infraspinatus and rhomboids. Left shoulder PROM flex, scap, ER/IR. Pt Right side-lying and supine. The manual therapy interventions were performed at a separate and distinct time from the therapeutic activities interventions. Rationale: decrease pain, increase ROM, increase tissue extensibility and decrease trigger points to improve ease of performing functional activities. With   [x] TE   [] TA   [] neuro   [] other: Patient Education: [x] Review HEP    [] Progressed/Changed HEP based on:   [] positioning   [] body mechanics   [] transfers   [] heat/ice application    [] other:      Other Objective/Functional Measures: Increased t-bands to blue resistance. Increased several exercises to 15 reps each. Pain Level (0-10 scale) post treatment: 0/10    ASSESSMENT/Changes in Function: Pt reports improved tolerance with laying on left side while sleeping. Denied pain post-treatment.  Continue PT to further increase strength and ease with performing daily activities. Patient will continue to benefit from skilled PT services to modify and progress therapeutic interventions, address functional mobility deficits, address ROM deficits, address strength deficits, analyze and address soft tissue restrictions and analyze and modify body mechanics/ergonomics to attain remaining goals. [x]  See Plan of Care  []  See progress note/recertification  []  See Discharge Summary         Progress towards goals / Updated goals:  Goals for this certification period to be accomplished in 3-4 weeks:  1. Improve left shoulder full can to 4/5 to improve ability for daily tasks              PN- 4-/5 with pain              Current: gradual progression 3-17-21  2. The pt will demonstrates 4+/5 left shoulder ER MMT to improve abilty for daily tasks               PN: 4-/5  3.  The pt will report at least 75% improvement in the left shoulder for improved ability for ADLs               PN: 50%       PLAN  []  Upgrade activities as tolerated     [x]  Continue plan of care  []  Update interventions per flow sheet       []  Discharge due to:_  []  Other:_      Rupal Garcia, PTA 3/23/2021  2:52 PM    Future Appointments   Date Time Provider Reza Bose   3/23/2021  3:00 PM Remus Close, PTA [facility]   3/25/2021  2:30 PM Remus Close, PTA [facility]   3/30/2021  9:15 AM Anais Livingston, PT [facility]   4/1/2021 10:00 AM Remus Close, PTA [facility]   4/6/2021 10:30 AM Remus Close, PTA [facility]   4/7/2021  1:45 PM Jam Palacios MD [facility] BS AMB   4/8/2021 10:00 AM Remus Close, PTA [facility]   4/27/2021  9:00 AM Matthew Olvera MD [facility] BS AMB   6/3/2021 10:30 AM Randal Rodriges MD [facility] BS AMB   8/26/2021  8:00 AM [facility] AMB   8/26/2021  9:00 AM [facility] AMB   8/26/2021 10:45 AM Nolan Lazaro PA [facility] AMB clears

## 2024-11-13 ENCOUNTER — APPOINTMENT (OUTPATIENT)
Facility: HOSPITAL | Age: 84
End: 2024-11-13
Payer: MEDICARE

## 2024-12-02 ENCOUNTER — OFFICE VISIT (OUTPATIENT)
Facility: CLINIC | Age: 84
End: 2024-12-02

## 2024-12-02 VITALS
TEMPERATURE: 98.1 F | RESPIRATION RATE: 14 BRPM | SYSTOLIC BLOOD PRESSURE: 110 MMHG | HEIGHT: 72 IN | DIASTOLIC BLOOD PRESSURE: 64 MMHG | HEART RATE: 85 BPM | OXYGEN SATURATION: 95 % | WEIGHT: 179 LBS | BODY MASS INDEX: 24.24 KG/M2

## 2024-12-02 DIAGNOSIS — R41.3 MEMORY LOSS: ICD-10-CM

## 2024-12-02 DIAGNOSIS — I10 ESSENTIAL (PRIMARY) HYPERTENSION: Primary | Chronic | ICD-10-CM

## 2024-12-02 DIAGNOSIS — R26.89 BALANCE DISORDER: ICD-10-CM

## 2024-12-02 DIAGNOSIS — K59.00 CONSTIPATION, UNSPECIFIED CONSTIPATION TYPE: ICD-10-CM

## 2024-12-02 DIAGNOSIS — R97.20 ELEVATED PSA: ICD-10-CM

## 2024-12-02 DIAGNOSIS — E78.5 HYPERLIPIDEMIA, UNSPECIFIED HYPERLIPIDEMIA TYPE: Chronic | ICD-10-CM

## 2024-12-02 SDOH — ECONOMIC STABILITY: FOOD INSECURITY: WITHIN THE PAST 12 MONTHS, THE FOOD YOU BOUGHT JUST DIDN'T LAST AND YOU DIDN'T HAVE MONEY TO GET MORE.: NEVER TRUE

## 2024-12-02 SDOH — ECONOMIC STABILITY: FOOD INSECURITY: WITHIN THE PAST 12 MONTHS, YOU WORRIED THAT YOUR FOOD WOULD RUN OUT BEFORE YOU GOT MONEY TO BUY MORE.: NEVER TRUE

## 2024-12-02 SDOH — ECONOMIC STABILITY: INCOME INSECURITY: HOW HARD IS IT FOR YOU TO PAY FOR THE VERY BASICS LIKE FOOD, HOUSING, MEDICAL CARE, AND HEATING?: NOT HARD AT ALL

## 2024-12-02 ASSESSMENT — PATIENT HEALTH QUESTIONNAIRE - PHQ9
SUM OF ALL RESPONSES TO PHQ QUESTIONS 1-9: 0
1. LITTLE INTEREST OR PLEASURE IN DOING THINGS: NOT AT ALL
SUM OF ALL RESPONSES TO PHQ QUESTIONS 1-9: 0
SUM OF ALL RESPONSES TO PHQ9 QUESTIONS 1 & 2: 0
SUM OF ALL RESPONSES TO PHQ QUESTIONS 1-9: 0
2. FEELING DOWN, DEPRESSED OR HOPELESS: NOT AT ALL
SUM OF ALL RESPONSES TO PHQ QUESTIONS 1-9: 0

## 2024-12-02 NOTE — PROGRESS NOTES
ASSESSMENT/PLAN:  1. Essential (primary) hypertension  Assessment & Plan:   Chronic, at goal (stable), continue current treatment plan  Orders:  -     Lipid Panel; Future  -     Comprehensive Metabolic Panel; Future  2. Hyperlipidemia, unspecified hyperlipidemia type  -     Lipid Panel; Future  -     Comprehensive Metabolic Panel; Future  3. Balance disorder  Assessment & Plan:    Improved.  Cont HEP.  4. Memory loss  Assessment & Plan:    Check status of neuro referral.    5. Elevated PSA  Assessment & Plan:    Pt reminded to sched mri.  Copy of order provided.    6. Constipation, unspecified constipation type  Assessment & Plan:    Improved with better po intake.         Return in about 3 months (around 3/2/2025) for HTN, HLD, specialist eval, lab review.      SUBJECTIVE/OBJECTIVE:    Chief Complaint   Patient presents with    Cholesterol Problem    Hypertension    Constipation         HPI    Vahe Negron is a 84 y.o. male presenting today for    3 months  follow up of htn, hld, constipation.    Pt had f/u with cards last month.  No meds were changed.  Six month f/u was planned.     Pt completed PT for gait and balance.  He reported 90% improvement at discharge. He feels he is maintaining the gains he made and is still doing his HEP.      Pt did not yet see gi for eval of change in bowel habit.   He cont to move his bowels every other day but with less difficulty.  He is eating better as well.      Pt has not yet had the mri of his prostate.     Pt does not yet have an appt with neuro.      Patient does not need medication refills today.      New concerns today: none        Review of Systems   Constitutional: Negative.    HENT: Negative.     Respiratory: Negative.     Cardiovascular: Negative.    All other systems reviewed and are negative.      Physical Exam  Vitals and nursing note reviewed.   Constitutional:       General: He is not in acute distress.     Appearance: Normal appearance.   HENT:      Head:

## 2024-12-02 NOTE — PROGRESS NOTES
Vahe Negron presents today for   Chief Complaint   Patient presents with    Cholesterol Problem    Hypertension    Constipation       Is someone accompanying this pt? No     Is the patient using any DME equipment during OV? No     Depression Screenin/2/2024     9:12 AM 10/28/2024     8:15 AM 2024    10:05 AM 2024     8:42 AM 2024     8:30 AM 2024     8:07 AM 3/8/2024     9:09 AM   PHQ-9 Questionaire   Little interest or pleasure in doing things 0 0 0 0 0 0 0   Feeling down, depressed, or hopeless 0 0 0 0 0 0 0   PHQ-9 Total Score 0 0 0 0 0 0 0        NAVARRO 7-Anxiety       10/28/2024     8:15 AM 10/23/2023     3:10 PM 2023     1:43 PM   NAVARRO-7 SCREENING   Feeling nervous, anxious, or on edge Not at all Not at all Not at all   Not being able to stop or control worrying Not at all Not at all Not at all   Worrying too much about different things Not at all Not at all Not at all   Trouble relaxing Not at all Not at all Not at all   Being so restless that it is hard to sit still Not at all Not at all Not at all   Becoming easily annoyed or irritable Not at all Not at all Not at all   Feeling afraid as if something awful might happen Not at all Not at all Not at all   NAVARRO-7 Total Score 0 0 0          Learning Assessment:  No question data found.     Fall Risk       No data to display                   Travel Screening:    Travel Screening       Question Response    Have you been in contact with someone who was sick? No / Unsure    Do you have any of the following new or worsening symptoms? None of these    Have you traveled internationally or domestically in the last month? No          Travel History   Travel since 24    No documented travel since 24            Health Maintenance reviewed and discussed and ordered per Provider.  Transportation Needs: Unknown (2024)    PRAPARE - Transportation     Lack of Transportation (Medical): Not on file     Lack of Transportation

## 2024-12-06 ENCOUNTER — TELEMEDICINE (OUTPATIENT)
Facility: CLINIC | Age: 84
End: 2024-12-06

## 2024-12-06 DIAGNOSIS — Z71.89 ACP (ADVANCE CARE PLANNING): ICD-10-CM

## 2024-12-06 DIAGNOSIS — Z00.00 MEDICARE ANNUAL WELLNESS VISIT, SUBSEQUENT: Primary | ICD-10-CM

## 2024-12-06 ASSESSMENT — LIFESTYLE VARIABLES
HOW OFTEN DURING THE LAST YEAR HAVE YOU NEEDED AN ALCOHOLIC DRINK FIRST THING IN THE MORNING TO GET YOURSELF GOING AFTER A NIGHT OF HEAVY DRINKING: NEVER
HAS A RELATIVE, FRIEND, DOCTOR, OR ANOTHER HEALTH PROFESSIONAL EXPRESSED CONCERN ABOUT YOUR DRINKING OR SUGGESTED YOU CUT DOWN: NO
HAVE YOU OR SOMEONE ELSE BEEN INJURED AS A RESULT OF YOUR DRINKING: NO
HOW OFTEN DURING THE LAST YEAR HAVE YOU BEEN UNABLE TO REMEMBER WHAT HAPPENED THE NIGHT BEFORE BECAUSE YOU HAD BEEN DRINKING: NEVER
HOW MANY STANDARD DRINKS CONTAINING ALCOHOL DO YOU HAVE ON A TYPICAL DAY: 5 OR 6
HOW OFTEN DURING THE LAST YEAR HAVE YOU HAD A FEELING OF GUILT OR REMORSE AFTER DRINKING: NEVER
HOW OFTEN DO YOU HAVE A DRINK CONTAINING ALCOHOL: 4 OR MORE TIMES A WEEK
HOW OFTEN DURING THE LAST YEAR HAVE YOU FAILED TO DO WHAT WAS NORMALLY EXPECTED FROM YOU BECAUSE OF DRINKING: NEVER
HOW OFTEN DURING THE LAST YEAR HAVE YOU FOUND THAT YOU WERE NOT ABLE TO STOP DRINKING ONCE YOU HAD STARTED: NEVER

## 2024-12-06 ASSESSMENT — PATIENT HEALTH QUESTIONNAIRE - PHQ9
1. LITTLE INTEREST OR PLEASURE IN DOING THINGS: NOT AT ALL
SUM OF ALL RESPONSES TO PHQ9 QUESTIONS 1 & 2: 0
SUM OF ALL RESPONSES TO PHQ QUESTIONS 1-9: 0
2. FEELING DOWN, DEPRESSED OR HOPELESS: NOT AT ALL
SUM OF ALL RESPONSES TO PHQ QUESTIONS 1-9: 0

## 2024-12-06 NOTE — PROGRESS NOTES
Vahe Negron presents today for   Chief Complaint   Patient presents with    Medicare AWV         Is the patient in the [facility] ? Yes     Is someone accompanying this pt? no    Is the patient using any DME equipment during OV? no    Depression Screenin/6/2024    10:33 AM 2024     9:12 AM 10/28/2024     8:15 AM 2024    10:05 AM 2024     8:42 AM 2024     8:30 AM 2024     8:07 AM   PHQ-9 Questionaire   Little interest or pleasure in doing things 0 0 0 0 0 0 0   Feeling down, depressed, or hopeless 0 0 0 0 0 0 0   PHQ-9 Total Score 0 0 0 0 0 0 0        NAVARRO 7-Anxiety       10/28/2024     8:15 AM 10/23/2023     3:10 PM 2023     1:43 PM   NAVARRO-7 SCREENING   Feeling nervous, anxious, or on edge Not at all Not at all Not at all   Not being able to stop or control worrying Not at all Not at all Not at all   Worrying too much about different things Not at all Not at all Not at all   Trouble relaxing Not at all Not at all Not at all   Being so restless that it is hard to sit still Not at all Not at all Not at all   Becoming easily annoyed or irritable Not at all Not at all Not at all   Feeling afraid as if something awful might happen Not at all Not at all Not at all   NAVARRO-7 Total Score 0 0 0          Learning Assessment:  No question data found.     Fall Risk       No data to display                   Travel Screening:    Travel Screening     No screening recorded since 24 0000       Travel History   Travel since 24    No documented travel since 24          Health Maintenance reviewed and discussed and ordered per Provider.  Transportation Needs: Unknown (2024)    PRAPARE - Transportation     Lack of Transportation (Medical): Not on file     Lack of Transportation (Non-Medical): No      Food Insecurity: No Food Insecurity (2024)    Hunger Vital Sign     Worried About Running Out of Food in the Last Year: Never true     Ran Out of Food in the Last

## 2024-12-06 NOTE — PROGRESS NOTES
Medicare Annual Wellness Visit    Vahe Negron is here for Medicare AWV    Assessment & Plan   Medicare annual wellness visit, subsequent  ACP (advance care planning)    Recommendations for Preventive Services Due: see orders and patient instructions/AVS.  Recommended screening schedule for the next 5-10 years is provided to the patient in written form: see Patient Instructions/AVS.     No follow-ups on file.     Subjective       Patient's complete Health Risk Assessment and screening values have been reviewed and are found in Flowsheets. The following problems were reviewed today and where indicated follow up appointments were made and/or referrals ordered.    Positive Risk Factor Screenings with Interventions:     Cognitive:      Words recalled: 2 Words Recalled     Total Score Interpretation: Abnormal Mini-Cog  Interventions:  Pt waiting for eval with neuro.      Alcohol Screening:  AUDIT-C Score: 10  AUDIT Total Score: 10  Total Score Interpretation: 8-14 suggests harmful or hazardous alcohol consumption in men   Interventions:  Patient comments: pt notes that he drinks 1-2 glasses of wine with dinner.  He does not have to have it.  He does not feel there is a problem.                    Hearing Screen:  Do you or your family notice any trouble with your hearing that hasn't been managed with hearing aids?: (!) Yes (Patient wears hearing aids)    Interventions:  Patient comments: he does well with his hearing aids.     Vision Screen:  Do you have difficulty driving, watching TV, or doing any of your daily activities because of your eyesight?: No  Have you had an eye exam within the past year?: (!) No  Interventions:   Patient encouraged to make appointment with their eye specialist    Safety:  Do you have non-slip mats or non-slip surfaces or shower bars or grab bars in your shower or bathtub?: (!) No  Interventions:  Patient comments: pt does have safety equipment in the bathroom as well as in the shower.

## 2024-12-06 NOTE — PATIENT INSTRUCTIONS
snack items, fast foods, canned soups, and other high-salt, high-fat, processed foods.     Read food labels and try to avoid saturated and trans fats. They increase your risk of heart disease by raising cholesterol levels.     Limit the amount of solid fat--butter, margarine, and shortening--you eat. Use olive, peanut, or canola oil when you cook. Bake, broil, and steam foods instead of frying them.     Eat a variety of fruit and vegetables every day. Dark green, deep orange, red, or yellow fruits and vegetables are especially good for you. Examples include spinach, carrots, peaches, and berries.     Foods high in fiber can reduce your cholesterol and provide important vitamins and minerals. High-fiber foods include whole-grain cereals and breads, oatmeal, beans, brown rice, citrus fruits, and apples.     Eat lean proteins. Heart-healthy proteins include seafood, lean meats and poultry, eggs, beans, peas, nuts, seeds, and soy products.     Limit drinks and foods with added sugar. These include candy, desserts, and soda pop.   Heart-healthy lifestyle    If your doctor recommends it, get more exercise. For many people, walking is a good choice. Or you may want to swim, bike, or do other activities. Bit by bit, increase the time you're active every day. Try for at least 30 minutes on most days of the week.     Try to quit or cut back on using tobacco and other nicotine products. This includes smoking and vaping. If you need help quitting, talk to your doctor about stop-smoking programs and medicines. These can increase your chances of quitting for good. Quitting is one of the most important things you can do to protect your heart. It is never too late to quit. Try to avoid secondhand smoke too.     Stay at a weight that's healthy for you. Talk to your doctor if you need help losing weight.     Try to get 7 to 9 hours of sleep each night.     Limit alcohol to 2 drinks a day for men and 1 drink a day for women. Too much

## 2024-12-06 NOTE — PROGRESS NOTES
Advance Care Planning     Advance Care Planning (ACP) Physician/NP/PA Conversation    Date of Conversation: 12/6/2024  Conducted with: Patient with Decision Making Capacity  Other persons present: None    Healthcare Decision Maker:   Primary Decision Maker: Yana Caballero - Child - [phone]    Secondary Decision Maker: Moose Negron - Child - [phone]         Care Preferences:    Hospitalization:  \"If your health worsens and it becomes clear that your chance of recovery is unlikely, what would be your preference regarding hospitalization?\"  The patient would prefer comfort-focused treatment without hospitalization.    Ventilation:  \"If you were unable to breath on your own and your chance of recovery was unlikely, what would be your preference about the use of a ventilator (breathing machine) if it was available to you?\"  The patient is unsure.    Resuscitation:  \"In the event your heart stopped as a result of an underlying serious health condition, would you want attempts made to restart your heart, or would you prefer a natural death?\"  The patient is unsure.        Conversation Outcomes / Follow-Up Plan:  ACP in process - information provided, considering goals and options  Reviewed DNR/DNI and patient elects Full Code (Attempt Resuscitation)    Length of Voluntary ACP Conversation in minutes:  16 minutes    Loida Molina MD

## 2024-12-08 PROBLEM — R41.3 MEMORY LOSS: Status: ACTIVE | Noted: 2024-12-08

## 2024-12-11 NOTE — PROGRESS NOTES
Vahe Negron (:  1940) is a 84 y.o. male,Established patient, here for evaluation of the following chief complaint(s):  Dizziness (ONSET- about a month ago /LOCATION- head /DURATION- constant and intermittent (1-2 minutes)/CHARACTERISTICS:  n/a  /ASSOCIATED SYMPTOMS: balancing issues /AGGRAVATING FACTORS: moving head up and down , walking,   /RELIEVING FACTORS: keeping head straight  /TREATMENT: none )         Assessment & Plan  Vertigo       Orders:    meclizine (ANTIVERT) 12.5 MG tablet; Take 1 tablet by mouth 2 times daily as needed for Dizziness MAY CAUSE DROWSINESS.    [facility] Roly Caceres MD, Neurology, [facility]    Balance disorder       Orders:    [facility] Roly Caceres MD, Neurology, [facility]    Memory loss       Orders:    [facility] Roly Caceres MD, Neurology, [facility]      Return if symptoms worsen or fail to improve.       Subjective   HPI  Pt here for f/u of vertigo.  He has been in PT for this previously.   Pt notes that he will sometimes stagger or feel unbalanced when he is walking.  There is no element of spinning or the room spinning around him; he just feels unsteady on his feet.  When he first stands, he needs a minute to get stable before he walks.  However, he still feels a little unsteady when he is walking.  Yesterday, he was exercising with a  who told him he seems to have vertigo and suggested that he see his doctor.  Pt has not yet heard from neuro about his referral there.        Review of Systems   Constitutional: Negative.    HENT: Negative.     Respiratory: Negative.     Cardiovascular: Negative.    Neurological:         Off balance   All other systems reviewed and are negative.         Objective   Physical Exam  Vitals and nursing note reviewed.   Constitutional:       General: He is not in acute distress.     Appearance: Normal appearance.   HENT:      Head: Normocephalic and

## 2024-12-12 ENCOUNTER — OFFICE VISIT (OUTPATIENT)
Facility: CLINIC | Age: 84
End: 2024-12-12

## 2024-12-12 VITALS
OXYGEN SATURATION: 98 % | BODY MASS INDEX: 24.52 KG/M2 | HEIGHT: 72 IN | DIASTOLIC BLOOD PRESSURE: 81 MMHG | TEMPERATURE: 97.5 F | SYSTOLIC BLOOD PRESSURE: 128 MMHG | WEIGHT: 181 LBS | RESPIRATION RATE: 16 BRPM | HEART RATE: 70 BPM

## 2024-12-12 DIAGNOSIS — R42 VERTIGO: Primary | ICD-10-CM

## 2024-12-12 DIAGNOSIS — R41.3 MEMORY LOSS: ICD-10-CM

## 2024-12-12 DIAGNOSIS — R26.89 BALANCE DISORDER: ICD-10-CM

## 2024-12-12 RX ORDER — MECLIZINE HCL 12.5 MG 12.5 MG/1
12.5 TABLET ORAL 2 TIMES DAILY PRN
Qty: 15 TABLET | Refills: 0 | Status: SHIPPED | OUTPATIENT
Start: 2024-12-12 | End: 2024-12-22

## 2024-12-12 SDOH — ECONOMIC STABILITY: FOOD INSECURITY: WITHIN THE PAST 12 MONTHS, YOU WORRIED THAT YOUR FOOD WOULD RUN OUT BEFORE YOU GOT MONEY TO BUY MORE.: PATIENT DECLINED

## 2024-12-12 SDOH — ECONOMIC STABILITY: FOOD INSECURITY: WITHIN THE PAST 12 MONTHS, THE FOOD YOU BOUGHT JUST DIDN'T LAST AND YOU DIDN'T HAVE MONEY TO GET MORE.: PATIENT DECLINED

## 2024-12-12 SDOH — ECONOMIC STABILITY: INCOME INSECURITY: HOW HARD IS IT FOR YOU TO PAY FOR THE VERY BASICS LIKE FOOD, HOUSING, MEDICAL CARE, AND HEATING?: PATIENT DECLINED

## 2024-12-12 ASSESSMENT — ENCOUNTER SYMPTOMS: RESPIRATORY NEGATIVE: 1

## 2024-12-12 NOTE — ASSESSMENT & PLAN NOTE
Orders:    [facility] - Roly Alegria MD, Neurology, [place] ([facility])

## 2024-12-12 NOTE — PROGRESS NOTES
Vahe Negron presents today for   Chief Complaint   Patient presents with    Dizziness     ONSET- about a month ago   LOCATION- head   DURATION- constant and intermittent (1-2 minutes)  CHARACTERISTICS:  n/a    ASSOCIATED SYMPTOMS: balancing issues   AGGRAVATING FACTORS: moving head up and down , walking,     RELIEVING FACTORS: keeping head straight    TREATMENT: none        Is someone accompanying this pt? no    Is the patient using any DME equipment during OV? no    Depression Screenin/6/2024    10:33 AM 2024     9:12 AM 10/28/2024     8:15 AM 2024    10:05 AM 2024     8:42 AM 2024     8:30 AM 2024     8:07 AM   PHQ-9 Questionaire   Little interest or pleasure in doing things 0 0 0 0 0 0 0   Feeling down, depressed, or hopeless 0 0 0 0 0 0 0   PHQ-9 Total Score 0 0 0 0 0 0 0        NAVARRO 7-Anxiety       10/28/2024     8:15 AM 10/23/2023     3:10 PM 2023     1:43 PM   NAVARRO-7 SCREENING   Feeling nervous, anxious, or on edge Not at all Not at all Not at all   Not being able to stop or control worrying Not at all Not at all Not at all   Worrying too much about different things Not at all Not at all Not at all   Trouble relaxing Not at all Not at all Not at all   Being so restless that it is hard to sit still Not at all Not at all Not at all   Becoming easily annoyed or irritable Not at all Not at all Not at all   Feeling afraid as if something awful might happen Not at all Not at all Not at all   NAVARRO-7 Total Score 0 0 0          Learning Assessment:  No question data found.     Fall Risk       No data to display                   Travel Screening:    Travel Screening     No screening recorded since 24 0000       Travel History   Travel since 24    No documented travel since 24            Health Maintenance reviewed and discussed and ordered per Provider.  Transportation Needs: Unknown (2024)    PRAPARE - Transportation     Lack of Transportation

## 2024-12-12 NOTE — ASSESSMENT & PLAN NOTE
Orders:    [facility] - Roly Alegria MD, Neurology, [place] ([facility])

## 2024-12-12 NOTE — PATIENT INSTRUCTIONS
Please call neurology to schedule an appt for evaluation of memory loss and vertigo.      [facility]  [address]  [address]  [place] [address]    Tel: [phone]

## 2025-02-05 ENCOUNTER — OFFICE VISIT (OUTPATIENT)
Age: 85
End: 2025-02-05

## 2025-02-05 VITALS
HEART RATE: 71 BPM | WEIGHT: 184 LBS | DIASTOLIC BLOOD PRESSURE: 92 MMHG | HEIGHT: 72 IN | BODY MASS INDEX: 24.92 KG/M2 | TEMPERATURE: 97 F | SYSTOLIC BLOOD PRESSURE: 140 MMHG | OXYGEN SATURATION: 98 %

## 2025-02-05 DIAGNOSIS — I25.10 CORONARY ARTERY DISEASE INVOLVING NATIVE CORONARY ARTERY OF NATIVE HEART WITHOUT ANGINA PECTORIS: ICD-10-CM

## 2025-02-05 DIAGNOSIS — R41.89 IMPAIRED COGNITION: Primary | ICD-10-CM

## 2025-02-05 DIAGNOSIS — M47.816 LUMBAR SPONDYLOSIS: ICD-10-CM

## 2025-02-05 DIAGNOSIS — Z95.0 PRESENCE OF CARDIAC PACEMAKER: ICD-10-CM

## 2025-02-05 DIAGNOSIS — R79.9 ABNORMAL FINDING OF BLOOD CHEMISTRY, UNSPECIFIED: ICD-10-CM

## 2025-02-05 DIAGNOSIS — G47.33 OSA (OBSTRUCTIVE SLEEP APNEA): ICD-10-CM

## 2025-02-05 DIAGNOSIS — I48.0 PAROXYSMAL ATRIAL FIBRILLATION (HCC): ICD-10-CM

## 2025-02-05 DIAGNOSIS — I65.22 STENOSIS OF LEFT CAROTID ARTERY: ICD-10-CM

## 2025-02-05 DIAGNOSIS — M47.812 CERVICAL SPONDYLOSIS: ICD-10-CM

## 2025-02-05 DIAGNOSIS — R26.89 ABNORMALITY OF GAIT DUE TO IMPAIRMENT OF BALANCE: ICD-10-CM

## 2025-02-05 NOTE — PATIENT INSTRUCTIONS
[facility] General  MRI brain/cervical and lumbar spine  MRA head and neck to look at the carotid arteries  2.  Bloodwork when you go for the MRIs at [facility]  3. Neurocognitive testing-in depth cognitive evalution  4. Speech therapy/occupational therapy to work on the planning/organizational difficulties  5.  Continue physical therapy.  6. Sleep medicine consult with Dr. Rincon-here.  You will get called to schedule  You have a [employer] pacemaker  7. Get MRI pictures on CD from [facility] and bring the CD with you

## 2025-02-05 NOTE — PROGRESS NOTES
SUBJECTIVE:   Vahe Negron is a 84 y.o. male seen regarding the following:     Chief Complaint   Patient presents with    New Patient     Memory loss        HPI:  The patient has noticed memory difficulties for the past year. He has forgotten appointments.  His biggest concern is his stability. He has to use a cane now for the past 3-4 months.  He has had one year of progressive balance difficulties.   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 He is not sure if he is good at remembering people's name. Mood is good. He wakes up in the middle of the night and can't fall back asleep. He takes  a nap in the afternoon. He is unsure if he snores. He is tired during the daytime. He does have low back pain.   He goes to the [employer], does training,  maintains an active social life. No history of concussions/head injuries/strokes/TIAs.     Past Medical History, Past Surgical History, Family history, Social History, and Medications were all reviewed with the patient today and updated as necessary.     Current Outpatient Medications   Medication Sig Dispense Refill    rosuvastatin (CRESTOR) 20 MG tablet Take 1 tablet by mouth daily 90 tablet 4    sildenafil (VIAGRA) 100 MG tablet TAKE 1 TABLET BY MOUTH ONCE DAILY AS NEEDED FOR  ERECTILE  DYSFUNCTION 45 tablet 3    fluticasone (FLONASE) 50 MCG/ACT nasal spray 2 sprays by Each Nostril route daily 16 g 12    nitroGLYCERIN (NITROSTAT) 0.4 MG SL tablet Place 1 tablet under the tongue every 5 minutes as needed for Chest pain 25 tablet 5    ezetimibe (ZETIA) 10 MG tablet Take 1 tablet by mouth daily 90 tablet 3    amLODIPine (NORVASC) 10 MG tablet Take 1 tablet by mouth daily 90 tablet 3    rivaroxaban (XARELTO) 20 MG TABS tablet Take 1 tablet by mouth daily (with breakfast) 90 tablet 3    polyethylene glycol (GLYCOLAX) 17 GM/SCOOP powder Take 17 g by mouth daily 510 g 12    Omega-3 Fatty Acids (FISH OIL) 1000 MG capsule Take 1 capsule by mouth daily

## 2025-02-05 NOTE — PROGRESS NOTES
Vahe Negron is a 84 y.o. male (: 1940) presenting to address:    Chief Complaint   Patient presents with    New Patient     Memory loss        Medication list and allergies have been reviewed with Vahe Negron and updated as of today's date.     I have gone over all Medical, Surgical and Social History with Vahe Negron and updated/added the information accordingly.

## 2025-02-27 ENCOUNTER — NURSE ONLY (OUTPATIENT)
Age: 85
End: 2025-02-27
Payer: MEDICARE

## 2025-02-27 DIAGNOSIS — Z95.0 PRESENCE OF CARDIAC PACEMAKER: Primary | ICD-10-CM

## 2025-02-27 DIAGNOSIS — I48.19 PERSISTENT ATRIAL FIBRILLATION (HCC): ICD-10-CM

## 2025-02-27 DIAGNOSIS — R00.1 SYMPTOMATIC BRADYCARDIA: ICD-10-CM

## 2025-02-27 PROCEDURE — 93288 INTERROG EVL PM/LDLS PM IP: CPT | Performed by: INTERNAL MEDICINE

## 2025-03-03 ENCOUNTER — OFFICE VISIT (OUTPATIENT)
Facility: CLINIC | Age: 85
End: 2025-03-03

## 2025-03-03 VITALS
SYSTOLIC BLOOD PRESSURE: 152 MMHG | TEMPERATURE: 97.8 F | OXYGEN SATURATION: 97 % | BODY MASS INDEX: 25.19 KG/M2 | RESPIRATION RATE: 15 BRPM | HEIGHT: 72 IN | DIASTOLIC BLOOD PRESSURE: 92 MMHG | WEIGHT: 186 LBS | HEART RATE: 76 BPM

## 2025-03-03 DIAGNOSIS — E78.5 HYPERLIPIDEMIA, UNSPECIFIED HYPERLIPIDEMIA TYPE: ICD-10-CM

## 2025-03-03 DIAGNOSIS — M47.816 SPONDYLOSIS OF LUMBAR REGION WITHOUT MYELOPATHY OR RADICULOPATHY: ICD-10-CM

## 2025-03-03 DIAGNOSIS — I25.119 ATHEROSCLEROSIS OF NATIVE CORONARY ARTERY OF NATIVE HEART WITH ANGINA PECTORIS: ICD-10-CM

## 2025-03-03 DIAGNOSIS — R41.3 MEMORY LOSS: ICD-10-CM

## 2025-03-03 DIAGNOSIS — I10 ESSENTIAL (PRIMARY) HYPERTENSION: Primary | ICD-10-CM

## 2025-03-03 DIAGNOSIS — J30.9 ALLERGIC RHINITIS, UNSPECIFIED SEASONALITY, UNSPECIFIED TRIGGER: ICD-10-CM

## 2025-03-03 RX ORDER — FLUTICASONE PROPIONATE 50 MCG
2 SPRAY, SUSPENSION (ML) NASAL DAILY
Qty: 3 EACH | Refills: 4 | Status: SHIPPED | OUTPATIENT
Start: 2025-03-03

## 2025-03-03 RX ORDER — EZETIMIBE 10 MG/1
10 TABLET ORAL DAILY
Qty: 90 TABLET | Refills: 3 | Status: SHIPPED | OUTPATIENT
Start: 2025-03-03

## 2025-03-03 RX ORDER — AMLODIPINE BESYLATE 10 MG/1
10 TABLET ORAL DAILY
Qty: 90 TABLET | Refills: 3 | Status: SHIPPED | OUTPATIENT
Start: 2025-03-03

## 2025-03-03 RX ORDER — NITROGLYCERIN 0.4 MG/1
0.4 TABLET SUBLINGUAL EVERY 5 MIN PRN
Qty: 25 TABLET | Refills: 5 | Status: SHIPPED | OUTPATIENT
Start: 2025-03-03 | End: 2026-04-09

## 2025-03-03 SDOH — ECONOMIC STABILITY: FOOD INSECURITY: WITHIN THE PAST 12 MONTHS, YOU WORRIED THAT YOUR FOOD WOULD RUN OUT BEFORE YOU GOT MONEY TO BUY MORE.: NEVER TRUE

## 2025-03-03 SDOH — ECONOMIC STABILITY: FOOD INSECURITY: WITHIN THE PAST 12 MONTHS, THE FOOD YOU BOUGHT JUST DIDN'T LAST AND YOU DIDN'T HAVE MONEY TO GET MORE.: NEVER TRUE

## 2025-03-03 ASSESSMENT — PATIENT HEALTH QUESTIONNAIRE - PHQ9
SUM OF ALL RESPONSES TO PHQ QUESTIONS 1-9: 0
SUM OF ALL RESPONSES TO PHQ QUESTIONS 1-9: 0
2. FEELING DOWN, DEPRESSED OR HOPELESS: NOT AT ALL
1. LITTLE INTEREST OR PLEASURE IN DOING THINGS: NOT AT ALL
SUM OF ALL RESPONSES TO PHQ QUESTIONS 1-9: 0
SUM OF ALL RESPONSES TO PHQ QUESTIONS 1-9: 0

## 2025-03-03 ASSESSMENT — ENCOUNTER SYMPTOMS: RESPIRATORY NEGATIVE: 1

## 2025-03-03 NOTE — PROGRESS NOTES
Vahe Negron presents today for   Chief Complaint   Patient presents with    Dizziness     Patient states that he is still off balance       Is someone accompanying this pt? no    Is the patient using any DME equipment during OV? Uses cane    Depression Screening:      3/3/2025     8:52 AM 12/6/2024    10:33 AM 12/2/2024     9:12 AM 10/28/2024     8:15 AM 8/30/2024    10:05 AM 6/27/2024     8:42 AM 5/30/2024     8:30 AM   PHQ-9 Questionaire   Little interest or pleasure in doing things 0 0 0 0 0 0 0   Feeling down, depressed, or hopeless 0 0 0 0 0 0 0   PHQ-9 Total Score 0 0 0 0 0 0 0        NAVARRO 7-Anxiety       10/28/2024     8:15 AM 10/23/2023     3:10 PM 4/4/2023     1:43 PM   NAVARRO-7 SCREENING   Feeling nervous, anxious, or on edge Not at all Not at all Not at all   Not being able to stop or control worrying Not at all Not at all Not at all   Worrying too much about different things Not at all Not at all Not at all   Trouble relaxing Not at all Not at all Not at all   Being so restless that it is hard to sit still Not at all Not at all Not at all   Becoming easily annoyed or irritable Not at all Not at all Not at all   Feeling afraid as if something awful might happen Not at all Not at all Not at all   NAVARRO-7 Total Score 0 0 0          Learning Assessment:  No question data found.     Fall Risk       No data to display                   Travel Screening:    Travel Screening       Question Response    Have you been in contact with someone who was sick? No / Unsure    Do you have any of the following new or worsening symptoms? None of these    Have you traveled internationally or domestically in the last month? No          Travel History   Travel since 02/03/25    No documented travel since 02/03/25            Health Maintenance reviewed and discussed and ordered per Provider.  Transportation Needs: No Transportation Needs (3/3/2025)    PRAPARE - Transportation     Lack of Transportation (Medical): No     Lack of 
  Constitutional: Negative.    HENT: Negative.     Respiratory: Negative.     Cardiovascular: Negative.    All other systems reviewed and are negative.      Physical Exam  Vitals and nursing note reviewed.   Constitutional:       General: He is not in acute distress.     Appearance: Normal appearance.   HENT:      Head: Normocephalic and atraumatic.      Right Ear: External ear normal.      Left Ear: External ear normal.      Nose: Nose normal.      Mouth/Throat:      Mouth: Mucous membranes are moist.   Eyes:      Extraocular Movements: Extraocular movements intact.      Conjunctiva/sclera: Conjunctivae normal.   Cardiovascular:      Rate and Rhythm: Normal rate and regular rhythm.      Heart sounds: No murmur heard.     No friction rub. No gallop.   Pulmonary:      Effort: Pulmonary effort is normal.      Breath sounds: Normal breath sounds. No wheezing, rhonchi or rales.   Musculoskeletal:         General: Normal range of motion.      Cervical back: Normal range of motion.   Skin:     General: Skin is warm and dry.   Neurological:      Mental Status: He is alert and oriented to person, place, and time.      Coordination: Coordination normal.   Psychiatric:         Mood and Affect: Mood normal.         Behavior: Behavior normal.         Thought Content: Thought content normal.         Judgment: Judgment normal.                   Please note: Portions of this chart were created with Dragon medical speech to text program; unrecognized errors may exist.      An electronic signature was used to authenticate this note.    --Loida Molina MD

## 2025-03-03 NOTE — PATIENT INSTRUCTIONS
Please go and do the lab work that was ordered by me and by your neurologist.  You can do all of it at one time.   You can go to the lab at [facility], the lab at [facility], or any other lab of your choosing that is covered by your insurance.      Also, please schedule the imaging studies that were ordered by your neurologist.      Please try arthritis strength Tylenol for your low  back pain.

## 2025-03-10 ENCOUNTER — RESULTS FOLLOW-UP (OUTPATIENT)
Age: 85
End: 2025-03-10

## 2025-03-19 ENCOUNTER — HOSPITAL ENCOUNTER (OUTPATIENT)
Facility: HOSPITAL | Age: 85
Discharge: HOME | End: 2025-03-22
Payer: MEDICARE

## 2025-03-19 DIAGNOSIS — I48.0 PAROXYSMAL ATRIAL FIBRILLATION (HCC): ICD-10-CM

## 2025-03-19 DIAGNOSIS — G47.33 OSA (OBSTRUCTIVE SLEEP APNEA): ICD-10-CM

## 2025-03-19 DIAGNOSIS — E53.8 B12 DEFICIENCY: Primary | ICD-10-CM

## 2025-03-19 DIAGNOSIS — Z95.0 PRESENCE OF CARDIAC PACEMAKER: ICD-10-CM

## 2025-03-19 DIAGNOSIS — R79.9 ABNORMAL FINDING OF BLOOD CHEMISTRY, UNSPECIFIED: ICD-10-CM

## 2025-03-19 DIAGNOSIS — R26.89 ABNORMALITY OF GAIT DUE TO IMPAIRMENT OF BALANCE: ICD-10-CM

## 2025-03-19 DIAGNOSIS — I25.10 CORONARY ARTERY DISEASE INVOLVING NATIVE CORONARY ARTERY OF NATIVE HEART WITHOUT ANGINA PECTORIS: ICD-10-CM

## 2025-03-19 DIAGNOSIS — R41.89 IMPAIRED COGNITION: ICD-10-CM

## 2025-03-19 DIAGNOSIS — M47.816 LUMBAR SPONDYLOSIS: ICD-10-CM

## 2025-03-19 DIAGNOSIS — M47.812 CERVICAL SPONDYLOSIS: ICD-10-CM

## 2025-03-19 DIAGNOSIS — I65.22 STENOSIS OF LEFT CAROTID ARTERY: ICD-10-CM

## 2025-03-19 LAB
EST. AVERAGE GLUCOSE BLD GHB EST-MCNC: 100 MG/DL
HBA1C MFR BLD: 5.1 % (ref 4.2–5.6)
T4 FREE SERPL-MCNC: 0.9 NG/DL (ref 0.7–1.5)
TSH SERPL DL<=0.05 MIU/L-ACNC: 1.39 UIU/ML (ref 0.36–3.74)
VIT B12 SERPL-MCNC: 297 PG/ML (ref 211–911)

## 2025-03-19 PROCEDURE — 83036 HEMOGLOBIN GLYCOSYLATED A1C: CPT

## 2025-03-19 PROCEDURE — 84155 ASSAY OF PROTEIN SERUM: CPT

## 2025-03-19 PROCEDURE — 84439 ASSAY OF FREE THYROXINE: CPT

## 2025-03-19 PROCEDURE — 82607 VITAMIN B-12: CPT

## 2025-03-19 PROCEDURE — 36415 COLL VENOUS BLD VENIPUNCTURE: CPT

## 2025-03-19 PROCEDURE — 82784 ASSAY IGA/IGD/IGG/IGM EACH: CPT

## 2025-03-19 PROCEDURE — 86334 IMMUNOFIX E-PHORESIS SERUM: CPT

## 2025-03-19 PROCEDURE — 84165 PROTEIN E-PHORESIS SERUM: CPT

## 2025-03-19 PROCEDURE — 84443 ASSAY THYROID STIM HORMONE: CPT

## 2025-03-19 RX ORDER — MULTIVITAMIN WITH IRON
500 TABLET ORAL DAILY
Qty: 90 TABLET | Refills: 3 | Status: SHIPPED | OUTPATIENT
Start: 2025-03-19 | End: 2026-03-19

## 2025-03-20 ENCOUNTER — HOSPITAL ENCOUNTER (OUTPATIENT)
Facility: HOSPITAL | Age: 85
Discharge: HOME | End: 2025-03-23
Payer: MEDICARE

## 2025-03-20 ENCOUNTER — RESULTS FOLLOW-UP (OUTPATIENT)
Facility: HOSPITAL | Age: 85
End: 2025-03-20

## 2025-03-20 DIAGNOSIS — I10 ESSENTIAL (PRIMARY) HYPERTENSION: Chronic | ICD-10-CM

## 2025-03-20 DIAGNOSIS — E78.5 HYPERLIPIDEMIA, UNSPECIFIED HYPERLIPIDEMIA TYPE: Chronic | ICD-10-CM

## 2025-03-20 LAB
ALBUMIN SERPL-MCNC: 3.7 G/DL (ref 3.4–5)
ALBUMIN/GLOB SERPL: 1.2 (ref 0.8–1.7)
ALP SERPL-CCNC: 127 U/L (ref 45–117)
ALT SERPL-CCNC: 32 U/L (ref 16–61)
ANION GAP SERPL CALC-SCNC: 8 MMOL/L (ref 3–18)
AST SERPL-CCNC: 32 U/L (ref 10–38)
BILIRUB SERPL-MCNC: 1.5 MG/DL (ref 0.2–1)
BUN SERPL-MCNC: 9 MG/DL (ref 7–18)
BUN/CREAT SERPL: 9 (ref 12–20)
CALCIUM SERPL-MCNC: 9.1 MG/DL (ref 8.5–10.1)
CHLORIDE SERPL-SCNC: 108 MMOL/L (ref 100–111)
CHOLEST SERPL-MCNC: 105 MG/DL
CO2 SERPL-SCNC: 26 MMOL/L (ref 21–32)
CREAT SERPL-MCNC: 1.03 MG/DL (ref 0.6–1.3)
GLOBULIN SER CALC-MCNC: 3.1 G/DL (ref 2–4)
GLUCOSE SERPL-MCNC: 93 MG/DL (ref 74–99)
HDLC SERPL-MCNC: 53 MG/DL (ref 40–60)
HDLC SERPL: 2 (ref 0–5)
LDLC SERPL CALC-MCNC: 40 MG/DL (ref 0–100)
LIPID PANEL: NORMAL
POTASSIUM SERPL-SCNC: 4.1 MMOL/L (ref 3.5–5.5)
PROT SERPL-MCNC: 6.8 G/DL (ref 6.4–8.2)
SODIUM SERPL-SCNC: 142 MMOL/L (ref 136–145)
TRIGL SERPL-MCNC: 60 MG/DL
VLDLC SERPL CALC-MCNC: 12 MG/DL

## 2025-03-20 PROCEDURE — 80053 COMPREHEN METABOLIC PANEL: CPT

## 2025-03-20 PROCEDURE — 80061 LIPID PANEL: CPT

## 2025-03-20 PROCEDURE — 36415 COLL VENOUS BLD VENIPUNCTURE: CPT

## 2025-03-20 NOTE — TELEPHONE ENCOUNTER
Attempted to contact pt regarding lab results. Unable to leave a voicemail due to the mailbox being full.     ----- Message from Dr. Roly Alegria MD sent at 3/19/2025  2:58 PM EDT -----  Please inform the patient that his labs are normal except his B12 level is on the low side.  I am going to send in a prescription for oral B12 supplement  ----- Message -----  From: Lisy Palomares Incoming [facility] For [facility]  Sent: 3/19/2025  12:33 PM EDT  To: Roly Alegria MD

## 2025-03-24 LAB
ALBUMIN SERPL ELPH-MCNC: 4.2 G/DL (ref 2.9–4.4)
ALBUMIN/GLOB SERPL: 1.7 (ref 0.7–1.7)
ALPHA1 GLOB SERPL ELPH-MCNC: 0.2 G/DL (ref 0–0.4)
ALPHA2 GLOB SERPL ELPH-MCNC: 0.7 G/DL (ref 0.4–1)
B-GLOBULIN SERPL ELPH-MCNC: 0.9 G/DL (ref 0.7–1.3)
GAMMA GLOB SERPL ELPH-MCNC: 0.9 G/DL (ref 0.4–1.8)
GLOBULIN SER-MCNC: 2.6 G/DL (ref 2.2–3.9)
IGA SERPL-MCNC: 139 MG/DL (ref 61–437)
IGG SERPL-MCNC: 925 MG/DL (ref 603–1613)
IGM SERPL-MCNC: 154 MG/DL (ref 15–143)
INTERPRETATION SERPL IEP-IMP: ABNORMAL
M PROTEIN SERPL ELPH-MCNC: ABNORMAL G/DL
PROT SERPL-MCNC: 6.8 G/DL (ref 6–8.5)

## 2025-04-29 ENCOUNTER — OFFICE VISIT (OUTPATIENT)
Age: 85
End: 2025-04-29
Payer: MEDICARE

## 2025-04-29 VITALS
DIASTOLIC BLOOD PRESSURE: 68 MMHG | OXYGEN SATURATION: 98 % | SYSTOLIC BLOOD PRESSURE: 128 MMHG | WEIGHT: 182 LBS | BODY MASS INDEX: 24.65 KG/M2 | HEART RATE: 64 BPM | HEIGHT: 72 IN

## 2025-04-29 DIAGNOSIS — I25.10 CORONARY ARTERY DISEASE INVOLVING NATIVE CORONARY ARTERY OF NATIVE HEART WITHOUT ANGINA PECTORIS: ICD-10-CM

## 2025-04-29 DIAGNOSIS — I10 ESSENTIAL (PRIMARY) HYPERTENSION: ICD-10-CM

## 2025-04-29 DIAGNOSIS — I48.19 PERSISTENT ATRIAL FIBRILLATION (HCC): Primary | ICD-10-CM

## 2025-04-29 DIAGNOSIS — R00.1 SYMPTOMATIC BRADYCARDIA: ICD-10-CM

## 2025-04-29 DIAGNOSIS — Z95.0 PRESENCE OF CARDIAC PACEMAKER: ICD-10-CM

## 2025-04-29 DIAGNOSIS — N52.9 ERECTILE DYSFUNCTION, UNSPECIFIED ERECTILE DYSFUNCTION TYPE: ICD-10-CM

## 2025-04-29 DIAGNOSIS — E78.00 PURE HYPERCHOLESTEROLEMIA, UNSPECIFIED: ICD-10-CM

## 2025-04-29 PROCEDURE — 1036F TOBACCO NON-USER: CPT | Performed by: INTERNAL MEDICINE

## 2025-04-29 PROCEDURE — 3074F SYST BP LT 130 MM HG: CPT | Performed by: INTERNAL MEDICINE

## 2025-04-29 PROCEDURE — 1159F MED LIST DOCD IN RCRD: CPT | Performed by: INTERNAL MEDICINE

## 2025-04-29 PROCEDURE — G8427 DOCREV CUR MEDS BY ELIG CLIN: HCPCS | Performed by: INTERNAL MEDICINE

## 2025-04-29 PROCEDURE — 1126F AMNT PAIN NOTED NONE PRSNT: CPT | Performed by: INTERNAL MEDICINE

## 2025-04-29 PROCEDURE — 99214 OFFICE O/P EST MOD 30 MIN: CPT | Performed by: INTERNAL MEDICINE

## 2025-04-29 PROCEDURE — 3078F DIAST BP <80 MM HG: CPT | Performed by: INTERNAL MEDICINE

## 2025-04-29 PROCEDURE — 1160F RVW MEDS BY RX/DR IN RCRD: CPT | Performed by: INTERNAL MEDICINE

## 2025-04-29 PROCEDURE — 93000 ELECTROCARDIOGRAM COMPLETE: CPT | Performed by: INTERNAL MEDICINE

## 2025-04-29 PROCEDURE — G8420 CALC BMI NORM PARAMETERS: HCPCS | Performed by: INTERNAL MEDICINE

## 2025-04-29 PROCEDURE — 1123F ACP DISCUSS/DSCN MKR DOCD: CPT | Performed by: INTERNAL MEDICINE

## 2025-04-29 ASSESSMENT — PATIENT HEALTH QUESTIONNAIRE - PHQ9
SUM OF ALL RESPONSES TO PHQ QUESTIONS 1-9: 0
SUM OF ALL RESPONSES TO PHQ QUESTIONS 1-9: 0
1. LITTLE INTEREST OR PLEASURE IN DOING THINGS: NOT AT ALL
2. FEELING DOWN, DEPRESSED OR HOPELESS: NOT AT ALL

## 2025-04-29 ASSESSMENT — ANXIETY QUESTIONNAIRES
GAD7 TOTAL SCORE: 0
3. WORRYING TOO MUCH ABOUT DIFFERENT THINGS: NOT AT ALL
4. TROUBLE RELAXING: NOT AT ALL
1. FEELING NERVOUS, ANXIOUS, OR ON EDGE: NOT AT ALL
6. BECOMING EASILY ANNOYED OR IRRITABLE: NOT AT ALL
5. BEING SO RESTLESS THAT IT IS HARD TO SIT STILL: NOT AT ALL
7. FEELING AFRAID AS IF SOMETHING AWFUL MIGHT HAPPEN: NOT AT ALL
2. NOT BEING ABLE TO STOP OR CONTROL WORRYING: NOT AT ALL

## 2025-04-29 ASSESSMENT — ENCOUNTER SYMPTOMS
NAUSEA: 0
ABDOMINAL PAIN: 0
COUGH: 0
SORE THROAT: 0
ABDOMINAL DISTENTION: 0
VOMITING: 0
SHORTNESS OF BREATH: 0

## 2025-04-29 NOTE — PROGRESS NOTES
Vahe Negron presents today for   Chief Complaint   Patient presents with    Follow-up     6 month       Vahe Negron preferred language for health care discussion is english/other.    Is someone accompanying this pt? no    Is the patient using any DME equipment during OV? no    Depression Screening:  Depression: Not at risk (4/29/2025)    PHQ-2     PHQ-2 Score: 0        Learning Assessment:  Who is the primary learner? Patient    What is the preferred language for health care of the primary learner? ENGLISH    How does the primary learner prefer to learn new concepts? DEMONSTRATION    Answered By patient    Relationship to Learner SELF           Pt currently taking Anticoagulant therapy? Xarelto 20 mg daily    Pt currently taking Antiplatelet therapy ? Aspirin 81 mg daily      Coordination of Care:  1. Have you been to the ER, urgent care clinic since your last visit? Hospitalized since your last visit? no    2. Have you seen or consulted any other health care providers outside of the [facility] since your last visit? Include any pap smears or colon screening. no

## 2025-04-29 NOTE — PROGRESS NOTES
04/29/25     Vahe Negron  is a 85 y.o. male     Chief Complaint   Patient presents with    Follow-up     6 month       HPI    Patient presents for a follow-up office visit.  He has a known history of coronary artery disease, status post a non-ST elevation myocardial infarction in 2009.  He underwent a cardiac catheterization at that time and was found to have moderate, but nonobstructive two-vessel coronary disease involving his left circumflex and a right-sided posterior descending artery, that were assessed by pressure wire.  The patient has been maintained on medical therapy since that time.  The patient last underwent a pharmacological nuclear stress test in December 2015, which was a normal study, showing no ischemia, normal left ventricle ejection fraction. EF 67%.  He has a history of mild to moderate carotid artery disease,  last evaluated with a carotid duplex in August 2018 which demonstrated moderate carotid disease of the left ICA and mild plaquing of the right.  He also underwent an abdominal ultrasound which showed a very small infrarenal AAA.      Patient last underwent a pharmacologic nuclear stress test in November 2019 which was a normal and low risk study.  He recently underwent follow-up abdominal ultrasound and carotid duplex scan in August 2020 which showed a small AAA and moderate bilateral carotid artery disease essentially unchanged compared to his previous studies.      Patient was diagnosed with new onset atrial fibrillation at last office visit in April 2021.  His rates were on the slow side without any rate slowing agents.  He underwent an echocardiogram in May 2021 which showed preserved LV systolic function, EF 55 to 60%, mild concentric LVH, severe left atrial enlargement, diastolic dysfunction, mild tricuspid regurgitation with normal PA pressure.   He was started on Xarelto for oral anticoagulation.    He was diagnosed with symptomatic bradycardia in 2022, so he underwent

## 2025-09-01 ASSESSMENT — ENCOUNTER SYMPTOMS: RESPIRATORY NEGATIVE: 1

## 2025-09-02 ENCOUNTER — OFFICE VISIT (OUTPATIENT)
Facility: CLINIC | Age: 85
End: 2025-09-02

## 2025-09-02 VITALS
HEIGHT: 72 IN | DIASTOLIC BLOOD PRESSURE: 82 MMHG | TEMPERATURE: 97.6 F | RESPIRATION RATE: 17 BRPM | BODY MASS INDEX: 24.52 KG/M2 | OXYGEN SATURATION: 97 % | WEIGHT: 181 LBS | HEART RATE: 82 BPM | SYSTOLIC BLOOD PRESSURE: 137 MMHG

## 2025-09-02 DIAGNOSIS — E78.2 MIXED HYPERLIPIDEMIA: ICD-10-CM

## 2025-09-02 DIAGNOSIS — Z95.0 PRESENCE OF CARDIAC PACEMAKER: ICD-10-CM

## 2025-09-02 DIAGNOSIS — I48.19 PERSISTENT ATRIAL FIBRILLATION (HCC): ICD-10-CM

## 2025-09-02 DIAGNOSIS — I10 ESSENTIAL (PRIMARY) HYPERTENSION: Primary | Chronic | ICD-10-CM

## 2025-09-02 DIAGNOSIS — I25.10 CORONARY ARTERY DISEASE INVOLVING NATIVE CORONARY ARTERY OF NATIVE HEART WITHOUT ANGINA PECTORIS: ICD-10-CM

## 2025-09-02 DIAGNOSIS — R41.3 MEMORY LOSS: ICD-10-CM

## 2025-09-02 SDOH — ECONOMIC STABILITY: FOOD INSECURITY: WITHIN THE PAST 12 MONTHS, YOU WORRIED THAT YOUR FOOD WOULD RUN OUT BEFORE YOU GOT MONEY TO BUY MORE.: NEVER TRUE

## 2025-09-02 SDOH — ECONOMIC STABILITY: FOOD INSECURITY: WITHIN THE PAST 12 MONTHS, THE FOOD YOU BOUGHT JUST DIDN'T LAST AND YOU DIDN'T HAVE MONEY TO GET MORE.: NEVER TRUE

## 2025-09-02 ASSESSMENT — PATIENT HEALTH QUESTIONNAIRE - PHQ9
SUM OF ALL RESPONSES TO PHQ QUESTIONS 1-9: 0
SUM OF ALL RESPONSES TO PHQ QUESTIONS 1-9: 0
2. FEELING DOWN, DEPRESSED OR HOPELESS: NOT AT ALL
SUM OF ALL RESPONSES TO PHQ QUESTIONS 1-9: 0
1. LITTLE INTEREST OR PLEASURE IN DOING THINGS: NOT AT ALL
SUM OF ALL RESPONSES TO PHQ QUESTIONS 1-9: 0

## (undated) DEVICE — KENDALL RADIOLUCENT FOAM MONITORING ELECTRODE RECTANGULAR SHAPE: Brand: KENDALL

## (undated) DEVICE — KIT EVAC 400CC DIA1/4IN H PAT 12.5IN 3 SPR RND SHP PVC DRN

## (undated) DEVICE — BLADE SAW W098XL295IN THK005IN CUT THK0058IN REPL SAG W

## (undated) DEVICE — LIMB HOLDER, WRIST/ANKLE: Brand: DEROYAL

## (undated) DEVICE — 4-PORT MANIFOLD: Brand: NEPTUNE 2

## (undated) DEVICE — PADDING CAST W6INXL4YD ST COT COHESIVE HND TEARABLE SPEC

## (undated) DEVICE — SLIM BODY SKIN STAPLER: Brand: APPOSE ULC

## (undated) DEVICE — Device

## (undated) DEVICE — DRESSING HEMSTAT W4XL4IN 4 PLY WHT IMPREG KAOLIN HYDRPHLC

## (undated) DEVICE — PACK SURG BSHR TOT KNEE LF

## (undated) DEVICE — STERILE POLYISOPRENE POWDER-FREE SURGICAL GLOVES: Brand: PROTEXIS

## (undated) DEVICE — T4 HOOD

## (undated) DEVICE — 3M™ IOBAN™ 2 ANTIMICROBIAL INCISE DRAPE 6640EZ: Brand: IOBAN™ 2

## (undated) DEVICE — BLADE TNGE REG 6IN WOOD STRL -- CONVERT TO ITEM 153408

## (undated) DEVICE — DRESSING FOAM 4X6 DISP POSTOP MEPILEX BORD AG

## (undated) DEVICE — ZIMMER® STERILE DISPOSABLE TOURNIQUET CUFF WITH PLC, DUAL PORT, SINGLE BLADDER, 34 IN. (86 CM)

## (undated) DEVICE — MEDI-TRACE CADENCE ADULT, DEFIBRILLATION ELECTRODE -RTS  (10 PR/PK) - PHYSIO-CONTROL: Brand: MEDI-TRACE CADENCE

## (undated) DEVICE — COVADERM: Brand: DEROYAL

## (undated) DEVICE — 3M™ BAIR PAWS FLEX™ WARMING GOWN, STANDARD, 20 PER CASE 81003: Brand: BAIR PAWS™

## (undated) DEVICE — WATERPROOF, BACTERIA PROOF DRESSING WITH ABSORBENT SEE THROUGH PAD: Brand: OPSITE POST-OP VISIBLE 30X10CM CTN 20

## (undated) DEVICE — INTENDED FOR TISSUE SEPARATION, AND OTHER PROCEDURES THAT REQUIRE A SHARP SURGICAL BLADE TO PUNCTURE OR CUT.: Brand: BARD-PARKER SAFETY BLADES SIZE 10, STERILE

## (undated) DEVICE — DRAPE TWL SURG 16X26IN BLU ORB04] ALLCARE INC]

## (undated) DEVICE — (D)PREP SKN CHLRAPRP APPL 26ML -- CONVERT TO ITEM 371833

## (undated) DEVICE — NEEDLE HYPO 18GA L1.5IN PNK S STL HUB POLYPR SHLD REG BVL

## (undated) DEVICE — SYR LR LCK 1ML GRAD NSAF 30ML --

## (undated) DEVICE — DRAPE SURG W25XL50IN E OPN CIR BND BG

## (undated) DEVICE — HANDPIECE SET WITH HIGH FLOW TIP AND SUCTION TUBE: Brand: INTERPULSE

## (undated) DEVICE — SUTURE VCRL SZ 2 L27IN ABSRB VLT L65MM TP-1 1/2 CIR J649G

## (undated) DEVICE — PLASMABLADE PS200-040 4.0: Brand: PLASMABLADE™

## (undated) DEVICE — Z DISCONTINUED BY MEDLINE USE 2711682 TRAY SKIN PREP DRY W/ PREM GLV

## (undated) DEVICE — SUTURE MCRYL SZ 4 0 L18IN ABSRB VLT PS 1 L24MM 3 8 CIR REV Y682H

## (undated) DEVICE — CABLE PACE ALGTR CLP SAF 12FT --

## (undated) DEVICE — REM POLYHESIVE ADULT PATIENT RETURN ELECTRODE: Brand: VALLEYLAB

## (undated) DEVICE — SUTURE ABSORBABLE BRAIDED 2-0 CT-1 27 IN UD VICRYL J259H

## (undated) DEVICE — DRAPE STRL ANGIO W/ 2 FLD COLLCTN PCH 86X135 218X343 CM 2

## (undated) DEVICE — SOLUTION IV 1000ML 0.9% SOD CHL

## (undated) DEVICE — KIT CLN UP BON SECOURS MARYV

## (undated) DEVICE — GUIDEWIRE VASC L150CM DIA0.035IN FLX END L7CM J 3MM PTFE

## (undated) DEVICE — HI-TORQUE VERSACORE FLOPPY GUIDE WIRE SYSTEM 145 CM: Brand: HI-TORQUE VERSACORE

## (undated) DEVICE — DRSG PATCH ANTIMIC 1INX7.0MM -- CONVERT TO ITEM 356054

## (undated) DEVICE — KENDALL SCD EXPRESS SLEEVES, KNEE LENGTH, MEDIUM: Brand: KENDALL SCD

## (undated) DEVICE — INTRO SHTH 7FR 13X20CM -- TEARAWAY

## (undated) DEVICE — BNDG CMPR ELAS KNT VEL 6INX5YD -- MEDICHOICE

## (undated) DEVICE — SOFT SILICONE HYDROCELLULAR SACRUM DRESSING WITH LOCK AWAY LAYER: Brand: ALLEVYN LIFE SACRUM (LARGE) PACK OF 10

## (undated) DEVICE — SHEET,DRAPE,70X100,STERILE: Brand: MEDLINE

## (undated) DEVICE — WATERPROOF, BACTERIA PROOF DRESSING WITH ABSORBENT SEE THROUGH PAD: Brand: OPSITE POST-OP VISIBLE 35X10CM CTN 20

## (undated) DEVICE — 3M™ TEGADERM™ TRANSPARENT FILM DRESSING FRAME STYLE, 1626W, 4 IN X 4-3/4 IN (10 CM X 12 CM), 50/CT 4CT/CASE: Brand: 3M™ TEGADERM™

## (undated) DEVICE — BIT DRL QR TOT KNEE 1/8IN SS -- DISP STRL 2/PK

## (undated) DEVICE — SUTURE COAT VCRL PC 5 PRECIS COSM CONVENTIONAL CUT PRIM 3 8 J823H

## (undated) DEVICE — NEEDLE SPNL 22GA L3.5IN BLK HUB S STL REG WALL FIT STYL W/

## (undated) DEVICE — SOLUTION IRRIG 3000ML 0.9% SOD CHL FLX CONT 0797208] ICU MEDICAL INC]

## (undated) DEVICE — COVER LT HNDL BLU STRL -- MEDICHOICE